# Patient Record
Sex: MALE | Race: WHITE | NOT HISPANIC OR LATINO | Employment: OTHER | URBAN - METROPOLITAN AREA
[De-identification: names, ages, dates, MRNs, and addresses within clinical notes are randomized per-mention and may not be internally consistent; named-entity substitution may affect disease eponyms.]

---

## 2017-01-04 ENCOUNTER — ALLIED HEALTH/NURSE VISIT (OUTPATIENT)
Dept: DERMATOLOGY | Facility: CLINIC | Age: 62
End: 2017-01-04

## 2017-01-04 DIAGNOSIS — L40.9 PSORIASIS: ICD-10-CM

## 2017-01-04 NOTE — PROGRESS NOTES
[facility] Dermatology Phototherapy Record  1. Tony Mcnair is a 61 year old male is here today for phototherapy (UVB) treatment for Psoriasis.        Changes or new medications since last treatment:   NO    New medical conditions or problems since last treatment:   NO    Any problems with last phototherapy treatment?    NO    2. The patient tolerated phototherapy without complication    Patient will return for next UVB treatment, per protocol.     Patient to see provider every 4-12 weeks for follow-up during treatment.      All questions and concerns discussed with patient in clinic today.      Evy Avina

## 2017-01-11 ENCOUNTER — ALLIED HEALTH/NURSE VISIT (OUTPATIENT)
Dept: DERMATOLOGY | Facility: CLINIC | Age: 62
End: 2017-01-11

## 2017-01-11 DIAGNOSIS — L40.9 PSORIASIS: ICD-10-CM

## 2017-01-11 NOTE — PROGRESS NOTES
[facility] Dermatology Phototherapy Record  1. Tony Mcnair is a 61 year old male is here today for phototherapy (UVB) treatment for psoriasis.        Changes or new medications since last treatment:   NO    New medical conditions or problems since last treatment:   NO    Any problems with last phototherapy treatment?    NO    2. The patient tolerated phototherapy without complication    Patient will return for next UVB treatment, per protocol.     Patient to see provider every 4-12 weeks for follow-up during treatment.      All questions and concerns discussed with patient in clinic today.      Mary Aponte

## 2017-01-18 ENCOUNTER — ALLIED HEALTH/NURSE VISIT (OUTPATIENT)
Dept: DERMATOLOGY | Facility: CLINIC | Age: 62
End: 2017-01-18

## 2017-01-18 DIAGNOSIS — L40.9 PSORIASIS: ICD-10-CM

## 2017-01-18 NOTE — PROGRESS NOTES
[facility] Dermatology Phototherapy Record  1. Tony Mcnair is a 61 year old male is here today for phototherapy (UVB) treatment for Psoriasis.        Changes or new medications since last treatment:   NO    New medical conditions or problems since last treatment:   NO    Any problems with last phototherapy treatment?    NO    2. The patient tolerated phototherapy without complication    Patient will return for next UVB treatment, per protocol.     Patient to see provider every 4-12 weeks for follow-up during treatment.      All questions and concerns discussed with patient in clinic today.      Evy Avina

## 2017-01-25 ENCOUNTER — ALLIED HEALTH/NURSE VISIT (OUTPATIENT)
Dept: DERMATOLOGY | Facility: CLINIC | Age: 62
End: 2017-01-25

## 2017-01-25 DIAGNOSIS — L40.9 PSORIASIS: ICD-10-CM

## 2017-01-25 NOTE — PROGRESS NOTES
[facility] Dermatology Phototherapy Record  1. Tony Mcnair is a 61 year old male is here today for phototherapy (UVB) treatment for psoriasis.        Changes or new medications since last treatment:   NO    New medical conditions or problems since last treatment:   NO    Any problems with last phototherapy treatment?    NO    2. The patient tolerated phototherapy without complication    Patient will return for next UVB treatment, per protocol.     Patient to see provider every 4-12 weeks for follow-up during treatment.      All questions and concerns discussed with patient in clinic today.      Mary Aponte

## 2017-02-01 ENCOUNTER — ALLIED HEALTH/NURSE VISIT (OUTPATIENT)
Dept: DERMATOLOGY | Facility: CLINIC | Age: 62
End: 2017-02-01

## 2017-02-01 DIAGNOSIS — L40.9 PSORIASIS: ICD-10-CM

## 2017-02-01 NOTE — PROGRESS NOTES
[facility] Dermatology Phototherapy Record  1. Tony Mcnair is a 61 year old male is here today for phototherapy (UVB) treatment for Psoriasis .        Changes or new medications since last treatment:   NO    New medical conditions or problems since last treatment:   NO    Any problems with last phototherapy treatment?    NO    2. The patient tolerated phototherapy without complication    Patient will return  for next UVB treatment, per protocol.     Patient to see provider every 4-12 weeks for follow-up during treatment.      All questions and concerns discussed with patient in clinic today.      Evy Avina

## 2017-02-08 ENCOUNTER — ALLIED HEALTH/NURSE VISIT (OUTPATIENT)
Dept: DERMATOLOGY | Facility: CLINIC | Age: 62
End: 2017-02-08

## 2017-02-08 DIAGNOSIS — L40.9 PSORIASIS: ICD-10-CM

## 2017-02-08 NOTE — PROGRESS NOTES
[facility] Dermatology Phototherapy Record  1. Tony Mcnair is a 61 year old male is here today for phototherapy (UVB) treatment for Psoriasis.        Changes or new medications since last treatment:   NO    New medical conditions or problems since last treatment:   NO    Any problems with last phototherapy treatment?    NO    2. The patient tolerated phototherapy without complication    Patient will return for next UVB treatment, per protocol.     Patient to see provider every 4-12 weeks for follow-up during treatment.      All questions and concerns discussed with patient in clinic today.      Evy Avina

## 2017-02-15 ENCOUNTER — ALLIED HEALTH/NURSE VISIT (OUTPATIENT)
Dept: DERMATOLOGY | Facility: CLINIC | Age: 62
End: 2017-02-15

## 2017-02-15 DIAGNOSIS — L40.9 PSORIASIS: ICD-10-CM

## 2017-02-15 NOTE — PROGRESS NOTES
[facility] Dermatology Phototherapy Record  1. Tony Mcnair is a 61 year old male is here today for phototherapy (UVB) treatment for Psoriasis.        Changes or new medications since last treatment:   NO    New medical conditions or problems since last treatment:   NO    Any problems with last phototherapy treatment?    NO    2. The patient tolerated phototherapy without complication    Patient will return for next UVB treatment, per protocol.     Patient to see provider every 4-12 weeks for follow-up during treatment.      All questions and concerns discussed with patient in clinic today.      Evy Avina

## 2017-02-22 ENCOUNTER — ALLIED HEALTH/NURSE VISIT (OUTPATIENT)
Dept: DERMATOLOGY | Facility: CLINIC | Age: 62
End: 2017-02-22

## 2017-02-22 DIAGNOSIS — L40.9 PSORIASIS: ICD-10-CM

## 2017-02-22 NOTE — MR AVS SNAPSHOT
After Visit Summary   2/22/2017    Tony Mcnair    MRN: [id number]           Patient Information     Date Of Birth          1955        Visit Information        Provider Department      2/22/2017 7:25 AM UC DERM LIGHT TREATMENT [facility] Dermatology        Today's Diagnoses     Psoriasis           Follow-ups after your visit        Your next 10 appointments already scheduled     Mar 01, 2017  7:30 AM CST   (Arrive by 7:15 AM)   Light Treatment Visit with UC DERM LIGHT TREATMENT   [facility] Dermatology ([facility])    [address] [place] [address]   [phone]            Mar 08, 2017  7:30 AM CST   (Arrive by 7:15 AM)   Light Treatment Visit with UC DERM LIGHT TREATMENT   [facility] Dermatology ([facility])    [address] [place] [address]   [phone]            Mar 15, 2017  7:30 AM CDT   (Arrive by 7:15 AM)   Light Treatment Visit with UC DERM LIGHT TREATMENT   [facility] Dermatology ([facility])    [address] [place] [address]   [phone]            Mar 15, 2017  8:00 AM CDT   (Arrive by 7:45 AM)   Return Visit with Sandhya Mckinney MD   [facility] Dermatology ([facility])    [address] [place] [address]   [phone]            Mar 22, 2017  7:30 AM CDT   (Arrive by 7:15 AM)   Light Treatment Visit with UC DERM LIGHT TREATMENT   [facility] Dermatology ([facility])    [address] [place] [address]   [phone]            Mar 29, 2017  7:30 AM CDT   (Arrive by 7:15 AM)   Light Treatment Visit with UC DERM LIGHT TREATMENT   [facility] Dermatology ([facility])    [address] [place] [address]   [phone]              Who to contact     Please call your clinic at  [phone] to:    Ask questions about your health    Make or cancel appointments    Discuss your medicines    Learn about your test results    Speak to your doctor   If you have compliments or concerns about an experience at your clinic, or if you wish to file a complaint, please contact [facility] Patient Relations at [phone] or email us at [email or web address]         Additional Information About Your Visit        [employer] Information     [employer] gives you secure access to your electronic health record. If you see a primary care provider, you can also send messages to your care team and make appointments. If you have questions, please call your primary care clinic.  If you do not have a primary care provider, please call [phone] and they will assist you.      [employer] is an electronic gateway that provides easy, online access to your medical records. With [employer], you can request a clinic appointment, read your test results, renew a prescription or communicate with your care team.     To access your existing account, please contact your [facility] or call [phone] for assistance.        Care EveryWhere ID     This is your Care EveryWhere ID. This could be used by other organizations to access your [place] medical records  [id number]         Blood Pressure from Last 3 Encounters:   01/23/15 (!) 145/100   10/08/14 133/81   03/03/14 131/89    Weight from Last 3 Encounters:   01/23/15 78 kg (172 lb)   10/08/14 75.3 kg (166 lb)   06/25/14 78.2 kg (172 lb 6.4 oz)              We Performed the Following     CHARGE - PHOTOTHERAPY NBUVB (UV LIGHT)     PROCEDURE - PHOTOTHERAPY NBUVB        Primary Care Provider Office Phone # Fax #    William Morton [phone]       [address] [address]        Thank you!     Thank you for choosing [facility] DERMATOLOGY  for your care. Our  goal is always to provide you with excellent care. Hearing back from our patients is one way we can continue to improve our services. Please take a few minutes to complete the written survey that you may receive in the mail after your visit with us. Thank you!             Your Updated Medication List - Protect others around you: Learn how to safely use, store and throw away your medicines at www.disposemymeds.org.          This list is accurate as of: 2/22/17  8:27 AM.  Always use your most recent med list.                   Brand Name Dispense Instructions for use    BIOTIN PO      Take  by mouth daily.       calcipotriene-betameth diprop 0.005-0.064 % Oint    TACLONEX    60 g    Externally apply  topically 2 times daily as needed.       COD LIVER OIL PO      Take  by mouth daily.       CONCERTA 18 MG CR tablet   Generic drug:  methylphenidate ER      Take 18 mg by mouth every morning.       DAILY MULTIVITAMIN PO      Take  by mouth daily.       fluocinonide 0.05 % solution    LIDEX    60 mL    Apply  topically 2 times daily for itchy scalp or scalp psoriasis       pimecrolimus 1 % cream    ELIDEL    60 g    Apply topically 2 times daily as needed For psoriasis of face or genitals       REFRESH P.M. Oint     1 Tube    Apply 1 inch to eye nightly as needed       tacrolimus 0.1 % ointment    PROTOPIC    60 g    Apply topically 2 times daily Apply to areas around eyes as needed       Vitamin C 100 MG Tabs      Take  by mouth daily.       VITAMIN E COMPLEX PO      Take by mouth daily

## 2017-02-22 NOTE — PROGRESS NOTES
[facility] Dermatology Phototherapy Record  1. Tony Mcnair is a 61 year old male is here today for phototherapy (UVB) treatment for Psoriasis.        Changes or new medications since last treatment:   NO    New medical conditions or problems since last treatment:   NO    Any problems with last phototherapy treatment?    NO    2. The patient tolerated phototherapy without complication    Patient will return for next UVB treatment, per protocol.     Patient to see provider every 4-12 weeks for follow-up during treatment.      All questions and concerns discussed with patient in clinic today.      Evy Avina

## 2017-03-01 ENCOUNTER — ALLIED HEALTH/NURSE VISIT (OUTPATIENT)
Dept: DERMATOLOGY | Facility: CLINIC | Age: 62
End: 2017-03-01

## 2017-03-01 DIAGNOSIS — L40.9 PSORIASIS: ICD-10-CM

## 2017-03-01 NOTE — MR AVS SNAPSHOT
After Visit Summary   3/1/2017    Tony Mcnair    MRN: [id number]           Patient Information     Date Of Birth          1955        Visit Information        Provider Department      3/1/2017 7:30 AM UC DERM LIGHT TREATMENT [facility] Dermatology        Today's Diagnoses     Psoriasis           Follow-ups after your visit        Your next 10 appointments already scheduled     Mar 08, 2017  7:30 AM CST   (Arrive by 7:15 AM)   Light Treatment Visit with UC DERM LIGHT TREATMENT   [facility] Dermatology ([facility])    [address] [place] [address]   [phone]            Mar 15, 2017  7:30 AM CDT   (Arrive by 7:15 AM)   Light Treatment Visit with UC DERM LIGHT TREATMENT   [facility] Dermatology ([facility])    [address] [place] [address]   [phone]            Mar 15, 2017  8:00 AM CDT   (Arrive by 7:45 AM)   Return Visit with Sandhya Mckinney MD   [facility] Dermatology ([facility])    [address] [place] [address]   [phone]            Mar 22, 2017  7:30 AM CDT   (Arrive by 7:15 AM)   Light Treatment Visit with UC DERM LIGHT TREATMENT   [facility] Dermatology ([facility])    [address] [place] [address]   [phone]            Mar 29, 2017  7:30 AM CDT   (Arrive by 7:15 AM)   Light Treatment Visit with UC DERM LIGHT TREATMENT   [facility])    [address] [place] [address]   [phone]              Who to contact     Please call your clinic at [phone] to:    Ask questions about your health    Make or cancel appointments    Discuss your medicines    Learn about your test results    Speak to your doctor   If you have compliments or concerns about an experience at your clinic, or if you wish to  file a complaint, please contact [facility] Patient Relations at [phone] or email us at [email or web address]         Additional Information About Your Visit        [employer] Information     [email or web address] gives you secure access to your electronic health record. If you see a primary care provider, you can also send messages to your care team and make appointments. If you have questions, please call your primary care clinic.  If you do not have a primary care provider, please call [phone] and they will assist you.      [email or web address] is an electronic gateway that provides easy, online access to your medical records. With [email or web address], you can request a clinic appointment, read your test results, renew a prescription or communicate with your care team.     To access your existing account, please contact your [facility] or call [phone] for assistance.        Care EveryWhere ID     This is your Care EveryWhere ID. This could be used by other organizations to access your [place] medical records  [id number]         Blood Pressure from Last 3 Encounters:   01/23/15 (!) 145/100   10/08/14 133/81   03/03/14 131/89    Weight from Last 3 Encounters:   01/23/15 78 kg (172 lb)   10/08/14 75.3 kg (166 lb)   06/25/14 78.2 kg (172 lb 6.4 oz)              We Performed the Following     CHARGE - PHOTOTHERAPY NBUVB (UV LIGHT)     PROCEDURE - PHOTOTHERAPY NBUVB          Today's Medication Changes          These changes are accurate as of: 3/1/17  9:24 AM.  If you have any questions, ask your nurse or doctor.               Stop taking these medicines if you haven't already. Please contact your care team if you have questions.     COD LIVER OIL PO                    Primary Care Provider Office Phone # Fax #    William Morton [phone]       [address] [address]        Thank you!     Thank you for  choosing [facility] DERMATOLOGY  for your care. Our goal is always to provide you with excellent care. Hearing back from our patients is one way we can continue to improve our services. Please take a few minutes to complete the written survey that you may receive in the mail after your visit with us. Thank you!             Your Updated Medication List - Protect others around you: Learn how to safely use, store and throw away your medicines at www.disposemymeds.org.          This list is accurate as of: 3/1/17  9:24 AM.  Always use your most recent med list.                   Brand Name Dispense Instructions for use    BIOTIN PO      Take  by mouth daily.       calcipotriene-betameth diprop 0.005-0.064 % Oint    TACLONEX    60 g    Externally apply  topically 2 times daily as needed.       CONCERTA 18 MG CR tablet   Generic drug:  methylphenidate ER      Take 18 mg by mouth every morning.       DAILY MULTIVITAMIN PO      Take  by mouth daily.       fluocinonide 0.05 % solution    LIDEX    60 mL    Apply  topically 2 times daily for itchy scalp or scalp psoriasis       pimecrolimus 1 % cream    ELIDEL    60 g    Apply topically 2 times daily as needed For psoriasis of face or genitals       REFRESH P.M. Oint     1 Tube    Apply 1 inch to eye nightly as needed       tacrolimus 0.1 % ointment    PROTOPIC    60 g    Apply topically 2 times daily Apply to areas around eyes as needed       Vitamin C 100 MG Tabs      Take  by mouth daily.       VITAMIN E COMPLEX PO      Take by mouth daily

## 2017-03-01 NOTE — PROGRESS NOTES
[facility] Dermatology Phototherapy Record  1. Tony Mcnair is a 61 year old male is here today for phototherapy (UVB) treatment forPsoriasis.        Changes or new medications since last treatment:   NO    New medical conditions or problems since last treatment:   NO    Any problems with last phototherapy treatment?    NO    2. The patient tolerated phototherapy without complication    Patient will return for next UVB treatment, per protocol.     Patient to see provider every 4-12 weeks for follow-up during treatment.      All questions and concerns discussed with patient in clinic today.      Evy Avina

## 2017-03-08 ENCOUNTER — ALLIED HEALTH/NURSE VISIT (OUTPATIENT)
Dept: DERMATOLOGY | Facility: CLINIC | Age: 62
End: 2017-03-08

## 2017-03-08 DIAGNOSIS — L40.9 PSORIASIS: ICD-10-CM

## 2017-03-08 NOTE — MR AVS SNAPSHOT
After Visit Summary   3/8/2017    Tony Mcnair    MRN: [id number]           Patient Information     Date Of Birth          1955        Visit Information        Provider Department      3/8/2017 7:30 AM UC DERM LIGHT TREATMENT [facility] Dermatology        Today's Diagnoses     Psoriasis           Follow-ups after your visit        Your next 10 appointments already scheduled     Mar 15, 2017  7:30 AM CDT   (Arrive by 7:15 AM)   Light Treatment Visit with UC DERM LIGHT TREATMENT   [facility] Dermatology ([facility])    [address] [place] [id number] [phone]            Mar 22, 2017  7:30 AM CDT   (Arrive by 7:15 AM)   Light Treatment Visit with UC DERM LIGHT TREATMENT   [facility] Dermatology ([facility])    [address] [place] [id number] [phone]            Mar 29, 2017  7:30 AM CDT   (Arrive by 7:15 AM)   Light Treatment Visit with UC DERM LIGHT TREATMENT   [facility])    [address] [place] [id number] [phone]              Who to contact     Please call your clinic at [phone] to:    Ask questions about your health    Make or cancel appointments    Discuss your medicines    Learn about your test results    Speak to your doctor   If you have compliments or concerns about an experience at your clinic, or if you wish to file a complaint, please contact [facility] Patient Relations at [phone] or email us at [email or web address]         Additional Information About Your Visit        MyChart Information     [employer] gives you secure access to your electronic health record. If you see a primary care provider, you can also send messages to your care team and make appointments. If you have questions, please call your primary care clinic.  If you do not have a primary  care provider, please call [phone] and they will assist you.      [employer] is an electronic gateway that provides easy, online access to your medical records. With [employer], you can request a clinic appointment, read your test results, renew a prescription or communicate with your care team.     To access your existing account, please contact your [facility] or call [phone] for assistance.        Care EveryWhere ID     This is your Care EveryWhere ID. This could be used by other organizations to access your [place] medical records  [id number]         Blood Pressure from Last 3 Encounters:   01/23/15 (!) 145/100   10/08/14 133/81   03/03/14 131/89    Weight from Last 3 Encounters:   01/23/15 78 kg (172 lb)   10/08/14 75.3 kg (166 lb)   06/25/14 78.2 kg (172 lb 6.4 oz)              We Performed the Following     CHARGE - PHOTOTHERAPY NBUVB (UV LIGHT)     PROCEDURE - PHOTOTHERAPY NBUVB        Primary Care Provider Office Phone # Fax #    William Morton [phone]       [place] [address]        Thank you!     Thank you for choosing [facility] DERMATOLOGY  for your care. Our goal is always to provide you with excellent care. Hearing back from our patients is one way we can continue to improve our services. Please take a few minutes to complete the written survey that you may receive in the mail after your visit with us. Thank you!             Your Updated Medication List - Protect others around you: Learn how to safely use, store and throw away your medicines at www.disposemymeds.org.          This list is accurate as of: 3/8/17  9:50 AM.  Always use your most recent med list.                   Brand Name Dispense Instructions for use    BIOTIN PO      Take  by mouth daily.       calcipotriene-betameth diprop 0.005-0.064 % Oint    TACLONEX    60 g    Externally apply  topically 2 times daily as needed.        CONCERTA 18 MG CR tablet   Generic drug:  methylphenidate ER      Take 18 mg by mouth every morning.       DAILY MULTIVITAMIN PO      Take  by mouth daily.       fluocinonide 0.05 % solution    LIDEX    60 mL    Apply  topically 2 times daily for itchy scalp or scalp psoriasis       pimecrolimus 1 % cream    ELIDEL    60 g    Apply topically 2 times daily as needed For psoriasis of face or genitals       REFRESH P.M. Oint     1 Tube    Apply 1 inch to eye nightly as needed       tacrolimus 0.1 % ointment    PROTOPIC    60 g    Apply topically 2 times daily Apply to areas around eyes as needed       Vitamin C 100 MG Tabs      Take  by mouth daily.       VITAMIN E COMPLEX PO      Take by mouth daily

## 2017-03-08 NOTE — PROGRESS NOTES
[facility] Dermatology Phototherapy Record  1. Tony Mcnair is a 61 year old male is here today for phototherapy (UVB) treatment forPsoriasis .        Changes or new medications since last treatment:   NO    New medical conditions or problems since last treatment:   NO    Any problems with last phototherapy treatment?    NO    2. The patient tolerated phototherapy without complication    Patient will return for next UVB treatment, per protocol.     Patient to see provider every 4-12 weeks for follow-up during treatment.      All questions and concerns discussed with patient in clinic today.      Lorrie Graham

## 2017-03-15 ENCOUNTER — ALLIED HEALTH/NURSE VISIT (OUTPATIENT)
Dept: DERMATOLOGY | Facility: CLINIC | Age: 62
End: 2017-03-15

## 2017-03-15 DIAGNOSIS — L40.9 PSORIASIS: ICD-10-CM

## 2017-03-15 NOTE — MR AVS SNAPSHOT
After Visit Summary   3/15/2017    Tony Mcnair    MRN: [id number]           Patient Information     Date Of Birth          1955        Visit Information        Provider Department      3/15/2017 7:30 AM UC DERM LIGHT TREATMENT [facility] Dermatology        Today's Diagnoses     Psoriasis           Follow-ups after your visit        Your next 10 appointments already scheduled     Mar 21, 2017  8:00 AM CDT   (Arrive by 7:45 AM)   Return Visit with Petar Corbett MD   [facility] Dermatology ([facility])    [address] [place] [id number] [phone]            Mar 21, 2017  9:00 AM CDT   (Arrive by 8:45 AM)   Light Treatment Visit with UC DERM LIGHT TREATMENT   [facility] Dermatology ([facility])    [address] [place] [id number] [phone]            Mar 29, 2017  7:30 AM CDT   (Arrive by 7:15 AM)   Light Treatment Visit with UC DERM LIGHT TREATMENT   [facility] Dermatology ([facility])    [address] [place] [id number] [phone]              Who to contact     Please call your clinic at [phone] to:    Ask questions about your health    Make or cancel appointments    Discuss your medicines    Learn about your test results    Speak to your doctor   If you have compliments or concerns about an experience at your clinic, or if you wish to file a complaint, please contact [facility] Patient Relations at [phone] or email us at [email or web address]         Additional Information About Your Visit        MyChart Information     [employer] gives you secure access to your electronic health record. If you see a primary care provider, you can also send messages to your care team and make appointments. If you have questions, please call your primary care clinic.  If you do not have a primary care  provider, please call [phone] and they will assist you.      [employer] is an electronic gateway that provides easy, online access to your medical records. With [employer], you can request a clinic appointment, read your test results, renew a prescription or communicate with your care team.     To access your existing account, please contact your [facility] or call [phone] for assistance.        Care EveryWhere ID     This is your Care EveryWhere ID. This could be used by other organizations to access your [place] medical records  [id number]         Blood Pressure from Last 3 Encounters:   01/23/15 (!) 145/100   10/08/14 133/81   03/03/14 131/89    Weight from Last 3 Encounters:   01/23/15 78 kg (172 lb)   10/08/14 75.3 kg (166 lb)   06/25/14 78.2 kg (172 lb 6.4 oz)              We Performed the Following     CHARGE - PHOTOTHERAPY NBUVB (UV LIGHT)     PROCEDURE - PHOTOTHERAPY NBUVB        Primary Care Provider Office Phone # Fax #    William Morton [phone]       [place] [address]        Thank you!     Thank you for choosing [facility] DERMATOLOGY  for your care. Our goal is always to provide you with excellent care. Hearing back from our patients is one way we can continue to improve our services. Please take a few minutes to complete the written survey that you may receive in the mail after your visit with us. Thank you!             Your Updated Medication List - Protect others around you: Learn how to safely use, store and throw away your medicines at www.disposemymeds.org.          This list is accurate as of: 3/15/17 10:25 AM.  Always use your most recent med list.                   Brand Name Dispense Instructions for use    BIOTIN PO      Take  by mouth daily.       calcipotriene-betameth diprop 0.005-0.064 % Oint    TACLONEX    60 g    Externally apply  topically 2 times daily as needed.        CONCERTA 18 MG CR tablet   Generic drug:  methylphenidate ER      Take 18 mg by mouth every morning.       DAILY MULTIVITAMIN PO      Take  by mouth daily.       fluocinonide 0.05 % solution    LIDEX    60 mL    Apply  topically 2 times daily for itchy scalp or scalp psoriasis       pimecrolimus 1 % cream    ELIDEL    60 g    Apply topically 2 times daily as needed For psoriasis of face or genitals       REFRESH P.M. Oint     1 Tube    Apply 1 inch to eye nightly as needed       tacrolimus 0.1 % ointment    PROTOPIC    60 g    Apply topically 2 times daily Apply to areas around eyes as needed       Vitamin C 100 MG Tabs      Take  by mouth daily.       VITAMIN E COMPLEX PO      Take by mouth daily

## 2017-03-15 NOTE — PROGRESS NOTES
[facility] Dermatology Phototherapy Record  1. Tony Mcnair is a 61 year old male is here today for phototherapy (UVB) treatment for Psoriasis.        Changes or new medications since last treatment:   NO    New medical conditions or problems since last treatment:   NO    Any problems with last phototherapy treatment?    NO    2. The patient tolerated phototherapy without complication    Patient will return for next UVB treatment, per protocol.     Patient to see provider every 4-12 weeks for follow-up during treatment.      All questions and concerns discussed with patient in clinic today.      Evy Avina

## 2017-03-21 ENCOUNTER — ALLIED HEALTH/NURSE VISIT (OUTPATIENT)
Dept: DERMATOLOGY | Facility: CLINIC | Age: 62
End: 2017-03-21

## 2017-03-21 ENCOUNTER — OFFICE VISIT (OUTPATIENT)
Dept: DERMATOLOGY | Facility: CLINIC | Age: 62
End: 2017-03-21

## 2017-03-21 DIAGNOSIS — L40.9 PSORIASIS: ICD-10-CM

## 2017-03-21 DIAGNOSIS — L40.9 PSORIASIS: Primary | ICD-10-CM

## 2017-03-21 RX ORDER — CHLORAL HYDRATE 500 MG
2 CAPSULE ORAL DAILY
COMMUNITY
End: 2018-10-23

## 2017-03-21 ASSESSMENT — PAIN SCALES - GENERAL: PAINLEVEL: NO PAIN (0)

## 2017-03-21 NOTE — LETTER
3/21/2017       RE: Tony Mcnair  [address] [place] [id number]     Dear Colleague,    Thank you for referring your patient, Tony Mcnair, to the [facility] DERMATOLOGY at [facility]. Please see a copy of my visit note below.    CHIEF COMPLAINT:  Follow up on psoriasis.      SUBJECTIVE:  Tony is a very pleasant 61-year-old male with a longstanding history of limited plaque type psoriasis affecting approximately 5% of his total body surface area.  He was last seen by me in the summer of 2015 but has also seen my colleague, Dr. Erendira Mckinney, in the last several years.  His current plan is narrowband UVB phototherapy once weekly as well as topical steroids and Elidel cream for sensitive areas.  Lately, he has been consistent about his weekly phototherapy, but he has noticed some flaring on his lower extremities over the past several months.  He is generally happy with his control but is worried about some spreading.  He also notes some intermittent arthritis over the last several months which he feels is most pronounced in his hips.  He thinks this is worse at the end of the day and he does not have significant morning stiffness.      REVIEW OF SYSTEMS:  Joint pain as noted above.  No recent fevers or chills.      PHYSICAL EXAMINATION:   GENERAL:  This is a well-appearing, well-nourished male with a normal mood and affect who is oriented x3.   SKIN:  A cutaneous exam of the head, neck and bilateral upper and lower extremities was performed.  On the extensor elbows and knees, there are thin, well-marginated, scaly, erythematous plaques.  Similar well-marginated appearing, thin, partially treated psoriatic plaques are seen on the pretibial areas and lower lateral calves.      ASSESSMENT AND PLAN:  Limited plaque type psoriasis, with approximately 5% total body surface area.  Tony is relatively happy with his current control but would like to improve the skin on his lower  extremities.  We discussed newer options for psoriasis, such as TNF alpha blockade or Otezla, though he has concerns about side effects from these.  I discussed with him that if he has true inflammatory psoriatic arthritis, that either a TNF blockade or methotrexate may be reasonable options, though I am not convinced that his arthritis complaints are actually psoriatic arthritis.  Today's plan is as follows:   1.  He will temporarily increase his phototherapy to 2 times weekly.  He may return to once weekly once his lower extremities are under better control.   2.  He will continue Lidex solution for his scalp as well as Elidel cream for areas of sensitive involvement.   3.  I provided him with a referral to Rheumatology to further evaluate for psoriatic arthritis.   4.  He will follow up in our clinic in 6-12 months.  At this point, we may consider additional therapy if he is still flaring.   5.  I encouraged him to continue close followup with his primary care provider and to have close monitoring of his blood pressure and cholesterol.       Petar Corbett MD  Dermatology Attending

## 2017-03-21 NOTE — PROGRESS NOTES
CHIEF COMPLAINT:  Follow up on psoriasis.      SUBJECTIVE:  Tony is a very pleasant 61-year-old male with a longstanding history of limited plaque type psoriasis affecting approximately 5% of his total body surface area.  He was last seen by me in the summer of 2015 but has also seen my colleague, Dr. Erendira Mckinney, in the last several years.  His current plan is narrowband UVB phototherapy once weekly as well as topical steroids and Elidel cream for sensitive areas.  Lately, he has been consistent about his weekly phototherapy, but he has noticed some flaring on his lower extremities over the past several months.  He is generally happy with his control but is worried about some spreading.  He also notes some intermittent arthritis over the last several months which he feels is most pronounced in his hips.  He thinks this is worse at the end of the day and he does not have significant morning stiffness.      REVIEW OF SYSTEMS:  Joint pain as noted above.  No recent fevers or chills.      PHYSICAL EXAMINATION:   GENERAL:  This is a well-appearing, well-nourished male with a normal mood and affect who is oriented x3.   SKIN:  A cutaneous exam of the head, neck and bilateral upper and lower extremities was performed.  On the extensor elbows and knees, there are thin, well-marginated, scaly, erythematous plaques.  Similar well-marginated appearing, thin, partially treated psoriatic plaques are seen on the pretibial areas and lower lateral calves.      ASSESSMENT AND PLAN:  Limited plaque type psoriasis, with approximately 5% total body surface area.  Tony is relatively happy with his current control but would like to improve the skin on his lower extremities.  We discussed newer options for psoriasis, such as TNF alpha blockade or Otezla, though he has concerns about side effects from these.  I discussed with him that if he has true inflammatory psoriatic arthritis, that either a TNF blockade or methotrexate may be  reasonable options, though I am not convinced that his arthritis complaints are actually psoriatic arthritis.  Today's plan is as follows:   1.  He will temporarily increase his phototherapy to 2 times weekly.  He may return to once weekly once his lower extremities are under better control.   2.  He will continue Lidex solution for his scalp as well as Elidel cream for areas of sensitive involvement.   3.  I provided him with a referral to Rheumatology to further evaluate for psoriatic arthritis.   4.  He will follow up in our clinic in 6-12 months.  At this point, we may consider additional therapy if he is still flaring.   5.  I encouraged him to continue close followup with his primary care provider and to have close monitoring of his blood pressure and cholesterol.       Petar Corbett MD  Dermatology Attending

## 2017-03-21 NOTE — MR AVS SNAPSHOT
After Visit Summary   3/21/2017    Tony Mcnair    MRN: [id number]           Patient Information     Date Of Birth          1955        Visit Information        Provider Department      3/21/2017 8:00 AM Petar Corbett MD [facility] Dermatology        Today's Diagnoses     Psoriasis    -  1       Follow-ups after your visit        Additional Services     RHEUMATOLOGY REFERRAL       Your provider has referred you to: [facility]: Rheumatology Clinic [facility] [phone]   [email or web address]    Please eval for psoriatic arthritis activity    Please be aware that coverage of these services is subject to the terms and limitations of your health insurance plan.  Call member services at your health plan with any benefit or coverage questions.      Please bring the following with you to your appointment:    (1) Any X-Rays, CTs or MRIs which have been performed.  Contact the facility where they were done to arrange for  prior to your scheduled appointment.    (2) List of current medications   (3) This referral request   (4) Any documents/labs given to you for this referral                  Your next 10 appointments already scheduled     Mar 21, 2017  9:00 AM CDT   (Arrive by 8:45 AM)   Light Treatment Visit with UC DERM LIGHT TREATMENT   [facility] Dermatology ([facility])    [address] [place] [id number] [phone]            Mar 29, 2017  7:30 AM CDT   (Arrive by 7:15 AM)   Light Treatment Visit with UC DERM LIGHT TREATMENT   [facility] Dermatology ([facility])    [address] [place] [id number] [phone]              Who to contact     Please call your clinic at [phone] to:    Ask questions about your health    Make or cancel appointments    Discuss your medicines    Learn about your test results    Speak to your doctor   If you have  compliments or concerns about an experience at your clinic, or if you wish to file a complaint, please contact [facility] Patient Relations at [phone] or email us at [email or web address]         Additional Information About Your Visit        MyChart Information     [employer] gives you secure access to your electronic health record. If you see a primary care provider, you can also send messages to your care team and make appointments. If you have questions, please call your primary care clinic.  If you do not have a primary care provider, please call [phone] and they will assist you.      [employer] is an electronic gateway that provides easy, online access to your medical records. With [employer], you can request a clinic appointment, read your test results, renew a prescription or communicate with your care team.     To access your existing account, please contact your [facility] or call [phone] for assistance.        Care EveryWhere ID     This is your Care EveryWhere ID. This could be used by other organizations to access your [place] medical records  [id number]         Blood Pressure from Last 3 Encounters:   01/23/15 (!) 145/100   10/08/14 133/81   03/03/14 131/89    Weight from Last 3 Encounters:   01/23/15 78 kg (172 lb)   10/08/14 75.3 kg (166 lb)   06/25/14 78.2 kg (172 lb 6.4 oz)              We Performed the Following     RHEUMATOLOGY REFERRAL          Today's Medication Changes          These changes are accurate as of: 3/21/17  8:28 AM.  If you have any questions, ask your nurse or doctor.               Stop taking these medicines if you haven't already. Please contact your care team if you have questions.     VITAMIN E COMPLEX PO   Stopped by:  Petar Corbett MD                    Primary Care Provider Office Phone # Fax #    William Morton [phone]       [address]  [facility] [address]        Thank you!     Thank you for choosing [facility] DERMATOLOGY  for your care. Our goal is always to provide you with excellent care. Hearing back from our patients is one way we can continue to improve our services. Please take a few minutes to complete the written survey that you may receive in the mail after your visit with us. Thank you!             Your Updated Medication List - Protect others around you: Learn how to safely use, store and throw away your medicines at www.disposemymeds.org.          This list is accurate as of: 3/21/17  8:28 AM.  Always use your most recent med list.                   Brand Name Dispense Instructions for use    BIOTIN PO      Take  by mouth daily.       calcipotriene-betameth diprop 0.005-0.064 % Oint    TACLONEX    60 g    Externally apply  topically 2 times daily as needed.       CONCERTA 18 MG CR tablet   Generic drug:  methylphenidate ER      Take 18 mg by mouth every morning.       DAILY MULTIVITAMIN PO      Take  by mouth daily.       fish oil-omega-3 fatty acids 1000 MG capsule      Take 2 g by mouth daily       fluocinonide 0.05 % solution    LIDEX    60 mL    Apply  topically 2 times daily for itchy scalp or scalp psoriasis       pimecrolimus 1 % cream    ELIDEL    60 g    Apply topically 2 times daily as needed For psoriasis of face or genitals       REFRESH P.M. Oint     1 Tube    Apply 1 inch to eye nightly as needed       tacrolimus 0.1 % ointment    PROTOPIC    60 g    Apply topically 2 times daily Apply to areas around eyes as needed       Vitamin C 100 MG Tabs      Take  by mouth daily.       VITAMIN D (CHOLECALCIFEROL) PO      Take by mouth daily

## 2017-03-21 NOTE — PROGRESS NOTES
[facility] Dermatology Phototherapy Record  1. Tony Mcnair is a 61 year old male is here today for phototherapy (UVB) treatment for Psoriasis.        Changes or new medications since last treatment:   NO    New medical conditions or problems since last treatment:   NO    Any problems with last phototherapy treatment?    NO    2. The patient tolerated phototherapy without complication    Patient will return for next UVB treatment, per protocol.     Patient to see provider every 4-12 weeks for follow-up during treatment.      All questions and concerns discussed with patient in clinic today.      Georgina Wills

## 2017-03-21 NOTE — MR AVS SNAPSHOT
After Visit Summary   3/21/2017    Tony Mcnair    MRN: [id number]           Patient Information     Date Of Birth          1955        Visit Information        Provider Department      3/21/2017 9:00 AM UC DERM LIGHT TREATMENT [facility] Dermatology        Today's Diagnoses     Psoriasis           Follow-ups after your visit        Your next 10 appointments already scheduled     Mar 29, 2017  7:30 AM CDT   (Arrive by 7:15 AM)   Light Treatment Visit with UC DERM LIGHT TREATMENT   [facility] Dermatology ([facility] [place])    [address] [address] [phone]              Who to contact     Please call your clinic at [phone] to:    Ask questions about your health    Make or cancel appointments    Discuss your medicines    Learn about your test results    Speak to your doctor   If you have compliments or concerns about an experience at your clinic, or if you wish to file a complaint, please contact [facility] Patient Relations at [phone] or email us at [email or web address]         Additional Information About Your Visit        MyChart Information     [employer] gives you secure access to your electronic health record. If you see a primary care provider, you can also send messages to your care team and make appointments. If you have questions, please call your primary care clinic.  If you do not have a primary care provider, please call [phone] and they will assist you.      [employer] is an electronic gateway that provides easy, online access to your medical records. With [employer] you can request a clinic appointment, read your test results, renew a prescription or communicate with your care team.     To access your existing account, please contact your [facility] or call [phone] for assistance.        Care EveryWhere ID     This is your Care EveryWhere ID. This  could be used by other organizations to access your [place] medical records  [id number]         Blood Pressure from Last 3 Encounters:   01/23/15 (!) 145/100   10/08/14 133/81   03/03/14 131/89    Weight from Last 3 Encounters:   01/23/15 78 kg (172 lb)   10/08/14 75.3 kg (166 lb)   06/25/14 78.2 kg (172 lb 6.4 oz)              We Performed the Following     PROCEDURE - PHOTOTHERAPY NBUVB          Today's Medication Changes          These changes are accurate as of: 3/21/17  9:20 AM.  If you have any questions, ask your nurse or doctor.               Stop taking these medicines if you haven't already. Please contact your care team if you have questions.     VITAMIN E COMPLEX PO   Stopped by:  Petar Corbett MD                    Primary Care Provider Office Phone # Fax #    William Morton [phone]       [place] [address]        Thank you!     Thank you for choosing [facility] DERMATOLOGY  for your care. Our goal is always to provide you with excellent care. Hearing back from our patients is one way we can continue to improve our services. Please take a few minutes to complete the written survey that you may receive in the mail after your visit with us. Thank you!             Your Updated Medication List - Protect others around you: Learn how to safely use, store and throw away your medicines at www.disposemymeds.org.          This list is accurate as of: 3/21/17  9:20 AM.  Always use your most recent med list.                   Brand Name Dispense Instructions for use    BIOTIN PO      Take  by mouth daily.       calcipotriene-betameth diprop 0.005-0.064 % Oint    TACLONEX    60 g    Externally apply  topically 2 times daily as needed.       CONCERTA 18 MG CR tablet   Generic drug:  methylphenidate ER      Take 18 mg by mouth every morning.       DAILY MULTIVITAMIN PO      Take  by mouth daily.       fish oil-omega-3 fatty acids 1000 MG  capsule      Take 2 g by mouth daily       fluocinonide 0.05 % solution    LIDEX    60 mL    Apply  topically 2 times daily for itchy scalp or scalp psoriasis       pimecrolimus 1 % cream    ELIDEL    60 g    Apply topically 2 times daily as needed For psoriasis of face or genitals       REFRESH P.M. Oint     1 Tube    Apply 1 inch to eye nightly as needed       tacrolimus 0.1 % ointment    PROTOPIC    60 g    Apply topically 2 times daily Apply to areas around eyes as needed       Vitamin C 100 MG Tabs      Take  by mouth daily.       VITAMIN D (CHOLECALCIFEROL) PO      Take by mouth daily

## 2017-03-26 ENCOUNTER — MYC MEDICAL ADVICE (OUTPATIENT)
Dept: RHEUMATOLOGY | Facility: CLINIC | Age: 62
End: 2017-03-26

## 2017-03-28 NOTE — TELEPHONE ENCOUNTER
Staff message sent to Dr Malik at 10:30 am:    You   Terrance Malik MD 39 minutes ago (10:43 AM)                 Can I put him at 8 am on 4/21/17? It appears a rtn was scheduled in the new slot and left the second 1/2 hour open. (Routing comment)        Mychart message sent to pt asking that he call office to discuss.

## 2017-03-29 ENCOUNTER — ALLIED HEALTH/NURSE VISIT (OUTPATIENT)
Dept: DERMATOLOGY | Facility: CLINIC | Age: 62
End: 2017-03-29

## 2017-03-29 DIAGNOSIS — L40.9 PSORIASIS: ICD-10-CM

## 2017-03-29 NOTE — PROGRESS NOTES
[facility] Dermatology Phototherapy Record  1. Tony Mcnair is a 61 year old male is here today for phototherapy (UVB) treatment for Psoriasis.        Changes or new medications since last treatment:   NO    New medical conditions or problems since last treatment:   NO    Any problems with last phototherapy treatment?    NO    2. The patient tolerated phototherapy without complication    Patient will return for next UVB treatment, per protocol.     Patient to see provider every 4-12 weeks for follow-up during treatment.      All questions and concerns discussed with patient in clinic today.      Evy Avina

## 2017-03-29 NOTE — MR AVS SNAPSHOT
After Visit Summary   3/29/2017    Tony Mcnair    MRN: [id number]           Patient Information     Date Of Birth          1955        Visit Information        Provider Department      3/29/2017 7:30 AM UC DERM LIGHT TREATMENT [facility] Dermatology        Today's Diagnoses     Psoriasis           Follow-ups after your visit        Who to contact     Please call your clinic at [phone] to:    Ask questions about your health    Make or cancel appointments    Discuss your medicines    Learn about your test results    Speak to your doctor   If you have compliments or concerns about an experience at your clinic, or if you wish to file a complaint, please contact [facility] Patient Relations at [phone] or email us at [email or web address]         Additional Information About Your Visit        MyChart Information     Datalott gives you secure access to your electronic health record. If you see a primary care provider, you can also send messages to your care team and make appointments. If you have questions, please call your primary care clinic.  If you do not have a primary care provider, please call [phone] and they will assist you.      Personera is an electronic gateway that provides easy, online access to your medical records. With Personera, you can request a clinic appointment, read your test results, renew a prescription or communicate with your care team.     To access your existing account, please contact your [facility] or call [phone] for assistance.        Care EveryWhere ID     This is your Care EveryWhere ID. This could be used by other organizations to access your [place] medical records  [id number]         Blood Pressure from Last 3 Encounters:   01/23/15 (!) 145/100   10/08/14 133/81   03/03/14 131/89    Weight from Last 3 Encounters:   01/23/15 78 kg (172 lb)   10/08/14 75.3 kg (166 lb)   06/25/14 78.2  kg (172 lb 6.4 oz)              We Performed the Following     CHARGE - PHOTOTHERAPY NBUVB (UV LIGHT)     PROCEDURE - PHOTOTHERAPY NBUVB        Primary Care Provider Office Phone # Fax #    William Morton [phone]       [address] [address]        Thank you!     Thank you for choosing [facility] DERMATOLOGY  for your care. Our goal is always to provide you with excellent care. Hearing back from our patients is one way we can continue to improve our services. Please take a few minutes to complete the written survey that you may receive in the mail after your visit with us. Thank you!             Your Updated Medication List - Protect others around you: Learn how to safely use, store and throw away your medicines at www.disposemymeds.org.          This list is accurate as of: 3/29/17  8:54 AM.  Always use your most recent med list.                   Brand Name Dispense Instructions for use    BIOTIN PO      Take  by mouth daily.       calcipotriene-betameth diprop 0.005-0.064 % Oint    TACLONEX    60 g    Externally apply  topically 2 times daily as needed.       CONCERTA 18 MG CR tablet   Generic drug:  methylphenidate ER      Take 18 mg by mouth every morning.       DAILY MULTIVITAMIN PO      Take  by mouth daily.       fish oil-omega-3 fatty acids 1000 MG capsule      Take 2 g by mouth daily       fluocinonide 0.05 % solution    LIDEX    60 mL    Apply  topically 2 times daily for itchy scalp or scalp psoriasis       pimecrolimus 1 % cream    ELIDEL    60 g    Apply topically 2 times daily as needed For psoriasis of face or genitals       REFRESH P.M. Oint     1 Tube    Apply 1 inch to eye nightly as needed       tacrolimus 0.1 % ointment    PROTOPIC    60 g    Apply topically 2 times daily Apply to areas around eyes as needed       Vitamin C 100 MG Tabs      Take  by mouth daily.       VITAMIN D (CHOLECALCIFEROL) PO      Take by mouth daily

## 2017-04-05 ENCOUNTER — ALLIED HEALTH/NURSE VISIT (OUTPATIENT)
Dept: DERMATOLOGY | Facility: CLINIC | Age: 62
End: 2017-04-05

## 2017-04-05 DIAGNOSIS — L40.9 PSORIASIS: ICD-10-CM

## 2017-04-05 NOTE — MR AVS SNAPSHOT
After Visit Summary   4/5/2017    Tony Mcnair    MRN: [id number]           Patient Information     Date Of Birth          1955        Visit Information        Provider Department      4/5/2017 7:55 AM UC DERM LIGHT TREATMENT [facility] Dermatology        Today's Diagnoses     Psoriasis           Follow-ups after your visit        Who to contact     Please call your clinic at [phone] to:    Ask questions about your health    Make or cancel appointments    Discuss your medicines    Learn about your test results    Speak to your doctor   If you have compliments or concerns about an experience at your clinic, or if you wish to file a complaint, please contact [facility] Patient Relations at [phone] or email us at [email or web address]         Additional Information About Your Visit        MyChart Information     [employer] gives you secure access to your electronic health record. If you see a primary care provider, you can also send messages to your care team and make appointments. If you have questions, please call your primary care clinic.  If you do not have a primary care provider, please call [phone] and they will assist you.      Overlay Studio is an electronic gateway that provides easy, online access to your medical records. With Over[employer] Studio, you can request a clinic appointment, read your test results, renew a prescription or communicate with your care team.     To access your existing account, please contact your [facility] or call [phone] for assistance.        Care EveryWhere ID     This is your Care EveryWhere ID. This could be used by other organizations to access your [place] medical records  [id number]         Blood Pressure from Last 3 Encounters:   01/23/15 (!) 145/100   10/08/14 133/81   03/03/14 131/89    Weight from Last 3 Encounters:   01/23/15 78 kg (172 lb)   10/08/14 75.3 kg (166 lb)   06/25/14 78.2  kg (172 lb 6.4 oz)              We Performed the Following     CHARGE - PHOTOTHERAPY NBUVB (UV LIGHT)     PROCEDURE - PHOTOTHERAPY NBUVB        Primary Care Provider Office Phone # Fax #    William Morton [phone]       [address] [address]        Thank you!     Thank you for choosing [facility] DERMATOLOGY  for your care. Our goal is always to provide you with excellent care. Hearing back from our patients is one way we can continue to improve our services. Please take a few minutes to complete the written survey that you may receive in the mail after your visit with us. Thank you!             Your Updated Medication List - Protect others around you: Learn how to safely use, store and throw away your medicines at www.disposemymeds.org.          This list is accurate as of: 4/5/17  8:49 AM.  Always use your most recent med list.                   Brand Name Dispense Instructions for use    BIOTIN PO      Take  by mouth daily.       calcipotriene-betameth diprop 0.005-0.064 % Oint    TACLONEX    60 g    Externally apply  topically 2 times daily as needed.       CONCERTA 18 MG CR tablet   Generic drug:  methylphenidate ER      Take 18 mg by mouth every morning.       DAILY MULTIVITAMIN PO      Take  by mouth daily.       fish oil-omega-3 fatty acids 1000 MG capsule      Take 2 g by mouth daily       fluocinonide 0.05 % solution    LIDEX    60 mL    Apply  topically 2 times daily for itchy scalp or scalp psoriasis       pimecrolimus 1 % cream    ELIDEL    60 g    Apply topically 2 times daily as needed For psoriasis of face or genitals       REFRESH P.M. Oint     1 Tube    Apply 1 inch to eye nightly as needed       tacrolimus 0.1 % ointment    PROTOPIC    60 g    Apply topically 2 times daily Apply to areas around eyes as needed       Vitamin C 100 MG Tabs      Take  by mouth daily.       VITAMIN D (CHOLECALCIFEROL) PO      Take by mouth daily

## 2017-04-05 NOTE — PROGRESS NOTES
[facility] Dermatology Phototherapy Record  1. Tony Mcnair is a 61 year old male is here today for phototherapy (UVB) treatment for Psoriasis .        Changes or new medications since last treatment:   NO    New medical conditions or problems since last treatment:   NO    Any problems with last phototherapy treatment?    NO    2. The patient tolerated phototherapy without complication    Patient will return for next UVB treatment, per protocol.     Patient to see provider every 4-12 weeks for follow-up during treatment.      All questions and concerns discussed with patient in clinic today.      Evy Avina

## 2017-04-12 ENCOUNTER — ALLIED HEALTH/NURSE VISIT (OUTPATIENT)
Dept: DERMATOLOGY | Facility: CLINIC | Age: 62
End: 2017-04-12

## 2017-04-12 DIAGNOSIS — L40.9 PSORIASIS: ICD-10-CM

## 2017-04-12 NOTE — MR AVS SNAPSHOT
After Visit Summary   4/12/2017    Tony Mcnair    MRN: [id number]           Patient Information     Date Of Birth          1955        Visit Information        Provider Department      4/12/2017 7:20 AM UC DERM LIGHT TREATMENT [facility] Dermatology        Today's Diagnoses     Psoriasis           Follow-ups after your visit        Your next 10 appointments already scheduled     Apr 19, 2017  7:25 AM CDT   (Arrive by 7:10 AM)   Light Treatment Visit with UC DERM LIGHT TREATMENT   [facility] ([facility])    [address] [place] [address]   [phone]            Apr 26, 2017  7:25 AM CDT   (Arrive by 7:10 AM)   Light Treatment Visit with UC DERM LIGHT TREATMENT   [facility] ([facility])    [address] [place] [address]   [phone]            May 03, 2017  7:20 AM CDT   (Arrive by 7:05 AM)   Light Treatment Visit with UC DERM LIGHT TREATMENT   [facility])    [address] [place] [address]   [phone]            May 10, 2017  7:20 AM CDT   (Arrive by 7:05 AM)   Light Treatment Visit with UC DERM LIGHT TREATMENT   [facility] ([facility])    [address] [place] [address]   [phone]            May 17, 2017  7:20 AM CDT   (Arrive by 7:05 AM)   Light Treatment Visit with UC DERM LIGHT TREATMENT   [facility])    [address] [place] [address]   [phone]            May 24, 2017  7:20 AM CDT   (Arrive by 7:05 AM)   Light Treatment Visit with UC DERM LIGHT TREATMENT   [facility] ([facility])    [address] [place] [address]   [phone]            May 31, 2017  7:20 AM CDT   (Arrive by 7:05 AM)    Light Treatment Visit with  DERM LIGHT TREATMENT   [facility] Dermatology ([facility])    [address]  3rd Floor  [facility] [address] [phone]              Who to contact     Please call your clinic at [phone] to:    Ask questions about your health    Make or cancel appointments    Discuss your medicines    Learn about your test results    Speak to your doctor   If you have compliments or concerns about an experience at your clinic, or if you wish to file a complaint, please contact [facility] Patient Relations at [phone] or email us at [email or web address]         Additional Information About Your Visit        Image SocketharHealthbox Information     [employer] gives you secure access to your electronic health record. If you see a primary care provider, you can also send messages to your care team and make appointments. If you have questions, please call your primary care clinic.  If you do not have a primary care provider, please call [phone] and they will assist you.      [employer] is an electronic gateway that provides easy, online access to your medical records. With [employer], you can request a clinic appointment, read your test results, renew a prescription or communicate with your care team.     To access your existing account, please contact your [facility] or call [phone] for assistance.        Care EveryWhere ID     This is your Care EveryWhere ID. This could be used by other organizations to access your [place] medical records  [id number]         Blood Pressure from Last 3 Encounters:   01/23/15 (!) 145/100   10/08/14 133/81   03/03/14 131/89    Weight from Last 3 Encounters:   01/23/15 78 kg (172 lb)   10/08/14 75.3 kg (166 lb)   06/25/14 78.2 kg (172 lb 6.4 oz)              We Performed the Following     CHARGE - PHOTOTHERAPY NBUVB (UV LIGHT)     PROCEDURE - PHOTOTHERAPY NBUVB         Primary Care Provider Office Phone # Fax #    William Morton [phone]       [facility] [address] [address]        Thank you!     Thank you for choosing [facility] DERMATOLOGY  for your care. Our goal is always to provide you with excellent care. Hearing back from our patients is one way we can continue to improve our services. Please take a few minutes to complete the written survey that you may receive in the mail after your visit with us. Thank you!             Your Updated Medication List - Protect others around you: Learn how to safely use, store and throw away your medicines at www.disposemymeds.org.          This list is accurate as of: 4/12/17 11:46 AM.  Always use your most recent med list.                   Brand Name Dispense Instructions for use    BIOTIN PO      Take  by mouth daily.       calcipotriene-betameth diprop 0.005-0.064 % Oint    TACLONEX    60 g    Externally apply  topically 2 times daily as needed.       CONCERTA 18 MG CR tablet   Generic drug:  methylphenidate ER      Take 18 mg by mouth every morning.       DAILY MULTIVITAMIN PO      Take  by mouth daily.       fish oil-omega-3 fatty acids 1000 MG capsule      Take 2 g by mouth daily       fluocinonide 0.05 % solution    LIDEX    60 mL    Apply  topically 2 times daily for itchy scalp or scalp psoriasis       pimecrolimus 1 % cream    ELIDEL    60 g    Apply topically 2 times daily as needed For psoriasis of face or genitals       REFRESH P.M. Oint     1 Tube    Apply 1 inch to eye nightly as needed       tacrolimus 0.1 % ointment    PROTOPIC    60 g    Apply topically 2 times daily Apply to areas around eyes as needed       Vitamin C 100 MG Tabs      Take  by mouth daily.       VITAMIN D (CHOLECALCIFEROL) PO      Take by mouth daily

## 2017-04-13 NOTE — TELEPHONE ENCOUNTER
Left message on answering machine for patient to call back.    HSARLENE HernandezN RN  Rheumatology RN Coordinator  [facility]

## 2017-04-19 ENCOUNTER — ALLIED HEALTH/NURSE VISIT (OUTPATIENT)
Dept: DERMATOLOGY | Facility: CLINIC | Age: 62
End: 2017-04-19

## 2017-04-19 DIAGNOSIS — L40.9 PSORIASIS: ICD-10-CM

## 2017-04-19 NOTE — MR AVS SNAPSHOT
After Visit Summary   4/19/2017    Tony Mcnair    MRN: [id number]           Patient Information     Date Of Birth          1955        Visit Information        Provider Department      4/19/2017 7:25 AM UC DERM LIGHT TREATMENT [facility] Dermatology        Today's Diagnoses     Psoriasis           Follow-ups after your visit        Your next 10 appointments already scheduled     Apr 26, 2017  7:25 AM CDT   (Arrive by 7:10 AM)   Light Treatment Visit with UC DERM LIGHT TREATMENT   [facility] ([facility])    [address] [place] [address]   [phone]            May 03, 2017  7:20 AM CDT   (Arrive by 7:05 AM)   Light Treatment Visit with UC DERM LIGHT TREATMENT   [facility] ([facility])    [address] [place] [address]   [phone]            May 10, 2017  7:20 AM CDT   (Arrive by 7:05 AM)   Light Treatment Visit with UC DERM LIGHT TREATMENT   [facility] Dermatology [facility])    [address] [place] [address]   [phone]            May 17, 2017  7:20 AM CDT   (Arrive by 7:05 AM)   Light Treatment Visit with UC DERM LIGHT TREATMENT   [facility] ([facility])    [address] [place] [address]   [phone]            May 24, 2017  7:20 AM CDT   (Arrive by 7:05 AM)   Light Treatment Visit with UC DERM LIGHT TREATMENT   [facility])    [address] [place] [address]   [phone]            May 31, 2017  7:20 AM CDT   (Arrive by 7:05 AM)   Light Treatment Visit with UC DERM LIGHT TREATMENT   [facility] ([facility])    [address] [place] [address]   [phone]              Who to contact     Please call your clinic at  [phone] to:    Ask questions about your health    Make or cancel appointments    Discuss your medicines    Learn about your test results    Speak to your doctor   If you have compliments or concerns about an experience at your clinic, or if you wish to file a complaint, please contact [facility] Patient Relations at [phone] or email us at [email or web address]         Additional Information About Your Visit        Virgancehart Information     [employer] gives you secure access to your electronic health record. If you see a primary care provider, you can also send messages to your care team and make appointments. If you have questions, please call your primary care clinic.  If you do not have a primary care provider, please call [phone] and they will assist you.      Cap[employer] is an electronic gateway that provides easy, online access to your medical records. With [employer], you can request a clinic appointment, read your test results, renew a prescription or communicate with your care team.     To access your existing account, please contact your [facility] or call [phone] for assistance.        Care EveryWhere ID     This is your Care EveryWhere ID. This could be used by other organizations to access your [place] medical records  [id number]         Blood Pressure from Last 3 Encounters:   01/23/15 (!) 145/100   10/08/14 133/81   03/03/14 131/89    Weight from Last 3 Encounters:   01/23/15 78 kg (172 lb)   10/08/14 75.3 kg (166 lb)   06/25/14 78.2 kg (172 lb 6.4 oz)              We Performed the Following     ACTINOTHERAPY (UV LIGHT)     PROCEDURE - PHOTOTHERAPY NBUVB        Primary Care Provider Office Phone # Fax #    William Morton [phone]       [address] [address]        Thank you!     Thank you for choosing [facility] DERMATOLOGY  for your care. Our goal is always  to provide you with excellent care. Hearing back from our patients is one way we can continue to improve our services. Please take a few minutes to complete the written survey that you may receive in the mail after your visit with us. Thank you!             Your Updated Medication List - Protect others around you: Learn how to safely use, store and throw away your medicines at www.disposemymeds.org.          This list is accurate as of: 4/19/17  8:36 AM.  Always use your most recent med list.                   Brand Name Dispense Instructions for use    BIOTIN PO      Take  by mouth daily.       calcipotriene-betameth diprop 0.005-0.064 % Oint    TACLONEX    60 g    Externally apply  topically 2 times daily as needed.       CONCERTA 18 MG CR tablet   Generic drug:  methylphenidate ER      Take 18 mg by mouth every morning.       DAILY MULTIVITAMIN PO      Take  by mouth daily.       fish oil-omega-3 fatty acids 1000 MG capsule      Take 2 g by mouth daily       fluocinonide 0.05 % solution    LIDEX    60 mL    Apply  topically 2 times daily for itchy scalp or scalp psoriasis       pimecrolimus 1 % cream    ELIDEL    60 g    Apply topically 2 times daily as needed For psoriasis of face or genitals       REFRESH P.M. Oint     1 Tube    Apply 1 inch to eye nightly as needed       tacrolimus 0.1 % ointment    PROTOPIC    60 g    Apply topically 2 times daily Apply to areas around eyes as needed       Vitamin C 100 MG Tabs      Take  by mouth daily.       VITAMIN D (CHOLECALCIFEROL) PO      Take by mouth daily

## 2017-04-19 NOTE — PROGRESS NOTES
[facility] Dermatology Phototherapy Record  1. Tony Mcnair is a 61 year old male is here today for phototherapy (UVB) treatment for Psoriasis.        Changes or new medications since last treatment:   NO    New medical conditions or problems since last treatment:   NO    Any problems with last phototherapy treatment?    NO    2. The patient tolerated phototherapy without complication    Patient will return on for next UVB treatment, per protocol.     Patient to see provider every 4-12 weeks for follow-up during treatment.      All questions and concerns discussed with patient in clinic today.      Evy Avina

## 2017-04-21 ENCOUNTER — TELEPHONE (OUTPATIENT)
Dept: RHEUMATOLOGY | Facility: CLINIC | Age: 62
End: 2017-04-21

## 2017-04-21 NOTE — TELEPHONE ENCOUNTER
New Patient Referral Documentation    Date Received: 4/6/17    Reason for referral: psoriasis    Referring provider: Petar Corbett    Type of form received: NA    Medical Records     Office Location: records in [facility]    Chart Reviewed: yes     Appointment scheduled for: 5/5/2017    Provider: Juaquin Saxena    Patient contacted: Attempted to contact patient on 4/27/17 offering appointment. No answer and unable to leave a voice mail. Patient is active on mychart, but does not read his messages. 4/28/17 spoke to patient. An appointment was offered to patient on 5/5/2017. Patient agrees to date and time and states that he has no questions at this time.

## 2017-04-26 ENCOUNTER — ALLIED HEALTH/NURSE VISIT (OUTPATIENT)
Dept: DERMATOLOGY | Facility: CLINIC | Age: 62
End: 2017-04-26

## 2017-04-26 DIAGNOSIS — L40.9 PSORIASIS: ICD-10-CM

## 2017-04-26 NOTE — MR AVS SNAPSHOT
After Visit Summary   4/26/2017    Tony Mcnair    MRN: [id number]           Patient Information     Date Of Birth          1955        Visit Information        Provider Department      4/26/2017 7:25 AM UC DERM LIGHT TREATMENT [facility] Dermatology        Today's Diagnoses     Psoriasis           Follow-ups after your visit        Your next 10 appointments already scheduled     May 03, 2017  7:20 AM CDT   (Arrive by 7:05 AM)   Light Treatment Visit with UC DERM LIGHT TREATMENT   [facility] ([facility])    [address] [place] [address]   [phone]            May 10, 2017  7:20 AM CDT   (Arrive by 7:05 AM)   Light Treatment Visit with UC DERM LIGHT TREATMENT   [facility] ([facility])    [address] [place] [address]   [phone]            May 17, 2017  7:20 AM CDT   (Arrive by 7:05 AM)   Light Treatment Visit with UC DERM LIGHT TREATMENT   [facility] ([facility])    [address] [place] [address] [phone]            May 24, 2017  7:20 AM CDT   (Arrive by 7:05 AM)   Light Treatment Visit with UC DERM LIGHT TREATMENT   [facility] ([facility])    [address] [place] [address] [phone]            May 31, 2017  7:20 AM CDT   (Arrive by 7:05 AM)   Light Treatment Visit with UC DERM LIGHT TREATMENT   [facility])    [address] [place] [address] [phone]              Who to contact     Please call your clinic at [phone] to:    Ask questions about your health    Make or cancel appointments    Discuss your medicines    Learn about your test results    Speak to your doctor   If you have compliments or concerns about an experience at your clinic, or if you  wish to file a complaint, please contact [facility] Patient Relations at [phone] or email us at [email or web address]         Additional Information About Your Visit        MuchasaharHelicomm Information     Second Porch gives you secure access to your electronic health record. If you see a primary care provider, you can also send messages to your care team and make appointments. If you have questions, please call your primary care clinic.  If you do not have a primary care provider, please call [phone] and they will assist you.      Second Porch is an electronic gateway that provides easy, online access to your medical records. With [employer], you can request a clinic appointment, read your test results, renew a prescription or communicate with your care team.     To access your existing account, please contact your [facility] or call [phone] for assistance.        Care EveryWhere ID     This is your Care EveryWhere ID. This could be used by other organizations to access your [place] medical records  [id number]         Blood Pressure from Last 3 Encounters:   01/23/15 (!) 145/100   10/08/14 133/81   03/03/14 131/89    Weight from Last 3 Encounters:   01/23/15 78 kg (172 lb)   10/08/14 75.3 kg (166 lb)   06/25/14 78.2 kg (172 lb 6.4 oz)              We Performed the Following     CHARGE - PHOTOTHERAPY NBUVB (UV LIGHT)     PROCEDURE - PHOTOTHERAPY NBUVB        Primary Care Provider Office Phone # Fax #    William Morton [phone]       [address] [address]        Thank you!     Thank you for choosing [facility] DERMATOLOGY  for your care. Our goal is always to provide you with excellent care. Hearing back from our patients is one way we can continue to improve our services. Please take a few minutes to complete the written survey that you may receive in the mail after your visit with us.  Thank you!             Your Updated Medication List - Protect others around you: Learn how to safely use, store and throw away your medicines at www.disposemymeds.org.          This list is accurate as of: 4/26/17  8:00 AM.  Always use your most recent med list.                   Brand Name Dispense Instructions for use    BIOTIN PO      Take  by mouth daily.       calcipotriene-betameth diprop 0.005-0.064 % Oint    TACLONEX    60 g    Externally apply  topically 2 times daily as needed.       CONCERTA 18 MG CR tablet   Generic drug:  methylphenidate ER      Take 18 mg by mouth every morning.       DAILY MULTIVITAMIN PO      Take  by mouth daily.       fish oil-omega-3 fatty acids 1000 MG capsule      Take 2 g by mouth daily       fluocinonide 0.05 % solution    LIDEX    60 mL    Apply  topically 2 times daily for itchy scalp or scalp psoriasis       pimecrolimus 1 % cream    ELIDEL    60 g    Apply topically 2 times daily as needed For psoriasis of face or genitals       REFRESH P.M. Oint     1 Tube    Apply 1 inch to eye nightly as needed       tacrolimus 0.1 % ointment    PROTOPIC    60 g    Apply topically 2 times daily Apply to areas around eyes as needed       Vitamin C 100 MG Tabs      Take  by mouth daily.       VITAMIN D (CHOLECALCIFEROL) PO      Take by mouth daily

## 2017-04-26 NOTE — PROGRESS NOTES
[facility] Dermatology Phototherapy Record  1. Tony Mcnair is a 61 year old male is here today for phototherapy (UVB) treatment for Psoriasis.        Changes or new medications since last treatment:   NO    New medical conditions or problems since last treatment:   NO    Any problems with last phototherapy treatment?    NO    2. The patient tolerated phototherapy without complication    Patient will return for next UVB treatment, per protocol.     Patient to see provider every 4-12 weeks for follow-up during treatment.      All questions and concerns discussed with patient in clinic today.      Evy Avina

## 2017-05-01 NOTE — TELEPHONE ENCOUNTER
Pt is scheduled to see Juaquin on 5/5/17 at 3 pm.    SHARLENE HernandezN RN  Rheumatology RN Coordinator  [facility]

## 2017-05-03 ENCOUNTER — ALLIED HEALTH/NURSE VISIT (OUTPATIENT)
Dept: DERMATOLOGY | Facility: CLINIC | Age: 62
End: 2017-05-03

## 2017-05-03 DIAGNOSIS — L40.9 PSORIASIS: ICD-10-CM

## 2017-05-03 NOTE — MR AVS SNAPSHOT
After Visit Summary   5/3/2017    Tony Mcnair    MRN: [id number]           Patient Information     Date Of Birth          1955        Visit Information        Provider Department      5/3/2017 7:20 AM UC DERM LIGHT TREATMENT [facility] Dermatology        Today's Diagnoses     Psoriasis           Follow-ups after your visit        Your next 10 appointments already scheduled     May 05, 2017  3:00 PM CDT   (Arrive by 2:45 PM)   Return Visit with CHAZ Gupta CNP   [facility] Rheumatology ([facility])    [address] [place] [address]   [phone]            May 10, 2017  7:20 AM CDT   (Arrive by 7:05 AM)   Light Treatment Visit with UC DERM LIGHT TREATMENT   [facility] Dermatology ([facility])    [address] [place] [address] [phone]            May 17, 2017  7:20 AM CDT   (Arrive by 7:05 AM)   Light Treatment Visit with UC DERM LIGHT TREATMENT   [facility] Dermatology ([facility])    [address] [place] [address] [phone]            May 24, 2017  7:20 AM CDT   (Arrive by 7:05 AM)   Light Treatment Visit with UC DERM LIGHT TREATMENT   [facility] Dermatology ([facility])    [address] [place] [address] [phone]            May 31, 2017  7:20 AM CDT   (Arrive by 7:05 AM)   Light Treatment Visit with UC DERM LIGHT TREATMENT   [facility] Dermatology ([facility])    [address] [place] [address] [phone]              Who to contact     Please call your clinic at [phone] to:    Ask questions about your health    Make or cancel appointments    Discuss your medicines    Learn about your test results    Speak to your doctor   If you have compliments or concerns about an experience at your clinic, or if you wish  to file a complaint, please contact [facility] Patient Relations at [phone] or email us at [email or web address]         Additional Information About Your Visit        RxVantagehart Information     [employer] gives you secure access to your electronic health record. If you see a primary care provider, you can also send messages to your care team and make appointments. If you have questions, please call your primary care clinic.  If you do not have a primary care provider, please call [phone] and they will assist you.      At[employer]ocor is an electronic gateway that provides easy, online access to your medical records. With [employer]or, you can request a clinic appointment, read your test results, renew a prescription or communicate with your care team.     To access your existing account, please contact your [facility] or call [phone] for assistance.        Care EveryWhere ID     This is your Care EveryWhere ID. This could be used by other organizations to access your [place] medical records  [id number]         Blood Pressure from Last 3 Encounters:   01/23/15 (!) 145/100   10/08/14 133/81   03/03/14 131/89    Weight from Last 3 Encounters:   01/23/15 78 kg (172 lb)   10/08/14 75.3 kg (166 lb)   06/25/14 78.2 kg (172 lb 6.4 oz)              We Performed the Following     CHARGE - PHOTOCHEMOTHERAPY WITH UV-B     PROCEDURE - PHOTOTHERAPY NBUVB        Primary Care Provider Office Phone # Fax #    William Morton [phone]       [address] [address]        Thank you!     Thank you for choosing [facility] DERMATOLOGY  for your care. Our goal is always to provide you with excellent care. Hearing back from our patients is one way we can continue to improve our services. Please take a few minutes to complete the written survey that you may receive in the mail after your visit with us. Thank  you!             Your Updated Medication List - Protect others around you: Learn how to safely use, store and throw away your medicines at www.disposemymeds.org.          This list is accurate as of: 5/3/17  9:22 AM.  Always use your most recent med list.                   Brand Name Dispense Instructions for use    BIOTIN PO      Take  by mouth daily.       calcipotriene-betameth diprop 0.005-0.064 % Oint    TACLONEX    60 g    Externally apply  topically 2 times daily as needed.       CONCERTA 18 MG CR tablet   Generic drug:  methylphenidate ER      Take 18 mg by mouth every morning.       DAILY MULTIVITAMIN PO      Take  by mouth daily.       fish oil-omega-3 fatty acids 1000 MG capsule      Take 2 g by mouth daily       fluocinonide 0.05 % solution    LIDEX    60 mL    Apply  topically 2 times daily for itchy scalp or scalp psoriasis       pimecrolimus 1 % cream    ELIDEL    60 g    Apply topically 2 times daily as needed For psoriasis of face or genitals       REFRESH P.M. Oint     1 Tube    Apply 1 inch to eye nightly as needed       tacrolimus 0.1 % ointment    PROTOPIC    60 g    Apply topically 2 times daily Apply to areas around eyes as needed       Vitamin C 100 MG Tabs      Take  by mouth daily.       VITAMIN D (CHOLECALCIFEROL) PO      Take by mouth daily

## 2017-05-03 NOTE — NURSING NOTE
[facility] Dermatology Phototherapy Record  1. Tony Mcnair is a 61 year old male is here today for phototherapy (UVB) treatment for Psoriasis.        Changes or new medications since last treatment:   NO    New medical conditions or problems since last treatment:   NO    Any problems with last phototherapy treatment?    NO    2. The patient tolerated phototherapy without complication    Patient will return for next UVB treatment, per protocol.     Patient to see provider every 4-12 weeks for follow-up during treatment.      All questions and concerns discussed with patient in clinic today.      Mary Aponte

## 2017-05-05 ENCOUNTER — OFFICE VISIT (OUTPATIENT)
Dept: RHEUMATOLOGY | Facility: CLINIC | Age: 62
End: 2017-05-05
Attending: NURSE PRACTITIONER
Payer: COMMERCIAL

## 2017-05-05 VITALS
HEIGHT: 70 IN | SYSTOLIC BLOOD PRESSURE: 121 MMHG | BODY MASS INDEX: 24.12 KG/M2 | WEIGHT: 168.5 LBS | HEART RATE: 90 BPM | OXYGEN SATURATION: 96 % | TEMPERATURE: 97.9 F | DIASTOLIC BLOOD PRESSURE: 87 MMHG

## 2017-05-05 DIAGNOSIS — M79.671 PAIN IN BOTH FEET: ICD-10-CM

## 2017-05-05 DIAGNOSIS — M79.672 PAIN IN BOTH FEET: ICD-10-CM

## 2017-05-05 DIAGNOSIS — L40.9 PSORIASIS: ICD-10-CM

## 2017-05-05 DIAGNOSIS — M76.30 ITB SYNDROME: ICD-10-CM

## 2017-05-05 DIAGNOSIS — M76.30 ITB SYNDROME: Primary | ICD-10-CM

## 2017-05-05 LAB
ALBUMIN SERPL-MCNC: 3.9 G/DL (ref 3.4–5)
ALT SERPL W P-5'-P-CCNC: 27 U/L (ref 0–70)
AST SERPL W P-5'-P-CCNC: 18 U/L (ref 0–45)
BASOPHILS # BLD AUTO: 0.1 10E9/L (ref 0–0.2)
BASOPHILS NFR BLD AUTO: 0.9 %
CREAT SERPL-MCNC: 1.05 MG/DL (ref 0.66–1.25)
CRP SERPL-MCNC: <2.9 MG/L (ref 0–8)
DIFFERENTIAL METHOD BLD: NORMAL
EOSINOPHIL # BLD AUTO: 0.3 10E9/L (ref 0–0.7)
EOSINOPHIL NFR BLD AUTO: 4.1 %
ERYTHROCYTE [DISTWIDTH] IN BLOOD BY AUTOMATED COUNT: 11.8 % (ref 10–15)
ERYTHROCYTE [SEDIMENTATION RATE] IN BLOOD BY WESTERGREN METHOD: 6 MM/H (ref 0–20)
GFR SERPL CREATININE-BSD FRML MDRD: 72 ML/MIN/1.7M2
HCT VFR BLD AUTO: 41.5 % (ref 40–53)
HGB BLD-MCNC: 14.8 G/DL (ref 13.3–17.7)
IMM GRANULOCYTES # BLD: 0 10E9/L (ref 0–0.4)
IMM GRANULOCYTES NFR BLD: 0.6 %
LYMPHOCYTES # BLD AUTO: 1.9 10E9/L (ref 0.8–5.3)
LYMPHOCYTES NFR BLD AUTO: 26.6 %
MCH RBC QN AUTO: 32.2 PG (ref 26.5–33)
MCHC RBC AUTO-ENTMCNC: 35.7 G/DL (ref 31.5–36.5)
MCV RBC AUTO: 90 FL (ref 78–100)
MONOCYTES # BLD AUTO: 0.7 10E9/L (ref 0–1.3)
MONOCYTES NFR BLD AUTO: 10.4 %
NEUTROPHILS # BLD AUTO: 4 10E9/L (ref 1.6–8.3)
NEUTROPHILS NFR BLD AUTO: 57.4 %
NRBC # BLD AUTO: 0 10*3/UL
NRBC BLD AUTO-RTO: 0 /100
PLATELET # BLD AUTO: 155 10E9/L (ref 150–450)
RBC # BLD AUTO: 4.6 10E12/L (ref 4.4–5.9)
WBC # BLD AUTO: 7 10E9/L (ref 4–11)

## 2017-05-05 PROCEDURE — 84450 TRANSFERASE (AST) (SGOT): CPT | Performed by: NURSE PRACTITIONER

## 2017-05-05 PROCEDURE — 86140 C-REACTIVE PROTEIN: CPT | Performed by: NURSE PRACTITIONER

## 2017-05-05 PROCEDURE — 99212 OFFICE O/P EST SF 10 MIN: CPT | Mod: ZF

## 2017-05-05 PROCEDURE — 85025 COMPLETE CBC W/AUTO DIFF WBC: CPT | Performed by: NURSE PRACTITIONER

## 2017-05-05 PROCEDURE — 36415 COLL VENOUS BLD VENIPUNCTURE: CPT | Performed by: NURSE PRACTITIONER

## 2017-05-05 PROCEDURE — 82040 ASSAY OF SERUM ALBUMIN: CPT | Performed by: NURSE PRACTITIONER

## 2017-05-05 PROCEDURE — 82565 ASSAY OF CREATININE: CPT | Performed by: NURSE PRACTITIONER

## 2017-05-05 PROCEDURE — 85652 RBC SED RATE AUTOMATED: CPT | Performed by: NURSE PRACTITIONER

## 2017-05-05 PROCEDURE — 84460 ALANINE AMINO (ALT) (SGPT): CPT | Performed by: NURSE PRACTITIONER

## 2017-05-05 ASSESSMENT — PAIN SCALES - GENERAL: PAINLEVEL: MILD PAIN (2)

## 2017-05-05 NOTE — MR AVS SNAPSHOT
After Visit Summary   5/5/2017    Tony Mcnair    MRN: [id number]           Patient Information     Date Of Birth          1955        Visit Information        Provider Department      5/5/2017 3:00 PM Juaquin Saxena APRN CNP [facility] Rheumatology        Today's Diagnoses     ITB syndrome    -  1    Pain in both feet        Psoriasis           Follow-ups after your visit        Additional Services     PHYSICAL THERAPY REFERRAL       *This therapy referral will be filtered to a centralized scheduling office at [facility] and the patient will receive a call to schedule an appointment at a [place] location most convenient for them. *     [facility] provides Physical Therapy evaluation and treatment and many specialty services across the [place] system.  If requesting a specialty program, please choose from the list below.    If you have not heard from the scheduling office within 2 business days, please call [phone] for all locations, with the exception of [place], please call [phone].  Treatment: Evaluation & Treatment  Special Instructions/Modalities:   Special Programs: None    Please be aware that coverage of these services is subject to the terms and limitations of your health insurance plan.  Call member services at your health plan with any benefit or coverage questions.      **Note to Provider:  If you are referring outside of [place] for the therapy appointment, please list the name of the location in the  special instructions  above, print the referral and give to the patient to schedule the appointment.            SPORTS MEDICINE REFERRAL       Your provider has referred you to:  FM: [facility] [phone]   [email or web address]     OR HERE     Please be aware that coverage of these services is subject to the terms and limitations of your health insurance plan.   Call member services at your health plan with any benefit or coverage questions.      Please bring the following to your appointment:    >>   Any x-rays, CTs or MRIs which have been performed.  Contact the facility where they were done to arrange for  prior to your scheduled appointment.    >>   List of current medications   >>   This referral request   >>   Any documents/labs given to you for this referral                  Follow-up notes from your care team     Return in about 1 year (around 5/5/2018) for Labs + X-rays Today.      Your next 10 appointments already scheduled     May 05, 2017  4:10 PM CDT   XR FOOT BILATERAL G/E 3 VIEWS with [id number]   [facility] XRay ([facility])    [address] [place] [address]  [address] [facility] [id number] [phone]           Please bring a list of your current medicines to your exam. (Include vitamins, minerals and over-thecounter medicines.) Leave your valuables at home.  Tell your doctor if there is a chance you may be pregnant.  You do not need to do anything special for this exam.            May 05, 2017  4:45 PM CDT   Lab with UC LAB   [facility] ([facility])    [address] [place] [address]  [address] [facility] [address]   [phone]            May 10, 2017  7:20 AM CDT   (Arrive by 7:05 AM)   Light Treatment Visit with UC DERM LIGHT TREATMENT   [facility] Dermatology ([facility])    [address] [place] [address]  [address] [facility] [address]   [phone]            May 17, 2017  7:20 AM CDT   (Arrive by 7:05 AM)   Light Treatment Visit with UC DERM LIGHT TREATMENT   [facility] Dermatology ([facility])    [address] [place] [address]  [address] [facility] [address]   [phone]            May 24, 2017  7:20 AM CDT   (Arrive by 7:05 AM)   Light Treatment Visit with UC DERM LIGHT TREATMENT   [facility] Dermatology [facility])     [address] [address]   [phone]            May 31, 2017  7:20 AM CDT   (Arrive by 7:05 AM)   Light Treatment Visit with UC DERM LIGHT TREATMENT   [facility] Dermatology ([facility])    [address] [place] [address] [phone]            Apr 30, 2018  7:00 AM CDT   (Arrive by 6:45 AM)   Return Visit with CHAZ Gupta CNP   [facility] Rheumatology ([facility])    [address] [place] [address] [phone]              Future tests that were ordered for you today     Open Future Orders        Priority Expected Expires Ordered    CBC with platelets differential Routine  5/5/2018 5/5/2017    AST Routine  5/5/2018 5/5/2017    ALT Routine  5/5/2018 5/5/2017    Albumin level Routine  5/5/2018 5/5/2017    Creatinine Routine  5/5/2018 5/5/2017    CRP inflammation Routine  5/5/2018 5/5/2017            Who to contact     If you have questions or need follow up information about today's clinic visit or your schedule please contact [facility] RHEUMATOLOGY directly at [phone].  Normal or non-critical lab and imaging results will be communicated to you by Bookioohart, letter or phone within 4 business days after the clinic has received the results. If you do not hear from us within 7 days, please contact the clinic through Bookioohart or phone. If you have a critical or abnormal lab result, we will notify you by phone as soon as possible.  Submit refill requests through Twyxt or call your pharmacy and they will forward the refill request to us. Please allow 3 business days for your refill to be completed.          Additional Information About Your Visit        BookiooharG10 Entertainment Information     Twyxt gives you secure access to your electronic health record. If you see a primary care provider, you can also send messages to your care team and make appointments. If you have questions,  "please call your primary care clinic.  If you do not have a primary care provider, please call [phone] and they will assist you.        Care EveryWhere ID     This is your Care EveryWhere ID. This could be used by other organizations to access your [place] medical records  [id number]        Your Vitals Were     Pulse Temperature Height Pulse Oximetry BMI (Body Mass Index)       90 97.9  F (36.6  C) (Oral) 1.778 m (5' 10\") 96% 24.18 kg/m2        Blood Pressure from Last 3 Encounters:   05/05/17 121/87   01/23/15 (!) 145/100   10/08/14 133/81    Weight from Last 3 Encounters:   05/05/17 76.4 kg (168 lb 8 oz)   01/23/15 78 kg (172 lb)   10/08/14 75.3 kg (166 lb)              We Performed the Following     Erythrocyte sedimentation rate auto     PHYSICAL THERAPY REFERRAL     SPORTS MEDICINE REFERRAL     XR Foot Bilateral G/E 3 Views        Primary Care Provider Office Phone # Fax #    William Morton [phone]       [place] [address]        Thank you!     Thank you for choosing [facility] RHEUMATOLOGY  for your care. Our goal is always to provide you with excellent care. Hearing back from our patients is one way we can continue to improve our services. Please take a few minutes to complete the written survey that you may receive in the mail after your visit with us. Thank you!             Your Updated Medication List - Protect others around you: Learn how to safely use, store and throw away your medicines at www.disposemymeds.org.          This list is accurate as of: 5/5/17  4:07 PM.  Always use your most recent med list.                   Brand Name Dispense Instructions for use    BIOTIN PO      Take  by mouth daily.       calcipotriene-betameth diprop 0.005-0.064 % Oint    TACLONEX    60 g    Externally apply  topically 2 times daily as needed.       CONCERTA 18 MG CR tablet   Generic drug:  methylphenidate ER      Take 18 mg by mouth every " morning.       DAILY MULTIVITAMIN PO      Take  by mouth daily.       fish oil-omega-3 fatty acids 1000 MG capsule      Take 2 g by mouth daily       fluocinonide 0.05 % solution    LIDEX    60 mL    Apply  topically 2 times daily for itchy scalp or scalp psoriasis       pimecrolimus 1 % cream    ELIDEL    60 g    Apply topically 2 times daily as needed For psoriasis of face or genitals       REFRESH P.M. Oint     1 Tube    Apply 1 inch to eye nightly as needed       tacrolimus 0.1 % ointment    PROTOPIC    60 g    Apply topically 2 times daily Apply to areas around eyes as needed       Vitamin C 100 MG Tabs      Take  by mouth daily.       VITAMIN D (CHOLECALCIFEROL) PO      Take by mouth daily

## 2017-05-05 NOTE — LETTER
5/5/2017      RE: Tony Mcnair  [address] [address]       Rheumatology Visit     Tony Mcnair MRN# [id number]   YOB: 1955 Age: 61 year old     Date of Visit: May 5, 2017  Last visit: January 23, 2015 with Dr. Malik   Primary care provider: William Morton          Assessment/Plan:     1. Left ITB syndrome. No clear psoriatic arthritis today. This is not clear if more of an injury or early sign of PsA. We discussed both    2. Feet pain, no swelling some EAS. No active swelling, or s/sx psoriatic arthritis.   3. Psoriasis    Plan:  1. Discussed PT and or referral to sports medicine for trochanteric injections, stretches and HEP. May try ice and NSAIDs (short-term) to help alleviate pain. Will do x-rays of left hip. X-rays of feet and labs to including inflammatory markers as I want to make sure there is no underlying inflammation or erosions (on x-rays).   2. RTC 1 yr sooner prn  3. I educated him on more s/sx psoriatic arthritis and when he would need to come back to consider immunosuppression therapy.   The patient understood the rational for the diagnosis and treatment plan. Patient shared in the decision making. All questions were answered to best of my ability and the patient's satisfaction  Juaquin WARE, CNP, MSN  [facility]  Department of Rheumatology & Autoimmune Disorders          History of Present Illness:   Tony Mcnair is a 61 year old male who presents as a follow-up for psoriatic arthritis.     Copy forward 1-2015: Review: Prior  (asymmetric oligoarthralgias including L hand 3rd and 4th DIP, L malleolus, R hip, morning stiffness, psoriasis, and fatigue). He has had psoriasis since ~ age 42. He developed gradual polyarthralgia over decades. Treatment for psoriatic arthritis began with NSAID monotherapy in 10-14.    : He had experienced pain in different joints for varying amounts of time (L foot/malleolus - ~20 years, R hip -  ~10-15 years, L achilles - 1-2 months). Pain had gotten gradually worse over time. Hip pain can wake up Mr. Mcnair once a night, he takes ibuprofen and sits up and for 1-2 hours and then is able to fall back asleep. Foot pain (low level, but constant) can increase rapidly and cause him to feel lame and unable to walk for a few minutes. DIP pain is pressure sensitive and only reports that it bothers him when he hits the joint with some force. Stiffness and pain is worse in the morning, improves quickly after waking up and then pain level/function is maintained for the rest of the day. Mr. Mcnair has noted that exercising regularly he experiences less pain, but that sometimes it's hard to find the motivation when he's in pain (even though he believes it helps - called it a catch-22).   1-2015:   Pt states that since Oct, he took the piroxicam which brought his pain from a 5-7/10 down to a 1-2/10 and now is not having any stiffness.  He states that he is doing much better and stopped the piroxicam a number of weeks ago.  He states that he did note some acid reflux but that resolved after a week of stopping the NSAID.  He did not try taking an prilosec or PPI.  He is very happy with how his joints are doing and is happy with his functional status.  He states that he did have trauma to the dorsum of his left foot a number of years ago and he still will have occasional left dorsum foot pain. The pt states that his psoriasis is doing very well too.  He continues to follow with dermatology and is using topical therapy and getting light therapy once/week.  He states that his psorasis is well controlled for him especially during the winter months in which it can be worse.     May 5, 2017      Hands are good,   Left hip will wake him up at night, this wake hims up. This has been since Fall into winter months <6 mths. This has had for about 15 yrs intermittently but now noticing this more. Like getting his leg over his saddle  "would be sore. The end joints of fingers would bother swelling --not now this was the past had for about 4 yrs. Occasional flare in 4-5 x months. No pain in right hip. Outside of his hip. Stretching and eating better helps. Avoiding gluten and alcohol and less sugar more recently this is some help. ysterday notice when working in yard \"really hurt\". Mornings --when walking is slow the feet are \"hurt\" then warm-up then they are fine. Left outer foot bothers where he broke years ago. Both feet sore in the mornings for about 3-60 min No swelling. Not in the achilles or the plantar fascia. This the past year or so. Mornings no pain in hands or wrist. No inflammatory back . Only the left hip. No dactylitis or tendontiis elbows or shoulders. NO knee issue. No loss of motion or ROM. No OTC. May take ibuprofen prn last night took 1 hr last night --washing his car squating down stood this shooting pain. More frequent    Psoriasis \"ok\" derm OV 6 weeks ago and continues with light therapy, may go to 2 times a week. Ebows good. Right leg psoriasis mild but under control         Past Medical/Surgical History:   Injuries-left foot broke   No Blood transfusions  Medical-Shingles 1998 not anymore left eye \"damage vision\" distance, psoriasi   Past Medical History:   Diagnosis Date     Arthritis      Zoster 1988    LEFT EYE       Surgical-left knee spur, hernia left/right repair   Past Surgical History:   Procedure Laterality Date     IRIS NEVUS Right      LESION RIGHT UPPER LID Right 1999    EXCISION             Family/Social History:   No autoimmune disorders, psoriasis, UC, crohn's, SLE, RA, PsA, gout.   Mom-breast CA, alzheimer, psoriasis \"fingers\" act up arthritis-passed  Dad-passed. CABG, CVI, carotid artery stenosis   Brother- passed heart disease, kidney and liver   1 other siblings healthy  Brother with MS 4 mth ago with at [place]. Slow kind   MGF-passed heart  MGM-lived long time   PGF-heart -passed   Dads side --aneursym or " CVI     Social-  Ocassionlly now none alcohol. Never Smoking. NO Children. NO IVDU or drug use. Works advertising              Allergies/Medications:     Allergies   Allergen Reactions     Nkda [No Known Drug Allergies]        Outpatient Prescriptions Marked as Taking for the 5/5/17 encounter (Office Visit) with Juaquin Saxena APRN CNP   Medication Sig     fish oil-omega-3 fatty acids 1000 MG capsule Take 2 g by mouth daily     VITAMIN D, CHOLECALCIFEROL, PO Take by mouth daily     pimecrolimus (ELIDEL) 1 % cream Apply topically 2 times daily as needed For psoriasis of face or genitals     fluocinonide (LIDEX) 0.05 % solution Apply  topically 2 times daily for itchy scalp or scalp psoriasis     calcipotriene-betameth diprop (TACLONEX) 0.005-0.064 % OINT Externally apply  topically 2 times daily as needed.     Artificial Tear Ointment (REFRESH P.M.) OINT Apply 1 inch to eye nightly as needed     tacrolimus (PROTOPIC) 0.1 % ointment Apply topically 2 times daily Apply to areas around eyes as needed     BIOTIN PO Take  by mouth daily.     methylphenidate (CONCERTA) 18 MG CR tablet Take 18 mg by mouth every morning.     Multiple Vitamin (DAILY MULTIVITAMIN PO) Take  by mouth daily.     Ascorbic Acid (VITAMIN C) 100 MG TABS Take  by mouth daily.            Review of Systems:   Negative raynaud s phenomena, hairloss, sun sensitivities, keratoconjunctivitis sicca, pleurisy, significant rashes like malar, oral/nasal or ulcerations, inflammatory eye disease, inflammatory bowel disease, dactylitis, tenosynovitis, or enthespathy, blood clots, gout, psoriasis, UC, crohn's. No temporal headache, no jaw claudication, no scalp tenderness, vision changes, carotidynia, cough  +eye drys   +good energy   CONSTITUTIONAL: No fevers, night sweats or unintentional weight change. No acute distress, swollen glands  EYES: No vision change, diplopia, pain in eyes or red eyes   EARS, NOSE, MOUTH, THROAT: No tinnitus or hearing change, no  epistaxis or nasal discharge, no oral lesions, throat clear. Normal saliva pool.  No drymouth. No thyroid enlargement  CARDIOVASCULAR: No chest pain, palpitations, or pain with walking, no orthopnea or PND   RESPIRATORY: No dyspnea, cough, shortness of breath or wheezing. No pleurisy.   GI: No nausea, vomiting, diarrhea or constipation, no abdominal pain, or blood in stools.   : No change in urine, no dysuria or hematuria   MUSCKL: No swollen, tender, red or painful joints. No nodules. No enthesitis, plantar fascitis or heel pain.   INTEGUMENTARY: No concerning lesions or moles   NEURO: No loss of strength or sensation, no numbness or tingling, no tremor, no dizziness, no headache. No falls   ENDO: No polyuria or polydipsia, no temperature intolerance   HEME/LYMPH:No concerning bumps, bleeding problems, or swollen lymph nodes. No recent infections, hospitalizations or new illnesses.   ALLERGY: No environmental allergies   PSYCH:No depression or anxiety, no sleep problems.  Otherwise 14 point ROS obtained, reviewed and found negative.          Physical Exam:     Constitutional: WD-WN-WG cooperative  Eyes: nl EOM, PERRLA, vision, conjunctiva, sclera  ENT: nl external ears, nose, hearing, lips, teeth, gums, throat. Nl saliva pool  No mucous membrane lesions, normal saliva pool  Neck: no mass or thyroid enlargement. non-tender.  Resp: lungs clear to auscultation, nl to palpation, nl breath sounds  CV: RRR, no murmurs, rubs or gallops, no edema  GI: no ABD mass or tenderness, no HSM. No RUQ pain.   : not tested  Lymph: no cervical, supraclavicular, inguinal or epitrochlear nodes  MSK: tender left ITB and trochanertic bursa. No SI tenderness and -SLR. FROM hips. All other TMJ, neck, shoulder, elbow, wrist, MCP/PIP/DIP, spine, hip, knee, ankle, and foot MTP/IP joints were examined and  found normal. No active synovitis or deformity. Full ROM.  NL prayer sign. No periuncle erythema. Normal  strength. No dactylitis,   tenosynovitis, enthespathy. Negative MCP and MTP squeeze. No impingment signs of shoulders. Negative Lhermitte's sign.   Skin: No nail pitting, alopecia, rash, nodules or lesions.   Neuro: nl cranial nerves, strength, sensation  Psych: nl judgement, orientation, memory, affect.         Labs/Imaging:   Reviewed medications, labs, imaging, and EMR.   Additional review:  Labs with Pt: NO     Outside Records/Xrays: YES  Reviewed Rheumatology Lab Flowsheet & Lab Flowsheet    Results for orders placed or performed in visit on 05/23/16   Punctal Closure, Plugs    Narrative    Sign in/Time Out: Correct patient, Correct medication, Correct procedure,   Correct site   . Time out performed at: 9:55 AM   . Preprocedure Medication: Proparacaine 2% solution   .     Punctal Plug Insertion #1  Brand: Collagen Oasis 0.4mm   . Location: Right lower   . Ref #: [id number]. Lot #: [id number]   .     Punctal Plug Insertion #2  Brand: Collagen Oasis 0.4mm   . Location: Left lower   . Ref #: [id number]   . Lot #: [id number]   .     Punctal Plug Insertion #4  Plan: Monitor   . Pre-Procedure Pain: 0   . Post-Procedure Pain: 0   . Sign Out: Patient counseled on signs and symptoms for which to call   and/or return to clinic, Patient tolerated procedure well with no   complications   .      Recent Labs   Lab Test  10/08/14   1003   WBC  5.1   HGB  14.5   HCT  41.2   MCV  93   PLT  131*   AST  16   ALT  22              Education:   1. Discussed routine annual physicals, cancer screening, cardiac risk monitoring, bone health and primary care with primary care provider.   2. Educated on diagnosis, prognosis, labs, imaging, treatment options (including risks, benefits, risk of no treatment), medications (use, dose, side-effects, risks of medications including infection/cancer/bone marrow suppression, lab monitoring), infections what to do, plan of cares, goals of treatment.       Juaquin Saxena APRN, CNP, MSN  [facility]  Department of  Rheumatology & Autoimmune Disorders  [facility]: [phone] (opt.2 then opt. 1 scheduling, opt. 3 nurse)  [facility]: [phone]

## 2017-05-05 NOTE — PROGRESS NOTES
Rheumatology Visit     Tony Mcnair MRN# [id number]   YOB: 1955 Age: 61 year old     Date of Visit: May 5, 2017  Last visit: January 23, 2015 with Dr. Malik   Primary care provider: William Morton          Assessment/Plan:     1. Left ITB syndrome. No clear psoriatic arthritis today. This is not clear if more of an injury or early sign of PsA. We discussed both    2. Feet pain, no swelling some EAS. No active swelling, or s/sx psoriatic arthritis.   3. Psoriasis    Plan:  1. Discussed PT and or referral to sports medicine for trochanteric injections, stretches and HEP. May try ice and NSAIDs (short-term) to help alleviate pain. Will do x-rays of left hip. X-rays of feet and labs to including inflammatory markers as I want to make sure there is no underlying inflammation or erosions (on x-rays).   2. RTC 1 yr sooner prn  3. I educated him on more s/sx psoriatic arthritis and when he would need to come back to consider immunosuppression therapy.   The patient understood the rational for the diagnosis and treatment plan. Patient shared in the decision making. All questions were answered to best of my ability and the patient's satisfaction  Juaquin WARE, CNP, MSN  [facility]  Department of Rheumatology & Autoimmune Disorders          History of Present Illness:   Tony Mcnair is a 61 year old male who presents as a follow-up for psoriatic arthritis.     Copy forward 1-2015: Review: Prior  (asymmetric oligoarthralgias including L hand 3rd and 4th DIP, L malleolus, R hip, morning stiffness, psoriasis, and fatigue). He has had psoriasis since ~ age 42. He developed gradual polyarthralgia over decades. Treatment for psoriatic arthritis began with NSAID monotherapy in 10-14.    : He had experienced pain in different joints for varying amounts of time (L foot/malleolus - ~20 years, R hip - ~10-15 years, L achilles - 1-2 months). Pain had gotten gradually worse over time.  Hip pain can wake up Mr. Mcnair once a night, he takes ibuprofen and sits up and for 1-2 hours and then is able to fall back asleep. Foot pain (low level, but constant) can increase rapidly and cause him to feel lame and unable to walk for a few minutes. DIP pain is pressure sensitive and only reports that it bothers him when he hits the joint with some force. Stiffness and pain is worse in the morning, improves quickly after waking up and then pain level/function is maintained for the rest of the day. Mr. Mcnair has noted that exercising regularly he experiences less pain, but that sometimes it's hard to find the motivation when he's in pain (even though he believes it helps - called it a catch-22).   1-2015:   Pt states that since Oct, he took the piroxicam which brought his pain from a 5-7/10 down to a 1-2/10 and now is not having any stiffness.  He states that he is doing much better and stopped the piroxicam a number of weeks ago.  He states that he did note some acid reflux but that resolved after a week of stopping the NSAID.  He did not try taking an prilosec or PPI.  He is very happy with how his joints are doing and is happy with his functional status.  He states that he did have trauma to the dorsum of his left foot a number of years ago and he still will have occasional left dorsum foot pain. The pt states that his psoriasis is doing very well too.  He continues to follow with dermatology and is using topical therapy and getting light therapy once/week.  He states that his psorasis is well controlled for him especially during the winter months in which it can be worse.     May 5, 2017      Hands are good,   Left hip will wake him up at night, this wake hims up. This has been since Fall into winter months <6 mths. This has had for about 15 yrs intermittently but now noticing this more. Like getting his leg over his saddle would be sore. The end joints of fingers would bother swelling --not now this was  "the past had for about 4 yrs. Occasional flare in 4-5 x months. No pain in right hip. Outside of his hip. Stretching and eating better helps. Avoiding gluten and alcohol and less sugar more recently this is some help. ysterday notice when working in yard \"really hurt\". Mornings --when walking is slow the feet are \"hurt\" then warm-up then they are fine. Left outer foot bothers where he broke years ago. Both feet sore in the mornings for about 3-60 min No swelling. Not in the achilles or the plantar fascia. This the past year or so. Mornings no pain in hands or wrist. No inflammatory back . Only the left hip. No dactylitis or tendontiis elbows or shoulders. NO knee issue. No loss of motion or ROM. No OTC. May take ibuprofen prn last night took 1 hr last night --washing his car squating down stood this shooting pain. More frequent    Psoriasis \"ok\" derm OV 6 weeks ago and continues with light therapy, may go to 2 times a week. Ebows good. Right leg psoriasis mild but under control         Past Medical/Surgical History:   Injuries-left foot broke   No Blood transfusions  Medical-Shingles 1998 not anymore left eye \"damage vision\" distance, psoriasi   Past Medical History:   Diagnosis Date     Arthritis      Zoster 1988    LEFT EYE       Surgical-left knee spur, hernia left/right repair   Past Surgical History:   Procedure Laterality Date     IRIS NEVUS Right      LESION RIGHT UPPER LID Right 1999    EXCISION             Family/Social History:   No autoimmune disorders, psoriasis, UC, crohn's, SLE, RA, PsA, gout.   Mom-breast CA, alzheimer, psoriasis \"fingers\" act up arthritis-passed  Dad-passed. CABG, CVI, carotid artery stenosis   Brother- passed heart disease, kidney and liver   1 other siblings healthy  Brother with MS 4 mth ago with at [place]. Slow kind   MGF-passed heart  MGM-lived long time   PGF-heart -passed   Dads side --aneursym or CVI     Social-  Ocassionlly now none alcohol. Never Smoking. NO Children. 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              Allergies/Medications:     Allergies   Allergen Reactions     Nkda [No Known Drug Allergies]        Outpatient Prescriptions Marked as Taking for the 5/5/17 encounter (Office Visit) with Juaquin Saxena APRN CNP   Medication Sig     fish oil-omega-3 fatty acids 1000 MG capsule Take 2 g by mouth daily     VITAMIN D, CHOLECALCIFEROL, PO Take by mouth daily     pimecrolimus (ELIDEL) 1 % cream Apply topically 2 times daily as needed For psoriasis of face or genitals     fluocinonide (LIDEX) 0.05 % solution Apply  topically 2 times daily for itchy scalp or scalp psoriasis     calcipotriene-betameth diprop (TACLONEX) 0.005-0.064 % OINT Externally apply  topically 2 times daily as needed.     Artificial Tear Ointment (REFRESH P.M.) OINT Apply 1 inch to eye nightly as needed     tacrolimus (PROTOPIC) 0.1 % ointment Apply topically 2 times daily Apply to areas around eyes as needed     BIOTIN PO Take  by mouth daily.     methylphenidate (CONCERTA) 18 MG CR tablet Take 18 mg by mouth every morning.     Multiple Vitamin (DAILY MULTIVITAMIN PO) Take  by mouth daily.     Ascorbic Acid (VITAMIN C) 100 MG TABS Take  by mouth daily.            Review of Systems:   Negative raynaud s phenomena, hairloss, sun sensitivities, keratoconjunctivitis sicca, pleurisy, significant rashes like malar, oral/nasal or ulcerations, inflammatory eye disease, inflammatory bowel disease, dactylitis, tenosynovitis, or enthespathy, blood clots, gout, psoriasis, UC, crohn's. No temporal headache, no jaw claudication, no scalp tenderness, vision changes, carotidynia, cough  +eye drys   +good energy   CONSTITUTIONAL: No fevers, night sweats or unintentional weight change. No acute distress, swollen glands  EYES: No vision change, diplopia, pain in eyes or red eyes   EARS, NOSE, MOUTH, THROAT: No tinnitus or hearing change, no epistaxis or nasal discharge, no oral lesions, throat clear. Normal saliva pool.   No drymouth. No thyroid enlargement  CARDIOVASCULAR: No chest pain, palpitations, or pain with walking, no orthopnea or PND   RESPIRATORY: No dyspnea, cough, shortness of breath or wheezing. No pleurisy.   GI: No nausea, vomiting, diarrhea or constipation, no abdominal pain, or blood in stools.   : No change in urine, no dysuria or hematuria   MUSCKL: No swollen, tender, red or painful joints. No nodules. No enthesitis, plantar fascitis or heel pain.   INTEGUMENTARY: No concerning lesions or moles   NEURO: No loss of strength or sensation, no numbness or tingling, no tremor, no dizziness, no headache. No falls   ENDO: No polyuria or polydipsia, no temperature intolerance   HEME/LYMPH:No concerning bumps, bleeding problems, or swollen lymph nodes. No recent infections, hospitalizations or new illnesses.   ALLERGY: No environmental allergies   PSYCH:No depression or anxiety, no sleep problems.  Otherwise 14 point ROS obtained, reviewed and found negative.          Physical Exam:     Constitutional: WD-WN-WG cooperative  Eyes: nl EOM, PERRLA, vision, conjunctiva, sclera  ENT: nl external ears, nose, hearing, lips, teeth, gums, throat. Nl saliva pool  No mucous membrane lesions, normal saliva pool  Neck: no mass or thyroid enlargement. non-tender.  Resp: lungs clear to auscultation, nl to palpation, nl breath sounds  CV: RRR, no murmurs, rubs or gallops, no edema  GI: no ABD mass or tenderness, no HSM. No RUQ pain.   : not tested  Lymph: no cervical, supraclavicular, inguinal or epitrochlear nodes  MSK: tender left ITB and trochanertic bursa. No SI tenderness and -SLR. FROM hips. All other TMJ, neck, shoulder, elbow, wrist, MCP/PIP/DIP, spine, hip, knee, ankle, and foot MTP/IP joints were examined and  found normal. No active synovitis or deformity. Full ROM.  NL prayer sign. No periuncle erythema. Normal  strength. No dactylitis,  tenosynovitis, enthespathy. Negative MCP and MTP squeeze. No impingment signs of  shoulders. Negative Lhermitte's sign.   Skin: No nail pitting, alopecia, rash, nodules or lesions.   Neuro: nl cranial nerves, strength, sensation  Psych: nl judgement, orientation, memory, affect.         Labs/Imaging:   Reviewed medications, labs, imaging, and EMR.   Additional review:  Labs with Pt: NO     Outside Records/Xrays: YES  Reviewed Rheumatology Lab Flowsheet & Lab Flowsheet    Results for orders placed or performed in visit on 05/23/16   Punctal Closure, Plugs    Narrative    Sign in/Time Out: Correct patient, Correct medication, Correct procedure,   Correct site   . Time out performed at: 9:55 AM   . Preprocedure Medication: Proparacaine 2% solution   .     Punctal Plug Insertion #1  Brand: Collagen Oasis 0.4mm   . Location: Right lower   . Ref #: [id number]. Lot #: [id number]   .     Punctal Plug Insertion #2  Brand: Collagen Oasis 0.4mm   . Location: Left lower   . Ref #: [id number]   . Lot #: [id number]   .     Punctal Plug Insertion #4  Plan: Monitor   . Pre-Procedure Pain: 0   . Post-Procedure Pain: 0   . Sign Out: Patient counseled on signs and symptoms for which to call   and/or return to clinic, Patient tolerated procedure well with no   complications   .      Recent Labs   Lab Test  10/08/14   1003   WBC  5.1   HGB  14.5   HCT  41.2   MCV  93   PLT  131*   AST  16   ALT  22              Education:   1. Discussed routine annual physicals, cancer screening, cardiac risk monitoring, bone health and primary care with primary care provider.   2. Educated on diagnosis, prognosis, labs, imaging, treatment options (including risks, benefits, risk of no treatment), medications (use, dose, side-effects, risks of medications including infection/cancer/bone marrow suppression, lab monitoring), infections what to do, plan of cares, goals of treatment.       Juaquin Saxena APRN, CNP, MSN  [facility]  Department of Rheumatology & Autoimmune Disorders  [facility]: [phone] (opt.2 then opt.  1 scheduling, opt. 3 nurse)  [facility]: [phone]

## 2017-05-05 NOTE — NURSING NOTE
"No chief complaint on file.      Initial /87  Pulse 90  Temp 97.9  F (36.6  C) (Oral)  Ht 1.778 m (5' 10\")  Wt 76.4 kg (168 lb 8 oz)  SpO2 96%  BMI 24.18 kg/m2 Estimated body mass index is 24.18 kg/(m^2) as calculated from the following:    Height as of this encounter: 1.778 m (5' 10\").    Weight as of this encounter: 76.4 kg (168 lb 8 oz).  Medication Reconciliation: complete   Vibha Calvo., CMA    "

## 2017-05-08 NOTE — PROGRESS NOTES
The blood counts, liver, kidney and CRP inflammation labs are normal.     Juaquin WARE, CNP, MSN  5/8/2017  11:19 AM

## 2017-05-10 ENCOUNTER — ALLIED HEALTH/NURSE VISIT (OUTPATIENT)
Dept: DERMATOLOGY | Facility: CLINIC | Age: 62
End: 2017-05-10

## 2017-05-10 DIAGNOSIS — L40.9 PSORIASIS: ICD-10-CM

## 2017-05-10 NOTE — MR AVS SNAPSHOT
After Visit Summary   5/10/2017    Tony Mcnair    MRN: [id number]           Patient Information     Date Of Birth          1955        Visit Information        Provider Department      5/10/2017 7:20 AM UC DERM LIGHT TREATMENT [facility] Dermatology        Today's Diagnoses     Psoriasis           Follow-ups after your visit        Your next 10 appointments already scheduled     May 17, 2017  7:20 AM CDT   (Arrive by 7:05 AM)   Light Treatment Visit with UC DERM LIGHT TREATMENT   [facility] Dermatology ([facility])    [address] [place] [id number] [phone]            May 24, 2017  7:20 AM CDT   (Arrive by 7:05 AM)   Light Treatment Visit with UC DERM LIGHT TREATMENT   [facility] Dermatology ([facility])    [address] [place] [id number] [phone]            May 31, 2017  7:20 AM CDT   (Arrive by 7:05 AM)   Light Treatment Visit with UC DERM LIGHT TREATMENT   [facility] Dermatology ([facility])    [address] [place] [id number] [phone]            Apr 30, 2018  7:00 AM CDT   (Arrive by 6:45 AM)   Return Visit with CHAZ Gupta CNP   [facility] Rheumatology ([facility])    [address] [place] [id number] [phone]              Who to contact     Please call your clinic at [phone] to:    Ask questions about your health    Make or cancel appointments    Discuss your medicines    Learn about your test results    Speak to your doctor   If you have compliments or concerns about an experience at your clinic, or if you wish to file a complaint, please contact [facility] Patient Relations at [phone] or email us at [email or web address]         Additional Information About Your Visit        MyChart Information     Geovannahart gives  you secure access to your electronic health record. If you see a primary care provider, you can also send messages to your care team and make appointments. If you have questions, please call your primary care clinic.  If you do not have a primary care provider, please call [phone] and they will assist you.      Edventory is an electronic gateway that provides easy, online access to your medical records. With Edventory, you can request a clinic appointment, read your test results, renew a prescription or communicate with your care team.     To access your existing account, please contact your [facility] or call [phone] for assistance.        Care EveryWhere ID     This is your Care EveryWhere ID. This could be used by other organizations to access your [place] medical records  [id number]         Blood Pressure from Last 3 Encounters:   05/05/17 121/87   01/23/15 (!) 145/100   10/08/14 133/81    Weight from Last 3 Encounters:   05/05/17 76.4 kg (168 lb 8 oz)   01/23/15 78 kg (172 lb)   10/08/14 75.3 kg (166 lb)              We Performed the Following     CHARGE - PHOTOTHERAPY NBUVB (UV LIGHT)     PROCEDURE - PHOTOTHERAPY NBUVB        Primary Care Provider Office Phone # Fax #    William Morton [phone]       [address] [address]        Thank you!     Thank you for choosing [facility]  for your care. Our goal is always to provide you with excellent care. Hearing back from our patients is one way we can continue to improve our services. Please take a few minutes to complete the written survey that you may receive in the mail after your visit with us. Thank you!             Your Updated Medication List - Protect others around you: Learn how to safely use, store and throw away your medicines at www.disposemymeds.org.          This list is accurate as of: 5/10/17  9:12 AM.  Always use your most recent med  list.                   Brand Name Dispense Instructions for use    BIOTIN PO      Take  by mouth daily.       calcipotriene-betameth diprop 0.005-0.064 % Oint    TACLONEX    60 g    Externally apply  topically 2 times daily as needed.       CONCERTA 18 MG CR tablet   Generic drug:  methylphenidate ER      Take 18 mg by mouth every morning.       DAILY MULTIVITAMIN PO      Take  by mouth daily.       fish oil-omega-3 fatty acids 1000 MG capsule      Take 2 g by mouth daily       fluocinonide 0.05 % solution    LIDEX    60 mL    Apply  topically 2 times daily for itchy scalp or scalp psoriasis       pimecrolimus 1 % cream    ELIDEL    60 g    Apply topically 2 times daily as needed For psoriasis of face or genitals       REFRESH P.M. Oint     1 Tube    Apply 1 inch to eye nightly as needed       tacrolimus 0.1 % ointment    PROTOPIC    60 g    Apply topically 2 times daily Apply to areas around eyes as needed       Vitamin C 100 MG Tabs      Take  by mouth daily.       VITAMIN D (CHOLECALCIFEROL) PO      Take by mouth daily

## 2017-05-10 NOTE — PROGRESS NOTES
[facility] Dermatology Phototherapy Record  1. Tony Mcnair is a 61 year old male is here today for phototherapy (UVB) treatment forPsoriasis .        Changes or new medications since last treatment:   NO    New medical conditions or problems since last treatment:   NO    Any problems with last phototherapy treatment?    NO    2. The patient tolerated phototherapy without complication    Patient will return for next UVB treatment, per protocol.     Patient to see provider every 4-12 weeks for follow-up during treatment.      All questions and concerns discussed with patient in clinic today.      Lorrie Graham

## 2017-05-12 ENCOUNTER — THERAPY VISIT (OUTPATIENT)
Dept: PHYSICAL THERAPY | Facility: CLINIC | Age: 62
End: 2017-05-12
Payer: COMMERCIAL

## 2017-05-12 DIAGNOSIS — M76.30 ILIOTIBIAL BAND SYNDROME, UNSPECIFIED LATERALITY: ICD-10-CM

## 2017-05-12 DIAGNOSIS — M79.671 PAIN IN BOTH FEET: Primary | ICD-10-CM

## 2017-05-12 DIAGNOSIS — M79.672 PAIN IN BOTH FEET: Primary | ICD-10-CM

## 2017-05-12 PROCEDURE — 97161 PT EVAL LOW COMPLEX 20 MIN: CPT | Mod: GP | Performed by: PHYSICAL THERAPIST

## 2017-05-12 PROCEDURE — 97530 THERAPEUTIC ACTIVITIES: CPT | Mod: GP | Performed by: PHYSICAL THERAPIST

## 2017-05-12 PROCEDURE — 97110 THERAPEUTIC EXERCISES: CPT | Mod: GP | Performed by: PHYSICAL THERAPIST

## 2017-05-12 ASSESSMENT — ACTIVITIES OF DAILY LIVING (ADL)
RISE FROM A CHAIR: ACTIVITY IS FAIRLY DIFFICULT
AS_A_RESULT_OF_YOUR_KNEE_INJURY,_HOW_WOULD_YOU_RATE_YOUR_CURRENT_LEVEL_OF_DAILY_ACTIVITY?: ABNORMAL
SIT WITH YOUR KNEE BENT: ACTIVITY IS FAIRLY DIFFICULT
GIVING WAY, BUCKLING OR SHIFTING OF KNEE: I DO NOT HAVE THE SYMPTOM
GO UP STAIRS: ACTIVITY IS MINIMALLY DIFFICULT
GO DOWN STAIRS: ACTIVITY IS MINIMALLY DIFFICULT
GO DOWN STAIRS: ACTIVITY IS MINIMALLY DIFFICULT
SQUAT: ACTIVITY IS FAIRLY DIFFICULT
GO UP STAIRS: ACTIVITY IS MINIMALLY DIFFICULT
SWELLING: I DO NOT HAVE THE SYMPTOM
KNEEL ON THE FRONT OF YOUR KNEE: ACTIVITY IS NOT DIFFICULT
SWELLING: I DO NOT HAVE THE SYMPTOM
SQUAT: ACTIVITY IS FAIRLY DIFFICULT
RISE FROM A CHAIR: ACTIVITY IS FAIRLY DIFFICULT
PAIN: THE SYMPTOM AFFECTS MY ACTIVITY SLIGHTLY
STIFFNESS: THE SYMPTOM AFFECTS MY ACTIVITY SLIGHTLY
STAND: ACTIVITY IS NOT DIFFICULT
SIT WITH YOUR KNEE BENT: ACTIVITY IS FAIRLY DIFFICULT
WEAKNESS: I HAVE THE SYMPTOM BUT IT DOES NOT AFFECT MY ACTIVITY
KNEE_ACTIVITY_OF_DAILY_LIVING_SUM: 49
HOW_WOULD_YOU_RATE_THE_OVERALL_FUNCTION_OF_YOUR_KNEE_DURING_YOUR_USUAL_DAILY_ACTIVITIES?: ABNORMAL
GIVING WAY, BUCKLING OR SHIFTING OF KNEE: I DO NOT HAVE THE SYMPTOM
PAIN: THE SYMPTOM AFFECTS MY ACTIVITY SLIGHTLY
RAW_SCORE: 49
STAND: ACTIVITY IS NOT DIFFICULT
KNEE_ACTIVITY_OF_DAILY_LIVING_SCORE: 70
HOW_WOULD_YOU_RATE_THE_OVERALL_FUNCTION_OF_YOUR_KNEE_DURING_YOUR_USUAL_DAILY_ACTIVITIES?: ABNORMAL
WEAKNESS: I HAVE THE SYMPTOM BUT IT DOES NOT AFFECT MY ACTIVITY
KNEEL ON THE FRONT OF YOUR KNEE: ACTIVITY IS NOT DIFFICULT
LIMPING: THE SYMPTOM AFFECTS MY ACTIVITY SLIGHTLY
HOW_WOULD_YOU_RATE_THE_CURRENT_FUNCTION_OF_YOUR_KNEE_DURING_YOUR_USUAL_DAILY_ACTIVITIES_ON_A_SCALE_FROM_0_TO_100_WITH_100_BEING_YOUR_LEVEL_OF_KNEE_FUNCTION_PRIOR_TO_YOUR_INJURY_AND_0_BEING_THE_INABILITY_TO_PERFORM_ANY_OF_YOUR_USUAL_DAILY_ACTIVITIES?: 50
STIFFNESS: THE SYMPTOM AFFECTS MY ACTIVITY SLIGHTLY
LIMPING: THE SYMPTOM AFFECTS MY ACTIVITY SLIGHTLY
RAW_SCORE: 49
WALK: ACTIVITY IS FAIRLY DIFFICULT
WALK: ACTIVITY IS FAIRLY DIFFICULT
KNEE_ACTIVITY_OF_DAILY_LIVING_SCORE: 70
HOW_WOULD_YOU_RATE_THE_CURRENT_FUNCTION_OF_YOUR_KNEE_DURING_YOUR_USUAL_DAILY_ACTIVITIES_ON_A_SCALE_FROM_0_TO_100_WITH_100_BEING_YOUR_LEVEL_OF_KNEE_FUNCTION_PRIOR_TO_YOUR_INJURY_AND_0_BEING_THE_INABILITY_TO_PERFORM_ANY_OF_YOUR_USUAL_DAILY_ACTIVITIES?: 50
KNEE_ACTIVITY_OF_DAILY_LIVING_SUM: 49
AS_A_RESULT_OF_YOUR_KNEE_INJURY,_HOW_WOULD_YOU_RATE_YOUR_CURRENT_LEVEL_OF_DAILY_ACTIVITY?: ABNORMAL

## 2017-05-12 NOTE — PROGRESS NOTES
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 He recalls first injuring his hip doing yoga about 10 years ago in a NAYLA position. He also reports a history of L lateral meniscectomy (early 2000s). Pt presents with exam finding of L>R dynamic SL valgus, LE weakness, + anterior hip testing for mobility restrictions/pain, limited L hip IR, and weakness of hip rotators, and pain with deep hip flexion; these finding are consistent with L subspine impingement vs anterior labral pathology. These impairments limit the patient's ability to squat, exercise, or sleep without significant discomfort. This patient would benefit from skilled PT service to address their functional limitation in ADLs.      SUBJECTIVE    Injury/Condition Details:  Onset Timing/Date 5/5/17 order   Presenting History of Chief Complaint/Symptoms Pt reports a 10 year history of L hip pain. He reports doing yoga at this time a recalls immediate pain is hip after this. Since then, He recalls his pain became better, but with certain activities, he would, \"notice pain.\"    He reports a mental history of anxiety/depression.     He mainly notices his pain at night. He is a side sleeper, but this doesn't seem to bother his hip. He notes a dull ache in his hip with sleeping that isn't really position dependent.    He as more pain as well with Spring yard work; squatting, lifting task.    He as autoimmune psoriasis    Mechanism Yoga stretch 10 year prior, per report      Symptom Behavior Details    Primary Symptoms Constant symptoms; worsen with activity, pain (Location: lateral hip, Quality: Aching/Throbbing), stiffness, weakness   Worst " Pain 7/10 (with exercise)   Best Pain 3/10 (with rest)   Time of day dependent? Worse in evening after activity   Recent symptom change? 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), bike racing,   General health status (self-report) good   Orthopaedic history L RTCR (2000), L meniscectomy    Notable medical history none     Previous Functional Status: 10 year history of pain  Patient's Goal(s): return to activity with less pain  Current HEP/exercise regimen: 2x/week, starting to bike daily  Medications: none  Hand/Leg Dominance: R  Red Flags: Patient denies the following: Clicking, Popping, Catching (knee); Pain with Cough / Sneeze / Laughing ; Night Pain ; Fever/Chills ; Weakness ; Numbness/Tingling ; Saddle Anesthesia ; Change in Bowel or Bladder ; Chest Pain ; Weight Loss/Gain ; Rashes or Lesions of the Skin ; Non-Healing Wounds ; Edema of the Feet ; Vision Change   Other relevant comments:       OBJECTIVE    Standing Posture: normal posture    Lumbar Screen: diminished lumbar flexion    SL Balance:   L: 30 touch-downs in 20 sec (+ R hip drop)  R: 30 touch-downs in 20sec (+L hip drop)     Gait: non antalgic     Functional Screen:   - Overhead Squat: anterior tilt   -SL Squat: L>R dynamic valgus  -Lateral Step Down:    Knee ROM: FROM    Strength:   L R   HIP     Flex 4 4   Ext 4- 4-   ABD/ER (clamshell MMT) 4 4   ABD 3+ 3   KNEE     Flex     Ext       Hip PROM:     L R   Flexion     Extension     IR 5 10   ER 40 40   Jaspreet's     Hamstring 90-90     Pk Test + -       Special tests:   L R   Scour - -   FADER - -   NAYLA: + on L  Palpation: Tender at A/L iliac crest/TFL     Patellar tracking:normal      Patient is a 61 year old male with left side knee/hip complaints.    Patient  has the following significant findings with corresponding treatment plan.                Diagnosis 1:  L subspine impingement syndrome  Pain -  manual therapy, splint/taping/bracing/orthotics, self management, education, directional preference exercise and home program  Decreased ROM/flexibility - manual therapy, therapeutic exercise, therapeutic activity and home program  Decreased strength - therapeutic exercise, therapeutic activities and home program    Therapy Evaluation Codes:   1) History comprised of:   Personal factors that impact the plan of care:      Age, Anxiety and Work status.    Comorbidity factors that impact the plan of care are:      Osteoarthritis and Weakness.     Medications impacting care: None.  2) Examination of Body Systems comprised of:   Body structures and functions that impact the plan of care:      Hip and Lumbar spine.   Activity limitations that impact the plan of care are:      Bending, Dressing, Jumping, Lifting, Reading/Computer work, Sitting, Squatting/kneeling, Stairs, Walking, Working and Sleeping.  3) Clinical presentation characteristics are:   Stable/Uncomplicated.  4) Decision-Making    Low complexity using standardized patient assessment instrument and/or measureable assessment of functional outcome.  Cumulative Therapy Evaluation is: Low complexity.    Previous and current functional limitations:  (See Goal Flow Sheet for this information)    Short term and Long term goals: (See Goal Flow Sheet for this information)     Communication ability:  Patient appears to be able to clearly communicate and understand verbal and written communication and follow directions correctly.  Treatment Explanation - The following has been discussed with the patient:   RX ordered/plan of care  Anticipated outcomes  Possible risks and side effects  This patient would benefit from PT intervention to resume normal activities.   Rehab potential is good.    Frequency:  1 X week, once  daily  Duration:  for 8 visits  Discharge Plan:  Achieve all LTG.  Independent in home treatment program.  Reach maximal therapeutic benefit.    Please refer to the daily flowsheet for treatment today, total treatment time and time spent performing 1:1 timed codes.

## 2017-05-17 ENCOUNTER — ALLIED HEALTH/NURSE VISIT (OUTPATIENT)
Dept: DERMATOLOGY | Facility: CLINIC | Age: 62
End: 2017-05-17

## 2017-05-17 DIAGNOSIS — L40.9 PSORIASIS: ICD-10-CM

## 2017-05-17 NOTE — NURSING NOTE
[facility] Dermatology Phototherapy Record  1. Tony Mcnair is a 61 year old male is here today for phototherapy (UVB) treatment for psoriasis.        Changes or new medications since last treatment:   NO    New medical conditions or problems since last treatment:   NO    Any problems with last phototherapy treatment?    NO    2. The patient tolerated phototherapy without complication    Patient will return for next UVB treatment, per protocol.     Patient to see provider every 4-12 weeks for follow-up during treatment.      All questions and concerns discussed with patient in clinic today.      Mary Aponte

## 2017-05-17 NOTE — MR AVS SNAPSHOT
After Visit Summary   5/17/2017    Toyn Mcnair    MRN: [id number]           Patient Information     Date Of Birth          1955        Visit Information        Provider Department      5/17/2017 7:20 AM UC DERM LIGHT TREATMENT [facility] Dermatology        Today's Diagnoses     Psoriasis           Follow-ups after your visit        Your next 10 appointments already scheduled     May 22, 2017  7:00 AM CDT   JEN Extremity with Theo Escamilla PT   [facility] [place] [facility] ([facility] [place])    [address] [place] [address]  [address]  [place] [address]   [phone]            May 24, 2017  7:20 AM CDT   (Arrive by 7:05 AM)   Light Treatment Visit with UC DERM LIGHT TREATMENT   [facility] Dermatology ([facility])    [address] [place] [id number] [phone]            May 31, 2017  7:20 AM CDT   (Arrive by 7:05 AM)   Light Treatment Visit with UC DERM LIGHT TREATMENT   [facility] Dermatology ([facility])    [address] [place] [address] [phone] [phone]            Apr 30, 2018  7:00 AM CDT   (Arrive by 6:45 AM)   Return Visit with CHAZ Gupta [facility] Rheumatology ([facility])    [address] [place] [address] [phone]              Who to contact     Please call your clinic at [phone] to:    Ask questions about your health    Make or cancel appointments    Discuss your medicines    Learn about your test results    Speak to your doctor   If you have compliments or concerns about an experience at your clinic, or if you wish to file a complaint, please contact [facility] Patient Relations at [phone] or email us at [email or web address]         Additional Information About Your Visit        MyChart Information     OnetoOnetexthart gives you secure access  to your electronic health record. If you see a primary care provider, you can also send messages to your care team and make appointments. If you have questions, please call your primary care clinic.  If you do not have a primary care provider, please call [phone] and they will assist you.      LiquidCompass is an electronic gateway that provides easy, online access to your medical records. With LiquidCompass, you can request a clinic appointment, read your test results, renew a prescription or communicate with your care team.     To access your existing account, please contact your [facility] or call [phone] for assistance.        Care EveryWhere ID     This is your Care EveryWhere ID. This could be used by other organizations to access your [place] medical records  [id number]         Blood Pressure from Last 3 Encounters:   05/05/17 121/87   01/23/15 (!) 145/100   10/08/14 133/81    Weight from Last 3 Encounters:   05/05/17 76.4 kg (168 lb 8 oz)   01/23/15 78 kg (172 lb)   10/08/14 75.3 kg (166 lb)              We Performed the Following     CHARGE - PHOTOTHERAPY NBUVB (UV LIGHT)     PROCEDURE - PHOTOTHERAPY NBUVB        Primary Care Provider Office Phone # Fax #    William Morton [phone]       [address] [address]        Thank you!     Thank you for choosing [facility]  for your care. Our goal is always to provide you with excellent care. Hearing back from our patients is one way we can continue to improve our services. Please take a few minutes to complete the written survey that you may receive in the mail after your visit with us. Thank you!             Your Updated Medication List - Protect others around you: Learn how to safely use, store and throw away your medicines at www.disposemymeds.org.          This list is accurate as of: 5/17/17  8:03 AM.  Always use your most recent med list.                    Brand Name Dispense Instructions for use    BIOTIN PO      Take  by mouth daily.       calcipotriene-betameth diprop 0.005-0.064 % Oint    TACLONEX    60 g    Externally apply  topically 2 times daily as needed.       CONCERTA 18 MG CR tablet   Generic drug:  methylphenidate ER      Take 18 mg by mouth every morning.       DAILY MULTIVITAMIN PO      Take  by mouth daily.       fish oil-omega-3 fatty acids 1000 MG capsule      Take 2 g by mouth daily       fluocinonide 0.05 % solution    LIDEX    60 mL    Apply  topically 2 times daily for itchy scalp or scalp psoriasis       pimecrolimus 1 % cream    ELIDEL    60 g    Apply topically 2 times daily as needed For psoriasis of face or genitals       REFRESH P.M. Oint     1 Tube    Apply 1 inch to eye nightly as needed       tacrolimus 0.1 % ointment    PROTOPIC    60 g    Apply topically 2 times daily Apply to areas around eyes as needed       Vitamin C 100 MG Tabs      Take  by mouth daily.       VITAMIN D (CHOLECALCIFEROL) PO      Take by mouth daily

## 2017-05-22 ENCOUNTER — THERAPY VISIT (OUTPATIENT)
Dept: PHYSICAL THERAPY | Facility: CLINIC | Age: 62
End: 2017-05-22
Payer: COMMERCIAL

## 2017-05-22 DIAGNOSIS — M76.30 ILIOTIBIAL BAND SYNDROME, UNSPECIFIED LATERALITY: ICD-10-CM

## 2017-05-22 DIAGNOSIS — M79.671 PAIN IN BOTH FEET: ICD-10-CM

## 2017-05-22 DIAGNOSIS — M79.672 PAIN IN BOTH FEET: ICD-10-CM

## 2017-05-22 PROCEDURE — 97110 THERAPEUTIC EXERCISES: CPT | Mod: GP | Performed by: PHYSICAL THERAPIST

## 2017-05-22 PROCEDURE — 97140 MANUAL THERAPY 1/> REGIONS: CPT | Mod: GP | Performed by: PHYSICAL THERAPIST

## 2017-05-24 ENCOUNTER — ALLIED HEALTH/NURSE VISIT (OUTPATIENT)
Dept: DERMATOLOGY | Facility: CLINIC | Age: 62
End: 2017-05-24

## 2017-05-24 DIAGNOSIS — L40.9 PSORIASIS: ICD-10-CM

## 2017-05-24 NOTE — MR AVS SNAPSHOT
After Visit Summary   5/24/2017    Tony Mcnair    MRN: [id number]           Patient Information     Date Of Birth          1955        Visit Information        Provider Department      5/24/2017 7:20 AM UC DERM LIGHT TREATMENT [facility] Dermatology        Today's Diagnoses     Psoriasis           Follow-ups after your visit        Your next 10 appointments already scheduled     May 31, 2017  7:20 AM CDT   (Arrive by 7:05 AM)   Light Treatment Visit with UC DERM LIGHT TREATMENT   [facility] Dermatology ([facility])    [address] [place] [address] [phone]            Jun 02, 2017  7:00 AM CDT   JEN Extremity with Theo Escamilla PT   [facility] [place] [place] [facility] ([facility] [place])    [address] [place] [address]  [address]  [place] [address]   [phone]            Apr 30, 2018  7:00 AM CDT   (Arrive by 6:45 AM)   Return Visit with CHAZ Gupta [facility] Rheumatology ([facility])    [address] [place] [address] [phone]              Who to contact     Please call your clinic at [phone] to:    Ask questions about your health    Make or cancel appointments    Discuss your medicines    Learn about your test results    Speak to your doctor   If you have compliments or concerns about an experience at your clinic, or if you wish to file a complaint, please contact [facility] Patient Relations at [phone] or email us at [email or web address]         Additional Information About Your Visit        MyChart Information     Bueroservice24hart gives you secure access to your electronic health record. If you see a primary care provider, you can also send messages to your care team and make appointments. If you have questions, please call your primary care clinic.  If you do not have a primary care provider, please  call [phone] and they will assist you.      IntellinX is an electronic gateway that provides easy, online access to your medical records. With IntellinX, you can request a clinic appointment, read your test results, renew a prescription or communicate with your care team.     To access your existing account, please contact your [facility] or call [phone] for assistance.        Care EveryWhere ID     This is your Care EveryWhere ID. This could be used by other organizations to access your [place] medical records  [id number]         Blood Pressure from Last 3 Encounters:   05/05/17 121/87   01/23/15 (!) 145/100   10/08/14 133/81    Weight from Last 3 Encounters:   05/05/17 76.4 kg (168 lb 8 oz)   01/23/15 78 kg (172 lb)   10/08/14 75.3 kg (166 lb)              We Performed the Following     CHARGE - PHOTOTHERAPY NBUVB (UV LIGHT)     PROCEDURE - PHOTOTHERAPY NBUVB        Primary Care Provider Office Phone # Fax #    William Morton [phone]       [place] [address]        Thank you!     Thank you for choosing [facility] DERMATOLOGY  for your care. Our goal is always to provide you with excellent care. Hearing back from our patients is one way we can continue to improve our services. Please take a few minutes to complete the written survey that you may receive in the mail after your visit with us. Thank you!             Your Updated Medication List - Protect others around you: Learn how to safely use, store and throw away your medicines at www.disposemymeds.org.          This list is accurate as of: 5/24/17  7:51 AM.  Always use your most recent med list.                   Brand Name Dispense Instructions for use    BIOTIN PO      Take  by mouth daily.       calcipotriene-betameth diprop 0.005-0.064 % Oint    TACLONEX    60 g    Externally apply  topically 2 times daily as needed.       CONCERTA 18 MG CR tablet    Generic drug:  methylphenidate ER      Take 18 mg by mouth every morning.       DAILY MULTIVITAMIN PO      Take  by mouth daily.       fish oil-omega-3 fatty acids 1000 MG capsule      Take 2 g by mouth daily       fluocinonide 0.05 % solution    LIDEX    60 mL    Apply  topically 2 times daily for itchy scalp or scalp psoriasis       pimecrolimus 1 % cream    ELIDEL    60 g    Apply topically 2 times daily as needed For psoriasis of face or genitals       REFRESH P.M. Oint     1 Tube    Apply 1 inch to eye nightly as needed       tacrolimus 0.1 % ointment    PROTOPIC    60 g    Apply topically 2 times daily Apply to areas around eyes as needed       Vitamin C 100 MG Tabs      Take  by mouth daily.       VITAMIN D (CHOLECALCIFEROL) PO      Take by mouth daily

## 2017-05-24 NOTE — NURSING NOTE
[facility] Dermatology Phototherapy Record  1. Tony Mcnair is a 61 year old male is here today for phototherapy (UVB) treatment for Psoriasis.        Changes or new medications since last treatment:   NO    New medical conditions or problems since last treatment:   NO    Any problems with last phototherapy treatment?    NO    2. The patient tolerated phototherapy without complication    Patient will return for next UVB treatment, per protocol.     Patient to see provider every 4-12 weeks for follow-up during treatment.      All questions and concerns discussed with patient in clinic today.      Katlyn Marinelli

## 2017-05-31 ENCOUNTER — ALLIED HEALTH/NURSE VISIT (OUTPATIENT)
Dept: DERMATOLOGY | Facility: CLINIC | Age: 62
End: 2017-05-31

## 2017-05-31 DIAGNOSIS — L40.9 PSORIASIS: ICD-10-CM

## 2017-05-31 NOTE — MR AVS SNAPSHOT
After Visit Summary   5/31/2017    Tony Mcnair    MRN: [id number]           Patient Information     Date Of Birth          1955        Visit Information        Provider Department      5/31/2017 7:20 AM UC DERM LIGHT TREATMENT [facility] D[facility]atology        Today's Diagnoses     Psoriasis           Follow-ups after your visit        Your next 10 appointments already scheduled     Jun 02, 2017  7:00 AM CDT   JEN Extremity with Theo Escamilla PT   [place] [facility] (JEN [place])    [address] [place] [address]  [address]  [place] [address]   [phone]            Apr 30, 2018  7:00 AM CDT   (Arrive by 6:45 AM)   Return Visit with CHAZ Gupta [facility] ([facility])    [address] [place] [address] [phone]              Who to contact     Please call your clinic at [phone] to:    Ask questions about your health    Make or cancel appointments    Discuss your medicines    Learn about your test results    Speak to your doctor   If you have compliments or concerns about an experience at your clinic, or if you wish to file a complaint, please contact [facility] Patient Relations at [phone] or email us at [email or web address]         Additional Information About Your Visit        MyChart Information     [employer] gives you secure access to your electronic health record. If you see a primary care provider, you can also send messages to your care team and make appointments. If you have questions, please call your primary care clinic.  If you do not have a primary care provider, please call [phone] and they will assist you.      [employer] is an electronic gateway that provides easy, online access to your medical records. With [employer], you can request a clinic appointment, read your test results, renew a prescription or  communicate with your care team.     To access your existing account, please contact your [facility] or call [phone] for assistance.        Care EveryWhere ID     This is your Care EveryWhere ID. This could be used by other organizations to access your [facility] medical records  [id number]         Blood Pressure from Last 3 Encounters:   05/05/17 121/87   01/23/15 (!) 145/100   10/08/14 133/81    Weight from Last 3 Encounters:   05/05/17 76.4 kg (168 lb 8 oz)   01/23/15 78 kg (172 lb)   10/08/14 75.3 kg (166 lb)              We Performed the Following     CHARGE - PHOTOTHERAPY NBUVB (UV LIGHT)     PROCEDURE - PHOTOTHERAPY NBUVB        Primary Care Provider Office Phone # Fax #    William Morton [phone]       [place] [address]        Thank you!     Thank you for choosing [facility]  for your care. Our goal is always to provide you with excellent care. Hearing back from our patients is one way we can continue to improve our services. Please take a few minutes to complete the written survey that you may receive in the mail after your visit with us. Thank you!             Your Updated Medication List - Protect others around you: Learn how to safely use, store and throw away your medicines at www.disposemymeds.org.          This list is accurate as of: 5/31/17  8:06 AM.  Always use your most recent med list.                   Brand Name Dispense Instructions for use    BIOTIN PO      Take  by mouth daily.       calcipotriene-betameth diprop 0.005-0.064 % Oint    TACLONEX    60 g    Externally apply  topically 2 times daily as needed.       CONCERTA 18 MG CR tablet   Generic drug:  methylphenidate ER      Take 18 mg by mouth every morning.       DAILY MULTIVITAMIN PO      Take  by mouth daily.       fish oil-omega-3 fatty acids 1000 MG capsule      Take 2 g by mouth daily       fluocinonide 0.05 %  solution    LIDEX    60 mL    Apply  topically 2 times daily for itchy scalp or scalp psoriasis       pimecrolimus 1 % cream    ELIDEL    60 g    Apply topically 2 times daily as needed For psoriasis of face or genitals       REFRESH P.M. Oint     1 Tube    Apply 1 inch to eye nightly as needed       tacrolimus 0.1 % ointment    PROTOPIC    60 g    Apply topically 2 times daily Apply to areas around eyes as needed       Vitamin C 100 MG Tabs      Take  by mouth daily.       VITAMIN D (CHOLECALCIFEROL) PO      Take by mouth daily

## 2017-05-31 NOTE — NURSING NOTE
[facility] Dermatology Phototherapy Record  1. Tony Mcnair is a 61 year old male is here today for phototherapy (UVB) treatment for psoriasis.        Changes or new medications since last treatment:   NO    New medical conditions or problems since last treatment:   NO    Any problems with last phototherapy treatment?    NO    2. The patient tolerated phototherapy without complication    Patient will return for next UVB treatment, per protocol.     Patient to see provider every 4-12 weeks for follow-up during treatment.      All questions and concerns discussed with patient in clinic today.      Mary Aponte

## 2017-06-05 ENCOUNTER — THERAPY VISIT (OUTPATIENT)
Dept: PHYSICAL THERAPY | Facility: CLINIC | Age: 62
End: 2017-06-05
Payer: COMMERCIAL

## 2017-06-05 DIAGNOSIS — M76.30 ILIOTIBIAL BAND SYNDROME, UNSPECIFIED LATERALITY: ICD-10-CM

## 2017-06-05 DIAGNOSIS — M79.672 PAIN IN BOTH FEET: ICD-10-CM

## 2017-06-05 DIAGNOSIS — M79.671 PAIN IN BOTH FEET: ICD-10-CM

## 2017-06-05 PROCEDURE — 97140 MANUAL THERAPY 1/> REGIONS: CPT | Mod: GP | Performed by: PHYSICAL THERAPIST

## 2017-06-05 PROCEDURE — 97110 THERAPEUTIC EXERCISES: CPT | Mod: GP | Performed by: PHYSICAL THERAPIST

## 2017-06-07 ENCOUNTER — ALLIED HEALTH/NURSE VISIT (OUTPATIENT)
Dept: DERMATOLOGY | Facility: CLINIC | Age: 62
End: 2017-06-07

## 2017-06-07 DIAGNOSIS — L40.9 PSORIASIS: ICD-10-CM

## 2017-06-07 NOTE — MR AVS SNAPSHOT
After Visit Summary   6/7/2017    Tony Mcnair    MRN: [id number]           Patient Information     Date Of Birth          1955        Visit Information        Provider Department      6/7/2017 7:35 AM UC DERM LIGHT TREATMENT [facility] Dermatology        Today's Diagnoses     Psoriasis           Follow-ups after your visit        Your next 10 appointments already scheduled     Jun 14, 2017  7:30 AM CDT   (Arrive by 7:15 AM)   Light Treatment Visit with UC DERM LIGHT TREATMENT   [facility] ([facility])    [address] [place] [address] [phone]            Jun 16, 2017  9:00 AM CDT   JEN Extremity with Theo Escamilla PT   [place] [facility] (J[facility] [place])    [address] [place] [address]  [address]  [place] [address]   [phone]            Jun 21, 2017  7:30 AM CDT   (Arrive by 7:15 AM)   Light Treatment Visit with UC DERM LIGHT TREATMENT   [facility] ([facility])    [address] [place] [address] [phone]            Jun 28, 2017  7:30 AM CDT   (Arrive by 7:15 AM)   Light Treatment Visit with UC DERM LIGHT TREATMENT   [facility] D[facility] ([facility])    [address] [place] [address] [phone]            Sep 12, 2017  8:00 AM CDT   (Arrive by 7:45 AM)   Return Visit with Petar Corbett MD   [facility] ([facility])    [address] [place] [address] [phone]            Apr 30, 2018  7:00 AM CDT   (Arrive by 6:45 AM)   Return Visit with CHAZ Gupta CNP   [facility] Rheumat[facility] ([facility])    [address] [place] [id number] [phone]              Who to contact     Please call your clinic at [phone] to:    Ask questions  about your health    Make or cancel appointments    Discuss your medicines    Learn about your test results    Speak to your doctor   If you have compliments or concerns about an experience at your clinic, or if you wish to file a complaint, please contact [facility] Patient Relations at [phone] or email us at [email or web address]         Additional Information About Your Visit        MyChart Information     [employer] gives you secure access to your electronic health record. If you see a primary care provider, you can also send messages to your care team and make appointments. If you have questions, please call your primary care clinic.  If you do not have a primary care provider, please call [phone] and they will assist you.      [employer] is an electronic gateway that provides easy, online access to your medical records. With [employer], you can request a clinic appointment, read your test results, renew a prescription or communicate with your care team.     To access your existing account, please contact your [facility] or call [phone] for assistance.        Care EveryWhere ID     This is your Care EveryWhere ID. This could be used by other organizations to access your [facility] medical records  [id number]         Blood Pressure from Last 3 Encounters:   05/05/17 121/87   01/23/15 (!) 145/100   10/08/14 133/81    Weight from Last 3 Encounters:   05/05/17 76.4 kg (168 lb 8 oz)   01/23/15 78 kg (172 lb)   10/08/14 75.3 kg (166 lb)              We Performed the Following     CHARGE - PHOTOTHERAPY NBUVB (UV LIGHT)     PROCEDURE - PHOTOTHERAPY NBUVB        Primary Care Provider Office Phone # Fax #    William Morton [phone]       [address] [address]        Thank you!     Thank you for choosing [facility]  for your care. Our goal is always to provide you with  excellent care. Hearing back from our patients is one way we can continue to improve our services. Please take a few minutes to complete the written survey that you may receive in the mail after your visit with us. Thank you!             Your Updated Medication List - Protect others around you: Learn how to safely use, store and throw away your medicines at www.disposemymeds.org.          This list is accurate as of: 6/7/17  8:26 AM.  Always use your most recent med list.                   Brand Name Dispense Instructions for use    BIOTIN PO      Take  by mouth daily.       calcipotriene-betameth diprop 0.005-0.064 % Oint    TACLONEX    60 g    Externally apply  topically 2 times daily as needed.       CONCERTA 18 MG CR tablet   Generic drug:  methylphenidate ER      Take 18 mg by mouth every morning.       DAILY MULTIVITAMIN PO      Take  by mouth daily.       fish oil-omega-3 fatty acids 1000 MG capsule      Take 2 g by mouth daily       fluocinonide 0.05 % solution    LIDEX    60 mL    Apply  topically 2 times daily for itchy scalp or scalp psoriasis       pimecrolimus 1 % cream    ELIDEL    60 g    Apply topically 2 times daily as needed For psoriasis of face or genitals       REFRESH P.M. Oint     1 Tube    Apply 1 inch to eye nightly as needed       tacrolimus 0.1 % ointment    PROTOPIC    60 g    Apply topically 2 times daily Apply to areas around eyes as needed       Vitamin C 100 MG Tabs      Take  by mouth daily.       VITAMIN D (CHOLECALCIFEROL) PO      Take by mouth daily

## 2017-06-14 ENCOUNTER — ALLIED HEALTH/NURSE VISIT (OUTPATIENT)
Dept: DERMATOLOGY | Facility: CLINIC | Age: 62
End: 2017-06-14

## 2017-06-14 DIAGNOSIS — L40.9 PSORIASIS: ICD-10-CM

## 2017-06-14 NOTE — MR AVS SNAPSHOT
After Visit Summary   6/14/2017    Tony Mcnair    MRN: [id number]           Patient Information     Date Of Birth          1955        Visit Information        Provider Department      6/14/2017 7:30 AM UC DERM LIGHT TREATMENT [facility] Dermatology        Today's Diagnoses     Psoriasis           Follow-ups after your visit        Your next 10 appointments already scheduled     Jun 16, 2017  9:00 AM CDT   JEN Extremity with Theo Escamilla PT   [place] [facility] [place] [facility] (JEN [place])    [address] [place] [address]  [address]  [place] [address]   [phone]            Jun 21, 2017  7:30 AM CDT   (Arrive by 7:15 AM)   Light Treatment Visit with UC DERM LIGHT TREATMENT   [facility] Dermatology ([facility])    [address] [place] [id number] [phone]            Jun 28, 2017  7:30 AM CDT   (Arrive by 7:15 AM)   Light Treatment Visit with UC DERM LIGHT TREATMENT   [facility] Dermat[facility] ([facility])    [address] [place] [id number] [phone]            Sep 12, 2017  8:00 AM CDT   (Arrive by 7:45 AM)   Return Visit with Petar Corbett MD   [facility] Dermat[facility] ([facility])    [address] [place] [id number] [phone]            Apr 30, 2018  7:00 AM CDT   (Arrive by 6:45 AM)   Return Visit with CHAZ Gupta CNP   [facility] Rheumatology ([facility])    [address] [place] [id number] [phone]              Who to contact     Please call your clinic at [phone] to:    Ask questions about your health    Make or cancel appointments    Discuss your medicines    Learn about your test results    Speak to your doctor   If you have compliments or concerns about an experience at your clinic, or if you wish to file a complaint, please  contact [facility] Patient Relations at [phone] or email us at [email or web address]         Additional Information About Your Visit        MyChart Information     [employer] gives you secure access to your electronic health record. If you see a primary care provider, you can also send messages to your care team and make appointments. If you have questions, please call your primary care clinic.  If you do not have a primary care provider, please call [phone] and they will assist you.      [employer] is an electronic gateway that provides easy, online access to your medical records. With [employer], you can request a clinic appointment, read your test results, renew a prescription or communicate with your care team.     To access your existing account, please contact your [facility] or call [phone] for assistance.        Care EveryWhere ID     This is your Care EveryWhere ID. This could be used by other organizations to access your [facility] medical records  [id number]         Blood Pressure from Last 3 Encounters:   05/05/17 121/87   01/23/15 (!) 145/100   10/08/14 133/81    Weight from Last 3 Encounters:   05/05/17 76.4 kg (168 lb 8 oz)   01/23/15 78 kg (172 lb)   10/08/14 75.3 kg (166 lb)              We Performed the Following     CHARGE - PHOTOTHERAPY NBUVB (UV LIGHT)     PROCEDURE - PHOTOTHERAPY NBUVB        Primary Care Provider Office Phone # Fax #    William Morton [phone]       [address] [address]        Thank you!     Thank you for choosing [facility] DERMATOLOGY  for your care. Our goal is always to provide you with excellent care. Hearing back from our patients is one way we can continue to improve our services. Please take a few minutes to complete the written survey that you may receive in the mail after your visit with us. Thank you!             Your Updated  Medication List - Protect others around you: Learn how to safely use, store and throw away your medicines at www.disposemymeds.org.          This list is accurate as of: 6/14/17  8:13 AM.  Always use your most recent med list.                   Brand Name Dispense Instructions for use    BIOTIN PO      Take  by mouth daily.       calcipotriene-betameth diprop 0.005-0.064 % Oint    TACLONEX    60 g    Externally apply  topically 2 times daily as needed.       CONCERTA 18 MG CR tablet   Generic drug:  methylphenidate ER      Take 18 mg by mouth every morning.       DAILY MULTIVITAMIN PO      Take  by mouth daily.       fish oil-omega-3 fatty acids 1000 MG capsule      Take 2 g by mouth daily       fluocinonide 0.05 % solution    LIDEX    60 mL    Apply  topically 2 times daily for itchy scalp or scalp psoriasis       pimecrolimus 1 % cream    ELIDEL    60 g    Apply topically 2 times daily as needed For psoriasis of face or genitals       REFRESH P.M. Oint     1 Tube    Apply 1 inch to eye nightly as needed       tacrolimus 0.1 % ointment    PROTOPIC    60 g    Apply topically 2 times daily Apply to areas around eyes as needed       Vitamin C 100 MG Tabs      Take  by mouth daily.       VITAMIN D (CHOLECALCIFEROL) PO      Take by mouth daily

## 2017-06-14 NOTE — NURSING NOTE
[facility] Dermatology Phototherapy Record  1. Tony Mcnair is a 61 year old male is here today for phototherapy (UVB) treatment for psoriasis.        Changes or new medications since last treatment:   NO    New medical conditions or problems since last treatment:   NO    Any problems with last phototherapy treatment?    NO    2. The patient tolerated phototherapy without complication    Patient will return for next UVB treatment, per protocol.     Patient to see provider every 4-12 weeks for follow-up during treatment.      All questions and concerns discussed with patient in clinic today.      Mary Aponte

## 2017-06-16 ENCOUNTER — THERAPY VISIT (OUTPATIENT)
Dept: PHYSICAL THERAPY | Facility: CLINIC | Age: 62
End: 2017-06-16
Payer: COMMERCIAL

## 2017-06-16 DIAGNOSIS — M76.30 ILIOTIBIAL BAND SYNDROME, UNSPECIFIED LATERALITY: ICD-10-CM

## 2017-06-16 DIAGNOSIS — M79.672 PAIN IN BOTH FEET: ICD-10-CM

## 2017-06-16 DIAGNOSIS — M79.671 PAIN IN BOTH FEET: ICD-10-CM

## 2017-06-16 PROCEDURE — 97110 THERAPEUTIC EXERCISES: CPT | Mod: GP | Performed by: PHYSICAL THERAPIST

## 2017-06-16 PROCEDURE — 97530 THERAPEUTIC ACTIVITIES: CPT | Mod: GP | Performed by: PHYSICAL THERAPIST

## 2017-06-16 ASSESSMENT — ACTIVITIES OF DAILY LIVING (ADL)
HOW_WOULD_YOU_RATE_THE_CURRENT_FUNCTION_OF_YOUR_KNEE_DURING_YOUR_USUAL_DAILY_ACTIVITIES_ON_A_SCALE_FROM_0_TO_100_WITH_100_BEING_YOUR_LEVEL_OF_KNEE_FUNCTION_PRIOR_TO_YOUR_INJURY_AND_0_BEING_THE_INABILITY_TO_PERFORM_ANY_OF_YOUR_USUAL_DAILY_ACTIVITIES?: 50
KNEE_ACTIVITY_OF_DAILY_LIVING_SCORE: 90
WALK: ACTIVITY IS MINIMALLY DIFFICULT
WEAKNESS: I DO NOT HAVE THE SYMPTOM
PAIN: THE SYMPTOM AFFECTS MY ACTIVITY SLIGHTLY
STAND: ACTIVITY IS NOT DIFFICULT
GO DOWN STAIRS: ACTIVITY IS NOT DIFFICULT
GO UP STAIRS: ACTIVITY IS NOT DIFFICULT
KNEEL ON THE FRONT OF YOUR KNEE: ACTIVITY IS NOT DIFFICULT
GIVING WAY, BUCKLING OR SHIFTING OF KNEE: I DO NOT HAVE THE SYMPTOM
KNEE_ACTIVITY_OF_DAILY_LIVING_SUM: 63
RAW_SCORE: 63
SWELLING: I DO NOT HAVE THE SYMPTOM
HOW_WOULD_YOU_RATE_THE_OVERALL_FUNCTION_OF_YOUR_KNEE_DURING_YOUR_USUAL_DAILY_ACTIVITIES?: NEARLY NORMAL
SQUAT: ACTIVITY IS NOT DIFFICULT
SIT WITH YOUR KNEE BENT: ACTIVITY IS MINIMALLY DIFFICULT
RISE FROM A CHAIR: ACTIVITY IS NOT DIFFICULT
STIFFNESS: THE SYMPTOM AFFECTS MY ACTIVITY SLIGHTLY
AS_A_RESULT_OF_YOUR_KNEE_INJURY,_HOW_WOULD_YOU_RATE_YOUR_CURRENT_LEVEL_OF_DAILY_ACTIVITY?: NEARLY NORMAL
LIMPING: I HAVE THE SYMPTOM BUT IT DOES NOT AFFECT MY ACTIVITY

## 2017-06-16 NOTE — PROGRESS NOTES
PROGRESS  REPORT    Progress reporting period is from 5/12/17 to 6/16/17.       Dx. M76.30 ITB syndrome    Therapist Impression:    Tony is doing much better since starting therapy. His KOS is at 63% and he rate is overall improvement as 50% since starting therapy. He continues to have modifiable factors such as LE strength and Tspine mobility that could potentially be related to his symptoms; these are becoming better with therapy and his symptoms are resolving. He is independent in his HEP and performing them with good form. Based on these findings, Tony and PT in agreement for 3-4 more visits due to his progress. We will reassess his needs at that time point. We will also extend duration of time between visits at this point.     SUBJECTIVE    Tony reports having nights where he can sleep throughout the night without pain. He also notes his pain is less severe with exercise and ADLs. He notes less irritability in the hip throughout the day, and when it does fair-up, it calms down faster.     Global Improvement Score: 50% improvement since starting therapy    KOS Score (actual hip pathology): 63% (49% at initial eval)      OBJECTIVE  Strength assessment:    Strength testing (0-5 MMT scale) L R   hip flexion     Hip Abduction 4- 4-   Hip ABD/ER 4- 4-   Hip Extension 4- 4-   Knee Extension     Knee Flexion     Upper Abdominal crunch  5 5   Lower Abdominal BILAT SLR lower (angle measured from 90 degrees hip flexion) 4- 4-     Tspine mobility: limited 25% at end range     Palpation: tender at anterior and lateral aspects of the L hip joint, tender at lateral epicondyle of L femur       ASSESSMENT/PLAN  Updated problem list and treatment plan: Diagnosis 1:  L ITB syndrome  Pain -  manual therapy, splint/taping/bracing/orthotics, self management, education, directional preference exercise and home program  Decreased ROM/flexibility - manual therapy, therapeutic exercise, therapeutic activity and home program  Decreased  strength - therapeutic exercise, therapeutic activities and home program  Decreased function - therapeutic activities and home program  STG/LTGs have been met or progress has been made towards goals:  Yes (See Goal flow sheet completed today.)  Assessment of Progress: The patient's condition is improving.  The patient's condition has potential to improve.  Patient is meeting short term goals and is progressing towards long term goals.  Self Management Plans:  Patient has been instructed in a home treatment program.  Patient is independent in a home treatment program.  Patient  has been instructed in self management of symptoms.  I have re-evaluated this patient and find that the nature, scope, duration and intensity of the therapy is appropriate for the medical condition of the patient.  Tony continues to require the following intervention to meet STG and LTG's:  PT    Recommendations:  This patient would benefit from continued therapy.     Frequency:  1 X week, once daily  Duration:  for 4-6 visits    Please refer to the daily flowsheet for treatment today, total treatment time and time spent performing 1:1 timed codes.

## 2017-06-16 NOTE — MR AVS SNAPSHOT
After Visit Summary   6/16/2017    Tony Mcnair    MRN: [id number]           Patient Information     Date Of Birth          1955        Visit Information        Provider Department      6/16/2017 9:00 AM Theo Escamilla PT [facility] [place] [facility]        Today's Diagnoses     Pain in both feet        Iliotibial band syndrome, unspecified laterality           Follow-ups after your visit        Your next 10 appointments already scheduled     Jun 21, 2017  7:30 AM CDT   (Arrive by 7:15 AM)   Light Treatment Visit with UC DERM LIGHT TREATMENT   [facility] Dermatology ([facility])    [address] [place] [address]   [phone]            Jun 26, 2017  7:00 AM CDT   JEN Extremity with Theo Escamilla PT   [place] [facility] ([facility] [place])    [address] [place] [address]  [address]  [place] [address] [phone]            Jun 28, 2017  7:30 AM CDT   (Arrive by 7:15 AM)   Light Treatment Visit with UC DERM LIGHT TREATMENT   [facility] Dermatology ([facility])    [address] [place] [id number] [phone]            Sep 12, 2017  8:00 AM CDT   (Arrive by 7:45 AM)   Return Visit with Petar Corbett MD   [facility] Dermatology ([facility])    [address] [place] [id number] [phone]            Apr 30, 2018  7:00 AM CDT   (Arrive by 6:45 AM)   Return Visit with CHAZ Gupta [facility] Rheumatology ([facility])    [address] [place] [id number] [phone]              Who to contact     If you have questions or need follow up information about today's clinic visit or your schedule please contact [facility] directly at [phone].  Normal or  non-critical lab and imaging results will be communicated to you by MyChart, letter or phone within 4 business days after the clinic has received the results. If you do not hear from us within 7 days, please contact the clinic through Webflakest or phone. If you have a critical or abnormal lab result, we will notify you by phone as soon as possible.  Submit refill requests through AtriCure or call your pharmacy and they will forward the refill request to us. Please allow 3 business days for your refill to be completed.          Additional Information About Your Visit        GooseChaseharGlimpse Information     AtriCure gives you secure access to your electronic health record. If you see a primary care provider, you can also send messages to your care team and make appointments. If you have questions, please call your primary care clinic.  If you do not have a primary care provider, please call [phone] and they will assist you.        Care EveryWhere ID     This is your Care EveryWhere ID. This could be used by other organizations to access your [place] medical records  [id number]         Blood Pressure from Last 3 Encounters:   05/05/17 121/87   01/23/15 (!) 145/100   10/08/14 133/81    Weight from Last 3 Encounters:   05/05/17 76.4 kg (168 lb 8 oz)   01/23/15 78 kg (172 lb)   10/08/14 75.3 kg (166 lb)              We Performed the Following     JNE PROGRESS NOTES REPORT     THERAPEUTIC ACTIVITIES     THERAPEUTIC EXERCISES        Primary Care Provider Office Phone # Fax #    William Morton [phone]       [address] [address]        Thank you!     Thank you for choosing [facility]  [facility]. Our goal is always to provide you with excellent care. Hearing back from our patients is one way we can continue to improve our services. Please take a few minutes to complete the written survey that you may  receive in the mail after your visit with us. Thank you!             Your Updated Medication List - Protect others around you: Learn how to safely use, store and throw away your medicines at www.disposemymeds.org.          This list is accurate as of: 6/16/17 10:23 AM.  Always use your most recent med list.                   Brand Name Dispense Instructions for use    BIOTIN PO      Take  by mouth daily.       calcipotriene-betameth diprop 0.005-0.064 % Oint    TACLONEX    60 g    Externally apply  topically 2 times daily as needed.       CONCERTA 18 MG CR tablet   Generic drug:  methylphenidate ER      Take 18 mg by mouth every morning.       DAILY MULTIVITAMIN PO      Take  by mouth daily.       fish oil-omega-3 fatty acids 1000 MG capsule      Take 2 g by mouth daily       fluocinonide 0.05 % solution    LIDEX    60 mL    Apply  topically 2 times daily for itchy scalp or scalp psoriasis       pimecrolimus 1 % cream    ELIDEL    60 g    Apply topically 2 times daily as needed For psoriasis of face or genitals       REFRESH P.M. Oint     1 Tube    Apply 1 inch to eye nightly as needed       tacrolimus 0.1 % ointment    PROTOPIC    60 g    Apply topically 2 times daily Apply to areas around eyes as needed       Vitamin C 100 MG Tabs      Take  by mouth daily.       VITAMIN D (CHOLECALCIFEROL) PO      Take by mouth daily

## 2017-06-21 ENCOUNTER — ALLIED HEALTH/NURSE VISIT (OUTPATIENT)
Dept: DERMATOLOGY | Facility: CLINIC | Age: 62
End: 2017-06-21

## 2017-06-21 DIAGNOSIS — L40.9 PSORIASIS: ICD-10-CM

## 2017-06-21 NOTE — NURSING NOTE
[facility] Dermatology Phototherapy Record  1. Tony Mcnair is a 61 year old male is here today for phototherapy (UVB) treatment for psoriasis.        Changes or new medications since last treatment:   NO    New medical conditions or problems since last treatment:   NO    Any problems with last phototherapy treatment?    NO    2. The patient tolerated phototherapy without complication    Patient will return for next UVB treatment, per protocol.     Patient to see provider every 4-12 weeks for follow-up during treatment.      All questions and concerns discussed with patient in clinic today.      Mary Aponte

## 2017-06-21 NOTE — MR AVS SNAPSHOT
After Visit Summary   6/21/2017    Tony Mcnair    MRN: [id number]           Patient Information     Date Of Birth          1955        Visit Information        Provider Department      6/21/2017 7:30 AM UC DERM LIGHT TREATMENT [facility] Dermatology        Today's Diagnoses     Psoriasis           Follow-ups after your visit        Your next 10 appointments already scheduled     Jun 26, 2017  7:00 AM CDT   JEN Extremity with Theo Escamilla PT   [place] [facility] [place] ([facility] [place])    [address] [place] [address]  [address]  [place] [address]   [phone]            Jun 28, 2017  7:30 AM CDT   (Arrive by 7:15 AM)   Light Treatment Visit with UC DERM LIGHT TREATMENT   [facility] Dermatology ([facility])    [address] [place] [id number] [phone]            Sep 12, 2017  8:00 AM CDT   (Arrive by 7:45 AM)   Return Visit with Petar Corbett MD   [facility] Dermatology ([facility])    [address] [place] [id number] [phone]            Apr 30, 2018  7:00 AM CDT   (Arrive by 6:45 AM)   Return Visit with CHAZ Gupta [facility] Rheumat[facility] ([facility])    [address] [place] [id number] [phone]              Who to contact     Please call your clinic at [phone] to:    Ask questions about your health    Make or cancel appointments    Discuss your medicines    Learn about your test results    Speak to your doctor   If you have compliments or concerns about an experience at your clinic, or if you wish to file a complaint, please contact [facility] Patient Relations at [phone] or email us at [email or web address]         Additional Information About Your Visit        MyChart Information     MyChart gives you secure access to your  electronic health record. If you see a primary care provider, you can also send messages to your care team and make appointments. If you have questions, please call your primary care clinic.  If you do not have a primary care provider, please call [phone] and they will assist you.      Rapidlea is an electronic gateway that provides easy, online access to your medical records. With Rapidlea, you can request a clinic appointment, read your test results, renew a prescription or communicate with your care team.     To access your existing account, please contact your [facility] or call [phone] for assistance.        Care EveryWhere ID     This is your Care EveryWhere ID. This could be used by other organizations to access your [place] medical records  [id number]         Blood Pressure from Last 3 Encounters:   05/05/17 121/87   01/23/15 (!) 145/100   10/08/14 133/81    Weight from Last 3 Encounters:   05/05/17 76.4 kg (168 lb 8 oz)   01/23/15 78 kg (172 lb)   10/08/14 75.3 kg (166 lb)              We Performed the Following     CHARGE - PHOTOTHERAPY NBUVB (UV LIGHT)     PROCEDURE - PHOTOTHERAPY NBUVB        Primary Care Provider Office Phone # Fax #    William Morton [phone]       [address] [address]        Equal Access to Services     TORI EDWARDS : Hadii aad ku hadasho Soanna, waaxda luqadaha, qaybta kaalmada saira, renzo monroe. So [facility] [phone].    ATENCIÓN: Si habla español, tiene a garrett disposición servicios gratuitos de asistencia lingüística. Llame al [phone].    We comply with applicable federal civil rights laws and [place] laws. We do not discriminate on the basis of race, color, national origin, age, disability sex, sexual orientation or gender identity.            Thank you!     Thank you for choosing [facility]  for your care. Our goal is  always to provide you with excellent care. Hearing back from our patients is one way we can continue to improve our services. Please take a few minutes to complete the written survey that you may receive in the mail after your visit with us. Thank you!             Your Updated Medication List - Protect others around you: Learn how to safely use, store and throw away your medicines at www.disposemymeds.org.          This list is accurate as of: 6/21/17  7:59 AM.  Always use your most recent med list.                   Brand Name Dispense Instructions for use Diagnosis    BIOTIN PO      Take  by mouth daily.        calcipotriene-betameth diprop 0.005-0.064 % Oint    TACLONEX    60 g    Externally apply  topically 2 times daily as needed.    Psoriasis       CONCERTA 18 MG CR tablet   Generic drug:  methylphenidate ER      Take 18 mg by mouth every morning.        DAILY MULTIVITAMIN PO      Take  by mouth daily.        fish oil-omega-3 fatty acids 1000 MG capsule      Take 2 g by mouth daily        fluocinonide 0.05 % solution    LIDEX    60 mL    Apply  topically 2 times daily for itchy scalp or scalp psoriasis    Psoriasis       pimecrolimus 1 % cream    ELIDEL    60 g    Apply topically 2 times daily as needed For psoriasis of face or genitals    Psoriasis       REFRESH P.M. Oint     1 Tube    Apply 1 inch to eye nightly as needed    Tear film insufficiency, unspecified       tacrolimus 0.1 % ointment    PROTOPIC    60 g    Apply topically 2 times daily Apply to areas around eyes as needed    Psoriasis       Vitamin C 100 MG Tabs      Take  by mouth daily.        VITAMIN D (CHOLECALCIFEROL) PO      Take by mouth daily

## 2017-06-26 ENCOUNTER — THERAPY VISIT (OUTPATIENT)
Dept: PHYSICAL THERAPY | Facility: CLINIC | Age: 62
End: 2017-06-26
Payer: COMMERCIAL

## 2017-06-26 DIAGNOSIS — M76.30 ILIOTIBIAL BAND SYNDROME, UNSPECIFIED LATERALITY: ICD-10-CM

## 2017-06-26 DIAGNOSIS — M79.671 PAIN IN BOTH FEET: ICD-10-CM

## 2017-06-26 DIAGNOSIS — M79.672 PAIN IN BOTH FEET: ICD-10-CM

## 2017-06-26 PROCEDURE — 97140 MANUAL THERAPY 1/> REGIONS: CPT | Mod: GP | Performed by: PHYSICAL THERAPIST

## 2017-06-26 PROCEDURE — 97110 THERAPEUTIC EXERCISES: CPT | Mod: GP | Performed by: PHYSICAL THERAPIST

## 2017-07-03 ENCOUNTER — TELEPHONE (OUTPATIENT)
Dept: OPHTHALMOLOGY | Facility: CLINIC | Age: 62
End: 2017-07-03

## 2017-07-05 ENCOUNTER — ALLIED HEALTH/NURSE VISIT (OUTPATIENT)
Dept: DERMATOLOGY | Facility: CLINIC | Age: 62
End: 2017-07-05

## 2017-07-05 DIAGNOSIS — L40.9 PSORIASIS: ICD-10-CM

## 2017-07-05 RX ORDER — CITALOPRAM HYDROBROMIDE 10 MG/1
10 TABLET ORAL DAILY
COMMUNITY
End: 2019-03-28

## 2017-07-05 NOTE — PROGRESS NOTES
[facility] Dermatology Phototherapy Record  1. Tony Mcnair is a 61 year old male is here today for phototherapy (UVB) treatment forPsoriasis .        Changes or new medications since last treatment:   Yes - citalopram. Decreased dose by 50%    New medical conditions or problems since last treatment:   NO    Any problems with last phototherapy treatment?    NO    2. The patient tolerated phototherapy without complication    Patient will return for next UVB treatment, per protocol.     Patient to see provider every 4-12 weeks for follow-up during treatment.      All questions and concerns discussed with patient in clinic today.      Lorrie Graham

## 2017-07-05 NOTE — MR AVS SNAPSHOT
After Visit Summary   7/5/2017    Tony Mcnair    MRN: [id number]           Patient Information     Date Of Birth          1955        Visit Information        Provider Department      7/5/2017 7:20 AM UC DERM LIGHT TREATMENT [facility] Dermatology        Today's Diagnoses     Psoriasis           Follow-ups after your visit        Your next 10 appointments already scheduled     Jul 10, 2017  7:00 AM CDT   JEN Extremity with Theo Escamilla PT   [place] [facility] [place] [place] ([facility] [place])    [address] [place] [address]  [address]  [place] [address]   [phone]            Sep 12, 2017  8:00 AM CDT   (Arrive by 7:45 AM)   Return Visit with Petar Corbett MD   [facility] Dermatology ([facility])    [address] [place] [id number] [phone]            Apr 30, 2018  7:00 AM CDT   (Arrive by 6:45 AM)   Return Visit with CHAZ Gupta [facility] Rheumatology ([facility])    [address] [place] [phone] [phone]              Who to contact     Please call your clinic at [phone] to:    Ask questions about your health    Make or cancel appointments    Discuss your medicines    Learn about your test results    Speak to your doctor   If you have compliments or concerns about an experience at your clinic, or if you wish to file a complaint, please contact [facility] Patient Relations at [phone] or email us at [email or web address]         Additional Information About Your Visit        MyChart Information     FTF Technologieshart gives you secure access to your electronic health record. If you see a primary care provider, you can also send messages to your care team and make appointments. If you have questions, please call your primary care clinic.  If you do not have a primary care provider, please call  [phone] and they will assist you.      Yellowsmith is an electronic gateway that provides easy, online access to your medical records. With Yellowsmith, you can request a clinic appointment, read your test results, renew a prescription or communicate with your care team.     To access your existing account, please contact your [facility] or call [phone] for assistance.        Care EveryWhere ID     This is your Care EveryWhere ID. This could be used by other organizations to access your [place] medical records  [id number]         Blood Pressure from Last 3 Encounters:   05/05/17 121/87   01/23/15 (!) 145/100   10/08/14 133/81    Weight from Last 3 Encounters:   05/05/17 76.4 kg (168 lb 8 oz)   01/23/15 78 kg (172 lb)   10/08/14 75.3 kg (166 lb)              We Performed the Following     CHARGE - PHOTOTHERAPY NBUVB (UV LIGHT)     PROCEDURE - PHOTOTHERAPY NBUVB        Primary Care Provider Office Phone # Fax #    William Morton [phone]       [address] [address]        Equal Access to Services     TORI EDWARDS : Hadii zina Rodrigez, waphoenixda ryley, qaybta kaalmada saira, renzo haider . So [facility] [phone].    ATENCIÓN: Si habla español, tiene a garrett disposición servicios gratuitos de asistencia lingüística. Llame al [phone].    We comply with applicable federal civil rights laws and [place] laws. We do not discriminate on the basis of race, color, national origin, age, disability sex, sexual orientation or gender identity.            Thank you!     Thank you for choosing [facility]  for your care. Our goal is always to provide you with excellent care. Hearing back from our patients is one way we can continue to improve our services. Please take a few minutes to complete the written survey that you may receive in the mail after your visit with us. Thank  you!             Your Updated Medication List - Protect others around you: Learn how to safely use, store and throw away your medicines at www.disposemymeds.org.          This list is accurate as of: 7/5/17  8:19 AM.  Always use your most recent med list.                   Brand Name Dispense Instructions for use Diagnosis    BIOTIN PO      Take  by mouth daily.        calcipotriene-betameth diprop 0.005-0.064 % Oint    TACLONEX    60 g    Externally apply  topically 2 times daily as needed.    Psoriasis       CONCERTA 18 MG CR tablet   Generic drug:  methylphenidate ER      Take 18 mg by mouth every morning.        DAILY MULTIVITAMIN PO      Take  by mouth daily.        fish oil-omega-3 fatty acids 1000 MG capsule      Take 2 g by mouth daily        fluocinonide 0.05 % solution    LIDEX    60 mL    Apply  topically 2 times daily for itchy scalp or scalp psoriasis    Psoriasis       pimecrolimus 1 % cream    ELIDEL    60 g    Apply topically 2 times daily as needed For psoriasis of face or genitals    Psoriasis       REFRESH P.M. Oint     1 Tube    Apply 1 inch to eye nightly as needed    Tear film insufficiency, unspecified       tacrolimus 0.1 % ointment    PROTOPIC    60 g    Apply topically 2 times daily Apply to areas around eyes as needed    Psoriasis       Vitamin C 100 MG Tabs      Take  by mouth daily.        VITAMIN D (CHOLECALCIFEROL) PO      Take by mouth daily

## 2017-07-10 ENCOUNTER — THERAPY VISIT (OUTPATIENT)
Dept: PHYSICAL THERAPY | Facility: CLINIC | Age: 62
End: 2017-07-10
Payer: COMMERCIAL

## 2017-07-10 DIAGNOSIS — M79.672 PAIN IN BOTH FEET: ICD-10-CM

## 2017-07-10 DIAGNOSIS — M79.671 PAIN IN BOTH FEET: ICD-10-CM

## 2017-07-10 DIAGNOSIS — M76.30 ILIOTIBIAL BAND SYNDROME, UNSPECIFIED LATERALITY: ICD-10-CM

## 2017-07-10 PROCEDURE — 97530 THERAPEUTIC ACTIVITIES: CPT | Mod: GP | Performed by: PHYSICAL THERAPIST

## 2017-07-10 PROCEDURE — 97110 THERAPEUTIC EXERCISES: CPT | Mod: GP | Performed by: PHYSICAL THERAPIST

## 2017-07-10 PROCEDURE — 97140 MANUAL THERAPY 1/> REGIONS: CPT | Mod: GP | Performed by: PHYSICAL THERAPIST

## 2017-07-12 ENCOUNTER — ALLIED HEALTH/NURSE VISIT (OUTPATIENT)
Dept: DERMATOLOGY | Facility: CLINIC | Age: 62
End: 2017-07-12

## 2017-07-12 DIAGNOSIS — L40.9 PSORIASIS: ICD-10-CM

## 2017-07-12 NOTE — NURSING NOTE
[facility] Dermatology Phototherapy Record  1. Tony Mcnair is a 61 year old male is here today for phototherapy (UVB) treatment for Psoriasis.        Changes or new medications since last treatment:   NO    New medical conditions or problems since last treatment:   NO    Any problems with last phototherapy treatment?    NO    2. The patient tolerated phototherapy without complication    Patient will return for next UVB treatment, per protocol.     Patient to see provider every 4-12 weeks for follow-up during treatment.      All questions and concerns discussed with patient in clinic today.      Mary Aponte

## 2017-07-12 NOTE — MR AVS SNAPSHOT
After Visit Summary   7/12/2017    Tony Mcnair    MRN: [id number]           Patient Information     Date Of Birth          1955        Visit Information        Provider Department      7/12/2017 7:25 AM UC DERM LIGHT TREATMENT [facility]        Today's Diagnoses     Psoriasis           Follow-ups after your visit        Your next 10 appointments already scheduled     Jul 19, 2017  7:25 AM CDT   (Arrive by 7:10 AM)   Light Treatment Visit with UC DERM LIGHT TREATMENT   [facility] ([facility])    [address] [place] [id number] [phone]            Jul 24, 2017  7:40 AM CDT   JEN Extremity with Theo Escamilla PT   [facility] [place] (JEN [place])    [address] [place] [address]  [address]  [place] [address]   [phone]            Jul 26, 2017  7:25 AM CDT   (Arrive by 7:10 AM)   Light Treatment Visit with UC DERM LIGHT TREATMENT   [facility] ([facility])    [address] [place] [id number] [phone]            Sep 12, 2017  8:00 AM CDT   (Arrive by 7:45 AM)   Return Visit with Petar Corbett MD   [facility] ([facility])    [address] [place] [id number] [phone]            Apr 30, 2018  7:00 AM CDT   (Arrive by 6:45 AM)   Return Visit with CHAZ Gupta CNP   [facility] ([facility])    [address] [place] [id number] [phone]              Who to contact     Please call your clinic at [phone] to:    Ask questions about your health    Make or cancel appointments    Discuss your medicines    Learn about your test results    Speak to your doctor   If you have compliments or concerns about an experience at your clinic, or if you wish to file a complaint, please  contact [facility] Patient Relations at [phone] or email us at [email or web address]         Additional Information About Your Visit        SpotBanksharmelissa Information     [employer] gives you secure access to your electronic health record. If you see a primary care provider, you can also send messages to your care team and make appointments. If you have questions, please call your primary care clinic.  If you do not have a primary care provider, please call [phone] and they will assist you.      [employer] is an electronic gateway that provides easy, online access to your medical records. With [employer], you can request a clinic appointment, read your test results, renew a prescription or communicate with your care team.     To access your existing account, please contact your [facility] or call [phone] for assistance.        Care EveryWhere ID     This is your Care EveryWhere ID. This could be used by other organizations to access your [facility] medical records  [id number]         Blood Pressure from Last 3 Encounters:   05/05/17 121/87   01/23/15 (!) 145/100   10/08/14 133/81    Weight from Last 3 Encounters:   05/05/17 76.4 kg (168 lb 8 oz)   01/23/15 78 kg (172 lb)   10/08/14 75.3 kg (166 lb)              We Performed the Following     CHARGE - PHOTOTHERAPY NBUVB (UV LIGHT)     PROCEDURE - PHOTOTHERAPY NBUVB        Primary Care Provider Office Phone # Fax #    William Morton [phone]       [address] [address]        Equal Access to Services     TORI EDWARDS : Hadii zina mcdonald Soanna, waaxda luqadaha, qaybta kaalmada renzo chávez. So [facility] [phone].    ATENCIÓN: Si habla español, tiene a garrett disposición servicios gratuitos de asistencia lingüística. Llame al [phone].    We comply with applicable federal civil rights laws  and [place] laws. We do not discriminate on the basis of race, color, national origin, age, disability sex, sexual orientation or gender identity.            Thank you!     Thank you for choosing [facility] DERMATOLOG[facility]  for your care. Our goal is always to provide you with excellent care. Hearing back from our patients is one way we can continue to improve our services. Please take a few minutes to complete the written survey that you may receive in the mail after your visit with us. Thank you!             Your Updated Medication List - Protect others around you: Learn how to safely use, store and throw away your medicines at www.disposemymeds.org.          This list is accurate as of: 7/12/17  7:47 AM.  Always use your most recent med list.                   Brand Name Dispense Instructions for use Diagnosis    BIOTIN PO      Take  by mouth daily.        calcipotriene-betameth diprop 0.005-0.064 % Oint    TACLONEX    60 g    Externally apply  topically 2 times daily as needed.    Psoriasis       citalopram 10 MG tablet    celeXA     Take 10 mg by mouth daily        CONCERTA 18 MG CR tablet   Generic drug:  methylphenidate ER      Take 18 mg by mouth every morning.        DAILY MULTIVITAMIN PO      Take  by mouth daily.        fish oil-omega-3 fatty acids 1000 MG capsule      Take 2 g by mouth daily        fluocinonide 0.05 % solution    LIDEX    60 mL    Apply  topically 2 times daily for itchy scalp or scalp psoriasis    Psoriasis       pimecrolimus 1 % cream    ELIDEL    60 g    Apply topically 2 times daily as needed For psoriasis of face or genitals    Psoriasis       REFRESH P.M. Oint     1 Tube    Apply 1 inch to eye nightly as needed    Tear film insufficiency, unspecified       tacrolimus 0.1 % ointment    PROTOPIC    60 g    Apply topically 2 times daily Apply to areas around eyes as needed    Psoriasis       Vitamin C 100 MG Tabs      Take  by mouth daily.        VITAMIN D (CHOLECALCIFEROL) PO      Take  by mouth daily

## 2017-07-19 ENCOUNTER — ALLIED HEALTH/NURSE VISIT (OUTPATIENT)
Dept: DERMATOLOGY | Facility: CLINIC | Age: 62
End: 2017-07-19

## 2017-07-19 DIAGNOSIS — L40.9 PSORIASIS: ICD-10-CM

## 2017-07-19 NOTE — MR AVS SNAPSHOT
After Visit Summary   7/19/2017    Tony Mcnair    MRN: [id number]           Patient Information     Date Of Birth          1955        Visit Information        Provider Department      7/19/2017 7:25 AM UC DERM LIGHT TREATMENT [facility]        Today's Diagnoses     Psoriasis           Follow-ups after your visit        Your next 10 appointments already scheduled     Jul 24, 2017  7:40 AM CDT   JEN Extremity with Theo Escamilla PT   [facility] ([facility] [place])    [address] [place] [address]  [address]  [place] [address]   [phone]            Jul 26, 2017  7:25 AM CDT   (Arrive by 7:10 AM)   Light Treatment Visit with UC DERM LIGHT TREATMENT   [facility] ([facility])    [address] [place] [phone] [phone]            Sep 12, 2017  8:00 AM CDT   (Arrive by 7:45 AM)   Return Visit with Petar Corbett MD   [facility] ([facility])    [address] [place] [id number] [phone]            Apr 30, 2018  7:00 AM CDT   (Arrive by 6:45 AM)   Return Visit with CHAZ Gupta [facility] ([facility])    [address] [place] [phone] [phone]              Who to contact     Please call your clinic at [phone] to:    Ask questions about your health    Make or cancel appointments    Discuss your medicines    Learn about your test results    Speak to your doctor   If you have compliments or concerns about an experience at your clinic, or if you wish to file a complaint, please contact [facility] Patient Relations at [phone] or email us at [email or web address]         Additional Information About Your Visit        MyChart Information     MyChart gives you secure access to your  electronic health record. If you see a primary care provider, you can also send messages to your care team and make appointments. If you have questions, please call your primary care clinic.  If you do not have a primary care provider, please call [phone] and they will assist you.      SumAll is an electronic gateway that provides easy, online access to your medical records. With SumAll, you can request a clinic appointment, read your test results, renew a prescription or communicate with your care team.     To access your existing account, please contact your [facility] Clinic or call [phone] for assistance.        Care EveryWhere ID     This is your Care EveryWhere ID. This could be used by other organizations to access your [facility] medical records  [id number]         Blood Pressure from Last 3 Encounters:   05/05/17 121/87   01/23/15 (!) 145/100   10/08/14 133/81    Weight from Last 3 Encounters:   05/05/17 76.4 kg (168 lb 8 oz)   01/23/15 78 kg (172 lb)   10/08/14 75.3 kg (166 lb)              We Performed the Following     CHARGE - PHOTOTHERAPY NBUVB (UV LIGHT)     PROCEDURE - PHOTOTHERAPY NBUVB        Primary Care Provider Office Phone # Fax #    William Morton [phone]       [address] [address]        Equal Access to Services     TORI EDWARDS : Hadii aad ku hadasho Soanna, waaxda luqadaha, qaybta kaalmada saira, renzo monroe. So [facility] [phone].    ATENCIÓN: Si habla español, tiene a garrett disposición servicios gratuitos de asistencia lingüística. Llame al [phone].    We comply with applicable federal civil rights laws and [place] laws. We do not discriminate on the basis of race, color, national origin, age, disability sex, sexual orientation or gender identity.            Thank you!     Thank you for choosing [facility]  for your care. Our goal is  always to provide you with excellent care. Hearing back from our patients is one way we can continue to improve our services. Please take a few minutes to complete the written survey that you may receive in the mail after your visit with us. Thank you!             Your Updated Medication List - Protect others around you: Learn how to safely use, store and throw away your medicines at www.disposemymeds.org.          This list is accurate as of: 7/19/17  8:06 AM.  Always use your most recent med list.                   Brand Name Dispense Instructions for use Diagnosis    BIOTIN PO      Take  by mouth daily.        calcipotriene-betameth diprop 0.005-0.064 % Oint    TACLONEX    60 g    Externally apply  topically 2 times daily as needed.    Psoriasis       citalopram 10 MG tablet    celeXA     Take 10 mg by mouth daily        CONCERTA 18 MG CR tablet   Generic drug:  methylphenidate ER      Take 18 mg by mouth every morning.        DAILY MULTIVITAMIN PO      Take  by mouth daily.        fish oil-omega-3 fatty acids 1000 MG capsule      Take 2 g by mouth daily        fluocinonide 0.05 % solution    LIDEX    60 mL    Apply  topically 2 times daily for itchy scalp or scalp psoriasis    Psoriasis       pimecrolimus 1 % cream    ELIDEL    60 g    Apply topically 2 times daily as needed For psoriasis of face or genitals    Psoriasis       REFRESH P.M. Oint     1 Tube    Apply 1 inch to eye nightly as needed    Tear film insufficiency, unspecified       tacrolimus 0.1 % ointment    PROTOPIC    60 g    Apply topically 2 times daily Apply to areas around eyes as needed    Psoriasis       Vitamin C 100 MG Tabs      Take  by mouth daily.        VITAMIN D (CHOLECALCIFEROL) PO      Take by mouth daily

## 2017-07-26 ENCOUNTER — ALLIED HEALTH/NURSE VISIT (OUTPATIENT)
Dept: DERMATOLOGY | Facility: CLINIC | Age: 62
End: 2017-07-26

## 2017-07-26 DIAGNOSIS — L40.9 PSORIASIS: Primary | ICD-10-CM

## 2017-07-26 NOTE — NURSING NOTE
[facility] Phototherapy Record  1. Tony Mcnair is a 62 year old male is here today for phototherapy (UVB) treatment for psoriasis.        Changes or new medications since last treatment:   NO    New medical conditions or problems since last treatment:   NO    Any problems with last phototherapy treatment?    NO    2. The patient tolerated phototherapy without complication    Patient will return for next UVB treatment, per protocol.     Patient to see provider every 4-12 weeks for follow-up during treatment.      All questions and concerns discussed with patient in clinic today.      Mary Aponte

## 2017-07-26 NOTE — MR AVS SNAPSHOT
After Visit Summary   7/26/2017    Tony Mcnair    MRN: [id number]           Patient Information     Date Of Birth          1955        Visit Information        Provider Department      7/26/2017 7:25 AM UC DERM LIGHT TREATMENT [facility] D[facility]        Today's Diagnoses     Psoriasis    -  1       Follow-ups after your visit        Your next 10 appointments already scheduled     Jul 28, 2017  7:00 AM CDT   JEN Extremity with Theo Escamilla PT   [facility] (J[facility] [place])    [address] [place] [address]  [address]  [place] [address]   [phone]            Sep 12, 2017  8:00 AM CDT   (Arrive by 7:45 AM)   Return Visit with Petar Corbett MD   [facility] ([facility])    [address] [place] [phone] [phone]            Apr 30, 2018  7:00 AM CDT   (Arrive by 6:45 AM)   Return Visit with CHAZ Gupta [facility] ([facility])    [address] [place] [phone] [phone]              Who to contact     Please call your clinic at [phone] to:    Ask questions about your health    Make or cancel appointments    Discuss your medicines    Learn about your test results    Speak to your doctor   If you have compliments or concerns about an experience at your clinic, or if you wish to file a complaint, please contact [facility] Patient Relations at [phone] or email us at [email or web address]         Additional Information About Your Visit        MyChart Information     [employer] gives you secure access to your electronic health record. If you see a primary care provider, you can also send messages to your care team and make appointments. If you have questions, please call your primary care clinic.  If you do not have a primary care provider, please call  [phone] and they will assist you.      [employer] is an electronic gateway that provides easy, online access to your medical records. With [employer], you can request a clinic appointment, read your test results, renew a prescription or communicate with your care team.     To access your existing account, please contact your [facility] Clinic or call [phone] for assistance.        Care EveryWhere ID     This is your Care EveryWhere ID. This could be used by other organizations to access your [facility] medical records  [id number]         Blood Pressure from Last 3 Encounters:   05/05/17 121/87   01/23/15 (!) 145/100   10/08/14 133/81    Weight from Last 3 Encounters:   05/05/17 76.4 kg (168 lb 8 oz)   01/23/15 78 kg (172 lb)   10/08/14 75.3 kg (166 lb)              We Performed the Following     CHARGE - PHOTOTHERAPY NBUVB (UV LIGHT)        Primary Care Provider Office Phone # Fax #    William Morton [phone]       [place] [address]        Equal Access to Services     TORI EDWARDS : Hadii zina mcdonald Soanna, waaxda luearline, qaybta kaalmada saira, renzo haider . So [facility] [phone].    ATENCIÓN: Si habla español, tiene a garrett disposición servicios gratuitos de asistencia lingüística. Ze al [phone].    We comply with applicable federal civil rights laws and [place] laws. We do not discriminate on the basis of race, color, national origin, age, disability sex, sexual orientation or gender identity.            Thank you!     Thank you for choosing [facility] DERMATOLOGY  for your care. Our goal is always to provide you with excellent care. Hearing back from our patients is one way we can continue to improve our services. Please take a few minutes to complete the written survey that you may receive in the mail after your visit with us. Thank you!             Your Updated  Medication List - Protect others around you: Learn how to safely use, store and throw away your medicines at www.disposemymeds.org.          This list is accurate as of: 7/26/17  7:46 AM.  Always use your most recent med list.                   Brand Name Dispense Instructions for use Diagnosis    BIOTIN PO      Take  by mouth daily.        calcipotriene-betameth diprop 0.005-0.064 % Oint    TACLONEX    60 g    Externally apply  topically 2 times daily as needed.    Psoriasis       citalopram 10 MG tablet    celeXA     Take 10 mg by mouth daily        CONCERTA 18 MG CR tablet   Generic drug:  methylphenidate ER      Take 18 mg by mouth every morning.        DAILY MULTIVITAMIN PO      Take  by mouth daily.        fish oil-omega-3 fatty acids 1000 MG capsule      Take 2 g by mouth daily        fluocinonide 0.05 % solution    LIDEX    60 mL    Apply  topically 2 times daily for itchy scalp or scalp psoriasis    Psoriasis       pimecrolimus 1 % cream    ELIDEL    60 g    Apply topically 2 times daily as needed For psoriasis of face or genitals    Psoriasis       REFRESH P.M. Oint     1 Tube    Apply 1 inch to eye nightly as needed    Tear film insufficiency, unspecified       tacrolimus 0.1 % ointment    PROTOPIC    60 g    Apply topically 2 times daily Apply to areas around eyes as needed    Psoriasis       Vitamin C 100 MG Tabs      Take  by mouth daily.        VITAMIN D (CHOLECALCIFEROL) PO      Take by mouth daily

## 2017-07-28 DIAGNOSIS — L40.9 PSORIASIS: ICD-10-CM

## 2017-07-28 NOTE — TELEPHONE ENCOUNTER
Last Visit:03/21/17  Next Visit: 09/12/17  ASSESSMENT AND PLAN:  Limited plaque type psoriasis, with approximately 5% total body surface area.  Tony is relatively happy with his current control but would like to improve the skin on his lower extremities.  We discussed newer options for psoriasis, such as TNF alpha blockade or Otezla, though he has concerns about side effects from these.  I discussed with him that if he has true inflammatory psoriatic arthritis, that either a TNF blockade or methotrexate may be reasonable options, though I am not convinced that his arthritis complaints are actually psoriatic arthritis.  Today's plan is as follows:   1.  He will temporarily increase his phototherapy to 2 times weekly.  He may return to once weekly once his lower extremities are under better control.   2.  He will continue Lidex solution for his scalp as well as Elidel cream for areas of sensitive involvement.   3.  I provided him with a referral to Rheumatology to further evaluate for psoriatic arthritis.   4.  He will follow up in our clinic in 6-12 months.  At this point, we may consider additional therapy if he is still flaring.   5.  I encouraged him to continue close followup with his primary care provider and to have close monitoring of his blood pressure and cholesterol.

## 2017-07-31 RX ORDER — CALCIPOTRIENE, BETAMETHASONE DIPROPIONATE 50; .643 UG/G; MG/G
OINTMENT TOPICAL
Qty: 60 G | Refills: 2 | Status: SHIPPED | OUTPATIENT
Start: 2017-07-31 | End: 2018-11-29

## 2017-07-31 NOTE — TELEPHONE ENCOUNTER
Received refill request for taclonex as EDEL. Dr. Mckinney's notes reviewed. Refill appropriate, and accepted.    Nadya Billings MD  Medicine/Dermatology, PGY-5  [facility]

## 2017-08-02 ENCOUNTER — ALLIED HEALTH/NURSE VISIT (OUTPATIENT)
Dept: DERMATOLOGY | Facility: CLINIC | Age: 62
End: 2017-08-02

## 2017-08-02 DIAGNOSIS — L40.9 PSORIASIS: ICD-10-CM

## 2017-08-02 NOTE — MR AVS SNAPSHOT
After Visit Summary   8/2/2017    Tony Mcnair    MRN: [id number]           Patient Information     Date Of Birth          1955        Visit Information        Provider Department      8/2/2017 7:25 AM UC DERM LIGHT TREATMENT [facility] Dermatology        Today's Diagnoses     Psoriasis           Follow-ups after your visit        Your next 10 appointments already scheduled     Aug 08, 2017  7:40 AM CDT   JEN Extremity with Bill Kennedy PT   [facility] [place] [facility] ([facility] [place])    [address] [place] [address]  [address]  [place] [address]   [phone]            Aug 09, 2017  7:20 AM CDT   (Arrive by 7:05 AM)   Light Treatment Visit with UC DERM LIGHT TREATMENT   [facility] Dermatology ([facility])    [address] [place] [id number] [phone]            Aug 16, 2017  7:20 AM CDT   (Arrive by 7:05 AM)   Light Treatment Visit with UC DERM LIGHT TREATMENT   [facility] Dermatology ([facility])    [address] [place] [id number] [phone]            Sep 12, 2017  8:00 AM CDT   (Arrive by 7:45 AM)   Return Visit with Petar Corbett MD   [facility] Dermatology ([facility])    [address] [place] [id number] [phone]            Apr 30, 2018  7:00 AM CDT   (Arrive by 6:45 AM)   [facility] ([facility])    [address] [place] [id number] [phone]              Who to contact     Please call your clinic at [phone] to:    Ask questions about your health    Make or cancel appointments    Discuss your medicines    Learn about your test results    Speak to your doctor   If you have compliments or concerns about an experience at your clinic, or if you wish to file a complaint, please  contact [facility] Patient Relations at [phone] or email us at [email or web address]         Additional Information About Your Visit        Goomzeeharmelissa Information     [employer] gives you secure access to your electronic health record. If you see a primary care provider, you can also send messages to your care team and make appointments. If you have questions, please call your primary care clinic.  If you do not have a primary care provider, please call [phone] and they will assist you.      [employer] is an electronic gateway that provides easy, online access to your medical records. With [employer], you can request a clinic appointment, read your test results, renew a prescription or communicate with your care team.     To access your existing account, please contact your [facility] or call [phone] for assistance.        Care EveryWhere ID     This is your Care EveryWhere ID. This could be used by other organizations to access your [place] medical records  [id number]         Blood Pressure from Last 3 Encounters:   05/05/17 121/87   01/23/15 (!) 145/100   10/08/14 133/81    Weight from Last 3 Encounters:   05/05/17 76.4 kg (168 lb 8 oz)   01/23/15 78 kg (172 lb)   10/08/14 75.3 kg (166 lb)              We Performed the Following     CHARGE - PHOTOTHERAPY NBUVB (UV LIGHT)     PROCEDURE - PHOTOTHERAPY NBUVB        Primary Care Provider Office Phone # Fax #    William Morton [phone]       [address] [address]        Equal Access to Services     TORI EDWARDS : Hadii zina mcdonald Soanna, waaxda luqadaha, qaybta kaalmada renzo chávez. So [facility] [phone].    ATENCIÓN: Si habla español, tiene a garrett disposición servicios gratuitos de asistencia lingüística. Llame al [phone].    We comply with applicable federal civil rights laws  and [place] laws. We do not discriminate on the basis of race, color, national origin, age, disability sex, sexual orientation or gender identity.            Thank you!     Thank you for choosing [facility] DERMATOLOGY  for your care. Our goal is always to provide you with excellent care. Hearing back from our patients is one way we can continue to improve our services. Please take a few minutes to complete the written survey that you may receive in the mail after your visit with us. Thank you!             Your Updated Medication List - Protect others around you: Learn how to safely use, store and throw away your medicines at www.disposemymeds.org.          This list is accurate as of: 8/2/17  8:06 AM.  Always use your most recent med list.                   Brand Name Dispense Instructions for use Diagnosis    BIOTIN PO      Take  by mouth daily.        calcipotriene-betameth diprop 0.005-0.064 % Oint    TACLONEX    60 g    Externally apply  topically 2 times daily as needed.    Psoriasis       citalopram 10 MG tablet    celeXA     Take 10 mg by mouth daily        CONCERTA 18 MG CR tablet   Generic drug:  methylphenidate ER      Take 18 mg by mouth every morning.        DAILY MULTIVITAMIN PO      Take  by mouth daily.        fish oil-omega-3 fatty acids 1000 MG capsule      Take 2 g by mouth daily        fluocinonide 0.05 % solution    LIDEX    60 mL    Apply  topically 2 times daily for itchy scalp or scalp psoriasis    Psoriasis       pimecrolimus 1 % cream    ELIDEL    60 g    Apply topically 2 times daily as needed For psoriasis of face or genitals    Psoriasis       REFRESH P.M. Oint     1 Tube    Apply 1 inch to eye nightly as needed    Tear film insufficiency, unspecified       tacrolimus 0.1 % ointment    PROTOPIC    60 g    Apply topically 2 times daily Apply to areas around eyes as needed    Psoriasis       Vitamin C 100 MG Tabs      Take  by mouth daily.        VITAMIN D (CHOLECALCIFEROL) PO      Take by  mouth daily

## 2017-08-02 NOTE — NURSING NOTE
[facility] Dermatology Phototherapy Record  1. Tony Mcnair is a 62 year old male is here today for phototherapy (UVB) treatment for psoriasis.        Changes or new medications since last treatment:   NO    New medical conditions or problems since last treatment:   NO    Any problems with last phototherapy treatment?    NO    2. The patient tolerated phototherapy without complication    Patient will return for next UVB treatment, per protocol.     Patient to see provider every 4-12 weeks for follow-up during treatment.      All questions and concerns discussed with patient in clinic today.      Mary Aponte

## 2017-08-08 ENCOUNTER — THERAPY VISIT (OUTPATIENT)
Dept: PHYSICAL THERAPY | Facility: CLINIC | Age: 62
End: 2017-08-08
Payer: COMMERCIAL

## 2017-08-08 DIAGNOSIS — M79.672 PAIN IN BOTH FEET: ICD-10-CM

## 2017-08-08 DIAGNOSIS — M79.671 PAIN IN BOTH FEET: ICD-10-CM

## 2017-08-08 DIAGNOSIS — M76.30 ILIOTIBIAL BAND SYNDROME, UNSPECIFIED LATERALITY: ICD-10-CM

## 2017-08-08 PROCEDURE — 97110 THERAPEUTIC EXERCISES: CPT | Mod: GP | Performed by: PHYSICAL THERAPIST

## 2017-08-09 ENCOUNTER — ALLIED HEALTH/NURSE VISIT (OUTPATIENT)
Dept: DERMATOLOGY | Facility: CLINIC | Age: 62
End: 2017-08-09

## 2017-08-09 DIAGNOSIS — L40.9 PSORIASIS: ICD-10-CM

## 2017-08-09 NOTE — MR AVS SNAPSHOT
After Visit Summary   8/9/2017    Tony Mcnair    MRN: [id number]           Patient Information     Date Of Birth          1955        Visit Information        Provider Department      8/9/2017 7:20 AM UC DERM LIGHT TREATMENT [facility] Dermatology        Today's Diagnoses     Psoriasis           Follow-ups after your visit        Your next 10 appointments already scheduled     Aug 16, 2017  7:20 AM CDT   (Arrive by 7:05 AM)   Light Treatment Visit with UC DERM LIGHT TREATMENT   [facility] Dermatology ([facility])    [address] [place] [id number] [phone]            Sep 12, 2017  8:00 AM CDT   (Arrive by 7:45 AM)   Return Visit with Petar Corbett MD   [facility] Dermatology ([facility])    [address] [place] [id number] [phone]            Apr 30, 2018  7:00 AM CDT   (Arrive by 6:45 AM)   Return Visit with CHAZ Gupta CNP   [facility] Rheumatology ([facility])    [address] [place] [address] [phone]              Who to contact     Please call your clinic at [phone] to:    Ask questions about your health    Make or cancel appointments    Discuss your medicines    Learn about your test results    Speak to your doctor   If you have compliments or concerns about an experience at your clinic, or if you wish to file a complaint, please contact [facility] Patient Relations at [phone] or email us at [email or web address]         Additional Information About Your Visit        MyChart Information     [employer] gives you secure access to your electronic health record. If you see a primary care provider, you can also send messages to your care team and make appointments. If you have questions, please call your primary care clinic.  If you do not have a primary care provider,  please call [phone] and they will assist you.      [employer] is an electronic gateway that provides easy, online access to your medical records. With [employer], you can request a clinic appointment, read your test results, renew a prescription or communicate with your care team.     To access your existing account, please contact your [facility] or call [phone] for assistance.        Care EveryWhere ID     This is your Care EveryWhere ID. This could be used by other organizations to access your [place] medical records  [id number]         Blood Pressure from Last 3 Encounters:   05/05/17 121/87   01/23/15 (!) 145/100   10/08/14 133/81    Weight from Last 3 Encounters:   05/05/17 76.4 kg (168 lb 8 oz)   01/23/15 78 kg (172 lb)   10/08/14 75.3 kg (166 lb)              We Performed the Following     CHARGE - PHOTOTHERAPY NBUVB (UV LIGHT)     PROCEDURE - PHOTOTHERAPY NBUVB        Primary Care Provider Office Phone # Fax #    William Morton [phone]       [address] [address]        Equal Access to Services     TORI EDWARDS : Hadii zina rios hadasho Soanna, waaxda luqadaha, qaybta kaalmada adeegyada, renzo monroe. So [facility] [phone].    ATENCIÓN: Si habla español, tiene a garrett disposición servicios gratuitos de asistencia lingüística. Llame al [phone].    We comply with applicable federal civil rights laws and [place] laws. We do not discriminate on the basis of race, color, national origin, age, disability sex, sexual orientation or gender identity.            Thank you!     Thank you for choosing [facility] DERMATOLOGY  for your care. Our goal is always to provide you with excellent care. Hearing back from our patients is one way we can continue to improve our services. Please take a few minutes to complete the written survey that you may receive in the mail after your visit with  us. Thank you!             Your Updated Medication List - Protect others around you: Learn how to safely use, store and throw away your medicines at www.disposemymeds.org.          This list is accurate as of: 8/9/17  8:31 AM.  Always use your most recent med list.                   Brand Name Dispense Instructions for use Diagnosis    BIOTIN PO      Take  by mouth daily.        calcipotriene-betameth diprop 0.005-0.064 % Oint    TACLONEX    60 g    Externally apply  topically 2 times daily as needed.    Psoriasis       citalopram 10 MG tablet    celeXA     Take 10 mg by mouth daily        CONCERTA 18 MG CR tablet   Generic drug:  methylphenidate ER      Take 18 mg by mouth every morning.        DAILY MULTIVITAMIN PO      Take  by mouth daily.        fish oil-omega-3 fatty acids 1000 MG capsule      Take 2 g by mouth daily        fluocinonide 0.05 % solution    LIDEX    60 mL    Apply  topically 2 times daily for itchy scalp or scalp psoriasis    Psoriasis       pimecrolimus 1 % cream    ELIDEL    60 g    Apply topically 2 times daily as needed For psoriasis of face or genitals    Psoriasis       REFRESH P.M. Oint     1 Tube    Apply 1 inch to eye nightly as needed    Tear film insufficiency, unspecified       tacrolimus 0.1 % ointment    PROTOPIC    60 g    Apply topically 2 times daily Apply to areas around eyes as needed    Psoriasis       Vitamin C 100 MG Tabs      Take  by mouth daily.        VITAMIN D (CHOLECALCIFEROL) PO      Take by mouth daily

## 2017-08-16 ENCOUNTER — ALLIED HEALTH/NURSE VISIT (OUTPATIENT)
Dept: DERMATOLOGY | Facility: CLINIC | Age: 62
End: 2017-08-16

## 2017-08-16 DIAGNOSIS — L40.9 PSORIASIS: ICD-10-CM

## 2017-08-16 NOTE — PROGRESS NOTES
[facility] Dermatology Phototherapy Record  1. Tony Mcnair is a 62 year old male is here today for phototherapy (UVB) treatment forPsoriasis .        Changes or new medications since last treatment:   NO    New medical conditions or problems since last treatment:   NO    Any problems with last phototherapy treatment?    NO    2. The patient tolerated phototherapy without complication    Patient will return for next UVB treatment, per protocol.     Patient to see provider every 4-12 weeks for follow-up during treatment.      All questions and concerns discussed with patient in clinic today.      Lorrie Graham

## 2017-08-16 NOTE — MR AVS SNAPSHOT
After Visit Summary   8/16/2017    Tony Mcnair    MRN: [id number]           Patient Information     Date Of Birth          1955        Visit Information        Provider Department      8/16/2017 7:20 AM UC DERM LIGHT TREATMENT [facility] Dermatology        Today's Diagnoses     Psoriasis           Follow-ups after your visit        Your next 10 appointments already scheduled     Aug 23, 2017  7:20 AM CDT   (Arrive by 7:05 AM)   Light Treatment Visit with UC DERM LIGHT TREATMENT   [facility] Dermatology ([facility])    [address] [place] [id number] [phone]            Aug 30, 2017  7:20 AM CDT   (Arrive by 7:05 AM)   Light Treatment Visit with UC DERM LIGHT TREATMENT   [facility] Dermatology ([facility])    [address] [place] [id number] [phone]            Sep 06, 2017  7:20 AM CDT   (Arrive by 7:05 AM)   Light Treatment Visit with UC DERM LIGHT TREATMENT   [facility] Dermatology ([facility])    [address] [place] [id number] [phone]            Sep 12, 2017  8:00 AM CDT   (Arrive by 7:45 AM)   Return Visit with Petar Corbett MD   [facility] Dermatology ([facility])    [address] [place] [id number] [phone]            Apr 30, 2018  7:00 AM CDT   (Arrive by 6:45 AM)   Return Visit with CHAZ Gupta CNP   [facility] Rheumatology ([facility])    [address] [place] [id number] [phone]              Who to contact     Please call your clinic at [phone] to:    Ask questions about your health    Make or cancel appointments    Discuss your medicines    Learn about your test results    Speak to your doctor   If you have compliments or concerns about an experience at your clinic, or if you wish to file a  complaint, please contact [facility] Phys[facility] Patient Relations at [phone] or email us at [email or web address]         Additional Information About Your Visit        [employer] Information     [employer] gives you secure access to your electronic health record. If you see a primary care provider, you can also send messages to your care team and make appointments. If you have questions, please call your primary care clinic.  If you do not have a primary care provider, please call [phone] and they will assist you.      [employer] is an electronic gateway that provides easy, online access to your medical records. With [employer], you can request a clinic appointment, read your test results, renew a prescription or communicate with your care team.     To access your existing account, please contact your [facility] Clinic or call [phone] for assistance.        Care EveryWhere ID     This is your Care EveryWhere ID. This could be used by other organizations to access your [place] medical records  [id number]         Blood Pressure from Last 3 Encounters:   05/05/17 121/87   01/23/15 (!) 145/100   10/08/14 133/81    Weight from Last 3 Encounters:   05/05/17 76.4 kg (168 lb 8 oz)   01/23/15 78 kg (172 lb)   10/08/14 75.3 kg (166 lb)              We Performed the Following     CHARGE - PHOTOTHERAPY NBUVB (UV LIGHT)     PROCEDURE - PHOTOTHERAPY NBUVB        Primary Care Provider Office Phone # Fax #    William Morton [phone]       [address] [address]        Equal Access to Services     DARYN [facility] Hospital[facility]IN : Hadii aad ku hadasho Soanna, waaxda luqadaha, qaybta kaalmada saira, renzo haider . So [facility] [phone].    ATENCIÓN: Si habla español, tiene a garrett disposición servicios gratuitos de asistencia lingüística. Llame al [phone].    We comply with applicable federal  civil rights laws and [place] laws. We do not discriminate on the basis of race, color, national origin, age, disability sex, sexual orientation or gender identity.            Thank you!     Thank you for choosing [facility] DERMATOLOGY  for your care. Our goal is always to provide you with excellent care. Hearing back from our patients is one way we can continue to improve our services. Please take a few minutes to complete the written survey that you may receive in the mail after your visit with us. Thank you!             Your Updated Medication List - Protect others around you: Learn how to safely use, store and throw away your medicines at www.disposemymeds.org.          This list is accurate as of: 8/16/17  7:53 AM.  Always use your most recent med list.                   Brand Name Dispense Instructions for use Diagnosis    BIOTIN PO      Take  by mouth daily.        calcipotriene-betameth diprop 0.005-0.064 % Oint    TACLONEX    60 g    Externally apply  topically 2 times daily as needed.    Psoriasis       citalopram 10 MG tablet    celeXA     Take 10 mg by mouth daily        CONCERTA 18 MG CR tablet   Generic drug:  methylphenidate ER      Take 18 mg by mouth every morning.        DAILY MULTIVITAMIN PO      Take  by mouth daily.        fish oil-omega-3 fatty acids 1000 MG capsule      Take 2 g by mouth daily        fluocinonide 0.05 % solution    LIDEX    60 mL    Apply  topically 2 times daily for itchy scalp or scalp psoriasis    Psoriasis       pimecrolimus 1 % cream    ELIDEL    60 g    Apply topically 2 times daily as needed For psoriasis of face or genitals    Psoriasis       REFRESH P.M. Oint     1 Tube    Apply 1 inch to eye nightly as needed    Tear film insufficiency, unspecified       tacrolimus 0.1 % ointment    PROTOPIC    60 g    Apply topically 2 times daily Apply to areas around eyes as needed    Psoriasis       Vitamin C 100 MG Tabs      Take  by mouth daily.        VITAMIN D  (CHOLECALCIFEROL) PO      Take by mouth daily

## 2017-08-23 ENCOUNTER — ALLIED HEALTH/NURSE VISIT (OUTPATIENT)
Dept: DERMATOLOGY | Facility: CLINIC | Age: 62
End: 2017-08-23

## 2017-08-23 DIAGNOSIS — L40.9 PSORIASIS: ICD-10-CM

## 2017-08-23 NOTE — MR AVS SNAPSHOT
After Visit Summary   8/23/2017    Tony Mcnair    MRN: [id number]           Patient Information     Date Of Birth          1955        Visit Information        Provider Department      8/23/2017 7:20 AM UC DERM LIGHT TREATMENT [facility] Dermatology        Today's Diagnoses     Psoriasis           Follow-ups after your visit        Your next 10 appointments already scheduled     Aug 28, 2017  2:00 PM CDT   RETURN CORNEA with Reji Allison MD   Eye Clinic ([facility])    [address]  [address] [facility]  [address] [address]  [facility] [address]   [phone]            Aug 30, 2017  7:20 AM CDT   (Arrive by 7:05 AM)   Light Treatment Visit with UC DERM LIGHT TREATMENT   [facility] Dermatology ([facility])    [address] [place] [address] [id number] [phone]            Sep 06, 2017  7:20 AM CDT   (Arrive by 7:05 AM)   Light Treatment Visit with UC DERM LIGHT TREATMENT   [facility] Dermatology ([facility])    [address] [place] [address] [id number] [phone]            Sep 12, 2017  8:00 AM CDT   (Arrive by 7:45 AM)   Return Visit with Petar Corbett MD   [facility] Dermatology ([facility])    [address] [place] [id number] [phone]            Apr 30, 2018  7:00 AM CDT   (Arrive by 6:45 AM)   Return Visit with CHAZ Gupta [facility] Rheumatology ([facility])    [address] [place] [address] [phone]              Who to contact     Please call your clinic at [phone] to:    Ask questions about your health    Make or cancel appointments    Discuss your medicines    Learn about your test results    Speak to your doctor   If you have compliments or concerns about an experience at your clinic, or if you wish to file a complaint, please contact  [facility] Patient Relations at [phone] or email us at [email or web address]         Additional Information About Your Visit        MeilleurMobilehart Information     MeilleurMobilehart gives you secure access to your electronic health record. If you see a primary care provider, you can also send messages to your care team and make appointments. If you have questions, please call your primary care clinic.  If you do not have a primary care provider, please call [phone] and they will assist you.      NetScaler is an electronic gateway that provides easy, online access to your medical records. With NetScaler, you can request a clinic appointment, read your test results, renew a prescription or communicate with your care team.     To access your existing account, please contact your [facility] or call [phone] for assistance.        Care EveryWhere ID     This is your Care EveryWhere ID. This could be used by other organizations to access your [place] medical records  [id number]         Blood Pressure from Last 3 Encounters:   05/05/17 121/87   01/23/15 (!) 145/100   10/08/14 133/81    Weight from Last 3 Encounters:   05/05/17 76.4 kg (168 lb 8 oz)   01/23/15 78 kg (172 lb)   10/08/14 75.3 kg (166 lb)              We Performed the Following     CHARGE - PHOTOTHERAPY NBUVB (UV LIGHT)     PROCEDURE - PHOTOTHERAPY NBUVB        Primary Care Provider Office Phone # Fax #    William Morton [phone]       [address] [address]        Equal Access to Services     TORI EDWARDS : Hadii zina mcdonald Soanna, waaxda luqadaha, qaybta kaalmada renzo chávez. So [facility] [place] [phone].    ATENCIÓN: Si habla español, tiene a garrett disposición servicios gratuitos de asistencia lingüística. Llame al [phone].    We comply with applicable federal civil rights laws and  [place] laws. We do not discriminate on the basis of race, color, national origin, age, disability sex, sexual orientation or gender identity.            Thank you!     Thank you for choosing [facility] DERMATOLOGY  for your care. Our goal is always to provide you with excellent care. Hearing back from our patients is one way we can continue to improve our services. Please take a few minutes to complete the written survey that you may receive in the mail after your visit with us. Thank you!             Your Updated Medication List - Protect others around you: Learn how to safely use, store and throw away your medicines at www.disposemymeds.org.          This list is accurate as of: 8/23/17  8:31 AM.  Always use your most recent med list.                   Brand Name Dispense Instructions for use Diagnosis    BIOTIN PO      Take  by mouth daily.        calcipotriene-betameth diprop 0.005-0.064 % Oint    TACLONEX    60 g    Externally apply  topically 2 times daily as needed.    Psoriasis       citalopram 10 MG tablet    celeXA     Take 10 mg by mouth daily        CONCERTA 18 MG CR tablet   Generic drug:  methylphenidate ER      Take 18 mg by mouth every morning.        DAILY MULTIVITAMIN PO      Take  by mouth daily.        fish oil-omega-3 fatty acids 1000 MG capsule      Take 2 g by mouth daily        fluocinonide 0.05 % solution    LIDEX    60 mL    Apply  topically 2 times daily for itchy scalp or scalp psoriasis    Psoriasis       pimecrolimus 1 % cream    ELIDEL    60 g    Apply topically 2 times daily as needed For psoriasis of face or genitals    Psoriasis       REFRESH P.M. Oint     1 Tube    Apply 1 inch to eye nightly as needed    Tear film insufficiency, unspecified       tacrolimus 0.1 % ointment    PROTOPIC    60 g    Apply topically 2 times daily Apply to areas around eyes as needed    Psoriasis       Vitamin C 100 MG Tabs      Take  by mouth daily.        VITAMIN D (CHOLECALCIFEROL) PO      Take by  mouth daily

## 2017-08-23 NOTE — NURSING NOTE
[facility] Dermatology Phototherapy Record  1. Tony Mcnair is a 62 year old male is here today for phototherapy (UVB) treatment for Psoriasis.        Changes or new medications since last treatment:   NO    New medical conditions or problems since last treatment:   NO    Any problems with last phototherapy treatment?    NO    2. The patient tolerated phototherapy without complication    Patient will return for next UVB treatment, per protocol.     Patient to see provider every 4-12 weeks for follow-up during treatment.      All questions and concerns discussed with patient in clinic today.      Mary Aponte

## 2017-08-28 ENCOUNTER — OFFICE VISIT (OUTPATIENT)
Dept: OPHTHALMOLOGY | Facility: CLINIC | Age: 62
End: 2017-08-28
Attending: OPHTHALMOLOGY
Payer: COMMERCIAL

## 2017-08-28 DIAGNOSIS — H04.123 DRY EYES, BILATERAL: Primary | ICD-10-CM

## 2017-08-28 DIAGNOSIS — H43.813 POSTERIOR VITREOUS DETACHMENT OF BOTH EYES: ICD-10-CM

## 2017-08-28 DIAGNOSIS — H10.13 ALLERGIC CONJUNCTIVITIS OF BOTH EYES: ICD-10-CM

## 2017-08-28 DIAGNOSIS — H02.889 MEIBOMIAN GLAND DYSFUNCTION (MGD): ICD-10-CM

## 2017-08-28 DIAGNOSIS — D31.41 NEVUS OF IRIS OF RIGHT EYE: ICD-10-CM

## 2017-08-28 PROCEDURE — 99213 OFFICE O/P EST LOW 20 MIN: CPT | Mod: ZF

## 2017-08-28 RX ORDER — OLOPATADINE HYDROCHLORIDE 2 MG/ML
1 SOLUTION/ DROPS OPHTHALMIC DAILY
Qty: 1 BOTTLE | Refills: 1 | Status: SHIPPED | OUTPATIENT
Start: 2017-08-28 | End: 2017-12-14

## 2017-08-28 ASSESSMENT — VISUAL ACUITY
METHOD: SNELLEN - LINEAR
OD_CC: 20/30
CORRECTION_TYPE: GLASSES
OD_CC+: +/-
OS_CC: 20/20

## 2017-08-28 ASSESSMENT — SLIT LAMP EXAM - LIDS
COMMENTS: MGD, MILD BLEPHARITIS
COMMENTS: MGD, MILD BLEPHARITIS

## 2017-08-28 ASSESSMENT — CONF VISUAL FIELD
METHOD: COUNTING FINGERS
OD_INFERIOR_TEMPORAL_RESTRICTION: 3
OD_INFERIOR_NASAL_RESTRICTION: 3
OS_INFERIOR_TEMPORAL_RESTRICTION: 3

## 2017-08-28 ASSESSMENT — TONOMETRY
IOP_METHOD: ICARE
OS_IOP_MMHG: 09
OD_IOP_MMHG: 14

## 2017-08-28 ASSESSMENT — CUP TO DISC RATIO
OD_RATIO: 0.25
OS_RATIO: 0.25

## 2017-08-28 ASSESSMENT — EXTERNAL EXAM - RIGHT EYE: OD_EXAM: NORMAL

## 2017-08-28 ASSESSMENT — EXTERNAL EXAM - LEFT EYE: OS_EXAM: NORMAL

## 2017-08-28 NOTE — NURSING NOTE
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 Needs DFE today.     HPI    Affected eye(s):  Both   Symptoms:     Blurred vision   Decreased vision   Floaters   Dryness   Itching   Burning         Do you have eye pain now?:  No      Comments:  Pt states vision seems worse because of all the floaters. Vision varies from day to day.  Estrella Sifuentes COA 2:26 PM August 28, 2017

## 2017-08-28 NOTE — PROGRESS NOTES
Mr. Mcnair is a 60 year old CM with h/o Dry eye syndrome.     Interval:  Patient was lost to f/up for over a year.  The patient reports that he continues to have dry eyes and symptoms of irritation, worse since last visit. He reports mild worsening of vision in OD. Left eye is more dry and harder to stay lubricated. Refuses eye pain and photophobia. Reports itching OU, denies runny nose.    Hx of psoriasis.    Reports worsening of floaters, no flashes.    Ocular meds:  Artificial tears ointment nightly both eyes   Preservative free artificial tears: 1-2x/day   Pataday: ran out  Fish oil  Warm compress: not doing    A/P:  1. 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    Continue artificial tears. 3-4 times daily.   Continue artificial tear ointment nightly both eyes.    BLL collagen plug placed 18 months (previously tried silicone and rubbed them out almost immediately)   Surface looks healthy. Can approach additional methods of treatment if symptoms/signs worsen - plugs, Restasis, BCL, Doxy, etc.      2. PVD OU   Discussed Retinal detachment  Return precautions   Retina attached OU, no holes/tears   Monitor     3. Allergic conjunctivitis.   Symptomatic   Refilled Pataday      4. Iris Nevus OD   stable in size compared to prior photos (dilated today)   Monitor      F/u: 12 months        MIRIAM Rodriguez  Cornea fellow    ~~~~~~~~~~~~~~~~~~~~~~~~~~~~~~~~~~~~~~~~~~~~~~~~~~~~~~~~~~~~~~~~    Complete documentation of historical and exam elements from today's encounter can be found in the full encounter summary report (not reduplicated in this progress note). I personally obtained the chief complaint(s) and history of present illness.  I confirmed and edited as necessary the review of systems, past medical/surgical history, family history, social history, and examination findings as documented by others; and I  examined the patient myself. I personally reviewed the relevant tests, images, and reports as documented above. I formulated and edited as necessary the assessment and plan and discussed the findings and management plan with the patient and family.    Reji Allison MD

## 2017-08-28 NOTE — MR AVS SNAPSHOT
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      Your next 10 appointments already scheduled     Aug 30, 2017  7:20 AM CDT   (Arrive by 7:05 AM)   Light Treatment Visit with UC DERM LIGHT TREATMENT   [facility] Dermatology ([facility])    [address] [place] [id number] [phone]            Sep 06, 2017  7:20 AM CDT   (Arrive by 7:05 AM)   Light Treatment Visit with UC DERM LIGHT TREATMENT   [facility] Dermatology ([facility])    [address] [place] [id number] [phone]            Sep 12, 2017  8:00 AM CDT   (Arrive by 7:45 AM)   Return Visit with Petar Corbett MD   [facility] Dermatology ([facility])    [address] [place] [address] [id number] [phone]            Apr 30, 2018  7:00 AM CDT   (Arrive by 6:45 AM)   Return Visit with CHAZ Gupta [facility] Rheumatology ([facility])    [address] [place] [id number] [phone]              Who to contact     Please call your clinic at [phone] to:    Ask questions about your health    Make or cancel appointments    Discuss your medicines    Learn about your test results    Speak to your doctor    If you have compliments or concerns about an experience at your clinic, or if you wish to file a complaint, please contact [facility] Patient Relations at [phone] or email us at [email or web address]         Additional Information About Your Visit        MyChart Information     Helioshart gives you secure access to your electronic health record. If you see a primary care provider, you can also send messages to your care team and make appointments. If you have questions, please call your primary care clinic.  If you do not have a primary care provider, please call [phone] and they will assist you.      Adworx is an electronic gateway that provides easy, online access to your medical records. With Adworx, you can request a clinic appointment, read your test results, renew a prescription or communicate with your care team.     To access your existing account, please contact your [facility] or call [phone] for assistance.        Care EveryWhere ID     This is your Care EveryWhere ID. This could be used by other organizations to access your [place] medical records  [id number]         Blood Pressure from Last 3 Encounters:   05/05/17 121/87   01/23/15 (!) 145/100   10/08/14 133/81    Weight from Last 3 Encounters:   05/05/17 76.4 kg (168 lb 8 oz)   01/23/15 78 kg (172 lb)   10/08/14 75.3 kg (166 lb)              Today, you had the following     No orders found for display         Today's Medication Changes          These changes are accurate as of: 8/28/17  4:24 PM.  If you have any questions, ask your nurse or doctor.               Start taking these medicines.        Dose/Directions    olopatadine HCl 0.2 % Soln   Commonly known as:  PATADAY   Used for:  Allergic conjunctivitis of both eyes   Started by:  Reji Allison MD        Dose:  1 drop   Place 1 drop into both eyes daily   Quantity:  1 Bottle   Refills:  1             Where to get your medicines      These medications were sent to [facility] [address] [facility] - [place], [place] - [address] [place] [address] [address]  [address] [facility] [address], [facility] [address]     Phone:  [phone]     olopatadine HCl 0.2 % Soln                Primary Care Provider Office Phone # Fax #    William Morton [phone]       [address] [address]        Equal Access to Services     TORI EDWARDS : Hadii aad ku hadasho Soomaali, waaxda luqadaha, qaybta kaalmada adeegyada, waxay idiin hayaan adeeg maryarafco la'zana . So [facility] [phone].    ATENCIÓN: Si habla español, tiene a garrett disposición servicios gratuitos de asistencia lingüística. [facility] [phone].    We comply with applicable federal civil rights laws and [place] laws. We do not discriminate on the basis of race, color, national origin, age, disability sex, sexual orientation or gender identity.            Thank you!     Thank you for choosing EYE CLINIC  for your care. Our goal is always to provide you with excellent care. Hearing back from our patients is one way we can continue to improve our services. Please take a few minutes to complete the written survey that you may receive in the mail after your visit with us. Thank you!             Your Updated Medication List - Protect others around you: Learn how to safely use, store and throw away your medicines at www.disposemymeds.org.          This list is accurate as of: 8/28/17  4:24 PM.  Always use your most recent med list.                   Brand Name Dispense Instructions for use Diagnosis    BIOTIN PO      Take  by mouth daily.        calcipotriene-betameth diprop 0.005-0.064 % Oint    TACLONEX    60 g    Externally apply  topically 2 times daily as needed.    Psoriasis       citalopram 10 MG tablet    celeXA     Take 10 mg by mouth daily        CONCERTA 18 MG CR tablet   Generic drug:  methylphenidate ER      Take 18 mg by mouth every  morning.        DAILY MULTIVITAMIN PO      Take  by mouth daily.        fish oil-omega-3 fatty acids 1000 MG capsule      Take 2 g by mouth daily        fluocinonide 0.05 % solution    LIDEX    60 mL    Apply  topically 2 times daily for itchy scalp or scalp psoriasis    Psoriasis       olopatadine HCl 0.2 % Soln    PATADAY    1 Bottle    Place 1 drop into both eyes daily    Allergic conjunctivitis of both eyes       pimecrolimus 1 % cream    ELIDEL    60 g    Apply topically 2 times daily as needed For psoriasis of face or genitals    Psoriasis       REFRESH P.M. Oint     1 Tube    Apply 1 inch to eye nightly as needed    Tear film insufficiency, unspecified       tacrolimus 0.1 % ointment    PROTOPIC    60 g    Apply topically 2 times daily Apply to areas around eyes as needed    Psoriasis       Vitamin C 100 MG Tabs      Take  by mouth daily.        VITAMIN D (CHOLECALCIFEROL) PO      Take by mouth daily

## 2017-08-30 ENCOUNTER — ALLIED HEALTH/NURSE VISIT (OUTPATIENT)
Dept: DERMATOLOGY | Facility: CLINIC | Age: 62
End: 2017-08-30

## 2017-08-30 DIAGNOSIS — L40.9 PSORIASIS: ICD-10-CM

## 2017-08-30 NOTE — NURSING NOTE
[facility] Dermatology Phototherapy Record  1. Tony Mcnair is a 62 year old male is here today for phototherapy (UVB) treatment for Psoriasis.        Changes or new medications since last treatment:   NO    New medical conditions or problems since last treatment:   NO    Any problems with last phototherapy treatment?    NO    2. The patient tolerated phototherapy without complication    Patient will return for next UVB treatment, per protocol.     Patient to see provider every 4-12 weeks for follow-up during treatment.      All questions and concerns discussed with patient in clinic today.      Mary Aponte

## 2017-08-30 NOTE — MR AVS SNAPSHOT
After Visit Summary   8/30/2017    Tony Mcnair    MRN: [id number]           Patient Information     Date Of Birth          1955        Visit Information        Provider Department      8/30/2017 7:20 AM UC DERM LIGHT TREATMENT [facility] Dermatology        Today's Diagnoses     Psoriasis           Follow-ups after your visit        Your next 10 appointments already scheduled     Sep 06, 2017  7:20 AM CDT   (Arrive by 7:05 AM)   Light Treatment Visit with UC DERM LIGHT TREATMENT   [facility] Dermatology ([facility])    [address] [place] [address] [phone]            Sep 12, 2017  8:00 AM CDT   (Arrive by 7:45 AM)   Return Visit with Petar Corbett MD   [facility] Dermatology ([facility])    [address] [place] [address] [phone]            Apr 30, 2018  7:00 AM CDT   (Arrive by 6:45 AM)   Return Visit with CHAZ Gupta CNP   [facility] Rheumatology ([facility])    [address] [place] [phone] [phone]              Who to contact     Please call your clinic at [phone] to:    Ask questions about your health    Make or cancel appointments    Discuss your medicines    Learn about your test results    Speak to your doctor   If you have compliments or concerns about an experience at your clinic, or if you wish to file a complaint, please contact [facility] Patient Relations at [phone] or email us at [email or web address]         Additional Information About Your Visit        MyChart Information     TrackBillt gives you secure access to your electronic health record. If you see a primary care provider, you can also send messages to your care team and make appointments. If you have questions, please call your primary care clinic.  If you do not have a primary care provider,  please call [phone] and they will assist you.      Sc[employer] is an electronic gateway that provides easy, online access to your medical records. With [employer], you can request a clinic appointment, read your test results, renew a prescription or communicate with your care team.     To access your existing account, please contact your [facility] or call [phone] for assistance.        Care EveryWhere ID     This is your Care EveryWhere ID. This could be used by other organizations to access your [place] medical records  [id number]         Blood Pressure from Last 3 Encounters:   05/05/17 121/87   01/23/15 (!) 145/100   10/08/14 133/81    Weight from Last 3 Encounters:   05/05/17 76.4 kg (168 lb 8 oz)   01/23/15 78 kg (172 lb)   10/08/14 75.3 kg (166 lb)              We Performed the Following     CHARGE - PHOTOTHERAPY NBUVB (UV LIGHT)     PROCEDURE - PHOTOTHERAPY NBUVB        Primary Care Provider Office Phone # Fax #    William Morton [phone]       [address] [address]        Equal Access to Services     TORI EDWARDS : Hadii zina rios hadasho Soanna, waaxda luqadaha, qaybta kaalmada adeegyada, renzo monroe. So [facility] [phone].    ATENCIÓN: Si habla español, tiene a garrett disposición servicios gratuitos de asistencia lingüística. Llame al [phone].    We comply with applicable federal civil rights laws and [place] laws. We do not discriminate on the basis of race, color, national origin, age, disability sex, sexual orientation or gender identity.            Thank you!     Thank you for choosing [facility] DERMATOLOGY  for your care. Our goal is always to provide you with excellent care. Hearing back from our patients is one way we can continue to improve our services. Please take a few minutes to complete the written survey that you may receive in the mail after your visit with  us. Thank you!             Your Updated Medication List - Protect others around you: Learn how to safely use, store and throw away your medicines at www.disposemymeds.org.          This list is accurate as of: 8/30/17  8:33 AM.  Always use your most recent med list.                   Brand Name Dispense Instructions for use Diagnosis    BIOTIN PO      Take  by mouth daily.        calcipotriene-betameth diprop 0.005-0.064 % Oint    TACLONEX    60 g    Externally apply  topically 2 times daily as needed.    Psoriasis       citalopram 10 MG tablet    celeXA     Take 10 mg by mouth daily        CONCERTA 18 MG CR tablet   Generic drug:  methylphenidate ER      Take 18 mg by mouth every morning.        DAILY MULTIVITAMIN PO      Take  by mouth daily.        fish oil-omega-3 fatty acids 1000 MG capsule      Take 2 g by mouth daily        fluocinonide 0.05 % solution    LIDEX    60 mL    Apply  topically 2 times daily for itchy scalp or scalp psoriasis    Psoriasis       olopatadine HCl 0.2 % Soln    PATADAY    1 Bottle    Place 1 drop into both eyes daily    Allergic conjunctivitis of both eyes       pimecrolimus 1 % cream    ELIDEL    60 g    Apply topically 2 times daily as needed For psoriasis of face or genitals    Psoriasis       REFRESH P.M. Oint     1 Tube    Apply 1 inch to eye nightly as needed    Tear film insufficiency, unspecified       tacrolimus 0.1 % ointment    PROTOPIC    60 g    Apply topically 2 times daily Apply to areas around eyes as needed    Psoriasis       Vitamin C 100 MG Tabs      Take  by mouth daily.        VITAMIN D (CHOLECALCIFEROL) PO      Take by mouth daily

## 2017-09-06 ENCOUNTER — ALLIED HEALTH/NURSE VISIT (OUTPATIENT)
Dept: DERMATOLOGY | Facility: CLINIC | Age: 62
End: 2017-09-06

## 2017-09-06 DIAGNOSIS — L40.9 PSORIASIS: ICD-10-CM

## 2017-09-06 NOTE — PROGRESS NOTES
[facility] Dermatology Phototherapy Record  1. Tony Mcnair is a 62 year old male is here today for phototherapy (UVB) treatment for Psoriasis.        Changes or new medications since last treatment:   NO    New medical conditions or problems since last treatment:   NO    Any problems with last phototherapy treatment?    NO    2. The patient tolerated phototherapy without complication    Patient will return for next UVB treatment, per protocol.     Patient to see provider every 4-12 weeks for follow-up during treatment.      All questions and concerns discussed with patient in clinic today.      Lorrie Graham

## 2017-09-06 NOTE — NURSING NOTE
Tony Mcnair comes into clinic today at the request of Dr. Merida Ordering Provider for Phototherapy.      This service provided today was under the supervising provider of the kristie Davies PA-C, who was available if needed.    Reason for visit: Phototherapy    Lorrie Graham

## 2017-09-06 NOTE — MR AVS SNAPSHOT
After Visit Summary   9/6/2017    Tony Mcnair    MRN: [id number]           Patient Information     Date Of Birth          1955        Visit Information        Provider Department      9/6/2017 7:20 AM [facility] DERM LIGHT TREATMENT [facility]        Today's Diagnoses     Psoriasis           Follow-ups after your visit        Your next 10 appointments already scheduled     Sep 12, 2017  8:00 AM CDT   (Arrive by 7:45 AM)   Return Visit with Petar Corbett MD   [facility])    [address] [place] [address]   [phone]            Sep 12, 2017  8:30 AM CDT   (Arrive by 8:15 AM)   Light Treatment Visit with UC DERM LIGHT TREATMENT   [facility] ([facility])    [address] [place] [address]   [phone]            Sep 20, 2017  7:50 AM CDT   (Arrive by 7:35 AM)   Light Treatment Visit with UC DERM LIGHT TREATMENT   [facility] ([facility])    [address] [place] [address]   [phone]            Sep 27, 2017  7:20 AM CDT   (Arrive by 7:05 AM)   Light Treatment Visit with UC DERM LIGHT TREATMENT   [facility] ([facility])    [address] [place] [address]   [phone]            Oct 04, 2017  7:20 AM CDT   (Arrive by 7:05 AM)   Light Treatment Visit with UC DERM LIGHT TREATMENT   [facility])    [address] [place] [address]   [phone]            Oct 11, 2017  7:20 AM CDT   (Arrive by 7:05 AM)   Light Treatment Visit with UC DERM LIGHT TREATMENT   [facility] ([facility])    [address] [place] [address]   [phone]            Oct 18, 2017  7:20 AM CDT   (Arrive by 7:05 AM)   Light  Treatment Visit with UC DERM LIGHT TREATMENT   [facility] D[facility] ([facility])    [address] [id number] [phone]            Oct 25, 2017  7:20 AM CDT   (Arrive by 7:05 AM)   Light Treatment Visit with UC DERM LIGHT TREATMENT   [facility] D[facility] ([facility])    [address] [id number] [phone]            Apr 30, 2018  7:00 AM CDT   (Arrive by 6:45 AM)   Return Visit with CHAZ Gupta [facility] Rheumatology ([facility])    [address] [place] [address] [phone]              Who to contact     Please call your clinic at [phone] to:    Ask questions about your health    Make or cancel appointments    Discuss your medicines    Learn about your test results    Speak to your doctor   If you have compliments or concerns about an experience at your clinic, or if you wish to file a complaint, please contact [facility] Patient Relations at [phone] or email us at [email or web address]         Additional Information About Your Visit        [employer] PlatformsharLocalEats Information     [employer] gives you secure access to your electronic health record. If you see a primary care provider, you can also send messages to your care team and make appointments. If you have questions, please call your primary care clinic.  If you do not have a primary care provider, please call [phone] and they will assist you.      [employer] is an electronic gateway that provides easy, online access to your medical records. With [employer], you can request a clinic appointment, read your test results, renew a prescription or communicate with your care team.     To access your existing account, please contact your [facility] or call [phone] for assistance.        Care EveryWhere ID      This is your Care EveryWhere ID. This could be used by other organizations to access your [place] medical records  [id number]         Blood Pressure from Last 3 Encounters:   05/05/17 121/87   01/23/15 (!) 145/100   10/08/14 133/81    Weight from Last 3 Encounters:   05/05/17 76.4 kg (168 lb 8 oz)   01/23/15 78 kg (172 lb)   10/08/14 75.3 kg (166 lb)              We Performed the Following     CHARGE - PHOTOTHERAPY NBUVB (UV LIGHT)     PROCEDURE - PHOTOTHERAPY NBUVB        Primary Care Provider Office Phone # Fax #    William Morton [phone]       [place] [address]        Equal Access to Services     TORI EDWARDS : Stan Rodrigez, waaxda luqadaha, qaybta kaalmada calebyakoby, renzo monroe. So [facility] [phone].    ATENCIÓN: Si habla español, tiene a garrett disposición servicios gratuitos de asistencia lingüística. Mickiame al [phone].    We comply with applicable federal civil rights laws and [place] laws. We do not discriminate on the basis of race, color, national origin, age, disability sex, sexual orientation or gender identity.            Thank you!     Thank you for choosing [facility] DERMATOLOG[facility]  for your care. Our goal is always to provide you with excellent care. Hearing back from our patients is one way we can continue to improve our services. Please take a few minutes to complete the written survey that you may receive in the mail after your visit with us. Thank you!             Your Updated Medication List - Protect others around you: Learn how to safely use, store and throw away your medicines at www.disposemymeds.org.          This list is accurate as of: 9/6/17  8:23 AM.  Always use your most recent med list.                   Brand Name Dispense Instructions for use Diagnosis    BIOTIN PO      Take  by mouth daily.        calcipotriene-betameth diprop 0.005-0.064 % Oint    TACLONEX     60 g    Externally apply  topically 2 times daily as needed.    Psoriasis       citalopram 10 MG tablet    celeXA     Take 10 mg by mouth daily        CONCERTA 18 MG CR tablet   Generic drug:  methylphenidate ER      Take 18 mg by mouth every morning.        DAILY MULTIVITAMIN PO      Take  by mouth daily.        fish oil-omega-3 fatty acids 1000 MG capsule      Take 2 g by mouth daily        fluocinonide 0.05 % solution    LIDEX    60 mL    Apply  topically 2 times daily for itchy scalp or scalp psoriasis    Psoriasis       olopatadine HCl 0.2 % Soln    PATADAY    1 Bottle    Place 1 drop into both eyes daily    Allergic conjunctivitis of both eyes       pimecrolimus 1 % cream    ELIDEL    60 g    Apply topically 2 times daily as needed For psoriasis of face or genitals    Psoriasis       REFRESH P.M. Oint     1 Tube    Apply 1 inch to eye nightly as needed    Tear film insufficiency, unspecified       tacrolimus 0.1 % ointment    PROTOPIC    60 g    Apply topically 2 times daily Apply to areas around eyes as needed    Psoriasis       Vitamin C 100 MG Tabs      Take  by mouth daily.        VITAMIN D (CHOLECALCIFEROL) PO      Take by mouth daily

## 2017-09-12 ENCOUNTER — ALLIED HEALTH/NURSE VISIT (OUTPATIENT)
Dept: DERMATOLOGY | Facility: CLINIC | Age: 62
End: 2017-09-12

## 2017-09-12 ENCOUNTER — OFFICE VISIT (OUTPATIENT)
Dept: DERMATOLOGY | Facility: CLINIC | Age: 62
End: 2017-09-12

## 2017-09-12 DIAGNOSIS — L40.9 PSORIASIS: ICD-10-CM

## 2017-09-12 DIAGNOSIS — L40.9 PSORIASIS: Primary | ICD-10-CM

## 2017-09-12 ASSESSMENT — PAIN SCALES - GENERAL: PAINLEVEL: NO PAIN (0)

## 2017-09-12 NOTE — NURSING NOTE
[facility] Phototherapy Record  1. Tony Mcnair is a 62 year old male is here today for phototherapy (UVB) treatment for psoriasis.        Changes or new medications since last treatment:   NO    New medical conditions or problems since last treatment:   NO    Any problems with last phototherapy treatment?    NO    2. The patient tolerated phototherapy without complication    Patient will return for next UVB treatment, per protocol.     Patient to see provider every 4-12 weeks for follow-up during treatment.      All questions and concerns discussed with patient in clinic today.      Mary Aponte      Tony Mcnair comes into clinic today at the request of Dr. Corbett Ordering Provider for NBUVB.      This service provided today was under the supervising provider of the [facility], who was available if needed.      Mary Aponte

## 2017-09-12 NOTE — MR AVS SNAPSHOT
After Visit Summary   9/12/2017    Tony Mcnair    MRN: [id number]           Patient Information     Date Of Birth          1955        Visit Information        Provider Department      9/12/2017 8:30 AM UC DERM LIGHT TREATMENT [facility]        Today's Diagnoses     Psoriasis           Follow-ups after your visit        Your next 10 appointments already scheduled     Sep 14, 2017  8:10 AM CDT   (Arrive by 7:55 AM)   Light Treatment Visit with UC DERM LIGHT TREATMENT   [facility] ([facility])    [address] [place] [address]   [phone]            Sep 20, 2017  7:50 AM CDT   (Arrive by 7:35 AM)   Light Treatment Visit with UC DERM LIGHT TREATMENT   [facility] ([facility])    [address] [place] [address]   [phone]            Sep 27, 2017  7:20 AM CDT   (Arrive by 7:05 AM)   Light Treatment Visit with UC DERM LIGHT TREATMENT   [facility])    [address] [place] [address]   [phone]            Oct 04, 2017  7:20 AM CDT   (Arrive by 7:05 AM)   Light Treatment Visit with UC DERM LIGHT TREATMENT   [facility] ([facility])    [address] [place] [address]   [phone]            Oct 11, 2017  7:20 AM CDT   (Arrive by 7:05 AM)   Light Treatment Visit with UC DERM LIGHT TREATMENT   [facility])    [address] [place] [address]   [phone]            Oct 18, 2017  7:20 AM CDT   (Arrive by 7:05 AM)   Light Treatment Visit with UC DERM LIGHT TREATMENT   [facility] ([facility])    [address] [place] [address]   [phone]            Oct 25, 2017  7:20 AM CDT   (Arrive by 7:05 AM)    Light Treatment Visit with BLAYNE DERM LIGHT TREATMENT   [facility] D[facility] ([facility])    [address] [facility]  [address] [facility] [address] [phone]            Apr 30, 2018  7:00 AM CDT   (Arrive by 6:45 AM)   Return Visit with CHAZ Gupta CNP   [facility] Rheumatology ([facility])    [address] [facility] [place] [facility] [address] [facility] [address] [phone]              Who to contact     Please call your clinic at [phone] to:    Ask questions about your health    Make or cancel appointments    Discuss your medicines    Learn about your test results    Speak to your doctor   If you have compliments or concerns about an experience at your clinic, or if you wish to file a complaint, please contact [facility] Patient Relations at [phone] or email us at [email or web address]         Additional Information About Your Visit        [employer] Information     [employer]l gives you secure access to your electronic health record. If you see a primary care provider, you can also send messages to your care team and make appointments. If you have questions, please call your primary care clinic.  If you do not have a primary care provider, please call [phone] and they will assist you.      Szl is an electronic gateway that provides easy, online access to your medical records. With [employer]l, you can request a clinic appointment, read your test results, renew a prescription or communicate with your care team.     To access your existing account, please contact your [facility] or call [phone] for assistance.        Care EveryWhere ID     This is your Care EveryWhere ID. This could be used by other organizations to access your [place] medical records  [id number]         Blood Pressure from Last 3 Encounters:   05/05/17 121/87   01/23/15 (!) 145/100   10/08/14 133/81     Weight from Last 3 Encounters:   05/05/17 76.4 kg (168 lb 8 oz)   01/23/15 78 kg (172 lb)   10/08/14 75.3 kg (166 lb)              We Performed the Following     CHARGE - PHOTOTHERAPY NBUVB (UV LIGHT)     PROCEDURE - PHOTOTHERAPY NBUVB        Primary Care Provider Office Phone # Fax #    William Morton [phone]       [address] [address]        Equal Access to Services     TORI EDWARDS : Hadii aad ku hadasho Soomaali, waaxda luqadaha, qaybta kaalmada adeegyada, waxay idiin hayaan adetiffanie monroe. So [facility] [phone].    ATENCIÓN: Si habla español, tiene a garrett disposición servicios gratuitos de asistencia lingüística. Ze al [phone].    We comply with applicable federal civil rights laws and [place] laws. We do not discriminate on the basis of race, color, national origin, age, disability sex, sexual orientation or gender identity.            Thank you!     Thank you for choosing [facility] DERMATOLOGY  for your care. Our goal is always to provide you with excellent care. Hearing back from our patients is one way we can continue to improve our services. Please take a few minutes to complete the written survey that you may receive in the mail after your visit with us. Thank you!             Your Updated Medication List - Protect others around you: Learn how to safely use, store and throw away your medicines at www.disposemymeds.org.          This list is accurate as of: 9/12/17  9:24 AM.  Always use your most recent med list.                   Brand Name Dispense Instructions for use Diagnosis    BIOTIN PO      Take  by mouth daily.        calcipotriene-betameth diprop 0.005-0.064 % Oint    TACLONEX    60 g    Externally apply  topically 2 times daily as needed.    Psoriasis       citalopram 10 MG tablet    celeXA     Take 10 mg by mouth daily        CONCERTA 18 MG CR tablet   Generic drug:  methylphenidate ER      Take 18 mg by mouth  every morning.        DAILY MULTIVITAMIN PO      Take  by mouth daily.        fish oil-omega-3 fatty acids 1000 MG capsule      Take 2 g by mouth daily        fluocinonide 0.05 % solution    LIDEX    60 mL    Apply  topically 2 times daily for itchy scalp or scalp psoriasis    Psoriasis       olopatadine HCl 0.2 % Soln    PATADAY    1 Bottle    Place 1 drop into both eyes daily    Allergic conjunctivitis of both eyes       pimecrolimus 1 % cream    ELIDEL    60 g    Apply topically 2 times daily as needed For psoriasis of face or genitals    Psoriasis       REFRESH P.M. Oint     1 Tube    Apply 1 inch to eye nightly as needed    Tear film insufficiency, unspecified       tacrolimus 0.1 % ointment    PROTOPIC    60 g    Apply topically 2 times daily Apply to areas around eyes as needed    Psoriasis       Vitamin C 100 MG Tabs      Take  by mouth daily.        VITAMIN D (CHOLECALCIFEROL) PO      Take by mouth daily

## 2017-09-12 NOTE — MR AVS SNAPSHOT
After Visit Summary   9/12/2017    Tony Mcnair    MRN: [id number]           Patient Information     Date Of Birth          1955        Visit Information        Provider Department      9/12/2017 8:00 AM Petar Corbett MD [facility] Dermatology        Today's Diagnoses     Psoriasis    -  1       Follow-ups after your visit        Your next 10 appointments already scheduled     Sep 20, 2017  7:50 AM CDT   (Arrive by 7:35 AM)   Light Treatment Visit with UC DERM LIGHT TREATMENT   [facility] ([facility])    [address] [place] [address]   [phone]            Sep 27, 2017  7:20 AM CDT   (Arrive by 7:05 AM)   Light Treatment Visit with UC DERM LIGHT TREATMENT   [facility])    [address] [place] [address]   [phone]            Oct 04, 2017  7:20 AM CDT   (Arrive by 7:05 AM)   Light Treatment Visit with UC DERM LIGHT TREATMENT   [facility])    [address] [place] [address]   [phone]            Oct 11, 2017  7:20 AM CDT   (Arrive by 7:05 AM)   Light Treatment Visit with UC DERM LIGHT TREATMENT   [facility])    [address] [place] [address]   [phone]            Oct 18, 2017  7:20 AM CDT   (Arrive by 7:05 AM)   Light Treatment Visit with UC DERM LIGHT TREATMENT   [facility])    [address] [place] [address]   [phone]            Oct 25, 2017  7:20 AM CDT   (Arrive by 7:05 AM)   Light Treatment Visit with UC DERM LIGHT TREATMENT   [facility])    [address] [place] [address]   [phone]            Apr 30, 2018  7:00 AM CDT   (Arrive by 6:45 AM)    Return Visit with CHAZ Gupta [facility] Rheumatology ([facility])    [address] [facility]  [address] [facility] [address] [phone]              Who to contact     Please call your clinic at [phone] to:    Ask questions about your health    Make or cancel appointments    Discuss your medicines    Learn about your test results    Speak to your doctor   If you have compliments or concerns about an experience at your clinic, or if you wish to file a complaint, please contact [facility] Patient Relations at [phone] or email us at [email or web address]         Additional Information About Your Visit        RetailNextharHiveLive Information     [employer] gives you secure access to your electronic health record. If you see a primary care provider, you can also send messages to your care team and make appointments. If you have questions, please call your primary care clinic.  If you do not have a primary care provider, please call [phone] and they will assist you.      [employer] is an electronic gateway that provides easy, online access to your medical records. With [employer], you can request a clinic appointment, read your test results, renew a prescription or communicate with your care team.     To access your existing account, please contact your [facility] or call [phone] for assistance.        Care EveryWhere ID     This is your Care EveryWhere ID. This could be used by other organizations to access your [place] medical records  [id number]         Blood Pressure from Last 3 Encounters:   05/05/17 121/87   01/23/15 (!) 145/100   10/08/14 133/81    Weight from Last 3 Encounters:   05/05/17 76.4 kg (168 lb 8 oz)   01/23/15 78 kg (172 lb)   10/08/14 75.3 kg (166 lb)              Today, you had the following     No orders found for display       Primary Care Provider Office Phone # Fax #    [facility]  MD Praveen [phone]       [address] [address]        Equal Access to Services     TORI EDWARDS : Stan Rodrigez, deepak hedrick, kathleenswathi acostajermankoby liragerrykoby, waxnancy kindrain hayaaindu liratiffanie marygrayfco monroe. So [facility] [phone].    ATENCIÓN: Si habla español, tiene a garrett disposición servicios gratuitos de asistencia lingüística. Llame al [phone].    We comply with applicable federal civil rights laws and [place] laws. We do not discriminate on the basis of race, color, national origin, age, disability sex, sexual orientation or gender identity.            Thank you!     Thank you for choosing [facility] DERMATOLOGY  for your care. Our goal is always to provide you with excellent care. Hearing back from our patients is one way we can continue to improve our services. Please take a few minutes to complete the written survey that you may receive in the mail after your visit with us. Thank you!             Your Updated Medication List - Protect others around you: Learn how to safely use, store and throw away your medicines at www.disposemymeds.org.          This list is accurate as of: 9/12/17 11:59 PM.  Always use your most recent med list.                   Brand Name Dispense Instructions for use Diagnosis    BIOTIN PO      Take  by mouth daily.        calcipotriene-betameth diprop 0.005-0.064 % Oint    TACLONEX    60 g    Externally apply  topically 2 times daily as needed.    Psoriasis       citalopram 10 MG tablet    celeXA     Take 10 mg by mouth daily        CONCERTA 18 MG CR tablet   Generic drug:  methylphenidate ER      Take 18 mg by mouth every morning.        DAILY MULTIVITAMIN PO      Take  by mouth daily.        fish oil-omega-3 fatty acids 1000 MG capsule      Take 2 g by mouth daily        fluocinonide 0.05 % solution    LIDEX    60 mL    Apply  topically 2 times daily for itchy scalp or scalp psoriasis    Psoriasis        olopatadine HCl 0.2 % Soln    PATADAY    1 Bottle    Place 1 drop into both eyes daily    Allergic conjunctivitis of both eyes       pimecrolimus 1 % cream    ELIDEL    60 g    Apply topically 2 times daily as needed For psoriasis of face or genitals    Psoriasis       REFRESH P.M. Oint     1 Tube    Apply 1 inch to eye nightly as needed    Tear film insufficiency, unspecified       tacrolimus 0.1 % ointment    PROTOPIC    60 g    Apply topically 2 times daily Apply to areas around eyes as needed    Psoriasis       Vitamin C 100 MG Tabs      Take  by mouth daily.        VITAMIN D (CHOLECALCIFEROL) PO      Take by mouth daily

## 2017-09-12 NOTE — NURSING NOTE
Dermatology Rooming Note    Tony Mcnair's goals for this visit include:   Chief Complaint   Patient presents with     Derm Problem     Psoriasis. Tony notes improvement with his skin. Phototherapy seems to help.     Lorrie Graham, CMA

## 2017-09-12 NOTE — LETTER
9/12/2017       RE: Tony Mcnair  [address] [facility] [address]     Dear Colleague,    Thank you for referring your patient, Tony Mcnair, to the [facility] DERMATOLOGY at [facility]. Please see a copy of my visit note below.    [facility] Dermatology Note      Dermatology Problem List:  1. Limited plaque type psoriasis  Current tx:   -UVB phototherapy 1/week  -Elidel 1% cream for face and groin BID PRN  -Lidex 0.05% solution for scalp BID   -Protopic 0.1% around eyes BID PRN  -taclonex ointment to affected areas BID PRN    Encounter Date: Sep 12, 2017    CC:  Chief Complaint   Patient presents with     Derm Problem     Psoriasis. Tony notes improvement with his skin. Phototherapy seems to help.         History of Present Illness:  Mr. Tony Mcnair is a 62 year old male who presents as a follow-up for psoriasis. The patient was last seen 3/21/17 when increasing phototherapy to 2/week was discussed. However the patient reports he started taking citalopram and was told this increases photosensitivity and to decrease his phototherapy time in half. Thus he has been going for once a week for half the time. He states he started flaring on his LE but this has since come towards his baseline. Mr. Mcnair has continued activity on his elbows and feels his scalp and groin are well controlled.     Patient reports he was seen by rheumatology who did not feel his hip pain was psoriatic arthritis but more likely ITB syndrome. Patient has been doing PT and reports improvement.     Past Medical History:   Patient Active Problem List   Diagnosis     Psoriasis     Pain in both feet     Iliotibial band syndrome, unspecified laterality     Past Medical History:   Diagnosis Date     Arthritis      Zoster 1988    LEFT EYE     Past Surgical History:   Procedure Laterality Date     IRIS NEVUS Right      LESION RIGHT UPPER LID Right 1999    EXCISION       Social  History:  The patient works in [employer]. The patient denies use of tanning beds.    Family History:  There is a history of psoriasis in his mother.     Medications:  Current Outpatient Prescriptions   Medication Sig Dispense Refill     olopatadine HCl (PATADAY) 0.2 % SOLN Place 1 drop into both eyes daily 1 Bottle 1     calcipotriene-betameth diprop (TACLONEX) 0.005-0.064 % OINT Externally apply  topically 2 times daily as needed. 60 g 2     citalopram (CELEXA) 10 MG tablet Take 10 mg by mouth daily       fish oil-omega-3 fatty acids 1000 MG capsule Take 2 g by mouth daily       VITAMIN D, CHOLECALCIFEROL, PO Take by mouth daily       pimecrolimus (ELIDEL) 1 % cream Apply topically 2 times daily as needed For psoriasis of face or genitals 60 g 2     fluocinonide (LIDEX) 0.05 % solution Apply  topically 2 times daily for itchy scalp or scalp psoriasis 60 mL 3     Artificial Tear Ointment (REFRESH P.M.) OINT Apply 1 inch to eye nightly as needed 1 Tube 6     tacrolimus (PROTOPIC) 0.1 % ointment Apply topically 2 times daily Apply to areas around eyes as needed 60 g 2     BIOTIN PO Take  by mouth daily.       methylphenidate (CONCERTA) 18 MG CR tablet Take 18 mg by mouth every morning.       Multiple Vitamin (DAILY MULTIVITAMIN PO) Take  by mouth daily.       Ascorbic Acid (VITAMIN C) 100 MG TABS Take  by mouth daily.       Allergies   Allergen Reactions     Nkda [No Known Drug Allergies]          Review of Systems:  -Constitutional: The patient denies fatigue, fevers, chills, unintended weight loss, and night sweats.  -HEENT: Patient denies nonhealing oral sores.  -Skin: As above in HPI. No additional skin concerns.    Physical exam:  Vitals: There were no vitals taken for this visit.  GEN: This is a well developed, well-nourished male in no acute distress, in a pleasant mood.    SKIN: Focused examination of the LE and UE was performed.  -pink plaques with scale and excoriations on lateral ankles bilaterally.    -Pink plaques with scale on extensor surfaces of knees and elbows bilaterally.  -Small pink plaque on forehead  -No other lesions of concern on areas examined.     Impression/Plan:  1. Limited type plaque psoriasis. Patient has flare of LE in context of his once a week phototherapy being cut down because he started citalopram, which may increase photosensitivity. Phototherapy is being increased by 50 mJ/session and he's now at 1115 mJ. Patient reports besides his LE his psoriasis at his elbows, scalp and groin is fairly well controlled. Discussed that he can apply Taclonex to his LE and wrap in Seran wrap before bed to decrease activity there.     Continue UVB phototherapy 1/week    Continue Elidel 1% cream for face and groin BID PRN    Continue Lidex 0.05% solution for scalp BID     Continue Protopic 0.1% around eyes BID PRN    Continue taclonex ointment to affected areas BID PRN      Follow-up in 6 months, earlier for new or changing lesions.     Staff Involved:  Scribed by Joanne Serrano, MS4 for Dr. Corbett.      The medical student acted as a scribe with respect to this patient.  I have performed all the key elements of the history and physical.    Petar Corbett MD  Dermatology Attending

## 2017-09-12 NOTE — PROGRESS NOTES
[facility] Dermatology Note      Dermatology Problem List:  1. Limited plaque type psoriasis  Current tx:   -UVB phototherapy 1/week  -Elidel 1% cream for face and groin BID PRN  -Lidex 0.05% solution for scalp BID   -Protopic 0.1% around eyes BID PRN  -taclonex ointment to affected areas BID PRN    Encounter Date: Sep 12, 2017    CC:  Chief Complaint   Patient presents with     Derm Problem     Psoriasis. Tony notes improvement with his skin. Phototherapy seems to help.         History of Present Illness:  Mr. Tony Mcnair is a 62 year old male who presents as a follow-up for psoriasis. The patient was last seen 3/21/17 when increasing phototherapy to 2/week was discussed. However the patient reports he started taking citalopram and was told this increases photosensitivity and to decrease his phototherapy time in half. Thus he has been going for once a week for half the time. He states he started flaring on his LE but this has since come towards his baseline. Mr. Mcnair has continued activity on his elbows and feels his scalp and groin are well controlled.     Patient reports he was seen by rheumatology who did not feel his hip pain was psoriatic arthritis but more likely ITB syndrome. Patient has been doing PT and reports improvement.     Past Medical History:   Patient Active Problem List   Diagnosis     Psoriasis     Pain in both feet     Iliotibial band syndrome, unspecified laterality     Past Medical History:   Diagnosis Date     Arthritis      Zoster 1988    LEFT EYE     Past Surgical History:   Procedure Laterality Date     IRIS NEVUS Right      LESION RIGHT UPPER LID Right 1999    EXCISION       Social History:  The patient works in [employer]. The patient denies use of tanning beds.    Family History:  There is a history of psoriasis in his mother.     Medications:  Current Outpatient Prescriptions   Medication Sig Dispense Refill     olopatadine HCl (PATADAY) 0.2 % SOLN Place 1 drop  into both eyes daily 1 Bottle 1     calcipotriene-betameth diprop (TACLONEX) 0.005-0.064 % OINT Externally apply  topically 2 times daily as needed. 60 g 2     citalopram (CELEXA) 10 MG tablet Take 10 mg by mouth daily       fish oil-omega-3 fatty acids 1000 MG capsule Take 2 g by mouth daily       VITAMIN D, CHOLECALCIFEROL, PO Take by mouth daily       pimecrolimus (ELIDEL) 1 % cream Apply topically 2 times daily as needed For psoriasis of face or genitals 60 g 2     fluocinonide (LIDEX) 0.05 % solution Apply  topically 2 times daily for itchy scalp or scalp psoriasis 60 mL 3     Artificial Tear Ointment (REFRESH P.M.) OINT Apply 1 inch to eye nightly as needed 1 Tube 6     tacrolimus (PROTOPIC) 0.1 % ointment Apply topically 2 times daily Apply to areas around eyes as needed 60 g 2     BIOTIN PO Take  by mouth daily.       methylphenidate (CONCERTA) 18 MG CR tablet Take 18 mg by mouth every morning.       Multiple Vitamin (DAILY MULTIVITAMIN PO) Take  by mouth daily.       Ascorbic Acid (VITAMIN C) 100 MG TABS Take  by mouth daily.       Allergies   Allergen Reactions     Nkda [No Known Drug Allergies]          Review of Systems:  -Constitutional: The patient denies fatigue, fevers, chills, unintended weight loss, and night sweats.  -HEENT: Patient denies nonhealing oral sores.  -Skin: As above in HPI. No additional skin concerns.    Physical exam:  Vitals: There were no vitals taken for this visit.  GEN: This is a well developed, well-nourished male in no acute distress, in a pleasant mood.    SKIN: Focused examination of the LE and UE was performed.  -pink plaques with scale and excoriations on lateral ankles bilaterally.   -Pink plaques with scale on extensor surfaces of knees and elbows bilaterally.  -Small pink plaque on forehead  -No other lesions of concern on areas examined.     Impression/Plan:  1. Limited type plaque psoriasis. Patient has flare of LE in context of his once a week phototherapy being cut  down because he started citalopram, which may increase photosensitivity. Phototherapy is being increased by 50 mJ/session and he's now at 1115 mJ. Patient reports besides his LE his psoriasis at his elbows, scalp and groin is fairly well controlled. Discussed that he can apply Taclonex to his LE and wrap in Seran wrap before bed to decrease activity there.     Continue UVB phototherapy 1/week    Continue Elidel 1% cream for face and groin BID PRN    Continue Lidex 0.05% solution for scalp BID     Continue Protopic 0.1% around eyes BID PRN    Continue taclonex ointment to affected areas BID PRN      Follow-up in 6 months, earlier for new or changing lesions.     Staff Involved:  Scribed by Joanne Serrano, MS4 for Dr. Corbett.      The medical student acted as a scribe with respect to this patient.  I have performed all the key elements of the history and physical.    Petar Corbett MD  Dermatology Attending

## 2017-09-14 ENCOUNTER — ALLIED HEALTH/NURSE VISIT (OUTPATIENT)
Dept: DERMATOLOGY | Facility: CLINIC | Age: 62
End: 2017-09-14

## 2017-09-14 DIAGNOSIS — L40.9 PSORIASIS: ICD-10-CM

## 2017-09-14 NOTE — MR AVS SNAPSHOT
After Visit Summary   9/14/2017    Tony Mcnair    MRN: [id number]           Patient Information     Date Of Birth          1955        Visit Information        Provider Department      9/14/2017 8:10 AM [facility] DERM LIGHT TREATMENT [facility] Dermatology        Today's Diagnoses     Psoriasis           Follow-ups after your visit        Your next 10 appointments already scheduled     Sep 20, 2017  7:50 AM CDT   (Arrive by 7:35 AM)   Light Treatment Visit with UC DERM LIGHT TREATMENT   [facility] ([facility])    [address] [place] [address]   [phone]            Sep 27, 2017  7:20 AM CDT   (Arrive by 7:05 AM)   Light Treatment Visit with UC DERM LIGHT TREATMENT   [facility] ([facility])    [address] [place] [address]   [phone]            Oct 04, 2017  7:20 AM CDT   (Arrive by 7:05 AM)   Light Treatment Visit with UC DERM LIGHT TREATMENT   [facility])    [address] [place] [address]   [phone]            Oct 11, 2017  7:20 AM CDT   (Arrive by 7:05 AM)   Light Treatment Visit with UC DERM LIGHT TREATMENT   [facility] ([facility])    [address] [place] [address]   [phone]            Oct 18, 2017  7:20 AM CDT   (Arrive by 7:05 AM)   Light Treatment Visit with UC DERM LIGHT TREATMENT   [facility])    [address] [place] [address]   [phone]            Oct 25, 2017  7:20 AM CDT   (Arrive by 7:05 AM)   Light Treatment Visit with UC DERM LIGHT TREATMENT   [facility] ([facility])    [address] [place] [address]   [phone]            Apr 30, 2018  7:00 AM CDT   (Arrive by 6:45 AM)    Return Visit with CHAZ Gupta [facility] Rheumatology ([facility])    [address] [facility] [address] [facility] [address] [phone]              Who to contact     Please call your clinic at [phone] to:    Ask questions about your health    Make or cancel appointments    Discuss your medicines    Learn about your test results    Speak to your doctor   If you have compliments or concerns about an experience at your clinic, or if you wish to file a complaint, please contact [facility] Patient Relations at [phone] or email us at [email or web address]         Additional Information About Your Visit        [employer] Information     [employer] gives you secure access to your electronic health record. If you see a primary care provider, you can also send messages to your care team and make appointments. If you have questions, please call your primary care clinic.  If you do not have a primary care provider, please call [phone] and they will assist you.      [employer] is an electronic gateway that provides easy, online access to your medical records. With [employer], you can request a clinic appointment, read your test results, renew a prescription or communicate with your care team.     To access your existing account, please contact your [facility] or call [phone] for assistance.        Care EveryWhere ID     This is your Care EveryWhere ID. This could be used by other organizations to access your [facility] medical records  [id number]         Blood Pressure from Last 3 Encounters:   05/05/17 121/87   01/23/15 (!) 145/100   10/08/14 133/81    Weight from Last 3 Encounters:   05/05/17 76.4 kg (168 lb 8 oz)   01/23/15 78 kg (172 lb)   10/08/14 75.3 kg (166 lb)              We Performed the Following     CHARGE - PHOTOTHERAPY NBUVB (UV LIGHT)     PROCEDURE - PHOTOTHERAPY NBUVB        Primary  Care Provider Office Phone # Fax #    William Morton [phone]       [address] [address]        Equal Access to Services     TORI EDWARDS : Hadii aad ku hadalhaji Rodrigez, waphoenixda luqgerman, qaybta kaalmada saira, renzo gómez sorayatiffanie marygrayfco monroe. So [facility] [phone].    ATENCIÓN: Si habla español, tiene a garrett disposición servicios gratuitos de asistencia lingüística. Ze al [phone].    We comply with applicable federal civil rights laws and [place] laws. We do not discriminate on the basis of race, color, national origin, age, disability sex, sexual orientation or gender identity.            Thank you!     Thank you for choosing [facility] DERMATOLOGY  for your care. Our goal is always to provide you with excellent care. Hearing back from our patients is one way we can continue to improve our services. Please take a few minutes to complete the written survey that you may receive in the mail after your visit with us. Thank you!             Your Updated Medication List - Protect others around you: Learn how to safely use, store and throw away your medicines at www.disposemymeds.org.          This list is accurate as of: 9/14/17  8:49 AM.  Always use your most recent med list.                   Brand Name Dispense Instructions for use Diagnosis    BIOTIN PO      Take  by mouth daily.        calcipotriene-betameth diprop 0.005-0.064 % Oint    TACLONEX    60 g    Externally apply  topically 2 times daily as needed.    Psoriasis       citalopram 10 MG tablet    celeXA     Take 10 mg by mouth daily        CONCERTA 18 MG CR tablet   Generic drug:  methylphenidate ER      Take 18 mg by mouth every morning.        DAILY MULTIVITAMIN PO      Take  by mouth daily.        fish oil-omega-3 fatty acids 1000 MG capsule      Take 2 g by mouth daily        fluocinonide 0.05 % solution    LIDEX    60 mL    Apply  topically 2 times daily for itchy  scalp or scalp psoriasis    Psoriasis       olopatadine HCl 0.2 % Soln    PATADAY    1 Bottle    Place 1 drop into both eyes daily    Allergic conjunctivitis of both eyes       pimecrolimus 1 % cream    ELIDEL    60 g    Apply topically 2 times daily as needed For psoriasis of face or genitals    Psoriasis       REFRESH P.M. Oint     1 Tube    Apply 1 inch to eye nightly as needed    Tear film insufficiency, unspecified       tacrolimus 0.1 % ointment    PROTOPIC    60 g    Apply topically 2 times daily Apply to areas around eyes as needed    Psoriasis       Vitamin C 100 MG Tabs      Take  by mouth daily.        VITAMIN D (CHOLECALCIFEROL) PO      Take by mouth daily

## 2017-09-14 NOTE — NURSING NOTE
[facility] Dermatology Phototherapy Record  1. Tony Mcnair is a 62 year old male is here today for phototherapy (UVB) treatment for Psoriasis.        Changes or new medications since last treatment:   NO    New medical conditions or problems since last treatment:   NO    Any problems with last phototherapy treatment?    NO    2. The patient tolerated phototherapy without complication    Patient will return for next UVB treatment, per protocol.     Patient to see provider every 4-12 weeks for follow-up during treatment.      All questions and concerns discussed with patient in clinic today.      Mary Aponte      Tony Mcnair comes into clinic today at the request of Hank Ordering Provider for NBUVB.      This service provided today was under the supervising provider of the kristie Betancourt, who was available if needed.      Mary Aponte

## 2017-09-20 ENCOUNTER — ALLIED HEALTH/NURSE VISIT (OUTPATIENT)
Dept: DERMATOLOGY | Facility: CLINIC | Age: 62
End: 2017-09-20

## 2017-09-20 DIAGNOSIS — L40.9 PSORIASIS: ICD-10-CM

## 2017-09-20 NOTE — MR AVS SNAPSHOT
After Visit Summary   9/20/2017    Tony Mcnair    MRN: [id number]           Patient Information     Date Of Birth          1955        Visit Information        Provider Department      9/20/2017 7:50 AM [facility] DERM LIGHT TREATMENT [facility] Dermatology        Today's Diagnoses     Psoriasis           Follow-ups after your visit        Your next 10 appointments already scheduled     Sep 27, 2017  7:20 AM CDT   (Arrive by 7:05 AM)   Light Treatment Visit with UC DERM LIGHT TREATMENT   [facility] Dermatology ([facility])    [address] [place] [address]   [phone]            Oct 04, 2017  7:20 AM CDT   (Arrive by 7:05 AM)   Light Treatment Visit with UC DERM LIGHT TREATMENT   [facility] Dermatology ([facility])    [address] [place] [address]   [phone]            Oct 11, 2017  7:20 AM CDT   (Arrive by 7:05 AM)   Light Treatment Visit with UC DERM LIGHT TREATMENT   [facility] Dermatology ([facility])    [address] [place] [address]   [phone]            Oct 18, 2017  7:20 AM CDT   (Arrive by 7:05 AM)   Light Treatment Visit with UC DERM LIGHT TREATMENT   [facility] Dermat[facility] ([facility])    [address] [place] [address]   [phone]            Oct 25, 2017  7:20 AM CDT   (Arrive by 7:05 AM)   Light Treatment Visit with UC DERM LIGHT TREATMENT   [facility] Dermat[facility] ([facility])    [address] [place] [address]   [phone]            Apr 30, 2018  7:00 AM CDT   (Arrive by 6:45 AM)   Return Visit with CHAZ Gupta CNP   [facility] Rheumatology ([facility])    [address] [place] [address]   [phone]              Who to contact     Please call your clinic at  [phone] to:    Ask questions about your health    Make or cancel appointments    Discuss your medicines    Learn about your test results    Speak to your doctor   If you have compliments or concerns about an experience at your clinic, or if you wish to file a complaint, please contact [facility] Patient Relations at [phone] or email us at [email or web address]         Additional Information About Your Visit        [facility] Information     Balls[employer] gives you secure access to your electronic health record. If you see a primary care provider, you can also send messages to your care team and make appointments. If you have questions, please call your primary care clinic.  If you do not have a primary care provider, please call [phone] and they will assist you.      [employer] is an electronic gateway that provides easy, online access to your medical records. With [employer], you can request a clinic appointment, read your test results, renew a prescription or communicate with your care team.     To access your existing account, please contact your [facility] or call [phone] for assistance.        Care EveryWhere ID     This is your Care EveryWhere ID. This could be used by other organizations to access your [facility] medical records  [id number]         Blood Pressure from Last 3 Encounters:   05/05/17 121/87   01/23/15 (!) 145/100   10/08/14 133/81    Weight from Last 3 Encounters:   05/05/17 76.4 kg (168 lb 8 oz)   01/23/15 78 kg (172 lb)   10/08/14 75.3 kg (166 lb)              We Performed the Following     CHARGE - PHOTOTHERAPY NBUVB (UV LIGHT)     PROCEDURE - PHOTOTHERAPY NBUVB        Primary Care Provider Office Phone # Fax #    William Morton [phone]       [address] [address]        Equal Access to Services     TORI EDWARDS : deepak Braxton  abhilashgerman renzo correa. So [facility] [phone].    ATENCIÓN: Si rose mary varghese, tiene a garrett disposición servicios gratuitos de asistencia lingüística. Ze al [phone].    We comply with applicable federal civil rights laws and [place] laws. We do not discriminate on the basis of race, color, national origin, age, disability sex, sexual orientation or gender identity.            Thank you!     Thank you for choosing [facility] DERMATOLOGY  for your care. Our goal is always to provide you with excellent care. Hearing back from our patients is one way we can continue to improve our services. Please take a few minutes to complete the written survey that you may receive in the mail after your visit with us. Thank you!             Your Updated Medication List - Protect others around you: Learn how to safely use, store and throw away your medicines at www.disposemymeds.org.          This list is accurate as of: 9/20/17  7:54 AM.  Always use your most recent med list.                   Brand Name Dispense Instructions for use Diagnosis    BIOTIN PO      Take  by mouth daily.        calcipotriene-betameth diprop 0.005-0.064 % Oint    TACLONEX    60 g    Externally apply  topically 2 times daily as needed.    Psoriasis       citalopram 10 MG tablet    celeXA     Take 10 mg by mouth daily        CONCERTA 18 MG CR tablet   Generic drug:  methylphenidate ER      Take 18 mg by mouth every morning.        DAILY MULTIVITAMIN PO      Take  by mouth daily.        fish oil-omega-3 fatty acids 1000 MG capsule      Take 2 g by mouth daily        fluocinonide 0.05 % solution    LIDEX    60 mL    Apply  topically 2 times daily for itchy scalp or scalp psoriasis    Psoriasis       olopatadine HCl 0.2 % Soln    PATADAY    1 Bottle    Place 1 drop into both eyes daily    Allergic conjunctivitis of both eyes       pimecrolimus 1 % cream    ELIDEL    60 g    Apply topically 2 times  daily as needed For psoriasis of face or genitals    Psoriasis       REFRESH P.M. Oint     1 Tube    Apply 1 inch to eye nightly as needed    Tear film insufficiency, unspecified       tacrolimus 0.1 % ointment    PROTOPIC    60 g    Apply topically 2 times daily Apply to areas around eyes as needed    Psoriasis       Vitamin C 100 MG Tabs      Take  by mouth daily.        VITAMIN D (CHOLECALCIFEROL) PO      Take by mouth daily

## 2017-09-20 NOTE — NURSING NOTE
[facility] Dermatology Phototherapy Record  1. Tony Mcnair is a 62 year old male is here today for phototherapy (UVB) treatment for Psoriasis .        Changes or new medications since last treatment:   NO    New medical conditions or problems since last treatment:   NO    Any problems with last phototherapy treatment?    NO    2. The patient tolerated phototherapy without complication    Patient will return for next UVB treatment, per protocol.     Patient to see provider every 4-12 weeks for follow-up during treatment.      All questions and concerns discussed with patient in clinic today.      Mary Aponte      Tony Mcnair comes into clinic today at the request of Hank Ordering Provider for NBUVB.      This service provided today was under the supervising provider of the [facility], who was available if needed.      Mary Aponte

## 2017-09-27 ENCOUNTER — ALLIED HEALTH/NURSE VISIT (OUTPATIENT)
Dept: DERMATOLOGY | Facility: CLINIC | Age: 62
End: 2017-09-27

## 2017-09-27 DIAGNOSIS — L40.9 PSORIASIS: ICD-10-CM

## 2017-09-27 NOTE — MR AVS SNAPSHOT
After Visit Summary   9/27/2017    Tony Mcnair    MRN: [id number]           Patient Information     Date Of Birth          1955        Visit Information        Provider Department      9/27/2017 7:20 AM UC DERM LIGHT TREATMENT [facility] Dermatology        Today's Diagnoses     Psoriasis           Follow-ups after your visit        Your next 10 appointments already scheduled     Oct 04, 2017  7:20 AM CDT   (Arrive by 7:05 AM)   Light Treatment Visit with UC DERM LIGHT TREATMENT   [facility] D[facility]atology ([facility])    [address] [place] [id number] [phone]            Oct 11, 2017  7:20 AM CDT   (Arrive by 7:05 AM)   Light Treatment Visit with UC DERM LIGHT TREATMENT   [facility] ([facility])    [address] [place] [id number] [phone]            Oct 18, 2017  7:20 AM CDT   (Arrive by 7:05 AM)   Light Treatment Visit with UC DERM LIGHT TREATMENT   [facility] ([facility])    [address] [place] [id number] [phone]            Oct 25, 2017  7:20 AM CDT   (Arrive by 7:05 AM)   Light Treatment Visit with UC DERM LIGHT TREATMENT   [facility] ([facility])    [address] [place] [id number] [phone]            Apr 30, 2018  7:00 AM CDT   (Arrive by 6:45 AM)   Return Visit with CHAZ Gupta CNP   [facility] ([facility])    [address] [place] [id number] [phone]              Who to contact     Please call your clinic at [phone] to:    Ask questions about your health    Make or cancel appointments    Discuss your medicines    Learn about your test results    Speak to your doctor   If you have compliments or concerns about an experience at your clinic, or if you wish  to file a complaint, please contact [facility] Patient Relations at [phone] or email us at [email or web address]         Additional Information About Your Visit        Manyetahart Information     [employer] gives you secure access to your electronic health record. If you see a primary care provider, you can also send messages to your care team and make appointments. If you have questions, please call your primary care clinic.  If you do not have a primary care provider, please call [phone] and they will assist you.      [employer] is an electronic gateway that provides easy, online access to your medical records. With [employer], you can request a clinic appointment, read your test results, renew a prescription or communicate with your care team.     To access your existing account, please contact your [facility] or call [phone] for assistance.        Care EveryWhere ID     This is your Care EveryWhere ID. This could be used by other organizations to access your [place] medical records  [id number]         Blood Pressure from Last 3 Encounters:   05/05/17 121/87   01/23/15 (!) 145/100   10/08/14 133/81    Weight from Last 3 Encounters:   05/05/17 76.4 kg (168 lb 8 oz)   01/23/15 78 kg (172 lb)   10/08/14 75.3 kg (166 lb)              We Performed the Following     CHARGE - PHOTOTHERAPY NBUVB (UV LIGHT)     PROCEDURE - PHOTOTHERAPY NBUVB        Primary Care Provider Office Phone # Fax #    William Morton [phone]       [address] [address]        Equal Access to Services     [facility]: Hadii zina mcdonald Soanna, waaxda luqadaha, qaybta kaalmada saira, renzo haider . So [facility] [phone].    ATENCIÓN: Si habla español, tiene a garrett disposición servicios gratuitos de asistencia lingüística. Llame al [phone].    We comply with applicable  federal civil rights laws and [place] laws. We do not discriminate on the basis of race, color, national origin, age, disability sex, sexual orientation or gender identity.            Thank you!     Thank you for choosing [facility] DERMATOLOGY  for your care. Our goal is always to provide you with excellent care. Hearing back from our patients is one way we can continue to improve our services. Please take a few minutes to complete the written survey that you may receive in the mail after your visit with us. Thank you!             Your Updated Medication List - Protect others around you: Learn how to safely use, store and throw away your medicines at www.disposemymeds.org.          This list is accurate as of: 9/27/17  7:41 AM.  Always use your most recent med list.                   Brand Name Dispense Instructions for use Diagnosis    BIOTIN PO      Take  by mouth daily.        calcipotriene-betameth diprop 0.005-0.064 % Oint    TACLONEX    60 g    Externally apply  topically 2 times daily as needed.    Psoriasis       citalopram 10 MG tablet    celeXA     Take 10 mg by mouth daily        CONCERTA 18 MG CR tablet   Generic drug:  methylphenidate ER      Take 18 mg by mouth every morning.        DAILY MULTIVITAMIN PO      Take  by mouth daily.        fish oil-omega-3 fatty acids 1000 MG capsule      Take 2 g by mouth daily        fluocinonide 0.05 % solution    LIDEX    60 mL    Apply  topically 2 times daily for itchy scalp or scalp psoriasis    Psoriasis       olopatadine HCl 0.2 % Soln    PATADAY    1 Bottle    Place 1 drop into both eyes daily    Allergic conjunctivitis of both eyes       pimecrolimus 1 % cream    ELIDEL    60 g    Apply topically 2 times daily as needed For psoriasis of face or genitals    Psoriasis       REFRESH P.M. Oint     1 Tube    Apply 1 inch to eye nightly as needed    Tear film insufficiency, unspecified       tacrolimus 0.1 % ointment    PROTOPIC    60 g    Apply topically 2 times  daily Apply to areas around eyes as needed    Psoriasis       Vitamin C 100 MG Tabs      Take  by mouth daily.        VITAMIN D (CHOLECALCIFEROL) PO      Take by mouth daily

## 2017-09-27 NOTE — PROGRESS NOTES
[facility] Dermatology Phototherapy Record  1. Tony Mcnair is a 62 year old male is here today for phototherapy (UVB) treatment for Psoriasis.        Changes or new medications since last treatment:   NO    New medical conditions or problems since last treatment:   NO    Any problems with last phototherapy treatment?    NO    2. The patient tolerated phototherapy without complication    Patient will return for next UVB treatment, per protocol.     Patient to see provider every 4-12 weeks for follow-up during treatment.      All questions and concerns discussed with patient in clinic today.      Georgina Wills RN

## 2017-10-04 ENCOUNTER — ALLIED HEALTH/NURSE VISIT (OUTPATIENT)
Dept: DERMATOLOGY | Facility: CLINIC | Age: 62
End: 2017-10-04

## 2017-10-04 DIAGNOSIS — L40.9 PSORIASIS: ICD-10-CM

## 2017-10-04 NOTE — PROGRESS NOTES
[facility] Dermatology Phototherapy Record  1. Tony Mcnair is a 62 year old male is here today for phototherapy (UVB) treatment for Psoriasis.        Changes or new medications since last treatment:   NO    New medical conditions or problems since last treatment:   NO    Any problems with last phototherapy treatment?    NO    2. The patient tolerated phototherapy without complication    Patient will return for next UVB treatment, per protocol.     Patient to see provider every 4-12 weeks for follow-up during treatment.      All questions and concerns discussed with patient in clinic today.      Lorrie Graham

## 2017-10-04 NOTE — MR AVS SNAPSHOT
After Visit Summary   10/4/2017    Tony Mcnair    MRN: [id number]           Patient Information     Date Of Birth          1955        Visit Information        Provider Department      10/4/2017 7:20 AM UC DERM LIGHT TREATMENT [facility] Dermatology        Today's Diagnoses     Psoriasis           Follow-ups after your visit        Your next 10 appointments already scheduled     Oct 11, 2017  7:20 AM CDT   (Arrive by 7:05 AM)   Light Treatment Visit with UC DERM LIGHT TREATMENT   [facility] Dermatology ([facility])    [address] [place] [phone]            Oct 18, 2017  7:20 AM CDT   (Arrive by 7:05 AM)   Light Treatment Visit with UC DERM LIGHT TREATMENT   [facility] D[facility] ([facility])    [address] [place] [phone]            Oct 25, 2017  7:20 AM CDT   (Arrive by 7:05 AM)   Light Treatment Visit with UC DERM LIGHT TREATMENT   [facility] ([facility])    [address] [place] [phone]            Apr 30, 2018  7:00 AM CDT   (Arrive by 6:45 AM)   Return Visit with CHAZ Gupta CNP   [facility] Rheumatology ([facility])    [address] [place] [phone]              Who to contact     Please call your clinic at [phone] to:    Ask questions about your health    Make or cancel appointments    Discuss your medicines    Learn about your test results    Speak to your doctor   If you have compliments or concerns about an experience at your clinic, or if you wish to file a complaint, please contact [facility] Patient Relations at [phone] or email us at [email or web address]         Additional Information About Your Visit        MyChart Information     MyChart gives  you secure access to your electronic health record. If you see a primary care provider, you can also send messages to your care team and make appointments. If you have questions, please call your primary care clinic.  If you do not have a primary care provider, please call [phone] and they will assist you.      [employer] is an electronic gateway that provides easy, online access to your medical records. With [employer], you can request a clinic appointment, read your test results, renew a prescription or communicate with your care team.     To access your existing account, please contact your [facility] or call [phone] for assistance.        Care EveryWhere ID     This is your Care EveryWhere ID. This could be used by other organizations to access your [place] medical records  [id number]         Blood Pressure from Last 3 Encounters:   05/05/17 121/87   01/23/15 (!) 145/100   10/08/14 133/81    Weight from Last 3 Encounters:   05/05/17 76.4 kg (168 lb 8 oz)   01/23/15 78 kg (172 lb)   10/08/14 75.3 kg (166 lb)              We Performed the Following     CHARGE - PHOTOTHERAPY NBUVB (UV LIGHT)     PROCEDURE - PHOTOTHERAPY NBUVB        Primary Care Provider Office Phone # Fax #    William Morton [phone]       [address] [address]        Equal Access to Services     [facility] : Hadii zina shoemakero Soanna, waaxda luqgerman, qaybta kaalmarenzo lainez. So [facility] [phone].    ATENCIÓN: Si habla español, tiene a garrett disposición servicios gratuitos de asistencia lingüística. Ze al [phone].    We comply with applicable federal civil rights laws and [place] laws. We do not discriminate on the basis of race, color, national origin, age, disability, sex, sexual orientation, or gender identity.            Thank you!     Thank you for choosing [facility]   for your care. Our goal is always to provide you with excellent care. Hearing back from our patients is one way we can continue to improve our services. Please take a few minutes to complete the written survey that you may receive in the mail after your visit with us. Thank you!             Your Updated Medication List - Protect others around you: Learn how to safely use, store and throw away your medicines at www.disposemymeds.org.          This list is accurate as of: 10/4/17 11:35 AM.  Always use your most recent med list.                   Brand Name Dispense Instructions for use Diagnosis    BIOTIN PO      Take  by mouth daily.        calcipotriene-betameth diprop 0.005-0.064 % Oint    TACLONEX    60 g    Externally apply  topically 2 times daily as needed.    Psoriasis       citalopram 10 MG tablet    celeXA     Take 10 mg by mouth daily        CONCERTA 18 MG CR tablet   Generic drug:  methylphenidate ER      Take 18 mg by mouth every morning.        DAILY MULTIVITAMIN PO      Take  by mouth daily.        fish oil-omega-3 fatty acids 1000 MG capsule      Take 2 g by mouth daily        fluocinonide 0.05 % solution    LIDEX    60 mL    Apply  topically 2 times daily for itchy scalp or scalp psoriasis    Psoriasis       olopatadine HCl 0.2 % Soln    PATADAY    1 Bottle    Place 1 drop into both eyes daily    Allergic conjunctivitis of both eyes       pimecrolimus 1 % cream    ELIDEL    60 g    Apply topically 2 times daily as needed For psoriasis of face or genitals    Psoriasis       REFRESH P.M. Oint     1 Tube    Apply 1 inch to eye nightly as needed    Tear film insufficiency, unspecified       tacrolimus 0.1 % ointment    PROTOPIC    60 g    Apply topically 2 times daily Apply to areas around eyes as needed    Psoriasis       Vitamin C 100 MG Tabs      Take  by mouth daily.        VITAMIN D (CHOLECALCIFEROL) PO      Take by mouth daily

## 2017-10-04 NOTE — NURSING NOTE
Tony Mcnair comes into clinic today at the request of Dr. Mckinney Ordering Provider for Phototherapy.    This service provided today was under the supervising provider of the day Dr. Waters, who was available if needed.    Reason for visit: Phototherapy    Lorrie Graham

## 2017-10-11 ENCOUNTER — ALLIED HEALTH/NURSE VISIT (OUTPATIENT)
Dept: DERMATOLOGY | Facility: CLINIC | Age: 62
End: 2017-10-11

## 2017-10-11 DIAGNOSIS — L40.9 PSORIASIS: ICD-10-CM

## 2017-10-11 NOTE — PROGRESS NOTES
[facility] Dermatology Phototherapy Record  1. Tony Mcnair is a 62 year old male is here today for phototherapy (UVB) treatment forPsoriasis .        Changes or new medications since last treatment:   NO    New medical conditions or problems since last treatment:   NO    Any problems with last phototherapy treatment?    NO    2. The patient tolerated phototherapy without complication    Patient will return for next UVB treatment, per protocol.     Patient to see provider every 4-12 weeks for follow-up during treatment.      All questions and concerns discussed with patient in clinic today.      Lorrie Graham

## 2017-10-11 NOTE — MR AVS SNAPSHOT
After Visit Summary   10/11/2017    Tony Mcnair    MRN: [id number]           Patient Information     Date Of Birth          1955        Visit Information        Provider Department      10/11/2017 7:20 AM UC DERM LIGHT TREATMENT [facility] Dermatology        Today's Diagnoses     Psoriasis           Follow-ups after your visit        Your next 10 appointments already scheduled     Oct 18, 2017  7:20 AM CDT   (Arrive by 7:05 AM)   Light Treatment Visit with UC DERM LIGHT TREATMENT   [facility] Dermatology ([facility])    [address] [place] [phone]            Oct 25, 2017  7:20 AM CDT   (Arrive by 7:05 AM)   Light Treatment Visit with UC DERM LIGHT TREATMENT   [facility] Dermatology ([facility])    [address] [place] [phone]            Apr 30, 2018  7:00 AM CDT   (Arrive by 6:45 AM)   Return Visit with CHAZ Gupta CNP   [facility] Rheumatology ([facility])    [address] [place] [phone]              Who to contact     Please call your clinic at [phone] to:    Ask questions about your health    Make or cancel appointments    Discuss your medicines    Learn about your test results    Speak to your doctor   If you have compliments or concerns about an experience at your clinic, or if you wish to file a complaint, please contact [facility] Patient Relations at [phone] or email us at [email or web address]         Additional Information About Your Visit        MyChart Information     [email or web address] gives you secure access to your electronic health record. If you see a primary care provider, you can also send messages to your care team and make appointments. If you have questions, please call your primary care clinic.  If you do not have a primary  care provider, please call [phone] and they will assist you.      [employer] is an electronic gateway that provides easy, online access to your medical records. With [employer], you can request a clinic appointment, read your test results, renew a prescription or communicate with your care team.     To access your existing account, please contact your [facility] or call [phone] for assistance.        Care EveryWhere ID     This is your Care EveryWhere ID. This could be used by other organizations to access your [place] medical records  [id number]         Blood Pressure from Last 3 Encounters:   05/05/17 121/87   01/23/15 (!) 145/100   10/08/14 133/81    Weight from Last 3 Encounters:   05/05/17 76.4 kg (168 lb 8 oz)   01/23/15 78 kg (172 lb)   10/08/14 75.3 kg (166 lb)              We Performed the Following     CHARGE - PHOTOTHERAPY NBUVB (UV LIGHT)     PROCEDURE - PHOTOTHERAPY NBUVB        Primary Care Provider Office Phone # Fax #    William Morton [phone]       [place] [address]        Equal Access to Services     [facility]WARDS : Hadii zina rios hadasho Soblaineali, waaxda luqadaha, qaybta kaalmada adegerryda, renzo monroe. So [facility] [phone].    ATENCIÓN: Si habla español, tiene a garrett disposición servicios gratuitos de asistencia lingüística. Ze al [phone].    We comply with applicable federal civil rights laws and [place] laws. We do not discriminate on the basis of race, color, national origin, age, disability, sex, sexual orientation, or gender identity.            Thank you!     Thank you for choosing [facility] DERMATOLOGY  for your care. Our goal is always to provide you with excellent care. Hearing back from our patients is one way we can continue to improve our services. Please take a few minutes to complete the written survey that you may receive in the mail  after your visit with us. Thank you!             Your Updated Medication List - Protect others around you: Learn how to safely use, store and throw away your medicines at www.disposemymeds.org.          This list is accurate as of: 10/11/17 11:39 AM.  Always use your most recent med list.                   Brand Name Dispense Instructions for use Diagnosis    BIOTIN PO      Take  by mouth daily.        calcipotriene-betameth diprop 0.005-0.064 % Oint    TACLONEX    60 g    Externally apply  topically 2 times daily as needed.    Psoriasis       citalopram 10 MG tablet    celeXA     Take 10 mg by mouth daily        CONCERTA 18 MG CR tablet   Generic drug:  methylphenidate ER      Take 18 mg by mouth every morning.        DAILY MULTIVITAMIN PO      Take  by mouth daily.        fish oil-omega-3 fatty acids 1000 MG capsule      Take 2 g by mouth daily        fluocinonide 0.05 % solution    LIDEX    60 mL    Apply  topically 2 times daily for itchy scalp or scalp psoriasis    Psoriasis       olopatadine HCl 0.2 % Soln    PATADAY    1 Bottle    Place 1 drop into both eyes daily    Allergic conjunctivitis of both eyes       pimecrolimus 1 % cream    ELIDEL    60 g    Apply topically 2 times daily as needed For psoriasis of face or genitals    Psoriasis       REFRESH P.M. Oint     1 Tube    Apply 1 inch to eye nightly as needed    Tear film insufficiency, unspecified       tacrolimus 0.1 % ointment    PROTOPIC    60 g    Apply topically 2 times daily Apply to areas around eyes as needed    Psoriasis       Vitamin C 100 MG Tabs      Take  by mouth daily.        VITAMIN D (CHOLECALCIFEROL) PO      Take by mouth daily

## 2017-10-11 NOTE — NURSING NOTE
Tony Mcnair comes into clinic today at the request of Dr. Mckinney Ordering Provider for NBUVB.    This service provided today was under the supervising provider of the day Dr. Waters, who was available if needed.    Reason for visit: NBUVTODD Graham

## 2017-10-18 ENCOUNTER — ALLIED HEALTH/NURSE VISIT (OUTPATIENT)
Dept: DERMATOLOGY | Facility: CLINIC | Age: 62
End: 2017-10-18

## 2017-10-18 DIAGNOSIS — L40.9 PSORIASIS: ICD-10-CM

## 2017-10-18 NOTE — PROGRESS NOTES
[facility] Dermatology Phototherapy Record  1. Tony Mcnair is a 62 year old male is here today for phototherapy (UVB) treatment for Psoriasis.        Changes or new medications since last treatment:   NO    New medical conditions or problems since last treatment:   NO    Any problems with last phototherapy treatment?    NO    2. The patient tolerated phototherapy without complication    Patient will return for next UVB treatment, per protocol.     Patient to see provider every 4-12 weeks for follow-up during treatment.      All questions and concerns discussed with patient in clinic today.      Lorrie Graham

## 2017-10-18 NOTE — MR AVS SNAPSHOT
After Visit Summary   10/18/2017    Tony Mcnair    MRN: [id number]           Patient Information     Date Of Birth          1955        Visit Information        Provider Department      10/18/2017 7:20 AM UC DERM LIGHT TREATMENT [facility]        Today's Diagnoses     Psoriasis           Follow-ups after your visit        Your next 10 appointments already scheduled     Oct 25, 2017  7:20 AM CDT   (Arrive by 7:05 AM)   Light Treatment Visit with UC DERM LIGHT TREATMENT   [facility] ([facility])    [address] [place] [address]   [phone]            Nov 01, 2017  7:15 AM CDT   (Arrive by 7:00 AM)   Light Treatment Visit with UC DERM LIGHT TREATMENT   [facility] ([facility])    [address] [place] [address]   [phone]            Nov 08, 2017  7:15 AM CST   (Arrive by 7:00 AM)   Light Treatment Visit with UC DERM LIGHT TREATMENT   [facility])    [address] [place] [address]   [phone]            Nov 15, 2017  7:20 AM CST   (Arrive by 7:05 AM)   Light Treatment Visit with UC DERM LIGHT TREATMENT   [facility] ([facility])    [address] [place] [address]   [phone]            Nov 22, 2017  7:15 AM CST   (Arrive by 7:00 AM)   Light Treatment Visit with UC DERM LIGHT TREATMENT   [facility] ([facility])    [address] [place] [address]   [phone]            Nov 29, 2017  7:15 AM CST   (Arrive by 7:00 AM)   Light Treatment Visit with UC DERM LIGHT TREATMENT   [facility] ([facility])    [address] [place] [address]   [phone]            Apr 30, 2018  7:00 AM CDT   (Arrive by 6:45  AM)   Return Visit with CHAZ Gupta [facility] ([facility])    [address]  [address]rd [facility] [address] [phone]              Who to contact     Please call your clinic at [phone] to:    Ask questions about your health    Make or cancel appointments    Discuss your medicines    Learn about your test results    Speak to your doctor   If you have compliments or concerns about an experience at your clinic, or if you wish to file a complaint, please contact [facility] Patient Relations at [phone] or email us at [email or web address]         Additional Information About Your Visit        [employer] Information     [email or web address] gives you secure access to your electronic health record. If you see a primary care provider, you can also send messages to your care team and make appointments. If you have questions, please call your primary care clinic.  If you do not have a primary care provider, please call [phone] and they will assist you.      [employer] is an electronic gateway that provides easy, online access to your medical records. With [employer], you can request a clinic appointment, read your test results, renew a prescription or communicate with your care team.     To access your existing account, please contact your [facility] Clinic or call [phone] for assistance.        Care EveryWhere ID     This is your Care EveryWhere ID. This could be used by other organizations to access your [place] medical records  [id number]         Blood Pressure from Last 3 Encounters:   05/05/17 121/87   01/23/15 (!) 145/100   10/08/14 133/81    Weight from Last 3 Encounters:   05/05/17 76.4 kg (168 lb 8 oz)   01/23/15 78 kg (172 lb)   10/08/14 75.3 kg (166 lb)              We Performed the Following     CHARGE - PHOTOTHERAPY NBUVB (UV LIGHT)     PROCEDURE - PHOTOTHERAPY NBUVB         Primary Care Provider Office Phone # Fax #    William Morton [phone]       [facility] [address] [address]        Equal Access to Services     TORI EDWARDS : Stan rios saharao Soanna, waaxda luqadaha, qaybta kaalmada saira, renzo gómez sorayatiffanie vargas meliza monroe. So [facility] [phone].    ATENCIÓN: Si habla español, tiene a garrett disposición servicios gratuitos de asistencia lingüística. Ze al [phone].    We comply with applicable federal civil rights laws and [place] laws. We do not discriminate on the basis of race, color, national origin, age, disability, sex, sexual orientation, or gender identity.            Thank you!     Thank you for choosing [facility] DER[facility]  for your care. Our goal is always to provide you with excellent care. Hearing back from our patients is one way we can continue to improve our services. Please take a few minutes to complete the written survey that you may receive in the mail after your visit with us. Thank you!             Your Updated Medication List - Protect others around you: Learn how to safely use, store and throw away your medicines at www.disposemymeds.org.          This list is accurate as of: 10/18/17  8:05 AM.  Always use your most recent med list.                   Brand Name Dispense Instructions for use Diagnosis    BIOTIN PO      Take  by mouth daily.        calcipotriene-betameth diprop 0.005-0.064 % Oint    TACLONEX    60 g    Externally apply  topically 2 times daily as needed.    Psoriasis       citalopram 10 MG tablet    celeXA     Take 10 mg by mouth daily        CONCERTA 18 MG CR tablet   Generic drug:  methylphenidate ER      Take 18 mg by mouth every morning.        DAILY MULTIVITAMIN PO      Take  by mouth daily.        fish oil-omega-3 fatty acids 1000 MG capsule      Take 2 g by mouth daily        fluocinonide 0.05 % solution    LIDEX    60 mL    Apply  topically 2 times daily  for itchy scalp or scalp psoriasis    Psoriasis       olopatadine HCl 0.2 % Soln    PATADAY    1 Bottle    Place 1 drop into both eyes daily    Allergic conjunctivitis of both eyes       pimecrolimus 1 % cream    ELIDEL    60 g    Apply topically 2 times daily as needed For psoriasis of face or genitals    Psoriasis       REFRESH P.M. Oint     1 Tube    Apply 1 inch to eye nightly as needed    Tear film insufficiency, unspecified       tacrolimus 0.1 % ointment    PROTOPIC    60 g    Apply topically 2 times daily Apply to areas around eyes as needed    Psoriasis       Vitamin C 100 MG Tabs      Take  by mouth daily.        VITAMIN D (CHOLECALCIFEROL) PO      Take by mouth daily

## 2017-10-25 ENCOUNTER — ALLIED HEALTH/NURSE VISIT (OUTPATIENT)
Dept: DERMATOLOGY | Facility: CLINIC | Age: 62
End: 2017-10-25

## 2017-10-25 DIAGNOSIS — L40.9 PSORIASIS: ICD-10-CM

## 2017-10-25 NOTE — NURSING NOTE
Tony Mcnair comes into clinic today at the request of Dr. Mckinney Ordering Provider for NBUVB.    This service provided today was under the supervising provider of the day Dr. Blount, who was available if needed.    Reason for visit: NBUVTODD Graham

## 2017-10-25 NOTE — PROGRESS NOTES
[facility] Phototherapy Record  1. Tony Mcnair is a 62 year old male is here today for phototherapy (UVB) treatment forPsoriasis .        Changes or new medications since last treatment:   NO    New medical conditions or problems since last treatment:   NO    Any problems with last phototherapy treatment?    NO    2. The patient tolerated phototherapy without complication    Patient will return for next UVB treatment, per protocol.     Patient to see provider every 4-12 weeks for follow-up during treatment.      All questions and concerns discussed with patient in clinic today.      Lorrie Graham

## 2017-10-25 NOTE — MR AVS SNAPSHOT
After Visit Summary   10/25/2017    Tony Mcnair    MRN: [id number]           Patient Information     Date Of Birth          1955        Visit Information        Provider Department      10/25/2017 7:20 AM UC DERM LIGHT TREATMENT [facility]        Today's Diagnoses     Psoriasis           Follow-ups after your visit        Your next 10 appointments already scheduled     Nov 01, 2017  7:15 AM CDT   (Arrive by 7:00 AM)   Light Treatment Visit with UC DERM LIGHT TREATMENT   [facility] ([facility])    [address] [place] [address]   [phone]            Nov 08, 2017  7:15 AM CST   (Arrive by 7:00 AM)   Light Treatment Visit with UC DERM LIGHT TREATMENT   [facility] ([facility])    [address] [place] [address]   [phone]            Nov 15, 2017  7:20 AM CST   (Arrive by 7:05 AM)   Light Treatment Visit with UC DERM LIGHT TREATMENT   [facility] ([facility])    [address] [place] [address]   [phone]            Nov 22, 2017  7:15 AM CST   (Arrive by 7:00 AM)   Light Treatment Visit with UC DERM LIGHT TREATMENT   [facility] ([facility])    [address] [place] [address]   [phone]            Nov 29, 2017  7:15 AM CST   (Arrive by 7:00 AM)   Light Treatment Visit with UC DERM LIGHT TREATMENT   [facility] ([facility])    [address] [place] [address]   [phone]            Apr 30, 2018  7:00 AM CDT   (Arrive by 6:45 AM)   Return Visit with CHAZ Gupta CNP   [facility] ([facility])    [address] [place] [address] [phone]              Who to contact     Please call your clinic at  [phone] to:    Ask questions about your health    Make or cancel appointments    Discuss your medicines    Learn about your test results    Speak to your doctor   If you have compliments or concerns about an experience at your clinic, or if you wish to file a complaint, please contact [facility] Patient Relations at [phone] or email us at [email or web address]         Additional Information About Your Visit        [employer] Information     [employer] gives you secure access to your electronic health record. If you see a primary care provider, you can also send messages to your care team and make appointments. If you have questions, please call your primary care clinic.  If you do not have a primary care provider, please call [phone] and they will assist you.      [employer] is an electronic gateway that provides easy, online access to your medical records. With [employer], you can request a clinic appointment, read your test results, renew a prescription or communicate with your care team.     To access your existing account, please contact your [facility] or call [phone] for assistance.        Care EveryWhere ID     This is your Care EveryWhere ID. This could be used by other organizations to access your [place] medical records  [id number]         Blood Pressure from Last 3 Encounters:   05/05/17 121/87   01/23/15 (!) 145/100   10/08/14 133/81    Weight from Last 3 Encounters:   05/05/17 76.4 kg (168 lb 8 oz)   01/23/15 78 kg (172 lb)   10/08/14 75.3 kg (166 lb)              We Performed the Following     CHARGE - PHOTOTHERAPY NBUVB (UV LIGHT)     PROCEDURE - PHOTOTHERAPY NBUVB        Primary Care Provider Office Phone # Fax #    William Morton [phone]       [address] [address]        Equal Access to Services     TORI EDWARDS : deepak Braxton  ryley renzo correa. So [facility] [phone].    ATENCIÓN: Si rose mary varghese, tiene a garrett disposición servicios gratuitos de asistencia lingüística. Ze al [phone].    We comply with applicable federal civil rights laws and [place] laws. We do not discriminate on the basis of race, color, national origin, age, disability, sex, sexual orientation, or gender identity.            Thank you!     Thank you for choosing [facility] DERMAT[facility]  for your care. Our goal is always to provide you with excellent care. Hearing back from our patients is one way we can continue to improve our services. Please take a few minutes to complete the written survey that you may receive in the mail after your visit with us. Thank you!             Your Updated Medication List - Protect others around you: Learn how to safely use, store and throw away your medicines at www.disposemymeds.org.          This list is accurate as of: 10/25/17 10:31 AM.  Always use your most recent med list.                   Brand Name Dispense Instructions for use Diagnosis    BIOTIN PO      Take  by mouth daily.        calcipotriene-betameth diprop 0.005-0.064 % Oint    TACLONEX    60 g    Externally apply  topically 2 times daily as needed.    Psoriasis       citalopram 10 MG tablet    celeXA     Take 10 mg by mouth daily        CONCERTA 18 MG CR tablet   Generic drug:  methylphenidate ER      Take 18 mg by mouth every morning.        DAILY MULTIVITAMIN PO      Take  by mouth daily.        fish oil-omega-3 fatty acids 1000 MG capsule      Take 2 g by mouth daily        fluocinonide 0.05 % solution    LIDEX    60 mL    Apply  topically 2 times daily for itchy scalp or scalp psoriasis    Psoriasis       olopatadine HCl 0.2 % Soln    PATADAY    1 Bottle    Place 1 drop into both eyes daily    Allergic conjunctivitis of both eyes       pimecrolimus 1 % cream    ELIDEL    60 g    Apply topically 2 times  daily as needed For psoriasis of face or genitals    Psoriasis       REFRESH P.M. Oint     1 Tube    Apply 1 inch to eye nightly as needed    Tear film insufficiency, unspecified       tacrolimus 0.1 % ointment    PROTOPIC    60 g    Apply topically 2 times daily Apply to areas around eyes as needed    Psoriasis       Vitamin C 100 MG Tabs      Take  by mouth daily.        VITAMIN D (CHOLECALCIFEROL) PO      Take by mouth daily

## 2017-11-01 ENCOUNTER — ALLIED HEALTH/NURSE VISIT (OUTPATIENT)
Dept: DERMATOLOGY | Facility: CLINIC | Age: 62
End: 2017-11-01

## 2017-11-01 DIAGNOSIS — L40.9 PSORIASIS: ICD-10-CM

## 2017-11-01 NOTE — MR AVS SNAPSHOT
After Visit Summary   11/1/2017    Tnoy Mcnair    MRN: [id number]           Patient Information     Date Of Birth          1955        Visit Information        Provider Department      11/1/2017 7:15 AM UC DERM LIGHT TREATMENT [facility]        Today's Diagnoses     Psoriasis           Follow-ups after your visit        Your next 10 appointments already scheduled     Nov 08, 2017  7:15 AM CST   (Arrive by 7:00 AM)   Light Treatment Visit with UC DERM LIGHT TREATMENT   [facility] ([facility])    [address] [place] [address] [phone]            Nov 15, 2017  7:20 AM CST   (Arrive by 7:05 AM)   Light Treatment Visit with UC DERM LIGHT TREATMENT   [facility] ([facility])    [address] [place] [id number] [phone]            Nov 22, 2017  7:15 AM CST   (Arrive by 7:00 AM)   Light Treatment Visit with UC DERM LIGHT TREATMENT   [facility] ([facility])    [address] [place] [address] [phone]            Nov 29, 2017  7:15 AM CST   (Arrive by 7:00 AM)   Light Treatment Visit with UC DERM LIGHT TREATMENT   [facility] ([facility])    [address] [place] [phone]            Apr 30, 2018  7:00 AM CDT   (Arrive by 6:45 AM)   Return Visit with CHAZ Gupta CNP   [facility] ([facility])    [address] [place] [id number] [phone]              Who to contact     Please call your clinic at [phone] to:    Ask questions about your health    Make or cancel appointments    Discuss your medicines    Learn about your test results    Speak to your doctor   If you have compliments or concerns about an experience at your clinic, or if you wish  to file a complaint, please contact [facility] Patient Relations at [phone] or email us at [email or web address]         Additional Information About Your Visit        [employer] Information     [employer] gives you secure access to your electronic health record. If you see a primary care provider, you can also send messages to your care team and make appointments. If you have questions, please call your primary care clinic.  If you do not have a primary care provider, please call [phone] and they will assist you.      [employer] is an electronic gateway that provides easy, online access to your medical records. With [employer], you can request a clinic appointment, read your test results, renew a prescription or communicate with your care team.     To access your existing account, please contact your [facility] or call [phone] for assistance.        Care EveryWhere ID     This is your Care EveryWhere ID. This could be used by other organizations to access your [place] medical records  [id number]         Blood Pressure from Last 3 Encounters:   05/05/17 121/87   01/23/15 (!) 145/100   10/08/14 133/81    Weight from Last 3 Encounters:   05/05/17 76.4 kg (168 lb 8 oz)   01/23/15 78 kg (172 lb)   10/08/14 75.3 kg (166 lb)              We Performed the Following     CHARGE - PHOTOTHERAPY NBUVB (UV LIGHT)     PROCEDURE - PHOTOTHERAPY NBUVB        Primary Care Provider Office Phone # Fax #    William Morton [phone]       [address] [address]        Equal Access to Services     [facility]: Hadii zina mcdonald Soanna, waaxda luqadaha, qaybta kaalmada saira, renzo haider . So [facility] [phone].    ATENCIÓN: Si habla español, tiene a garrett disposición servicios gratuitos de asistencia lingüística. Llame al [phone].    We comply with applicable  federal civil rights laws and [place] laws. We do not discriminate on the basis of race, color, national origin, age, disability, sex, sexual orientation, or gender identity.            Thank you!     Thank you for choosing [facility] DERMATOLOGY  for your care. Our goal is always to provide you with excellent care. Hearing back from our patients is one way we can continue to improve our services. Please take a few minutes to complete the written survey that you may receive in the mail after your visit with us. Thank you!             Your Updated Medication List - Protect others around you: Learn how to safely use, store and throw away your medicines at www.disposemymeds.org.          This list is accurate as of: 11/1/17  8:28 AM.  Always use your most recent med list.                   Brand Name Dispense Instructions for use Diagnosis    BIOTIN PO      Take  by mouth daily.        calcipotriene-betameth diprop 0.005-0.064 % Oint    TACLONEX    60 g    Externally apply  topically 2 times daily as needed.    Psoriasis       citalopram 10 MG tablet    celeXA     Take 10 mg by mouth daily        CONCERTA 18 MG CR tablet   Generic drug:  methylphenidate ER      Take 18 mg by mouth every morning.        DAILY MULTIVITAMIN PO      Take  by mouth daily.        fish oil-omega-3 fatty acids 1000 MG capsule      Take 2 g by mouth daily        fluocinonide 0.05 % solution    LIDEX    60 mL    Apply  topically 2 times daily for itchy scalp or scalp psoriasis    Psoriasis       olopatadine HCl 0.2 % Soln    PATADAY    1 Bottle    Place 1 drop into both eyes daily    Allergic conjunctivitis of both eyes       pimecrolimus 1 % cream    ELIDEL    60 g    Apply topically 2 times daily as needed For psoriasis of face or genitals    Psoriasis       REFRESH P.M. Oint     1 Tube    Apply 1 inch to eye nightly as needed    Tear film insufficiency, unspecified       tacrolimus 0.1 % ointment    PROTOPIC    60 g    Apply topically 2 times  daily Apply to areas around eyes as needed    Psoriasis       Vitamin C 100 MG Tabs      Take  by mouth daily.        VITAMIN D (CHOLECALCIFEROL) PO      Take by mouth daily

## 2017-11-01 NOTE — NURSING NOTE
[facility]at[facility] Phototherapy Record  1. Tony Mcnair is a 62 year old male is here today for phototherapy (UVB) treatment for Psoriasis.        Changes or new medications since last treatment:   NO    New medical conditions or problems since last treatment:   NO    Any problems with last phototherapy treatment?    NO    2. The patient tolerated phototherapy without complication    Patient will return for next UVB treatment, per protocol.     Patient to see provider every 4-12 weeks for follow-up during treatment.      All questions and concerns discussed with patient in clinic today.      Mary Aponte      Tony Mcnair comes into clinic today at the request of Hank Ordering Provider for NBUVB.    This service provided today was under the supervising provider of the [facility], who was available if needed.      Mary Aponte

## 2017-11-08 ENCOUNTER — ALLIED HEALTH/NURSE VISIT (OUTPATIENT)
Dept: DERMATOLOGY | Facility: CLINIC | Age: 62
End: 2017-11-08

## 2017-11-08 DIAGNOSIS — L40.9 PSORIASIS: ICD-10-CM

## 2017-11-08 NOTE — PROGRESS NOTES
[facility] Dermat[facility] Phototherapy Record  1. Tony Mcnair is a 62 year old male is here today for phototherapy (UVB) treatment for Psoriasis.        Changes or new medications since last treatment:   NO    New medical conditions or problems since last treatment:   NO    Any problems with last phototherapy treatment?    NO    2. The patient tolerated phototherapy without complication    Patient will return for next UVB treatment, per protocol.     Patient to see provider every 4-12 weeks for follow-up during treatment.      All questions and concerns discussed with patient in clinic today.      Lorrie Graham

## 2017-11-08 NOTE — MR AVS SNAPSHOT
After Visit Summary   11/8/2017    Tony Mcnair    MRN: [id number]           Patient Information     Date Of Birth          1955        Visit Information        Provider Department      11/8/2017 7:15 AM UC DERM LIGHT TREATMENT [facility]        Today's Diagnoses     Psoriasis           Follow-ups after your visit        Your next 10 appointments already scheduled     Nov 15, 2017  7:20 AM CST   (Arrive by 7:05 AM)   Light Treatment Visit with UC DERM LIGHT TREATMENT   [facility] ([facility])    [address] [place] [id number] [phone]            Nov 22, 2017  7:15 AM CST   (Arrive by 7:00 AM)   Light Treatment Visit with UC DERM LIGHT TREATMENT   [facility] ([facility])    [address] [place] [phone] [phone]            Nov 29, 2017  7:15 AM CST   (Arrive by 7:00 AM)   Light Treatment Visit with UC DERM LIGHT TREATMENT   [facility] ([facility])    [address] [place] [address] [phone] [phone]            Apr 30, 2018  7:00 AM CDT   (Arrive by 6:45 AM)   Return Visit with CHAZ Gupta CNP   [facility] ([facility])    [address] [place] [address] [id number] [phone]              Who to contact     Please call your clinic at [phone] to:    Ask questions about your health    Make or cancel appointments    Discuss your medicines    Learn about your test results    Speak to your doctor   If you have compliments or concerns about an experience at your clinic, or if you wish to file a complaint, please contact [facility] Patient Relations at [phone] or email us at [email or web address]         Additional Information About Your Visit        MyChart Information     MyChart gives  you secure access to your electronic health record. If you see a primary care provider, you can also send messages to your care team and make appointments. If you have questions, please call your primary care clinic.  If you do not have a primary care provider, please call [phone] and they will assist you.      [employer] is an electronic gateway that provides easy, online access to your medical records. With [employer], you can request a clinic appointment, read your test results, renew a prescription or communicate with your care team.     To access your existing account, please contact your [facility] or call [phone] for assistance.        Care EveryWhere ID     This is your Care EveryWhere ID. This could be used by other organizations to access your Mobile medical records  [id number]         Blood Pressure from Last 3 Encounters:   05/05/17 121/87   01/23/15 (!) 145/100   10/08/14 133/81    Weight from Last 3 Encounters:   05/05/17 76.4 kg (168 lb 8 oz)   01/23/15 78 kg (172 lb)   10/08/14 75.3 kg (166 lb)              We Performed the Following     CHARGE - PHOTOTHERAPY NBUVB (UV LIGHT)     PROCEDURE - PHOTOTHERAPY NBUVB        Primary Care Provider Office Phone # Fax #    William Morton [phone]       [address] [address]        Equal Access to Services     TORI EDWARDS : Hadii zina shoemakero Soanna, waaxda luqgerman, qaybta kaalmarenzo lainez. So [facility] [phone].    ATENCIÓN: Si habla español, tiene a garrett disposición servicios gratuitos de asistencia lingüística. Ze al [phone].    We comply with applicable federal civil rights laws and [place] laws. We do not discriminate on the basis of race, color, national origin, age, disability, sex, sexual orientation, or gender identity.            Thank you!     Thank you for choosing [facility]   for your care. Our goal is always to provide you with excellent care. Hearing back from our patients is one way we can continue to improve our services. Please take a few minutes to complete the written survey that you may receive in the mail after your visit with us. Thank you!             Your Updated Medication List - Protect others around you: Learn how to safely use, store and throw away your medicines at www.disposemymeds.org.          This list is accurate as of: 11/8/17  8:12 AM.  Always use your most recent med list.                   Brand Name Dispense Instructions for use Diagnosis    BIOTIN PO      Take  by mouth daily.        calcipotriene-betameth diprop 0.005-0.064 % Oint    TACLONEX    60 g    Externally apply  topically 2 times daily as needed.    Psoriasis       citalopram 10 MG tablet    celeXA     Take 10 mg by mouth daily        CONCERTA 18 MG CR tablet   Generic drug:  methylphenidate ER      Take 18 mg by mouth every morning.        DAILY MULTIVITAMIN PO      Take  by mouth daily.        fish oil-omega-3 fatty acids 1000 MG capsule      Take 2 g by mouth daily        fluocinonide 0.05 % solution    LIDEX    60 mL    Apply  topically 2 times daily for itchy scalp or scalp psoriasis    Psoriasis       olopatadine HCl 0.2 % Soln    PATADAY    1 Bottle    Place 1 drop into both eyes daily    Allergic conjunctivitis of both eyes       pimecrolimus 1 % cream    ELIDEL    60 g    Apply topically 2 times daily as needed For psoriasis of face or genitals    Psoriasis       REFRESH P.M. Oint     1 Tube    Apply 1 inch to eye nightly as needed    Tear film insufficiency, unspecified       tacrolimus 0.1 % ointment    PROTOPIC    60 g    Apply topically 2 times daily Apply to areas around eyes as needed    Psoriasis       Vitamin C 100 MG Tabs      Take  by mouth daily.        VITAMIN D (CHOLECALCIFEROL) PO      Take by mouth daily

## 2017-11-15 ENCOUNTER — ALLIED HEALTH/NURSE VISIT (OUTPATIENT)
Dept: DERMATOLOGY | Facility: CLINIC | Age: 62
End: 2017-11-15

## 2017-11-15 DIAGNOSIS — L40.9 PSORIASIS: ICD-10-CM

## 2017-11-15 NOTE — NURSING NOTE
[facility] Phototherapy Record  1. Tony Mcnair is a 62 year old male is here today for phototherapy (UVB) treatment for Psoriasis.        Changes or new medications since last treatment:   NO    New medical conditions or problems since last treatment:   NO    Any problems with last phototherapy treatment?    NO    2. The patient tolerated phototherapy without complication    Patient will return for next UVB treatment, per protocol.     Patient to see provider every 4-12 weeks for follow-up during treatment.      All questions and concerns discussed with patient in clinic today.      Mary Aponte    Tony Mcnair comes into clinic today at the request of [facility] Ordering Provider for Psoriasis.      This service provided today was under the supervising provider of the [facility], who was available if needed.      Mary Aponte

## 2017-11-15 NOTE — MR AVS SNAPSHOT
After Visit Summary   11/15/2017    Tony Mcnair    MRN: [id number]           Patient Information     Date Of Birth          1955        Visit Information        Provider Department      11/15/2017 7:20 AM UC DERM LIGHT TREATMENT [facility]        Today's Diagnoses     Psoriasis           Follow-ups after your visit        Your next 10 appointments already scheduled     Nov 22, 2017  7:15 AM CST   (Arrive by 7:00 AM)   Light Treatment Visit with UC DERM LIGHT TREATMENT   [facility] ([facility])    [address] [place] [address]   [phone]            Nov 29, 2017  7:15 AM CST   (Arrive by 7:00 AM)   Light Treatment Visit with UC DERM LIGHT TREATMENT   [facility] ([facility])    [address] [place] [address]   [phone]            Dec 06, 2017  7:25 AM CST   (Arrive by 7:10 AM)   Light Treatment Visit with UC DERM LIGHT TREATMENT   [facility])    [address] [place] [address]   [phone]            Dec 13, 2017  7:25 AM CST   (Arrive by 7:10 AM)   Light Treatment Visit with UC DERM LIGHT TREATMENT   [facility] ([facility])    [address] [place] [address]   [phone]            Dec 20, 2017  7:25 AM CST   (Arrive by 7:10 AM)   Light Treatment Visit with UC DERM LIGHT TREATMENT   [facility])    [address] [place] [address]   [phone]            Dec 27, 2017  7:25 AM CST   (Arrive by 7:10 AM)   Light Treatment Visit with UC DERM LIGHT TREATMENT   [facility] ([facility])    [address] [place] [address]   [phone]            Apr 30, 2018  7:00 AM CDT   (Arrive by 6:45  AM)   Return Visit with [facility]pta [facility] ([facility])    [address] [facility]  [address] [facility] [address] [phone]              Who to contact     Please call your clinic at [phone] to:    Ask questions about your health    Make or cancel appointments    Discuss your medicines    Learn about your test results    Speak to your doctor   If you have compliments or concerns about an experience at your clinic, or if you wish to file a complaint, please contact [facility] Patient Relations at [phone] or email us at [email or web address]         Additional Information About Your Visit        [employer] Information     [employer] gives you secure access to your electronic health record. If you see a primary care provider, you can also send messages to your care team and make appointments. If you have questions, please call your primary care clinic.  If you do not have a primary care provider, please call [phone] and they will assist you.      [employer] is an electronic gateway that provides easy, online access to your medical records. With [employer], you can request a clinic appointment, read your test results, renew a prescription or communicate with your care team.     To access your existing account, please contact your [facility] or call [phone] for assistance.        Care EveryWhere ID     This is your Care EveryWhere ID. This could be used by other organizations to access your [place] medical records  [id number]         Blood Pressure from Last 3 Encounters:   05/05/17 121/87   01/23/15 (!) 145/100   10/08/14 133/81    Weight from Last 3 Encounters:   05/05/17 76.4 kg (168 lb 8 oz)   01/23/15 78 kg (172 lb)   10/08/14 75.3 kg (166 lb)              We Performed the Following     CHARGE - PHOTOTHERAPY NBUVB (UV LIGHT)     PROCEDURE - PHOTOTHERAPY NBUVB         Primary Care Provider Office Phone # Fax #    William Morton [phone]       [facility] [address] [address]        Equal Access to Services     TORI EDWARDS : Hadii aad ku hadrlo Soanna, waaxda luqadaha, qaybta kaalmada saira, renzo gómez sorayatiffanie vargas meliza monroe. So [facility] [phone].    ATENCIÓN: Si habla español, tiene a garrett disposición servicios gratuitos de asistencia lingüística. Ze al [phone].    We comply with applicable federal civil rights laws and [place] laws. We do not discriminate on the basis of race, color, national origin, age, disability, sex, sexual orientation, or gender identity.            Thank you!     Thank you for choosing [facility]  for your care. Our goal is always to provide you with excellent care. Hearing back from our patients is one way we can continue to improve our services. Please take a few minutes to complete the written survey that you may receive in the mail after your visit with us. Thank you!             Your Updated Medication List - Protect others around you: Learn how to safely use, store and throw away your medicines at www.disposemymeds.org.          This list is accurate as of: 11/15/17  8:02 AM.  Always use your most recent med list.                   Brand Name Dispense Instructions for use Diagnosis    BIOTIN PO      Take  by mouth daily.        calcipotriene-betameth diprop 0.005-0.064 % Oint    TACLONEX    60 g    Externally apply  topically 2 times daily as needed.    Psoriasis       citalopram 10 MG tablet    celeXA     Take 10 mg by mouth daily        CONCERTA 18 MG CR tablet   Generic drug:  methylphenidate ER      Take 18 mg by mouth every morning.        DAILY MULTIVITAMIN PO      Take  by mouth daily.        fish oil-omega-3 fatty acids 1000 MG capsule      Take 2 g by mouth daily        fluocinonide 0.05 % solution    LIDEX    60 mL    Apply  topically 2 times daily  for itchy scalp or scalp psoriasis    Psoriasis       olopatadine HCl 0.2 % Soln    PATADAY    1 Bottle    Place 1 drop into both eyes daily    Allergic conjunctivitis of both eyes       pimecrolimus 1 % cream    ELIDEL    60 g    Apply topically 2 times daily as needed For psoriasis of face or genitals    Psoriasis       REFRESH P.M. Oint     1 Tube    Apply 1 inch to eye nightly as needed    Tear film insufficiency, unspecified       tacrolimus 0.1 % ointment    PROTOPIC    60 g    Apply topically 2 times daily Apply to areas around eyes as needed    Psoriasis       Vitamin C 100 MG Tabs      Take  by mouth daily.        VITAMIN D (CHOLECALCIFEROL) PO      Take by mouth daily

## 2017-11-22 ENCOUNTER — ALLIED HEALTH/NURSE VISIT (OUTPATIENT)
Dept: DERMATOLOGY | Facility: CLINIC | Age: 62
End: 2017-11-22

## 2017-11-22 DIAGNOSIS — L40.9 PSORIASIS: ICD-10-CM

## 2017-11-22 NOTE — MR AVS SNAPSHOT
After Visit Summary   11/22/2017    Tony Mcnair    MRN: [id number]           Patient Information     Date Of Birth          1955        Visit Information        Provider Department      11/22/2017 7:15 AM UC DERM LIGHT TREATMENT [facility]        Today's Diagnoses     Psoriasis           Follow-ups after your visit        Your next 10 appointments already scheduled     Nov 29, 2017  7:15 AM CST   (Arrive by 7:00 AM)   Light Treatment Visit with UC DERM LIGHT TREATMENT   [facility] ([facility])    [address] [place] [address]   [phone]            Dec 06, 2017  7:25 AM CST   (Arrive by 7:10 AM)   Light Treatment Visit with UC DERM LIGHT TREATMENT   [facility] ([facility])    [address] [place] [address] [phone]            Dec 13, 2017  7:25 AM CST   (Arrive by 7:10 AM)   Light Treatment Visit with UC DERM LIGHT TREATMENT   [facility] ([facility])    [address] [place] [phone]            Dec 20, 2017  7:25 AM CST   (Arrive by 7:10 AM)   Light Treatment Visit with UC DERM LIGHT TREATMENT   [facility] ([facility])    [address] [place] [phone] [phone]            Dec 27, 2017  7:25 AM CST   (Arrive by 7:10 AM)   Light Treatment Visit with UC DERM LIGHT TREATMENT   [facility] ([facility])    [address] [place] [phone]            Apr 30, 2018  7:00 AM CDT   (Arrive by 6:45 AM)   Return Visit with CHAZ Gupta CNP   [facility] ([facility])    [address] [place] [address] [phone]              Who to contact     Please call your clinic at  [phone] to:    Ask questions about your health    Make or cancel appointments    Discuss your medicines    Learn about your test results    Speak to your doctor   If you have compliments or concerns about an experience at your clinic, or if you wish to file a complaint, please contact [facility] Patient Relations at [phone] or email us at [email or web address]         Additional Information About Your Visit        [employer] Information     [employer] gives you secure access to your electronic health record. If you see a primary care provider, you can also send messages to your care team and make appointments. If you have questions, please call your primary care clinic.  If you do not have a primary care provider, please call [phone] and they will assist you.      [employer] is an electronic gateway that provides easy, online access to your medical records. With [employer], you can request a clinic appointment, read your test results, renew a prescription or communicate with your care team.     To access your existing account, please contact your [facility] or call [phone] for assistance.        Care EveryWhere ID     This is your Care EveryWhere ID. This could be used by other organizations to access your [place] medical records  [id number]         Blood Pressure from Last 3 Encounters:   05/05/17 121/87   01/23/15 (!) 145/100   10/08/14 133/81    Weight from Last 3 Encounters:   05/05/17 76.4 kg (168 lb 8 oz)   01/23/15 78 kg (172 lb)   10/08/14 75.3 kg (166 lb)              We Performed the Following     CHARGE - PHOTOTHERAPY NBUVB (UV LIGHT)     PROCEDURE - PHOTOTHERAPY NBUVB        Primary Care Provider Office Phone # Fax #    William Mroton [phone]       [address] [address]        Equal Access to Services     TORI EDWARDS : deepak Braxton  ryley kathleenfigueroarenzo stacy. So [facility] [phone].    ATENCIÓN: Si rose mary varghese, tiene a garrett disposición servicios gratuitos de asistencia lingüística. Ze al [phone].    We comply with applicable federal civil rights laws and [place] laws. We do not discriminate on the basis of race, color, national origin, age, disability, sex, sexual orientation, or gender identity.            Thank you!     Thank you for choosing [facility]  for your care. Our goal is always to provide you with excellent care. Hearing back from our patients is one way we can continue to improve our services. Please take a few minutes to complete the written survey that you may receive in the mail after your visit with us. Thank you!             Your Updated Medication List - Protect others around you: Learn how to safely use, store and throw away your medicines at www.disposemymeds.org.          This list is accurate as of: 11/22/17  7:46 AM.  Always use your most recent med list.                   Brand Name Dispense Instructions for use Diagnosis    BIOTIN PO      Take  by mouth daily.        calcipotriene-betameth diprop 0.005-0.064 % Oint    TACLONEX    60 g    Externally apply  topically 2 times daily as needed.    Psoriasis       citalopram 10 MG tablet    celeXA     Take 10 mg by mouth daily        CONCERTA 18 MG CR tablet   Generic drug:  methylphenidate ER      Take 18 mg by mouth every morning.        DAILY MULTIVITAMIN PO      Take  by mouth daily.        fish oil-omega-3 fatty acids 1000 MG capsule      Take 2 g by mouth daily        fluocinonide 0.05 % solution    LIDEX    60 mL    Apply  topically 2 times daily for itchy scalp or scalp psoriasis    Psoriasis       olopatadine HCl 0.2 % Soln    PATADAY    1 Bottle    Place 1 drop into both eyes daily    Allergic conjunctivitis of both eyes       pimecrolimus 1 % cream    ELIDEL    60 g    Apply topically 2 times  daily as needed For psoriasis of face or genitals    Psoriasis       REFRESH P.M. Oint     1 Tube    Apply 1 inch to eye nightly as needed    Tear film insufficiency, unspecified       tacrolimus 0.1 % ointment    PROTOPIC    60 g    Apply topically 2 times daily Apply to areas around eyes as needed    Psoriasis       Vitamin C 100 MG Tabs      Take  by mouth daily.        VITAMIN D (CHOLECALCIFEROL) PO      Take by mouth daily

## 2017-11-22 NOTE — NURSING NOTE
[facility] Phototherapy Record  1. Tony Mcnair is a 62 year old male is here today for phototherapy (UVB) treatment for Psoriasis.        Changes or new medications since last treatment:   NO    New medical conditions or problems since last treatment:   NO    Any problems with last phototherapy treatment?    NO    2. The patient tolerated phototherapy without complication    Patient will return for next UVB treatment, per protocol.     Patient to see provider every 4-12 weeks for follow-up during treatment.      All questions and concerns discussed with patient in clinic today.      Mary Aponte      Tony Mcnair comes into clinic today at the request of Hank Ordering Provider for NBUVB    This service provided today was under the supervising provider of the [facility], who was available if needed.    Mary Aponte

## 2017-11-29 ENCOUNTER — ALLIED HEALTH/NURSE VISIT (OUTPATIENT)
Dept: DERMATOLOGY | Facility: CLINIC | Age: 62
End: 2017-11-29

## 2017-11-29 DIAGNOSIS — L40.9 PSORIASIS: ICD-10-CM

## 2017-11-29 NOTE — MR AVS SNAPSHOT
After Visit Summary   11/29/2017    Tony Mcnair    MRN: [id number]           Patient Information     Date Of Birth          1955        Visit Information        Provider Department      11/29/2017 7:15 AM UC DERM LIGHT TREATMENT [facility]        Today's Diagnoses     Psoriasis           Follow-ups after your visit        Your next 10 appointments already scheduled     Dec 06, 2017  7:25 AM CST   (Arrive by 7:10 AM)   Light Treatment Visit with UC DERM LIGHT TREATMENT   [facility] ([facility])    [address] [place] [id number] [phone]            Dec 13, 2017  7:25 AM CST   (Arrive by 7:10 AM)   Light Treatment Visit with UC DERM LIGHT TREATMENT   [facility] ([facility])    [address] [place] [id number] [phone]            Dec 20, 2017  7:25 AM CST   (Arrive by 7:10 AM)   Light Treatment Visit with UC DERM LIGHT TREATMENT   [facility] ([facility])    [address] [place] [id number] [phone]            Dec 27, 2017  7:25 AM CST   (Arrive by 7:10 AM)   Light Treatment Visit with UC DERM LIGHT TREATMENT   [facility] ([facility])    [address] [place] [id number] [phone]            Apr 30, 2018  7:00 AM CDT   (Arrive by 6:45 AM)   Return Visit with CHAZ Gupta CNP   [facility] ([facility])    [address] [place] [id number] [phone]              Who to contact     Please call your clinic at [phone] to:    Ask questions about your health    Make or cancel appointments    Discuss your medicines    Learn about your test results    Speak to your doctor   If you have compliments or concerns about an experience at your clinic, or if you  wish to file a complaint, please contact [facility] Patient Relations at [phone] or email us at [email or web address]         Additional Information About Your Visit        [employer] Information     [employer] gives you secure access to your electronic health record. If you see a primary care provider, you can also send messages to your care team and make appointments. If you have questions, please call your primary care clinic.  If you do not have a primary care provider, please call [phone] and they will assist you.      [employer] is an electronic gateway that provides easy, online access to your medical records. With [employer], you can request a clinic appointment, read your test results, renew a prescription or communicate with your care team.     To access your existing account, please contact your [facility] or call [phone] for assistance.        Care EveryWhere ID     This is your Care EveryWhere ID. This could be used by other organizations to access your [place] medical records  [id number]         Blood Pressure from Last 3 Encounters:   05/05/17 121/87   01/23/15 (!) 145/100   10/08/14 133/81    Weight from Last 3 Encounters:   05/05/17 76.4 kg (168 lb 8 oz)   01/23/15 78 kg (172 lb)   10/08/14 75.3 kg (166 lb)              We Performed the Following     CHARGE - PHOTOTHERAPY NBUVB (UV LIGHT)     PROCEDURE - PHOTOTHERAPY NBUVB        Primary Care Provider Office Phone # Fax #    William Morton [phone]       [address] [address]        Equal Access to Services     DARYN EDWARDS : Hadii aad ku hadashjaden Soanna, waaxda luqadaha, qaybta kaalmada saira, renzo monroe. So [facility] [phone].    ATENCIÓN: Si habla español, tiene a garrett disposición servicios gratuitos de asistencia lingüística. Llame al [phone].    We comply with  applicable federal civil rights laws and [place] laws. We do not discriminate on the basis of race, color, national origin, age, disability, sex, sexual orientation, or gender identity.            Thank you!     Thank you for choosing [facility] DERMATOLOG[facility]  for your care. Our goal is always to provide you with excellent care. Hearing back from our patients is one way we can continue to improve our services. Please take a few minutes to complete the written survey that you may receive in the mail after your visit with us. Thank you!             Your Updated Medication List - Protect others around you: Learn how to safely use, store and throw away your medicines at www.disposemymeds.org.          This list is accurate as of: 11/29/17  8:55 AM.  Always use your most recent med list.                   Brand Name Dispense Instructions for use Diagnosis    BIOTIN PO      Take  by mouth daily.        calcipotriene-betameth diprop 0.005-0.064 % Oint    TACLONEX    60 g    Externally apply  topically 2 times daily as needed.    Psoriasis       citalopram 10 MG tablet    celeXA     Take 10 mg by mouth daily        CONCERTA 18 MG CR tablet   Generic drug:  methylphenidate ER      Take 18 mg by mouth every morning.        DAILY MULTIVITAMIN PO      Take  by mouth daily.        fish oil-omega-3 fatty acids 1000 MG capsule      Take 2 g by mouth daily        fluocinonide 0.05 % solution    LIDEX    60 mL    Apply  topically 2 times daily for itchy scalp or scalp psoriasis    Psoriasis       olopatadine HCl 0.2 % Soln    PATADAY    1 Bottle    Place 1 drop into both eyes daily    Allergic conjunctivitis of both eyes       pimecrolimus 1 % cream    ELIDEL    60 g    Apply topically 2 times daily as needed For psoriasis of face or genitals    Psoriasis       REFRESH P.M. Oint     1 Tube    Apply 1 inch to eye nightly as needed    Tear film insufficiency, unspecified       tacrolimus 0.1 % ointment    PROTOPIC    60 g    Apply  topically 2 times daily Apply to areas around eyes as needed    Psoriasis       Vitamin C 100 MG Tabs      Take  by mouth daily.        VITAMIN D (CHOLECALCIFEROL) PO      Take by mouth daily

## 2017-11-29 NOTE — PROGRESS NOTES
[facility] Phototherapy Record  1. Tony Mcnair is a 62 year old male is here today for phototherapy (UVB) treatment for Psoriasis.        Changes or new medications since last treatment:   NO    New medical conditions or problems since last treatment:   NO    Any problems with last phototherapy treatment?    NO    2. The patient tolerated phototherapy without complication    Patient will return for next UVB treatment, per protocol.     Patient to see provider every 4-12 weeks for follow-up during treatment.      All questions and concerns discussed with patient in clinic today.      Georgina Wills RN

## 2017-11-29 NOTE — NURSING NOTE
Tony Mcnair comes into clinic today at the request of Dr Mckinney Ordering Provider for NBUVB    This service provided today was under the supervising provider of the day Dr Waters, who was available if needed.    Georgina Wills RN

## 2017-12-06 ENCOUNTER — ALLIED HEALTH/NURSE VISIT (OUTPATIENT)
Dept: DERMATOLOGY | Facility: CLINIC | Age: 62
End: 2017-12-06

## 2017-12-06 DIAGNOSIS — L40.9 PSORIASIS: ICD-10-CM

## 2017-12-06 NOTE — MR AVS SNAPSHOT
After Visit Summary   12/6/2017    Tony Mcnair    MRN: [id number]           Patient Information     Date Of Birth          1955        Visit Information        Provider Department      12/6/2017 7:25 AM UC DERM LIGHT TREATMENT [facility] D[facility]at[facility]        Today's Diagnoses     Psoriasis           Follow-ups after your visit        Your next 10 appointments already scheduled     Dec 13, 2017  7:25 AM CST   (Arrive by 7:10 AM)   Light Treatment Visit with UC DERM LIGHT TREATMENT   [facility] ([facility])    [address] [place] [id number] [phone]            Dec 20, 2017  7:25 AM CST   (Arrive by 7:10 AM)   Light Treatment Visit with UC DERM LIGHT TREATMENT   [facility] ([facility])    [address] [place] [phone]            Dec 27, 2017  7:25 AM CST   (Arrive by 7:10 AM)   Light Treatment Visit with UC DERM LIGHT TREATMENT   [facility] ([facility])    [address] [place] [address] [phone]            Apr 30, 2018  7:00 AM CDT   (Arrive by 6:45 AM)   Return Visit with CHAZ Gupta CNP   [facility] ([facility])    [address] [place] [address] [phone]              Who to contact     Please call your clinic at [phone] to:    Ask questions about your health    Make or cancel appointments    Discuss your medicines    Learn about your test results    Speak to your doctor   If you have compliments or concerns about an experience at your clinic, or if you wish to file a complaint, please contact [facility] Patient Relations at [phone] or email us at [email or web address]         Additional Information About Your Visit        MyChart Information     MyChart gives  you secure access to your electronic health record. If you see a primary care provider, you can also send messages to your care team and make appointments. If you have questions, please call your primary care clinic.  If you do not have a primary care provider, please call [phone] and they will assist you.      [employer] is an electronic gateway that provides easy, online access to your medical records. With [employer], you can request a clinic appointment, read your test results, renew a prescription or communicate with your care team.     To access your existing account, please contact your [facility] or call [phone] for assistance.        Care EveryWhere ID     This is your Care EveryWhere ID. This could be used by other organizations to access your [place] medical records  [id number]         Blood Pressure from Last 3 Encounters:   05/05/17 121/87   01/23/15 (!) 145/100   10/08/14 133/81    Weight from Last 3 Encounters:   05/05/17 76.4 kg (168 lb 8 oz)   01/23/15 78 kg (172 lb)   10/08/14 75.3 kg (166 lb)              We Performed the Following     CHARGE - PHOTOTHERAPY NBUVB (UV LIGHT)     PROCEDURE - PHOTOTHERAPY NBUVB        Primary Care Provider Office Phone # Fax #    William Morton [phone]       [address] [address]        Equal Access to Services     TORI EDWARDS : Hadii zina shoemakero Soanna, waaxda luqgerman, qaybta kaalmarenzo lainez. So [facility] [phone].    ATENCIÓN: Si habla español, tiene a garrett disposición servicios gratuitos de asistencia lingüística. Ze al [phone].    We comply with applicable federal civil rights laws and [place] laws. We do not discriminate on the basis of race, color, national origin, age, disability, sex, sexual orientation, or gender identity.            Thank you!     Thank you for choosing [facility]   for your care. Our goal is always to provide you with excellent care. Hearing back from our patients is one way we can continue to improve our services. Please take a few minutes to complete the written survey that you may receive in the mail after your visit with us. Thank you!             Your Updated Medication List - Protect others around you: Learn how to safely use, store and throw away your medicines at www.disposemymeds.org.          This list is accurate as of: 12/6/17  7:55 AM.  Always use your most recent med list.                   Brand Name Dispense Instructions for use Diagnosis    BIOTIN PO      Take  by mouth daily.        calcipotriene-betameth diprop 0.005-0.064 % Oint    TACLONEX    60 g    Externally apply  topically 2 times daily as needed.    Psoriasis       citalopram 10 MG tablet    celeXA     Take 10 mg by mouth daily        CONCERTA 18 MG CR tablet   Generic drug:  methylphenidate ER      Take 18 mg by mouth every morning.        DAILY MULTIVITAMIN PO      Take  by mouth daily.        fish oil-omega-3 fatty acids 1000 MG capsule      Take 2 g by mouth daily        fluocinonide 0.05 % solution    LIDEX    60 mL    Apply  topically 2 times daily for itchy scalp or scalp psoriasis    Psoriasis       olopatadine HCl 0.2 % Soln    PATADAY    1 Bottle    Place 1 drop into both eyes daily    Allergic conjunctivitis of both eyes       pimecrolimus 1 % cream    ELIDEL    60 g    Apply topically 2 times daily as needed For psoriasis of face or genitals    Psoriasis       REFRESH P.M. Oint     1 Tube    Apply 1 inch to eye nightly as needed    Tear film insufficiency, unspecified       tacrolimus 0.1 % ointment    PROTOPIC    60 g    Apply topically 2 times daily Apply to areas around eyes as needed    Psoriasis       Vitamin C 100 MG Tabs      Take  by mouth daily.        VITAMIN D (CHOLECALCIFEROL) PO      Take by mouth daily

## 2017-12-06 NOTE — NURSING NOTE
[facility] Dermatology Phototherapy Record  1. Tony Mcnair is a 62 year old male is here today for phototherapy (UVB) treatment for psoriasis.        Changes or new medications since last treatment:   NO    New medical conditions or problems since last treatment:   NO    Any problems with last phototherapy treatment?    NO    2. The patient tolerated phototherapy without complication    Patient will return for next UVB treatment, per protocol.     Patient to see provider every 4-12 weeks for follow-up during treatment.      All questions and concerns discussed with patient in clinic today.      Mary Aponte      Tony Mcnair comes into clinic today at the request of Hank Ordering Provider for NBUVB      This service provided today was under the supervising provider of the [facility], who was available if needed.    Mary Aponte

## 2017-12-13 ENCOUNTER — ALLIED HEALTH/NURSE VISIT (OUTPATIENT)
Dept: DERMATOLOGY | Facility: CLINIC | Age: 62
End: 2017-12-13
Payer: COMMERCIAL

## 2017-12-13 DIAGNOSIS — L40.9 PSORIASIS: ICD-10-CM

## 2017-12-13 NOTE — NURSING NOTE
[facility] Dermat[facility] Phototherapy Record  1. Tony Mcnair is a 62 year old male is here today for phototherapy (UVB) treatment for Psoriasis.        Changes or new medications since last treatment:   NO    New medical conditions or problems since last treatment:   NO    Any problems with last phototherapy treatment?    NO    2. The patient tolerated phototherapy without complication    Patient will return for next UVB treatment, per protocol.     Patient to see provider every 4-12 weeks for follow-up during treatment.      All questions and concerns discussed with patient in clinic today.      Mary Aponte      Tony Mcnair comes into clinic today at the request of Hank Ordering Provider for NBUVB      This service provided today was under the supervising provider of the [facility], who was available if needed.    Mary Aponte

## 2017-12-13 NOTE — MR AVS SNAPSHOT
After Visit Summary   12/13/2017    Tony Mcnair    MRN: [id number]           Patient Information     Date Of Birth          1955        Visit Information        Provider Department      12/13/2017 7:25 AM UC DERM LIGHT TREATMENT [facility] Dermatology        Today's Diagnoses     Psoriasis           Follow-ups after your visit        Your next 10 appointments already scheduled     Dec 20, 2017  7:25 AM CST   (Arrive by 7:10 AM)   Light Treatment Visit with UC DERM LIGHT TREATMENT   [facility] ([facility])    [address] [place] [id number] [phone]            Dec 27, 2017  7:25 AM CST   (Arrive by 7:10 AM)   Light Treatment Visit with UC DERM LIGHT TREATMENT   [facility] ([facility])    [address] [place] [phone]            Apr 30, 2018  7:00 AM CDT   (Arrive by 6:45 AM)   Return Visit with CHAZ Gupta CNP   [facility] ([facility])    [address] [place] [phone]              Who to contact     Please call your clinic at [phone] to:    Ask questions about your health    Make or cancel appointments    Discuss your medicines    Learn about your test results    Speak to your doctor   If you have compliments or concerns about an experience at your clinic, or if you wish to file a complaint, please contact [facility] Patient Relations at [phone] or email us at [email or web address]         Additional Information About Your Visit        MyChart Information     [employer] gives you secure access to your electronic health record. If you see a primary care provider, you can also send messages to your care team and make appointments. If you have questions, please call your primary care clinic.  If you do not have a primary  care provider, please call [phone] and they will assist you.      [employer] is an electronic gateway that provides easy, online access to your medical records. With [employer], you can request a clinic appointment, read your test results, renew a prescription or communicate with your care team.     To access your existing account, please contact your [facility] or call [phone] for assistance.        Care EveryWhere ID     This is your Care EveryWhere ID. This could be used by other organizations to access your [place] medical records  [id number]         Blood Pressure from Last 3 Encounters:   05/05/17 121/87   01/23/15 (!) 145/100   10/08/14 133/81    Weight from Last 3 Encounters:   05/05/17 76.4 kg (168 lb 8 oz)   01/23/15 78 kg (172 lb)   10/08/14 75.3 kg (166 lb)              We Performed the Following     CHARGE - PHOTOTHERAPY NBUVB (UV LIGHT)     PROCEDURE - PHOTOTHERAPY NBUVB        Primary Care Provider Office Phone # Fax #    William Morton [phone]       [place] [address]        Equal Access to Services     TORI EDWARDS : Hadii zina rios hadasho Soblaineali, waaxda luqadaha, qaybta kaalmada adegerryda, renzo monroe. So [facility] [phone].    ATENCIÓN: Si habla español, tiene a garrett disposición servicios gratuitos de asistencia lingüística. Ze al [phone].    We comply with applicable federal civil rights laws and [place] laws. We do not discriminate on the basis of race, color, national origin, age, disability, sex, sexual orientation, or gender identity.            Thank you!     Thank you for choosing [facility] DERMATOLOGY  for your care. Our goal is always to provide you with excellent care. Hearing back from our patients is one way we can continue to improve our services. Please take a few minutes to complete the written survey that you may receive in the mail  after your visit with us. Thank you!             Your Updated Medication List - Protect others around you: Learn how to safely use, store and throw away your medicines at www.disposemymeds.org.          This list is accurate as of: 12/13/17  7:44 AM.  Always use your most recent med list.                   Brand Name Dispense Instructions for use Diagnosis    BIOTIN PO      Take  by mouth daily.        calcipotriene-betameth diprop 0.005-0.064 % Oint    TACLONEX    60 g    Externally apply  topically 2 times daily as needed.    Psoriasis       citalopram 10 MG tablet    celeXA     Take 10 mg by mouth daily        CONCERTA 18 MG CR tablet   Generic drug:  methylphenidate ER      Take 18 mg by mouth every morning.        DAILY MULTIVITAMIN PO      Take  by mouth daily.        fish oil-omega-3 fatty acids 1000 MG capsule      Take 2 g by mouth daily        fluocinonide 0.05 % solution    LIDEX    60 mL    Apply  topically 2 times daily for itchy scalp or scalp psoriasis    Psoriasis       olopatadine HCl 0.2 % Soln    PATADAY    1 Bottle    Place 1 drop into both eyes daily    Allergic conjunctivitis of both eyes       pimecrolimus 1 % cream    ELIDEL    60 g    Apply topically 2 times daily as needed For psoriasis of face or genitals    Psoriasis       REFRESH P.M. Oint     1 Tube    Apply 1 inch to eye nightly as needed    Tear film insufficiency, unspecified       tacrolimus 0.1 % ointment    PROTOPIC    60 g    Apply topically 2 times daily Apply to areas around eyes as needed    Psoriasis       Vitamin C 100 MG Tabs      Take  by mouth daily.        VITAMIN D (CHOLECALCIFEROL) PO      Take by mouth daily

## 2017-12-14 DIAGNOSIS — H10.13 ALLERGIC CONJUNCTIVITIS OF BOTH EYES: ICD-10-CM

## 2017-12-15 NOTE — TELEPHONE ENCOUNTER
pataday  Last Written Prescription Date:  8/28/17  Last Fill Quantity: 1,   # refills: 1  Last Office Visit: 8/28/17  Future Office visit:   No future appt  Last Note:Office Visit     8/28/2017  Eye Clinic    Reji Allison MD   Ophthalmology    Dry eyes, bilateral - Both Eyes +4 more   Dx    Follow Up For ; Referred by Self, Referred, MD   Reason for visit    Progress Notes      Mr. Mcnair is a 60 year old CM with h/o Dry eye syndrome.      Interval:  Patient was lost to f/up for over a year.  The patient reports that he continues to have dry eyes and symptoms of irritation, worse since last visit. He reports mild worsening of vision in OD. Left eye is more dry and harder to stay lubricated. Refuses eye pain and photophobia. Reports itching OU, denies runny nose.     Hx of psoriasis.     Reports worsening of floaters, no flashes.     Ocular meds:  Artificial tears ointment nightly both eyes   Preservative free artificial tears: 1-2x/day   Pataday: ran out  Fish oil  Warm compress: not doing     A/P:  1. 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                         Continue artificial tears. 3-4 times daily.                        Continue artificial tear ointment nightly both eyes.                         BLL collagen plug placed 18 months (previously tried silicone and rubbed them out almost immediately)                        Surface looks healthy. Can approach additional methods of treatment if symptoms/signs worsen - plugs, Restasis, BCL, Doxy, etc.       2. PVD OU                        Discussed Retinal detachment  Return precautions                        Retina attached OU, no holes/tears                        Monitor      3. Allergic conjunctivitis.                        Symptomatic                        Refilled Pataday        4. Iris Nevus OD                        stable in size compared to prior photos (dilated today)                        Monitor        F/u: 12 months          MIRIAM Rodriguez  Cornea fellow            Routing refill request to provider for review/approval because:  Drug not on the [facility], P or  Health refill protocol or controlled substance

## 2017-12-18 RX ORDER — OLOPATADINE HYDROCHLORIDE 2 MG/ML
SOLUTION/ DROPS OPHTHALMIC
Qty: 2.5 ML | Refills: 2 | Status: SHIPPED | OUTPATIENT
Start: 2017-12-18 | End: 2018-03-21

## 2017-12-20 ENCOUNTER — ALLIED HEALTH/NURSE VISIT (OUTPATIENT)
Dept: DERMATOLOGY | Facility: CLINIC | Age: 62
End: 2017-12-20
Payer: COMMERCIAL

## 2017-12-20 DIAGNOSIS — L40.9 PSORIASIS: ICD-10-CM

## 2017-12-20 NOTE — MR AVS SNAPSHOT
After Visit Summary   12/20/2017    Tony Mcnair    MRN: [id number]           Patient Information     Date Of Birth          1955        Visit Information        Provider Department      12/20/2017 7:25 AM UC DERM LIGHT TREATMENT [facility] Dermatology        Today's Diagnoses     Psoriasis           Follow-ups after your visit        Your next 10 appointments already scheduled     Dec 27, 2017  7:25 AM CST   (Arrive by 7:10 AM)   Light Treatment Visit with UC DERM LIGHT TREATMENT   [facility] Dermatology ([facility])    [address] [place] [id number] [phone]            Apr 30, 2018  7:00 AM CDT   (Arrive by 6:45 AM)   Return Visit with CHAZ Gupta CNP   [facility] Rheumatology ([facility])    [address] [place] [id number] [phone]              Who to contact     Please call your clinic at [phone] to:    Ask questions about your health    Make or cancel appointments    Discuss your medicines    Learn about your test results    Speak to your doctor   If you have compliments or concerns about an experience at your clinic, or if you wish to file a complaint, please contact [facility] Patient Relations at [phone] or email us at [email or web address]         Additional Information About Your Visit        MyChart Information     [employer] gives you secure access to your electronic health record. If you see a primary care provider, you can also send messages to your care team and make appointments. If you have questions, please call your primary care clinic.  If you do not have a primary care provider, please call [phone] and they will assist you.      [employer] is an electronic gateway that provides easy, online access to your medical records. With [employer], you can request a clinic appointment, read your test results, renew a  prescription or communicate with your care team.     To access your existing account, please contact your [facility] or call [phone] for assistance.        Care EveryWhere ID     This is your Care EveryWhere ID. This could be used by other organizations to access your [place] medical records  [id number]         Blood Pressure from Last 3 Encounters:   05/05/17 121/87   01/23/15 (!) 145/100   10/08/14 133/81    Weight from Last 3 Encounters:   05/05/17 76.4 kg (168 lb 8 oz)   01/23/15 78 kg (172 lb)   10/08/14 75.3 kg (166 lb)              We Performed the Following     CHARGE - PHOTOTHERAPY NBUVB (UV LIGHT)     PROCEDURE - PHOTOTHERAPY NBUVB     PROCEDURE - PHOTOTHERAPY NBUVB        Primary Care Provider Office Phone # Fax #    William Morton [phone]       [place] [address]        Equal Access to Services     TORI EDWARDS : Hadii zina ku hadasho Soomaali, waaxda luqadaha, qaybta kaalmada adeegyada, renzo haider . So [facility] [phone].    ATENCIÓN: Si habla español, tiene a garrett disposición servicios gratuitos de asistencia lingüística. Llame al [phone].    We comply with applicable federal civil rights laws and [place] laws. We do not discriminate on the basis of race, color, national origin, age, disability, sex, sexual orientation, or gender identity.            Thank you!     Thank you for choosing [facility] DERMATOLOGY  for your care. Our goal is always to provide you with excellent care. Hearing back from our patients is one way we can continue to improve our services. Please take a few minutes to complete the written survey that you may receive in the mail after your visit with us. Thank you!             Your Updated Medication List - Protect others around you: Learn how to safely use, store and throw away your medicines at www.disposemymeds.org.          This list is  accurate as of: 12/20/17  8:10 AM.  Always use your most recent med list.                   Brand Name Dispense Instructions for use Diagnosis    BIOTIN PO      Take  by mouth daily.        calcipotriene-betameth diprop 0.005-0.064 % Oint    TACLONEX    60 g    Externally apply  topically 2 times daily as needed.    Psoriasis       citalopram 10 MG tablet    celeXA     Take 10 mg by mouth daily        CONCERTA 18 MG CR tablet   Generic drug:  methylphenidate ER      Take 18 mg by mouth every morning.        DAILY MULTIVITAMIN PO      Take  by mouth daily.        fish oil-omega-3 fatty acids 1000 MG capsule      Take 2 g by mouth daily        fluocinonide 0.05 % solution    LIDEX    60 mL    Apply  topically 2 times daily for itchy scalp or scalp psoriasis    Psoriasis       olopatadine HCl 0.2 % Soln    PATADAY    2.5 mL    PLACE 1 DROP INTO BOTH EYES DAILY    Allergic conjunctivitis of both eyes       pimecrolimus 1 % cream    ELIDEL    60 g    Apply topically 2 times daily as needed For psoriasis of face or genitals    Psoriasis       REFRESH P.M. Oint     1 Tube    Apply 1 inch to eye nightly as needed    Tear film insufficiency, unspecified       tacrolimus 0.1 % ointment    PROTOPIC    60 g    Apply topically 2 times daily Apply to areas around eyes as needed    Psoriasis       Vitamin C 100 MG Tabs      Take  by mouth daily.        VITAMIN D (CHOLECALCIFEROL) PO      Take by mouth daily

## 2017-12-20 NOTE — NURSING NOTE
Tony Mcnair comes into clinic today at the request of Dr Corbett Ordering Provider for NBUVB.    This service provided today was under the supervising provider of the day Dr Waters, who was available if needed.    Georgina Wills RN

## 2017-12-20 NOTE — PROGRESS NOTES
[facility] Dermatology Phototherapy Record  1. Tony Mcnair is a 62 year old male is here today for phototherapy (UVB) treatment for Psoriasis.        Changes or new medications since last treatment:   NO    New medical conditions or problems since last treatment:   NO    Any problems with last phototherapy treatment?    NO    2. The patient tolerated phototherapy without complication    Patient will return for next UVB treatment, per protocol.     Patient to see provider every 4-12 weeks for follow-up during treatment.      All questions and concerns discussed with patient in clinic today.      Georgina Wills RN

## 2017-12-27 ENCOUNTER — ALLIED HEALTH/NURSE VISIT (OUTPATIENT)
Dept: DERMATOLOGY | Facility: CLINIC | Age: 62
End: 2017-12-27
Payer: COMMERCIAL

## 2017-12-27 DIAGNOSIS — L40.9 PSORIASIS: ICD-10-CM

## 2017-12-27 NOTE — PROGRESS NOTES
[facility] Dermatology Phototherapy Record  1. Tony Mcnair is a 62 year old male is here today for phototherapy (UVB) treatment for Psoriasis.        Changes or new medications since last treatment:   NO    New medical conditions or problems since last treatment:   NO    Any problems with last phototherapy treatment?    NO    2. The patient tolerated phototherapy without complication    Patient will return on  for next UVB treatment, per protocol.     Patient to see provider every 4-12 weeks for follow-up during treatment.      All questions and concerns discussed with patient in clinic today.      Cherie Zafar      Tony Mcnair comes into clinic today at the request of Dr. Mckinney Ordering Provider for NBUVB        This service provided today was under the supervising provider of the day Dr. Waters, who was available if needed.    Cherie Zafar

## 2017-12-27 NOTE — MR AVS SNAPSHOT
After Visit Summary   12/27/2017    Tony Mcnair    MRN: [id number]           Patient Information     Date Of Birth          1955        Visit Information        Provider Department      12/27/2017 7:25 AM UC DERM LIGHT TREATMENT [facility] Dermatology        Today's Diagnoses     Psoriasis           Follow-ups after your visit        Your next 10 appointments already scheduled     Jan 03, 2018  7:35 AM CST   (Arrive by 7:20 AM)   Light Treatment Visit with UC DERM LIGHT TREATMENT   [facility] Dermatology ([facility])    [address] [place] [phone]            Robbi 10, 2018  7:35 AM CST   (Arrive by 7:20 AM)   Light Treatment Visit with UC DERM LIGHT TREATMENT   [facility] Dermatology ([facility])    [address] [place] [id number] [phone]            Jan 17, 2018  7:35 AM CST   (Arrive by 7:20 AM)   Light Treatment Visit with UC DERM LIGHT TREATMENT   [facility] Dermatology ([facility])    [address] [place] [id number] [phone]            Jan 24, 2018  7:35 AM CST   (Arrive by 7:20 AM)   Light Treatment Visit with UC DERM LIGHT TREATMENT   [facility] Dermatology ([facility])    [address] [place] [id number] [phone]            Jan 31, 2018  7:35 AM CST   (Arrive by 7:20 AM)   Light Treatment Visit with UC DERM LIGHT TREATMENT   [facility] Dermatology ([facility])    [address] [place] [id number] [phone]            Apr 30, 2018  7:00 AM CDT   (Arrive by 6:45 AM)   Return Visit with CHAZ Gupta CNP   [facility] Rheumatology ([facility])    [address] [place] [id number] [phone]              Who to contact     Please call your clinic at  [phone] to:    Ask questions about your health    Make or cancel appointments    Discuss your medicines    Learn about your test results    Speak to your doctor   If you have compliments or concerns about an experience at your clinic, or if you wish to file a complaint, please contact [facility] Patient Relations at [phone] or email us at [email or web address]         Additional Information About Your Visit        [employer] Information     [employer] gives you secure access to your electronic health record. If you see a primary care provider, you can also send messages to your care team and make appointments. If you have questions, please call your primary care clinic.  If you do not have a primary care provider, please call [phone] and they will assist you.      [email or web address] is an electronic gateway that provides easy, online access to your medical records. With [email or web address], you can request a clinic appointment, read your test results, renew a prescription or communicate with your care team.     To access your existing account, please contact your [facility] or call [phone] for assistance.        Care EveryWhere ID     This is your Care EveryWhere ID. This could be used by other organizations to access your [place] medical records  [id number]         Blood Pressure from Last 3 Encounters:   05/05/17 121/87   01/23/15 (!) 145/100   10/08/14 133/81    Weight from Last 3 Encounters:   05/05/17 76.4 kg (168 lb 8 oz)   01/23/15 78 kg (172 lb)   10/08/14 75.3 kg (166 lb)              We Performed the Following     CHARGE - PHOTOTHERAPY NBUVB (UV LIGHT)     PROCEDURE - PHOTOTHERAPY NBUVB        Primary Care Provider Office Phone # Fax #    William Morton [phone]       [address] [address]        Equal Access to Services     TORI EDWARDS : deepak Braxton  ryley renzo correa. So [facility] [phone].    ATENCIÓN: Si rose mary varghese, tiene a garrett disposición servicios gratuitos de asistencia lingüística. Ze al [phone].    We comply with applicable federal civil rights laws and [place] laws. We do not discriminate on the basis of race, color, national origin, age, disability, sex, sexual orientation, or gender identity.            Thank you!     Thank you for choosing [facility] DERMATOLOGY  for your care. Our goal is always to provide you with excellent care. Hearing back from our patients is one way we can continue to improve our services. Please take a few minutes to complete the written survey that you may receive in the mail after your visit with us. Thank you!             Your Updated Medication List - Protect others around you: Learn how to safely use, store and throw away your medicines at www.disposemymeds.org.          This list is accurate as of: 12/27/17  8:28 AM.  Always use your most recent med list.                   Brand Name Dispense Instructions for use Diagnosis    BIOTIN PO      Take  by mouth daily.        calcipotriene-betameth diprop 0.005-0.064 % Oint    TACLONEX    60 g    Externally apply  topically 2 times daily as needed.    Psoriasis       citalopram 10 MG tablet    celeXA     Take 10 mg by mouth daily        CONCERTA 18 MG CR tablet   Generic drug:  methylphenidate ER      Take 18 mg by mouth every morning.        DAILY MULTIVITAMIN PO      Take  by mouth daily.        fish oil-omega-3 fatty acids 1000 MG capsule      Take 2 g by mouth daily        fluocinonide 0.05 % solution    LIDEX    60 mL    Apply  topically 2 times daily for itchy scalp or scalp psoriasis    Psoriasis       olopatadine HCl 0.2 % Soln    PATADAY    2.5 mL    PLACE 1 DROP INTO BOTH EYES DAILY    Allergic conjunctivitis of both eyes       pimecrolimus 1 % cream    ELIDEL    60 g    Apply topically 2 times  daily as needed For psoriasis of face or genitals    Psoriasis       REFRESH P.M. Oint     1 Tube    Apply 1 inch to eye nightly as needed    Tear film insufficiency, unspecified       tacrolimus 0.1 % ointment    PROTOPIC    60 g    Apply topically 2 times daily Apply to areas around eyes as needed    Psoriasis       Vitamin C 100 MG Tabs      Take  by mouth daily.        VITAMIN D (CHOLECALCIFEROL) PO      Take by mouth daily

## 2018-01-03 ENCOUNTER — ALLIED HEALTH/NURSE VISIT (OUTPATIENT)
Dept: DERMATOLOGY | Facility: CLINIC | Age: 63
End: 2018-01-03
Payer: COMMERCIAL

## 2018-01-03 DIAGNOSIS — L40.9 PSORIASIS: ICD-10-CM

## 2018-01-03 NOTE — PROGRESS NOTES
[facility] Dermatology Phototherapy Record  1. Tony Mcnair is a 62 year old male is here today for phototherapy (UVB) treatment for Psoriasis.        Changes or new medications since last treatment:   NO    New medical conditions or problems since last treatment:   NO    Any problems with last phototherapy treatment?    NO    2. The patient tolerated phototherapy without complication    Patient will return for next UVB treatment, per protocol.     Patient to see provider every 4-12 weeks for follow-up during treatment.      All questions and concerns discussed with patient in clinic today.      Georgina Wills RN

## 2018-01-03 NOTE — MR AVS SNAPSHOT
After Visit Summary   1/3/2018    Tony Mcnair    MRN: [id number]           Patient Information     Date Of Birth          1955        Visit Information        Provider Department      1/3/2018 7:35 AM UC DERM LIGHT TREATMENT [facility] Dermatology        Today's Diagnoses     Psoriasis           Follow-ups after your visit        Your next 10 appointments already scheduled     Robbi 10, 2018  7:35 AM CST   (Arrive by 7:20 AM)   Light Treatment Visit with UC DERM LIGHT TREATMENT   [facility] Dermatology ([facility])    [address] [place] [phone]            Jan 17, 2018  7:35 AM CST   (Arrive by 7:20 AM)   Light Treatment Visit with UC DERM LIGHT TREATMENT   [facility] Dermatology ([facility])    [address] [place] [phone]            Jan 24, 2018  7:35 AM CST   (Arrive by 7:20 AM)   Light Treatment Visit with UC DERM LIGHT TREATMENT   [facility] Dermatology ([facility])    [address] [place] [phone]            Jan 31, 2018  7:35 AM CST   (Arrive by 7:20 AM)   Light Treatment Visit with UC DERM LIGHT TREATMENT   [facility] Dermatology ([facility])    [address] [place] [phone]            Apr 30, 2018  7:00 AM CDT   (Arrive by 6:45 AM)   Return Visit with CHAZ Gupta CNP   [facility] Rheumatology ([facility])    [address] [place] [phone] [phone]              Who to contact     Please call your clinic at [phone] to:    Ask questions about your health    Make or cancel appointments    Discuss your medicines    Learn about your test results    Speak to your doctor   If you have compliments or concerns about an experience at your clinic, or if you wish  to file a complaint, please contact [facility] Patient Relations at [phone] or email us at [email or web address]         Additional Information About Your Visit        [employer] Information     [employer] gives you secure access to your electronic health record. If you see a primary care provider, you can also send messages to your care team and make appointments. If you have questions, please call your primary care clinic.  If you do not have a primary care provider, please call [phone] and they will assist you.      [employer] is an electronic gateway that provides easy, online access to your medical records. With [employer], you can request a clinic appointment, read your test results, renew a prescription or communicate with your care team.     To access your existing account, please contact your [facility] or call [phone] for assistance.        Care EveryWhere ID     This is your Care EveryWhere ID. This could be used by other organizations to access your [place] medical records  [id number]         Blood Pressure from Last 3 Encounters:   05/05/17 121/87   01/23/15 (!) 145/100   10/08/14 133/81    Weight from Last 3 Encounters:   05/05/17 76.4 kg (168 lb 8 oz)   01/23/15 78 kg (172 lb)   10/08/14 75.3 kg (166 lb)              We Performed the Following     CHARGE - PHOTOTHERAPY NBUVB (UV LIGHT)     PROCEDURE - PHOTOTHERAPY NBUVB        Primary Care Provider Office Phone # Fax #    William Morton [phone]       [address] [address]        Equal Access to Services     [facility]: Hadii zina mcdonald Soanna, waaxda luqadaha, qaybta kaalmada saira, renzo haider . So [facility] [phone].    ATENCIÓN: Si habla español, tiene a garrett disposición servicios gratuitos de asistencia lingüística. Llame al [phone].    We comply with applicable  federal civil rights laws and [place] laws. We do not discriminate on the basis of race, color, national origin, age, disability, sex, sexual orientation, or gender identity.            Thank you!     Thank you for choosing [facility] DERMATOLOGY  for your care. Our goal is always to provide you with excellent care. Hearing back from our patients is one way we can continue to improve our services. Please take a few minutes to complete the written survey that you may receive in the mail after your visit with us. Thank you!             Your Updated Medication List - Protect others around you: Learn how to safely use, store and throw away your medicines at www.disposemymeds.org.          This list is accurate as of: 1/3/18  8:48 AM.  Always use your most recent med list.                   Brand Name Dispense Instructions for use Diagnosis    BIOTIN PO      Take  by mouth daily.        calcipotriene-betameth diprop 0.005-0.064 % Oint    TACLONEX    60 g    Externally apply  topically 2 times daily as needed.    Psoriasis       citalopram 10 MG tablet    celeXA     Take 10 mg by mouth daily        CONCERTA 18 MG CR tablet   Generic drug:  methylphenidate ER      Take 18 mg by mouth every morning.        DAILY MULTIVITAMIN PO      Take  by mouth daily.        fish oil-omega-3 fatty acids 1000 MG capsule      Take 2 g by mouth daily        fluocinonide 0.05 % solution    LIDEX    60 mL    Apply  topically 2 times daily for itchy scalp or scalp psoriasis    Psoriasis       olopatadine HCl 0.2 % Soln    PATADAY    2.5 mL    PLACE 1 DROP INTO BOTH EYES DAILY    Allergic conjunctivitis of both eyes       pimecrolimus 1 % cream    ELIDEL    60 g    Apply topically 2 times daily as needed For psoriasis of face or genitals    Psoriasis       REFRESH P.M. Oint     1 Tube    Apply 1 inch to eye nightly as needed    Tear film insufficiency, unspecified       tacrolimus 0.1 % ointment    PROTOPIC    60 g    Apply topically 2 times  daily Apply to areas around eyes as needed    Psoriasis       Vitamin C 100 MG Tabs      Take  by mouth daily.        VITAMIN D (CHOLECALCIFEROL) PO      Take by mouth daily

## 2018-01-03 NOTE — NURSING NOTE
Tony Mcnair comes into clinic today at the request of Dr Mckinney Ordering Provider for NBUVB.    This service provided today was under the supervising provider of the day Dr Waters, who was available if needed.     Georgina Wills RN

## 2018-01-10 ENCOUNTER — ALLIED HEALTH/NURSE VISIT (OUTPATIENT)
Dept: DERMATOLOGY | Facility: CLINIC | Age: 63
End: 2018-01-10
Payer: COMMERCIAL

## 2018-01-10 DIAGNOSIS — L40.9 PSORIASIS: ICD-10-CM

## 2018-01-10 NOTE — MR AVS SNAPSHOT
After Visit Summary   1/10/2018    Tony Mcnair    MRN: [id number]           Patient Information     Date Of Birth          1955        Visit Information        Provider Department      1/10/2018 7:35 AM UC DERM LIGHT TREATMENT [facility]        Today's Diagnoses     Psoriasis           Follow-ups after your visit        Your next 10 appointments already scheduled     Jan 17, 2018  7:35 AM CST   (Arrive by 7:20 AM)   Light Treatment Visit with UC DERM LIGHT TREATMENT   [facility] ([facility])    [address] [place] [id number] [phone]            Jan 24, 2018  7:35 AM CST   (Arrive by 7:20 AM)   Light Treatment Visit with UC DERM LIGHT TREATMENT   [facility] ([facility])    [address] [place] [address] [id number] [phone]            Jan 31, 2018  7:35 AM CST   (Arrive by 7:20 AM)   Light Treatment Visit with UC DERM LIGHT TREATMENT   [facility] ([facility])    [address] [place] [address] [id number] [phone]            Apr 10, 2018  8:15 AM CDT   (Arrive by 8:00 AM)   Return Visit with Petar Corbett MD   [facility] ([facility])    [address] [place] [id number] [phone]            Apr 30, 2018  7:00 AM CDT   (Arrive by 6:45 AM)   Return Visit with CHAZ Gupta CNP   [facility] ([facility])    [address] [place] [address]  [address]  [facility] [phone] [phone]              Who to contact     Please call your clinic at [phone] to:    Ask questions about your health    Make or cancel appointments    Discuss your medicines    Learn about your test results    Speak to your doctor   If you have compliments or concerns about an experience at your clinic, or if you wish to file a  complaint, please contact [facility] Patient Relations at [phone] or email us at [email or web address]         Additional Information About Your Visit        CHiWAO Mobile Apphart Information     CloudMinet gives you secure access to your electronic health record. If you see a primary care provider, you can also send messages to your care team and make appointments. If you have questions, please call your primary care clinic.  If you do not have a primary care provider, please call [phone] and they will assist you.      Replicon is an electronic gateway that provides easy, online access to your medical records. With Replicon, you can request a clinic appointment, read your test results, renew a prescription or communicate with your care team.     To access your existing account, please contact your [facility] or call [phone] for assistance.        Care EveryWhere ID     This is your Care EveryWhere ID. This could be used by other organizations to access your [place] medical records  [id number]         Blood Pressure from Last 3 Encounters:   05/05/17 121/87   01/23/15 (!) 145/100   10/08/14 133/81    Weight from Last 3 Encounters:   05/05/17 76.4 kg (168 lb 8 oz)   01/23/15 78 kg (172 lb)   10/08/14 75.3 kg (166 lb)              We Performed the Following     CHARGE - PHOTOTHERAPY NBUVB (UV LIGHT)     PROCEDURE - PHOTOTHERAPY NBUVB        Primary Care Provider Office Phone # Fax #    William Morton [phone]       [address] [address]        Equal Access to Services     DARYN [facility] Center[facility]IN : Hadii zina mcdonald Soanna, waaxda luqadaha, qaybta kaalmada renzo chávez . So [facility] [phone].    ATENCIÓN: Si habla español, tiene a garrett disposición servicios gratuitos de asistencia lingüística. Llame al [phone].    We comply with applicable federal  civil rights laws and [place] laws. We do not discriminate on the basis of race, color, national origin, age, disability, sex, sexual orientation, or gender identity.            Thank you!     Thank you for choosing [facility]  for your care. Our goal is always to provide you with excellent care. Hearing back from our patients is one way we can continue to improve our services. Please take a few minutes to complete the written survey that you may receive in the mail after your visit with us. Thank you!             Your Updated Medication List - Protect others around you: Learn how to safely use, store and throw away your medicines at www.disposemymeds.org.          This list is accurate as of: 1/10/18  7:58 AM.  Always use your most recent med list.                   Brand Name Dispense Instructions for use Diagnosis    BIOTIN PO      Take  by mouth daily.        calcipotriene-betameth diprop 0.005-0.064 % Oint    TACLONEX    60 g    Externally apply  topically 2 times daily as needed.    Psoriasis       citalopram 10 MG tablet    celeXA     Take 10 mg by mouth daily        CONCERTA 18 MG CR tablet   Generic drug:  methylphenidate ER      Take 18 mg by mouth every morning.        DAILY MULTIVITAMIN PO      Take  by mouth daily.        fish oil-omega-3 fatty acids 1000 MG capsule      Take 2 g by mouth daily        fluocinonide 0.05 % solution    LIDEX    60 mL    Apply  topically 2 times daily for itchy scalp or scalp psoriasis    Psoriasis       olopatadine HCl 0.2 % Soln    PATADAY    2.5 mL    PLACE 1 DROP INTO BOTH EYES DAILY    Allergic conjunctivitis of both eyes       pimecrolimus 1 % cream    ELIDEL    60 g    Apply topically 2 times daily as needed For psoriasis of face or genitals    Psoriasis       REFRESH P.M. Oint     1 Tube    Apply 1 inch to eye nightly as needed    Tear film insufficiency, unspecified       tacrolimus 0.1 % ointment    PROTOPIC    60 g    Apply topically 2 times daily  Apply to areas around eyes as needed    Psoriasis       Vitamin C 100 MG Tabs      Take  by mouth daily.        VITAMIN D (CHOLECALCIFEROL) PO      Take by mouth daily

## 2018-01-10 NOTE — PROGRESS NOTES
[facility] Phototherapy Record  1. Tony Mcnair is a 62 year old male is here today for phototherapy (UVB) treatment for Psoriasis.        Changes or new medications since last treatment:   NO    New medical conditions or problems since last treatment:   NO    Any problems with last phototherapy treatment?    NO    2. The patient tolerated phototherapy without complication    Patient will return for next UVB treatment, per protocol.     Patient to see provider every 4-12 weeks for follow-up during treatment.      All questions and concerns discussed with patient in clinic today.      Georgina Wills RN

## 2018-01-17 ENCOUNTER — ALLIED HEALTH/NURSE VISIT (OUTPATIENT)
Dept: DERMATOLOGY | Facility: CLINIC | Age: 63
End: 2018-01-17
Payer: COMMERCIAL

## 2018-01-17 DIAGNOSIS — L40.9 PSORIASIS: ICD-10-CM

## 2018-01-17 NOTE — PROGRESS NOTES
[facility] Phototherapy Record  1. Tony Mcnair is a 62 year old male is here today for phototherapy (UVB) treatment for Psoriasis.        Changes or new medications since last treatment:   NO    New medical conditions or problems since last treatment:   NO    Any problems with last phototherapy treatment?    NO    2. The patient tolerated phototherapy without complication    Patient will return for next UVB treatment, per protocol.     Patient to see provider every 4-12 weeks for follow-up during treatment.      All questions and concerns discussed with patient in clinic today.      Georgina Wills RN

## 2018-01-17 NOTE — MR AVS SNAPSHOT
After Visit Summary   1/17/2018    Tony Mcnair    MRN: [id number]           Patient Information     Date Of Birth          1955        Visit Information        Provider Department      1/17/2018 7:35 AM UC DERM LIGHT TREATMENT [facility]        Today's Diagnoses     Psoriasis           Follow-ups after your visit        Your next 10 appointments already scheduled     Jan 24, 2018  7:35 AM CST   (Arrive by 7:20 AM)   Light Treatment Visit with UC DERM LIGHT TREATMENT   [facility] ([facility])    [address] [phone] [phone]            Jan 31, 2018  7:35 AM CST   (Arrive by 7:20 AM)   Light Treatment Visit with UC DERM LIGHT TREATMENT   [facility] ([facility])    [address] [phone] [phone]            Apr 10, 2018  8:15 AM CDT   (Arrive by 8:00 AM)   Return Visit with Petar Corbett MD   [facility] ([facility])    [address] [place] [id number] [phone]            Apr 30, 2018  7:00 AM CDT   (Arrive by 6:45 AM)   Return Visit with CHAZ Gupta [facility] ([facility])    [address] [place] [address]  [address]  [facility] [phone] [phone]              Who to contact     Please call your clinic at [phone] to:    Ask questions about your health    Make or cancel appointments    Discuss your medicines    Learn about your test results    Speak to your doctor   If you have compliments or concerns about an experience at your clinic, or if you wish to file a complaint, please contact [facility] Patient Relations at [phone] or email us at [email or web address]         Additional Information About Your Visit        MyChart Information     Geovannahart gives you secure  access to your electronic health record. If you see a primary care provider, you can also send messages to your care team and make appointments. If you have questions, please call your primary care clinic.  If you do not have a primary care provider, please call [phone] and they will assist you.      Sunrise Atelier is an electronic gateway that provides easy, online access to your medical records. With Sunrise Atelier, you can request a clinic appointment, read your test results, renew a prescription or communicate with your care team.     To access your existing account, please contact your [facility] or call [phone] for assistance.        Care EveryWhere ID     This is your Care EveryWhere ID. This could be used by other organizations to access your [place] medical records  [id number]         Blood Pressure from Last 3 Encounters:   05/05/17 121/87   01/23/15 (!) 145/100   10/08/14 133/81    Weight from Last 3 Encounters:   05/05/17 76.4 kg (168 lb 8 oz)   01/23/15 78 kg (172 lb)   10/08/14 75.3 kg (166 lb)              We Performed the Following     CHARGE - PHOTOTHERAPY NBUVB (UV LIGHT)     PROCEDURE - PHOTOTHERAPY NBUVB        Primary Care Provider Office Phone # Fax #    William Morton [phone]       [address] [address]        Equal Access to Services     TORI EDWARDS : Hadii zina shoemakero Soanna, waaxda luqadaha, qaybta kaalmada saira, renzo [place]. So [facility] [phone].    ATENCIÓN: Si habla español, tiene a garrett disposición servicios gratuitos de asistencia lingüística. Ze al [phone].    We comply with applicable federal civil rights laws and [place] laws. We do not discriminate on the basis of race, color, national origin, age, disability, sex, sexual orientation, or gender identity.            Thank you!     Thank you for choosing [facility]  for your  care. Our goal is always to provide you with excellent care. Hearing back from our patients is one way we can continue to improve our services. Please take a few minutes to complete the written survey that you may receive in the mail after your visit with us. Thank you!             Your Updated Medication List - Protect others around you: Learn how to safely use, store and throw away your medicines at www.disposemymeds.org.          This list is accurate as of: 1/17/18  8:33 AM.  Always use your most recent med list.                   Brand Name Dispense Instructions for use Diagnosis    BIOTIN PO      Take  by mouth daily.        calcipotriene-betameth diprop 0.005-0.064 % Oint    TACLONEX    60 g    Externally apply  topically 2 times daily as needed.    Psoriasis       citalopram 10 MG tablet    celeXA     Take 10 mg by mouth daily        CONCERTA 18 MG CR tablet   Generic drug:  methylphenidate ER      Take 18 mg by mouth every morning.        DAILY MULTIVITAMIN PO      Take  by mouth daily.        fish oil-omega-3 fatty acids 1000 MG capsule      Take 2 g by mouth daily        fluocinonide 0.05 % solution    LIDEX    60 mL    Apply  topically 2 times daily for itchy scalp or scalp psoriasis    Psoriasis       olopatadine HCl 0.2 % Soln    PATADAY    2.5 mL    PLACE 1 DROP INTO BOTH EYES DAILY    Allergic conjunctivitis of both eyes       pimecrolimus 1 % cream    ELIDEL    60 g    Apply topically 2 times daily as needed For psoriasis of face or genitals    Psoriasis       REFRESH P.M. Oint     1 Tube    Apply 1 inch to eye nightly as needed    Tear film insufficiency, unspecified       tacrolimus 0.1 % ointment    PROTOPIC    60 g    Apply topically 2 times daily Apply to areas around eyes as needed    Psoriasis       Vitamin C 100 MG Tabs      Take  by mouth daily.        VITAMIN D (CHOLECALCIFEROL) PO      Take by mouth daily

## 2018-01-24 ENCOUNTER — ALLIED HEALTH/NURSE VISIT (OUTPATIENT)
Dept: DERMATOLOGY | Facility: CLINIC | Age: 63
End: 2018-01-24
Payer: COMMERCIAL

## 2018-01-24 DIAGNOSIS — L40.9 PSORIASIS: ICD-10-CM

## 2018-01-24 NOTE — MR AVS SNAPSHOT
After Visit Summary   1/24/2018    Tony Mcnair    MRN: [id number]           Patient Information     Date Of Birth          1955        Visit Information        Provider Department      1/24/2018 7:35 AM UC DERM LIGHT TREATMENT [facility]        Today's Diagnoses     Psoriasis           Follow-ups after your visit        Your next 10 appointments already scheduled     Jan 31, 2018  7:35 AM CST   (Arrive by 7:20 AM)   Light Treatment Visit with UC DERM LIGHT TREATMENT   [facility] ([facility])    [address] [place] [address]   [phone]            Feb 07, 2018  7:20 AM CST   (Arrive by 7:05 AM)   Light Treatment Visit with UC DERM LIGHT TREATMENT   [facility] ([facility])    [address] [place] [address]   [phone]            Feb 14, 2018  7:20 AM CST   (Arrive by 7:05 AM)   Light Treatment Visit with UC DERM LIGHT TREATMENT   [facility] ([facility])    [address] [place] [address]   [phone]            Feb 21, 2018  7:20 AM CST   (Arrive by 7:05 AM)   Light Treatment Visit with UC DERM LIGHT TREATMENT   [facility] ([facility])    [address] [place] [address]   [phone]            Feb 28, 2018  7:25 AM CST   (Arrive by 7:10 AM)   Light Treatment Visit with UC DERM LIGHT TREATMENT   [facility] ([facility])    [address] [place] [address]   [phone]            Apr 10, 2018  8:15 AM CDT   (Arrive by 8:00 AM)   Return Visit with Petar Corbett MD   [facility] ([facility])    [address] [place] [address]   [phone]            Apr 30, 2018  7:00 AM CDT   (Arrive by 6:45 AM)   Return  Visit with CHAZ Gupta [facility] ([facility])    [address]  [address]  [facility] [address] [phone]              Who to contact     Please call your clinic at [phone] to:    Ask questions about your health    Make or cancel appointments    Discuss your medicines    Learn about your test results    Speak to your doctor   If you have compliments or concerns about an experience at your clinic, or if you wish to file a complaint, please contact [facility] Patient Relations at [phone] or email us at [email or web address]         Additional Information About Your Visit        Le CicogneharNudge Information     WorkingPoint gives you secure access to your electronic health record. If you see a primary care provider, you can also send messages to your care team and make appointments. If you have questions, please call your primary care clinic.  If you do not have a primary care provider, please call [phone] and they will assist you.      WorkingPoint is an electronic gateway that provides easy, online access to your medical records. With WorkingPoint, you can request a clinic appointment, read your test results, renew a prescription or communicate with your care team.     To access your existing account, please contact your [facility] or call [phone] for assistance.        Care EveryWhere ID     This is your Care EveryWhere ID. This could be used by other organizations to access your [place] medical records  [id number]         Blood Pressure from Last 3 Encounters:   05/05/17 121/87   01/23/15 (!) 145/100   10/08/14 133/81    Weight from Last 3 Encounters:   05/05/17 76.4 kg (168 lb 8 oz)   01/23/15 78 kg (172 lb)   10/08/14 75.3 kg (166 lb)              We Performed the Following     CHARGE - PHOTOTHERAPY NBUVB (UV LIGHT)     PROCEDURE - PHOTOTHERAPY NBUVB        Primary Care  Provider Office Phone # Fax #    iWlliam Morton [phone]       [address] [address]        Equal Access to Services     TORI EDWARDS : Hadii aad ku hadrljaden Elia, waphoenixda luqgerman, qaybta kaalmada saira, renzo gómez sorayatiffanie marygrayfco [place]. So [facility] [phone].    ATENCIÓN: Si habla español, tiene a garrett disposición servicios gratuitos de asistencia lingüística. Mickiame al [phone].    We comply with applicable federal civil rights laws and [place] laws. We do not discriminate on the basis of race, color, national origin, age, disability, sex, sexual orientation, or gender identity.            Thank you!     Thank you for choosing [facility]  for your care. Our goal is always to provide you with excellent care. Hearing back from our patients is one way we can continue to improve our services. Please take a few minutes to complete the written survey that you may receive in the mail after your visit with us. Thank you!             Your Updated Medication List - Protect others around you: Learn how to safely use, store and throw away your medicines at www.disposemymeds.org.          This list is accurate as of 1/24/18  8:14 AM.  Always use your most recent med list.                   Brand Name Dispense Instructions for use Diagnosis    BIOTIN PO      Take  by mouth daily.        calcipotriene-betameth diprop 0.005-0.064 % Oint    TACLONEX    60 g    Externally apply  topically 2 times daily as needed.    Psoriasis       citalopram 10 MG tablet    celeXA     Take 10 mg by mouth daily        CONCERTA 18 MG CR tablet   Generic drug:  methylphenidate ER      Take 18 mg by mouth every morning.        DAILY MULTIVITAMIN PO      Take  by mouth daily.        fish oil-omega-3 fatty acids 1000 MG capsule      Take 2 g by mouth daily        fluocinonide 0.05 % solution    LIDEX    60 mL    Apply  topically 2 times daily for itchy scalp  or scalp psoriasis    Psoriasis       olopatadine HCl 0.2 % Soln    PATADAY    2.5 mL    PLACE 1 DROP INTO BOTH EYES DAILY    Allergic conjunctivitis of both eyes       pimecrolimus 1 % cream    ELIDEL    60 g    Apply topically 2 times daily as needed For psoriasis of face or genitals    Psoriasis       REFRESH P.M. Oint     1 Tube    Apply 1 inch to eye nightly as needed    Tear film insufficiency, unspecified       tacrolimus 0.1 % ointment    PROTOPIC    60 g    Apply topically 2 times daily Apply to areas around eyes as needed    Psoriasis       Vitamin C 100 MG Tabs      Take  by mouth daily.        VITAMIN D (CHOLECALCIFEROL) PO      Take by mouth daily

## 2018-01-24 NOTE — PROGRESS NOTES
[facility] Phototherapy Record  1. Tony Mcnair is a 62 year old male is here today for phototherapy (UVB) treatment for Psoriasis.        Changes or new medications since last treatment:   NO    New medical conditions or problems since last treatment:   NO    Any problems with last phototherapy treatment?    NO    2. The patient tolerated phototherapy without complication    Patient will return for next UVB treatment, per protocol.     Patient to see provider every 4-12 weeks for follow-up during treatment.      All questions and concerns discussed with patient in clinic today.      Georgina Wills RN

## 2018-01-31 ENCOUNTER — ALLIED HEALTH/NURSE VISIT (OUTPATIENT)
Dept: DERMATOLOGY | Facility: CLINIC | Age: 63
End: 2018-01-31
Payer: COMMERCIAL

## 2018-01-31 DIAGNOSIS — L40.9 PSORIASIS: ICD-10-CM

## 2018-01-31 NOTE — NURSING NOTE
Tony Mcnair comes into clinic today at the request of Dr Mckinney Ordering Provider for NBUVB.    This service provided today was under the supervising provider of the day Dr Watres, who was available if needed.    Georgina Wills RN

## 2018-01-31 NOTE — PROGRESS NOTES
[facility] Phototherapy Record  1. Tony Mcnair is a 62 year old male is here today for phototherapy (UVB) treatment for Psoriasis.        Changes or new medications since last treatment:   NO    New medical conditions or problems since last treatment:   NO    Any problems with last phototherapy treatment?    NO    2. The patient tolerated phototherapy without complication    Patient will return for next UVB treatment, per protocol.     Patient to see provider every 4-12 weeks for follow-up during treatment.      All questions and concerns discussed with patient in clinic today.      Georgina Wills RN

## 2018-01-31 NOTE — MR AVS SNAPSHOT
After Visit Summary   1/31/2018    Tony Mcnair    MRN: [id number]           Patient Information     Date Of Birth          1955        Visit Information        Provider Department      1/31/2018 7:35 AM UC DERM LIGHT TREATMENT [facility] Dermatology        Today's Diagnoses     Psoriasis           Follow-ups after your visit        Your next 10 appointments already scheduled     Feb 07, 2018  7:20 AM CST   (Arrive by 7:05 AM)   Light Treatment Visit with UC DERM LIGHT TREATMENT   [facility] Dermatology ([facility])    [address] [place] [id number] [phone]            Feb 14, 2018  7:20 AM CST   (Arrive by 7:05 AM)   Light Treatment Visit with UC DERM LIGHT TREATMENT   [facility] Dermatology ([facility])    [address] [place] [phone] [phone]            Feb 21, 2018  7:20 AM CST   (Arrive by 7:05 AM)   Light Treatment Visit with UC DERM LIGHT TREATMENT   [facility] Dermatology ([facility])    [address] [place] [id number] [phone]            Feb 28, 2018  7:25 AM CST   (Arrive by 7:10 AM)   Light Treatment Visit with UC DERM LIGHT TREATMENT   [facility] Dermatology ([facility])    [address] [place] [id number] [phone]            Apr 10, 2018  8:15 AM CDT   (Arrive by 8:00 AM)   Return Visit with Petar Corbett MD   [facility] Dermatology ([facility])    [address] [place] [phone] [phone]            Apr 30, 2018  7:00 AM CDT   (Arrive by 6:45 AM)   Return Visit with CHAZ Gupta CNP   [facility] Rheumatology ([facility])    [address] [place] [id number] [phone]              Who to contact     Please call your clinic at [phone]  to:    Ask questions about your health    Make or cancel appointments    Discuss your medicines    Learn about your test results    Speak to your doctor   If you have compliments or concerns about an experience at your clinic, or if you wish to file a complaint, please contact [facility] Patient Relations at [phone] or email us at [email or web address]         Additional Information About Your Visit        MyChart Information     [employer] gives you secure access to your electronic health record. If you see a primary care provider, you can also send messages to your care team and make appointments. If you have questions, please call your primary care clinic.  If you do not have a primary care provider, please call [phone] and they will assist you.      [employer] is an electronic gateway that provides easy, online access to your medical records. With [employer], you can request a clinic appointment, read your test results, renew a prescription or communicate with your care team.     To access your existing account, please contact your [facility] or call [phone] for assistance.        Care EveryWhere ID     This is your Care EveryWhere ID. This could be used by other organizations to access your [place] medical records  [id number]         Blood Pressure from Last 3 Encounters:   05/05/17 121/87   01/23/15 (!) 145/100   10/08/14 133/81    Weight from Last 3 Encounters:   05/05/17 76.4 kg (168 lb 8 oz)   01/23/15 78 kg (172 lb)   10/08/14 75.3 kg (166 lb)              We Performed the Following     CHARGE - PHOTOTHERAPY NBUVB (UV LIGHT)     PROCEDURE - PHOTOTHERAPY NBUVB        Primary Care Provider Office Phone # Fax #    William Morton [phone]       [address] [address]        Equal Access to Services     TORI EDWARDS : deepak Braxton,  renzo correa ah. [facility] [phone].    ATENCIÓN: Si rose mary varghese, tiene a garrett disposición servicios gratuitos de asistencia lingüística. Ze al [phone].    We comply with applicable federal civil rights laws and [place] laws. We do not discriminate on the basis of race, color, national origin, age, disability, sex, sexual orientation, or gender identity.            Thank you!     Thank you for choosing [facility] DERMATOLOGY  for your care. Our goal is always to provide you with excellent care. Hearing back from our patients is one way we can continue to improve our services. Please take a few minutes to complete the written survey that you may receive in the mail after your visit with us. Thank you!             Your Updated Medication List - Protect others around you: Learn how to safely use, store and throw away your medicines at www.disposemymeds.org.          This list is accurate as of 1/31/18  8:08 AM.  Always use your most recent med list.                   Brand Name Dispense Instructions for use Diagnosis    BIOTIN PO      Take  by mouth daily.        calcipotriene-betameth diprop 0.005-0.064 % Oint    TACLONEX    60 g    Externally apply  topically 2 times daily as needed.    Psoriasis       citalopram 10 MG tablet    celeXA     Take 10 mg by mouth daily        CONCERTA 18 MG CR tablet   Generic drug:  methylphenidate ER      Take 18 mg by mouth every morning.        DAILY MULTIVITAMIN PO      Take  by mouth daily.        fish oil-omega-3 fatty acids 1000 MG capsule      Take 2 g by mouth daily        fluocinonide 0.05 % solution    LIDEX    60 mL    Apply  topically 2 times daily for itchy scalp or scalp psoriasis    Psoriasis       olopatadine HCl 0.2 % Soln    PATADAY    2.5 mL    PLACE 1 DROP INTO BOTH EYES DAILY    Allergic conjunctivitis of both eyes       pimecrolimus 1 % cream    ELIDEL    60 g    Apply topically 2 times daily as needed  For psoriasis of face or genitals    Psoriasis       REFRESH P.M. Oint     1 Tube    Apply 1 inch to eye nightly as needed    Tear film insufficiency, unspecified       tacrolimus 0.1 % ointment    PROTOPIC    60 g    Apply topically 2 times daily Apply to areas around eyes as needed    Psoriasis       Vitamin C 100 MG Tabs      Take  by mouth daily.        VITAMIN D (CHOLECALCIFEROL) PO      Take by mouth daily

## 2018-02-09 ENCOUNTER — ALLIED HEALTH/NURSE VISIT (OUTPATIENT)
Dept: DERMATOLOGY | Facility: CLINIC | Age: 63
End: 2018-02-09
Payer: COMMERCIAL

## 2018-02-09 DIAGNOSIS — L40.9 PSORIASIS: Primary | ICD-10-CM

## 2018-02-09 NOTE — PROGRESS NOTES
[facility] Dermatology Phototherapy Record  1. Tony Mcnair is a 62 year old male is here today for phototherapy (UVB) treatment for psoriasis.        Changes or new medications since last treatment:   NO    New medical conditions or problems since last treatment:   NO    Any problems with last phototherapy treatment?    NO    2. The patient tolerated phototherapy without complication    Patient will return for next UVB treatment, per protocol.     Patient to see provider every 4-12 weeks for follow-up during treatment.      All questions and concerns discussed with patient in clinic today.      Raquel Christine LPN

## 2018-02-09 NOTE — NURSING NOTE
Tony Mcnair comes into clinic today at the request of Dr. Mckinney Ordering Provider for NBUVB      This service provided today was under the supervising provider of the day Dr. Sharp, who was available if needed.    Raquel Christine LPN

## 2018-02-09 NOTE — MR AVS SNAPSHOT
After Visit Summary   2/9/2018    Tony Mcnair    MRN: [id number]           Patient Information     Date Of Birth          1955        Visit Information        Provider Department      2/9/2018 8:05 AM UC DERM LIGHT TREATMENT [facility] Dermatology        Today's Diagnoses     Psoriasis    -  1       Follow-ups after your visit        Your next 10 appointments already scheduled     Feb 14, 2018  7:20 AM CST   (Arrive by 7:05 AM)   Light Treatment Visit with UC DERM LIGHT TREATMENT   [facility] Dermatology ([facility])    [address] [place] [phone] [phone]            Feb 21, 2018  7:20 AM CST   (Arrive by 7:05 AM)   Light Treatment Visit with UC DERM LIGHT TREATMENT   [facility] Dermatology ([facility])    [address] [place] [phone]            Feb 28, 2018  7:25 AM CST   (Arrive by 7:10 AM)   Light Treatment Visit with UC DERM LIGHT TREATMENT   [facility] Dermatology ([facility])    [address] [place] [phone] [phone]            Apr 10, 2018  8:15 AM CDT   (Arrive by 8:00 AM)   Return Visit with Petar Corbett MD   [facility] Dermatology ([facility])    [address] [place] [address] [phone] [phone]            Apr 30, 2018  7:00 AM CDT   (Arrive by 6:45 AM)   Return Visit with CHAZ Gupta [facility] Rheumatology ([facility])    [address] [place] [address] [phone] [phone]              Who to contact     Please call your clinic at [phone] to:    Ask questions about your health    Make or cancel appointments    Discuss your medicines    Learn about your test results    Speak to your doctor            Additional Information About Your Visit        MyChart Information     MyChart gives you  secure access to your electronic health record. If you see a primary care provider, you can also send messages to your care team and make appointments. If you have questions, please call your primary care clinic.  If you do not have a primary care provider, please call [phone] and they will assist you.      [employer] is an electronic gateway that provides easy, online access to your medical records. With [employer], you can request a clinic appointment, read your test results, renew a prescription or communicate with your care team.     To access your existing account, please contact your [facility] or call [phone] for assistance.        Care EveryWhere ID     This is your Care EveryWhere ID. This could be used by other organizations to access your [place] medical records  [id number]         Blood Pressure from Last 3 Encounters:   05/05/17 121/87   01/23/15 (!) 145/100   10/08/14 133/81    Weight from Last 3 Encounters:   05/05/17 76.4 kg (168 lb 8 oz)   01/23/15 78 kg (172 lb)   10/08/14 75.3 kg (166 lb)              We Performed the Following     CHARGE - PHOTOTHERAPY NBUVB (UV LIGHT)     PROCEDURE - PHOTOTHERAPY NBUVB        Primary Care Provider Office Phone # Fax #    William Morton [phone]       [address] [address]        Equal Access to Services     TORI EDWARDS : Hadii zina shoemakero Soanna, waaxda luqadaha, qaybta kaalrenzo marsh. So [facility] [phone].    ATENCIÓN: Si habla español, tiene a garrett disposición servicios gratuitos de asistencia lingüística. Ze al [phone].    We comply with applicable federal civil rights laws and [place] laws. We do not discriminate on the basis of race, color, national origin, age, disability, sex, sexual orientation, or gender identity.            Thank you!     Thank you for choosing [facility]  for  your care. Our goal is always to provide you with excellent care. Hearing back from our patients is one way we can continue to improve our services. Please take a few minutes to complete the written survey that you may receive in the mail after your visit with us. Thank you!             Your Updated Medication List - Protect others around you: Learn how to safely use, store and throw away your medicines at www.disposemymeds.org.          This list is accurate as of 2/9/18  8:07 AM.  Always use your most recent med list.                   Brand Name Dispense Instructions for use Diagnosis    BIOTIN PO      Take  by mouth daily.        calcipotriene-betameth diprop 0.005-0.064 % Oint    TACLONEX    60 g    Externally apply  topically 2 times daily as needed.    Psoriasis       citalopram 10 MG tablet    celeXA     Take 10 mg by mouth daily        CONCERTA 18 MG CR tablet   Generic drug:  methylphenidate ER      Take 18 mg by mouth every morning.        DAILY MULTIVITAMIN PO      Take  by mouth daily.        fish oil-omega-3 fatty acids 1000 MG capsule      Take 2 g by mouth daily        fluocinonide 0.05 % solution    LIDEX    60 mL    Apply  topically 2 times daily for itchy scalp or scalp psoriasis    Psoriasis       olopatadine HCl 0.2 % Soln    PATADAY    2.5 mL    PLACE 1 DROP INTO BOTH EYES DAILY    Allergic conjunctivitis of both eyes       pimecrolimus 1 % cream    ELIDEL    60 g    Apply topically 2 times daily as needed For psoriasis of face or genitals    Psoriasis       REFRESH P.M. Oint     1 Tube    Apply 1 inch to eye nightly as needed    Tear film insufficiency, unspecified       tacrolimus 0.1 % ointment    PROTOPIC    60 g    Apply topically 2 times daily Apply to areas around eyes as needed    Psoriasis       Vitamin C 100 MG Tabs      Take  by mouth daily.        VITAMIN D (CHOLECALCIFEROL) PO      Take by mouth daily

## 2018-02-14 ENCOUNTER — ALLIED HEALTH/NURSE VISIT (OUTPATIENT)
Dept: DERMATOLOGY | Facility: CLINIC | Age: 63
End: 2018-02-14
Payer: COMMERCIAL

## 2018-02-14 DIAGNOSIS — L40.9 PSORIASIS: ICD-10-CM

## 2018-02-14 NOTE — PROGRESS NOTES
[facility] Dermatology Phototherapy Record  1. Tony Mcnair is a 62 year old male is here today for phototherapy (UVB) treatment for Psoriasis.        Changes or new medications since last treatment:   NO    New medical conditions or problems since last treatment:   NO    Any problems with last phototherapy treatment?    NO    2. The patient tolerated phototherapy without complication    Patient will return for next UVB treatment, per protocol.     Patient to see provider every 4-12 weeks for follow-up during treatment.      All questions and concerns discussed with patient in clinic today.      Georgina Wills RN

## 2018-02-14 NOTE — NURSING NOTE
Tony Mcnair comes into clinic today at the request of Dr Mckinney Ordering Provider for NBUVB.    This service provided today was under the supervising provider of the kristie Davies PA-C, who was available if needed.    Georgina Wills RN

## 2018-02-14 NOTE — MR AVS SNAPSHOT
After Visit Summary   2/14/2018    Tony Mcnair    MRN: [id number]           Patient Information     Date Of Birth          1955        Visit Information        Provider Department      2/14/2018 7:20 AM UC DERM LIGHT TREATMENT [facility] Dermatology        Today's Diagnoses     Psoriasis           Follow-ups after your visit        Your next 10 appointments already scheduled     Feb 21, 2018  7:20 AM CST   (Arrive by 7:05 AM)   Light Treatment Visit with UC DERM LIGHT TREATMENT   [facility] Dermatology ([facility])    [address] [place] [phone]            Feb 28, 2018  7:25 AM CST   (Arrive by 7:10 AM)   Light Treatment Visit with UC DERM LIGHT TREATMENT   [facility] Dermatology ([facility])    [address] [place] [phone]            Apr 10, 2018  8:15 AM CDT   (Arrive by 8:00 AM)   Return Visit with Petar Corbett MD   [facility] Dermatology ([facility])    [address] [place] [address] [phone]            Apr 30, 2018  7:00 AM CDT   (Arrive by 6:45 AM)   Return Visit with CHAZ Gupta [facility] Rheumatology ([facility])    [address] [place] [phone]              Who to contact     Please call your clinic at [phone] to:    Ask questions about your health    Make or cancel appointments    Discuss your medicines    Learn about your test results    Speak to your doctor            Additional Information About Your Visit        MyChart Information     [employer] gives you secure access to your electronic health record. If you see a primary care provider, you can also send messages to your care team and make appointments. If you have questions, please call your primary care clinic.  If you do not have a primary care  provider, please call [phone] and they will assist you.      [employer] is an electronic gateway that provides easy, online access to your medical records. With [employer], you can request a clinic appointment, read your test results, renew a prescription or communicate with your care team.     To access your existing account, please contact your [facility] or call [phone] for assistance.        Care EveryWhere ID     This is your Care EveryWhere ID. This could be used by other organizations to access your [place] medical records  [id number]         Blood Pressure from Last 3 Encounters:   05/05/17 121/87   01/23/15 (!) 145/100   10/08/14 133/81    Weight from Last 3 Encounters:   05/05/17 76.4 kg (168 lb 8 oz)   01/23/15 78 kg (172 lb)   10/08/14 75.3 kg (166 lb)              We Performed the Following     CHARGE - PHOTOTHERAPY NBUVB (UV LIGHT)     PROCEDURE - PHOTOTHERAPY NBUVB        Primary Care Provider Office Phone # Fax #    William Morton [phone]       [place] [address]        Equal Access to Services     TORI EDWARDS : Hadii aad ku hadasho Soanna, waaxda luqadaha, qaybta kaalmada adegerryda, renzo monroe. So [facility] [phone].    ATENCIÓN: Si habla español, tiene a garrett disposición servicios gratuitos de asistencia lingüística. Llame al [phone].    We comply with applicable federal civil rights laws and [place] laws. We do not discriminate on the basis of race, color, national origin, age, disability, sex, sexual orientation, or gender identity.            Thank you!     Thank you for choosing [facility] DERMATOLOGY  for your care. Our goal is always to provide you with excellent care. Hearing back from our patients is one way we can continue to improve our services. Please take a few minutes to complete the written survey that you may receive in the mail after  your visit with us. Thank you!             Your Updated Medication List - Protect others around you: Learn how to safely use, store and throw away your medicines at www.disposemymeds.org.          This list is accurate as of 2/14/18  7:52 AM.  Always use your most recent med list.                   Brand Name Dispense Instructions for use Diagnosis    BIOTIN PO      Take  by mouth daily.        calcipotriene-betameth diprop 0.005-0.064 % Oint    TACLONEX    60 g    Externally apply  topically 2 times daily as needed.    Psoriasis       citalopram 10 MG tablet    celeXA     Take 10 mg by mouth daily        CONCERTA 18 MG CR tablet   Generic drug:  methylphenidate ER      Take 18 mg by mouth every morning.        DAILY MULTIVITAMIN PO      Take  by mouth daily.        fish oil-omega-3 fatty acids 1000 MG capsule      Take 2 g by mouth daily        fluocinonide 0.05 % solution    LIDEX    60 mL    Apply  topically 2 times daily for itchy scalp or scalp psoriasis    Psoriasis       olopatadine HCl 0.2 % Soln    PATADAY    2.5 mL    PLACE 1 DROP INTO BOTH EYES DAILY    Allergic conjunctivitis of both eyes       pimecrolimus 1 % cream    ELIDEL    60 g    Apply topically 2 times daily as needed For psoriasis of face or genitals    Psoriasis       REFRESH P.M. Oint     1 Tube    Apply 1 inch to eye nightly as needed    Tear film insufficiency, unspecified       tacrolimus 0.1 % ointment    PROTOPIC    60 g    Apply topically 2 times daily Apply to areas around eyes as needed    Psoriasis       Vitamin C 100 MG Tabs      Take  by mouth daily.        VITAMIN D (CHOLECALCIFEROL) PO      Take by mouth daily

## 2018-02-21 ENCOUNTER — ALLIED HEALTH/NURSE VISIT (OUTPATIENT)
Dept: DERMATOLOGY | Facility: CLINIC | Age: 63
End: 2018-02-21
Payer: COMMERCIAL

## 2018-02-21 DIAGNOSIS — L40.9 PSORIASIS: ICD-10-CM

## 2018-02-21 NOTE — PROGRESS NOTES
[facility] Dermatology Phototherapy Record  1. Tony Mcnair is a 62 year old male is here today for phototherapy (UVB) treatment for Psoriasis.        Changes or new medications since last treatment:   NO    New medical conditions or problems since last treatment:   NO    Any problems with last phototherapy treatment?    NO    2. The patient tolerated phototherapy without complication    Patient will return for next UVB treatment, per protocol.     Patient to see provider every 4-12 weeks for follow-up during treatment.      All questions and concerns discussed with patient in clinic today.      Georgina Wills RN

## 2018-02-21 NOTE — MR AVS SNAPSHOT
After Visit Summary   2/21/2018    Tony Mcnair    MRN: [id number]           Patient Information     Date Of Birth          1955        Visit Information        Provider Department      2/21/2018 7:20 AM UC DERM LIGHT TREATMENT [facility] Dermatology        Today's Diagnoses     Psoriasis           Follow-ups after your visit        Your next 10 appointments already scheduled     Feb 28, 2018  7:25 AM CST   (Arrive by 7:10 AM)   Light Treatment Visit with UC DERM LIGHT TREATMENT   [facility] Dermatology ([facility])    [address] [id number] [phone]            Apr 10, 2018  8:15 AM CDT   (Arrive by 8:00 AM)   Return Visit with Petar Corbett MD   [facility] Dermatology ([facility])    [address] [place] [address] [phone]            Apr 30, 2018  7:00 AM CDT   (Arrive by 6:45 AM)   Return Visit with CHAZ Gupta CNP   [facility] Rheumatology ([facility])    [address] [place] [address]  [address]  [facility] [address] [phone]              Who to contact     Please call your clinic at [phone] to:    Ask questions about your health    Make or cancel appointments    Discuss your medicines    Learn about your test results    Speak to your doctor            Additional Information About Your Visit        [employer] Information     [employer] gives you secure access to your electronic health record. If you see a primary care provider, you can also send messages to your care team and make appointments. If you have questions, please call your primary care clinic.  If you do not have a primary care provider, please call [phone] and they will assist you.      [employer] is an electronic gateway that provides easy, online access to your medical records. With [employer], you can request a clinic appointment, read your test results, renew a  prescription or communicate with your care team.     To access your existing account, please contact your [facility] or call [phone] for assistance.        Care EveryWhere ID     This is your Care EveryWhere ID. This could be used by other organizations to access your [place] medical records  [id number]         Blood Pressure from Last 3 Encounters:   05/05/17 121/87   01/23/15 (!) 145/100   10/08/14 133/81    Weight from Last 3 Encounters:   05/05/17 76.4 kg (168 lb 8 oz)   01/23/15 78 kg (172 lb)   10/08/14 75.3 kg (166 lb)              We Performed the Following     CHARGE - PHOTOTHERAPY NBUVB (UV LIGHT)     PROCEDURE - PHOTOTHERAPY NBUVB        Primary Care Provider Office Phone # Fax #    William Morton [phone]       [place] [address]        Equal Access to Services     TORI EDWARDS : Hadii zina shoemakero Elia, waaxda lustevenadaha, qaybta kaalmada adeciara, renzo haider . So [facility] [phone].    ATENCIÓN: Si habla español, tiene a garrett disposición servicios gratuitos de asistencia lingüística. Llame al [phone].    We comply with applicable federal civil rights laws and [place] laws. We do not discriminate on the basis of race, color, national origin, age, disability, sex, sexual orientation, or gender identity.            Thank you!     Thank you for choosing [facility] DERMATOLOGY  for your care. Our goal is always to provide you with excellent care. Hearing back from our patients is one way we can continue to improve our services. Please take a few minutes to complete the written survey that you may receive in the mail after your visit with us. Thank you!             Your Updated Medication List - Protect others around you: Learn how to safely use, store and throw away your medicines at www.disposemymeds.org.          This list is accurate as of 2/21/18  7:27 AM.   Always use your most recent med list.                   Brand Name Dispense Instructions for use Diagnosis    BIOTIN PO      Take  by mouth daily.        calcipotriene-betameth diprop 0.005-0.064 % Oint    TACLONEX    60 g    Externally apply  topically 2 times daily as needed.    Psoriasis       citalopram 10 MG tablet    celeXA     Take 10 mg by mouth daily        CONCERTA 18 MG CR tablet   Generic drug:  methylphenidate ER      Take 18 mg by mouth every morning.        DAILY MULTIVITAMIN PO      Take  by mouth daily.        fish oil-omega-3 fatty acids 1000 MG capsule      Take 2 g by mouth daily        fluocinonide 0.05 % solution    LIDEX    60 mL    Apply  topically 2 times daily for itchy scalp or scalp psoriasis    Psoriasis       olopatadine HCl 0.2 % Soln    PATADAY    2.5 mL    PLACE 1 DROP INTO BOTH EYES DAILY    Allergic conjunctivitis of both eyes       pimecrolimus 1 % cream    ELIDEL    60 g    Apply topically 2 times daily as needed For psoriasis of face or genitals    Psoriasis       REFRESH P.M. Oint     1 Tube    Apply 1 inch to eye nightly as needed    Tear film insufficiency, unspecified       tacrolimus 0.1 % ointment    PROTOPIC    60 g    Apply topically 2 times daily Apply to areas around eyes as needed    Psoriasis       Vitamin C 100 MG Tabs      Take  by mouth daily.        VITAMIN D (CHOLECALCIFEROL) PO      Take by mouth daily

## 2018-02-28 ENCOUNTER — ALLIED HEALTH/NURSE VISIT (OUTPATIENT)
Dept: DERMATOLOGY | Facility: CLINIC | Age: 63
End: 2018-02-28
Payer: COMMERCIAL

## 2018-02-28 DIAGNOSIS — L40.9 PSORIASIS: ICD-10-CM

## 2018-02-28 NOTE — NURSING NOTE
Tony Mcnair comes into clinic today at the request of Dr Mckinney Ordering Provider for Phototherapy.    This service provided today was under the supervising provider of the day Dr Waters, who was available if needed.    Georgina Wills RN

## 2018-02-28 NOTE — PROGRESS NOTES
[facility] Dermatology Phototherapy Record  1. Tony Mcnair is a 62 year old male is here today for phototherapy (UVB) treatment for Psoriasis.        Changes or new medications since last treatment:   NO    New medical conditions or problems since last treatment:   NO    Any problems with last phototherapy treatment?    NO    2. The patient tolerated phototherapy without complication    Patient will return for next UVB treatment, per protocol.     Patient to see provider every 4-12 weeks for follow-up during treatment.      All questions and concerns discussed with patient in clinic today.      Georgina Wills RN

## 2018-02-28 NOTE — MR AVS SNAPSHOT
After Visit Summary   2/28/2018    Tony Mcnair    MRN: [id number]           Patient Information     Date Of Birth          1955        Visit Information        Provider Department      2/28/2018 7:25 AM UC DERM LIGHT TREATMENT [facility] Dermatology        Today's Diagnoses     Psoriasis           Follow-ups after your visit        Your next 10 appointments already scheduled     Apr 10, 2018  8:15 AM CDT   (Arrive by 8:00 AM)   Return Visit with Petar Corbett MD   [facility] Dermatology ([facility])    [address]  3rd Floor  [facility] [address] [phone]            Apr 30, 2018  7:00 AM CDT   (Arrive by 6:45 AM)   Return Visit with CHAZ Gupta CNP   [facility] Rheumatology ([facility])    [address]  [address]  [facility] [address] [phone]              Who to contact     Please call your clinic at [phone] to:    Ask questions about your health    Make or cancel appointments    Discuss your medicines    Learn about your test results    Speak to your doctor            Additional Information About Your Visit        [employer] Information     [employer] gives you secure access to your electronic health record. If you see a primary care provider, you can also send messages to your care team and make appointments. If you have questions, please call your primary care clinic.  If you do not have a primary care provider, please call [phone] and they will assist you.      [employer] is an electronic gateway that provides easy, online access to your medical records. With [employer], you can request a clinic appointment, read your test results, renew a prescription or communicate with your care team.     To access your existing account, please contact your [facility] or call [phone] for assistance.        Care EveryWhere ID     This is your Care EveryWhere ID. This could be  used by other organizations to access your [place] medical records  [id number]         Blood Pressure from Last 3 Encounters:   05/05/17 121/87   01/23/15 (!) 145/100   10/08/14 133/81    Weight from Last 3 Encounters:   05/05/17 76.4 kg (168 lb 8 oz)   01/23/15 78 kg (172 lb)   10/08/14 75.3 kg (166 lb)              We Performed the Following     CHARGE - PHOTOTHERAPY NBUVB (UV LIGHT)     PROCEDURE - PHOTOTHERAPY NBUVB        Primary Care Provider Office Phone # Fax #    William Morton [phone]       [address] [address]        Equal Access to Services     TORI EDWARDS : Stan Rodrigez, wakerry hedrick, qaybta kaalmada saira, renzo monroe. So [facility] [phone].    ATENCIÓN: Si habla español, tiene a garrett disposición servicios gratuitos de asistencia lingüística. [facility] [phone].    We comply with applicable federal civil rights laws and [place] laws. We do not discriminate on the basis of race, color, national origin, age, disability, sex, sexual orientation, or gender identity.            Thank you!     Thank you for choosing [facility] DERMATOLOGY  for your care. Our goal is always to provide you with excellent care. Hearing back from our patients is one way we can continue to improve our services. Please take a few minutes to complete the written survey that you may receive in the mail after your visit with us. Thank you!             Your Updated Medication List - Protect others around you: Learn how to safely use, store and throw away your medicines at www.disposemymeds.org.          This list is accurate as of 2/28/18  8:57 AM.  Always use your most recent med list.                   Brand Name Dispense Instructions for use Diagnosis    BIOTIN PO      Take  by mouth daily.        calcipotriene-betameth diprop 0.005-0.064 % Oint    TACLONEX    60 g    Externally apply  topically 2 times  daily as needed.    Psoriasis       citalopram 10 MG tablet    celeXA     Take 10 mg by mouth daily        CONCERTA 18 MG CR tablet   Generic drug:  methylphenidate ER      Take 18 mg by mouth every morning.        DAILY MULTIVITAMIN PO      Take  by mouth daily.        fish oil-omega-3 fatty acids 1000 MG capsule      Take 2 g by mouth daily        fluocinonide 0.05 % solution    LIDEX    60 mL    Apply  topically 2 times daily for itchy scalp or scalp psoriasis    Psoriasis       olopatadine HCl 0.2 % Soln    PATADAY    2.5 mL    PLACE 1 DROP INTO BOTH EYES DAILY    Allergic conjunctivitis of both eyes       pimecrolimus 1 % cream    ELIDEL    60 g    Apply topically 2 times daily as needed For psoriasis of face or genitals    Psoriasis       REFRESH P.M. Oint     1 Tube    Apply 1 inch to eye nightly as needed    Tear film insufficiency, unspecified       tacrolimus 0.1 % ointment    PROTOPIC    60 g    Apply topically 2 times daily Apply to areas around eyes as needed    Psoriasis       Vitamin C 100 MG Tabs      Take  by mouth daily.        VITAMIN D (CHOLECALCIFEROL) PO      Take by mouth daily

## 2018-03-07 ENCOUNTER — ALLIED HEALTH/NURSE VISIT (OUTPATIENT)
Dept: DERMATOLOGY | Facility: CLINIC | Age: 63
End: 2018-03-07
Payer: COMMERCIAL

## 2018-03-07 DIAGNOSIS — L40.9 PSORIASIS: ICD-10-CM

## 2018-03-07 NOTE — PROGRESS NOTES
[facility] Dermatology Phototherapy Record  1. Tony Mcnair is a 62 year old male is here today for phototherapy (UVB) treatment for psoriasis.        Changes or new medications since last treatment:   NO    New medical conditions or problems since last treatment:   NO    Any problems with last phototherapy treatment?    NO    2. The patient tolerated phototherapy without complication    Patient will return for next UVB treatment, per protocol.     Patient to see provider every 4-12 weeks for follow-up during treatment.      All questions and concerns discussed with patient in clinic today.      Raquel Christine LPN

## 2018-03-07 NOTE — NURSING NOTE
Tony Mcnair comes into clinic today at the request of Dr. Mckinney Ordering Provider for NBUVB.        This service provided today was under the supervising provider of the day Dr. Waters, who was available if needed.    Raquel Christine LPN

## 2018-03-07 NOTE — MR AVS SNAPSHOT
After Visit Summary   3/7/2018    Tony Mcnair    MRN: [id number]           Patient Information     Date Of Birth          1955        Visit Information        Provider Department      3/7/2018 7:20 AM UC DERM LIGHT TREATMENT [facility] Dermatology        Today's Diagnoses     Psoriasis           Follow-ups after your visit        Your next 10 appointments already scheduled     Mar 14, 2018  7:20 AM CDT   (Arrive by 7:05 AM)   Light Treatment Visit with UC DERM LIGHT TREATMENT   [facility] Dermatology ([facility])    [address] [place] [address]   [phone]            Mar 21, 2018  7:20 AM CDT   (Arrive by 7:05 AM)   Light Treatment Visit with UC DERM LIGHT TREATMENT   [facility] ([facility])    [address] [place] [address]   [phone]            Mar 28, 2018  7:20 AM CDT   (Arrive by 7:05 AM)   Light Treatment Visit with UC DERM LIGHT TREATMENT   [facility] Dermatology ([facility])    [address] [place] [address]   [phone]            Apr 04, 2018  7:20 AM CDT   (Arrive by 7:05 AM)   Light Treatment Visit with UC DERM LIGHT TREATMENT   [facility] ([facility])    [address] [place] [address]   [phone]            Apr 10, 2018  8:15 AM CDT   (Arrive by 8:00 AM)   Return Visit with Petar Corbett MD   [facility] ([facility])    [address] [place] [address]   [phone]            Apr 11, 2018  7:20 AM CDT   (Arrive by 7:05 AM)   Light Treatment Visit with UC DERM LIGHT TREATMENT   [facility] ([facility])    [address] [place] [address]   [phone]            Apr 18, 2018  7:20 AM CDT   (Arrive by 7:05 AM)   Light  Treatment Visit with UC DERM LIGHT TREATMENT   [facility] Dermatology ([facility])    [address]  3rd Floor  [facility] [id number] [phone]            Apr 25, 2018  7:20 AM CDT   (Arrive by 7:05 AM)   Light Treatment Visit with UC DERM LIGHT TREATMENT   [facility] Dermatology ([facility])    [address]  3rd [address]  [facility] [address] [phone]            Apr 30, 2018  7:00 AM CDT   (Arrive by 6:45 AM)   Return Visit with CHAZ Gupta [facility] Rheumatology ([facility])    [address]  [address]  [facility] [address] [phone]              Who to contact     Please call your clinic at [phone] to:    Ask questions about your health    Make or cancel appointments    Discuss your medicines    Learn about your test results    Speak to your doctor            Additional Information About Your Visit        [employer] Information     [employer] gives you secure access to your electronic health record. If you see a primary care provider, you can also send messages to your care team and make appointments. If you have questions, please call your primary care clinic.  If you do not have a primary care provider, please call [phone] and they will assist you.      [employer] is an electronic gateway that provides easy, online access to your medical records. With [employer], you can request a clinic appointment, read your test results, renew a prescription or communicate with your care team.     To access your existing account, please contact your [facility] or call [phone] for assistance.        Care EveryWhere ID     This is your Care EveryWhere ID. This could be used by other organizations to access your [place] medical records  [id number]         Blood Pressure from Last 3 Encounters:   05/05/17 121/87   01/23/15 (!) 145/100   10/08/14 133/81     Weight from Last 3 Encounters:   05/05/17 76.4 kg (168 lb 8 oz)   01/23/15 78 kg (172 lb)   10/08/14 75.3 kg (166 lb)              We Performed the Following     CHARGE - PHOTOTHERAPY NBUVB (UV LIGHT)     PROCEDURE - PHOTOTHERAPY NBUVB        Primary Care Provider Office Phone # Fax #    William Morton [phone]       [address] [address]        Equal Access to Services     TORI EDWARDS : Hadii aad ku hadasho Soomaali, waaxda luqadaha, qaybta kaalmada adeegyada, waxay idiin hayaan caleb monroe. So [facility] [phone].    ATENCIÓN: Si habla español, tiene a garrett disposición servicios gratuitos de asistencia lingüística. Ze al [phone].    We comply with applicable federal civil rights laws and [place] laws. We do not discriminate on the basis of race, color, national origin, age, disability, sex, sexual orientation, or gender identity.            Thank you!     Thank you for choosing [facility] DERMATOLOGY  for your care. Our goal is always to provide you with excellent care. Hearing back from our patients is one way we can continue to improve our services. Please take a few minutes to complete the written survey that you may receive in the mail after your visit with us. Thank you!             Your Updated Medication List - Protect others around you: Learn how to safely use, store and throw away your medicines at www.disposemymeds.org.          This list is accurate as of 3/7/18  7:27 AM.  Always use your most recent med list.                   Brand Name Dispense Instructions for use Diagnosis    BIOTIN PO      Take  by mouth daily.        calcipotriene-betameth diprop 0.005-0.064 % Oint    TACLONEX    60 g    Externally apply  topically 2 times daily as needed.    Psoriasis       citalopram 10 MG tablet    celeXA     Take 10 mg by mouth daily        CONCERTA 18 MG CR tablet   Generic drug:  methylphenidate ER      Take 18 mg by mouth  every morning.        DAILY MULTIVITAMIN PO      Take  by mouth daily.        fish oil-omega-3 fatty acids 1000 MG capsule      Take 2 g by mouth daily        fluocinonide 0.05 % solution    LIDEX    60 mL    Apply  topically 2 times daily for itchy scalp or scalp psoriasis    Psoriasis       olopatadine HCl 0.2 % Soln    PATADAY    2.5 mL    PLACE 1 DROP INTO BOTH EYES DAILY    Allergic conjunctivitis of both eyes       pimecrolimus 1 % cream    ELIDEL    60 g    Apply topically 2 times daily as needed For psoriasis of face or genitals    Psoriasis       REFRESH P.M. Oint     1 Tube    Apply 1 inch to eye nightly as needed    Tear film insufficiency, unspecified       tacrolimus 0.1 % ointment    PROTOPIC    60 g    Apply topically 2 times daily Apply to areas around eyes as needed    Psoriasis       Vitamin C 100 MG Tabs      Take  by mouth daily.        VITAMIN D (CHOLECALCIFEROL) PO      Take by mouth daily

## 2018-03-14 ENCOUNTER — ALLIED HEALTH/NURSE VISIT (OUTPATIENT)
Dept: DERMATOLOGY | Facility: CLINIC | Age: 63
End: 2018-03-14
Payer: COMMERCIAL

## 2018-03-14 DIAGNOSIS — L40.9 PSORIASIS: ICD-10-CM

## 2018-03-14 NOTE — PROGRESS NOTES
[facility] Dermatology Phototherapy Record  1. Tony Mcnair is a 62 year old male is here today for phototherapy (UVB) treatment for Psoriasis.        Changes or new medications since last treatment:   NO    New medical conditions or problems since last treatment:   NO    Any problems with last phototherapy treatment?    NO    2. The patient tolerated phototherapy without complication    Patient will return for next UVB treatment, per protocol.     Patient to see provider every 4-12 weeks for follow-up during treatment.      All questions and concerns discussed with patient in clinic today.      Georgina Wills RN

## 2018-03-14 NOTE — MR AVS SNAPSHOT
After Visit Summary   3/14/2018    Tony Mcnair    MRN: [id number]           Patient Information     Date Of Birth          1955        Visit Information        Provider Department      3/14/2018 7:20 AM UC DERM LIGHT TREATMENT [facility]        Today's Diagnoses     Psoriasis           Follow-ups after your visit        Your next 10 appointments already scheduled     Mar 21, 2018  7:20 AM CDT   (Arrive by 7:05 AM)   Light Treatment Visit with UC DERM LIGHT TREATMENT   [facility] ([facility])    [address] [place] [phone]   [phone]            Mar 28, 2018  7:20 AM CDT   (Arrive by 7:05 AM)   Light Treatment Visit with UC DERM LIGHT TREATMENT   [facility] ([facility])    [address] [place] [address]   [phone]            Apr 04, 2018  7:20 AM CDT   (Arrive by 7:05 AM)   Light Treatment Visit with UC DERM LIGHT TREATMENT   [facility] ([facility])    [address] [place] [address]   [phone]            Apr 10, 2018  8:15 AM CDT   (Arrive by 8:00 AM)   Return Visit with Petar Corbett MD   [facility] ([facility])    [address] [place] [address]   [phone]            Apr 11, 2018  7:20 AM CDT   (Arrive by 7:05 AM)   Light Treatment Visit with UC DERM LIGHT TREATMENT   [facility] ([facility])    [address] [place] [address]   [phone]            Apr 18, 2018  7:20 AM CDT   (Arrive by 7:05 AM)   Light Treatment Visit with UC DERM LIGHT TREATMENT   [facility] ([facility])    [address] [place] [phone]   [phone]            Apr 25, 2018  7:20 AM CDT   (Arrive by 7:05 AM)   Light  Treatment Visit with UC DERM LIGHT TREATMENT   [facility] ([facility])    [address]  [address]  [facility] [address] [phone]            Apr 30, 2018  7:00 AM CDT   (Arrive by 6:45 AM)   Return Visit with CHAZ Gupta CNP   [facility] ([facility])    [address]  [address]  [facility] [address] [phone]              Who to contact     Please call your clinic at [phone] to:    Ask questions about your health    Make or cancel appointments    Discuss your medicines    Learn about your test results    Speak to your doctor            Additional Information About Your Visit        Gl[employer] Information     [employer] gives you secure access to your electronic health record. If you see a primary care provider, you can also send messages to your care team and make appointments. If you have questions, please call your primary care clinic.  If you do not have a primary care provider, please call [phone] and they will assist you.      Gl[employer] is an electronic gateway that provides easy, online access to your medical records. With [employer], you can request a clinic appointment, read your test results, renew a prescription or communicate with your care team.     To access your existing account, please contact your [facility] or call [phone] for assistance.        Care EveryWhere ID     This is your Care EveryWhere ID. This could be used by other organizations to access your [place] medical records  [id number]         Blood Pressure from Last 3 Encounters:   05/05/17 121/87   01/23/15 (!) 145/100   10/08/14 133/81    Weight from Last 3 Encounters:   05/05/17 76.4 kg (168 lb 8 oz)   01/23/15 78 kg (172 lb)   10/08/14 75.3 kg (166 lb)              We Performed the Following     CHARGE - PHOTOTHERAPY NBUVB (UV LIGHT)     PROCEDURE - PHOTOTHERAPY NBUVB        Primary  Care Provider Office Phone # Fax #    William Morton [phone]       [address] [address]        Equal Access to Services     OTRI EDWARDS : Hadii aad ku hadalhaji Rodrigez, waphoenixda luqgerman, qaybta kaalmada saira, renzo gómez sorayatiffanie vargas meliza monroe. So [facility] [phone].    ATENCIÓN: Si habla español, tiene a garrett disposición servicios gratuitos de asistencia lingüística. Ze al [phone].    We comply with applicable federal civil rights laws and [place] laws. We do not discriminate on the basis of race, color, national origin, age, disability, sex, sexual orientation, or gender identity.            Thank you!     Thank you for choosing [facility]  for your care. Our goal is always to provide you with excellent care. Hearing back from our patients is one way we can continue to improve our services. Please take a few minutes to complete the written survey that you may receive in the mail after your visit with us. Thank you!             Your Updated Medication List - Protect others around you: Learn how to safely use, store and throw away your medicines at www.disposemymeds.org.          This list is accurate as of 3/14/18  7:40 AM.  Always use your most recent med list.                   Brand Name Dispense Instructions for use Diagnosis    BIOTIN PO      Take  by mouth daily.        calcipotriene-betameth diprop 0.005-0.064 % Oint    TACLONEX    60 g    Externally apply  topically 2 times daily as needed.    Psoriasis       citalopram 10 MG tablet    celeXA     Take 10 mg by mouth daily        CONCERTA 18 MG CR tablet   Generic drug:  methylphenidate ER      Take 18 mg by mouth every morning.        DAILY MULTIVITAMIN PO      Take  by mouth daily.        fish oil-omega-3 fatty acids 1000 MG capsule      Take 2 g by mouth daily        fluocinonide 0.05 % solution    LIDEX    60 mL    Apply  topically 2 times daily for itchy  scalp or scalp psoriasis    Psoriasis       olopatadine HCl 0.2 % Soln    PATADAY    2.5 mL    PLACE 1 DROP INTO BOTH EYES DAILY    Allergic conjunctivitis of both eyes       pimecrolimus 1 % cream    ELIDEL    60 g    Apply topically 2 times daily as needed For psoriasis of face or genitals    Psoriasis       REFRESH P.M. Oint     1 Tube    Apply 1 inch to eye nightly as needed    Tear film insufficiency, unspecified       tacrolimus 0.1 % ointment    PROTOPIC    60 g    Apply topically 2 times daily Apply to areas around eyes as needed    Psoriasis       Vitamin C 100 MG Tabs      Take  by mouth daily.        VITAMIN D (CHOLECALCIFEROL) PO      Take by mouth daily

## 2018-03-21 ENCOUNTER — ALLIED HEALTH/NURSE VISIT (OUTPATIENT)
Dept: DERMATOLOGY | Facility: CLINIC | Age: 63
End: 2018-03-21
Payer: COMMERCIAL

## 2018-03-21 DIAGNOSIS — H10.13 ALLERGIC CONJUNCTIVITIS OF BOTH EYES: ICD-10-CM

## 2018-03-21 DIAGNOSIS — L40.9 PSORIASIS: ICD-10-CM

## 2018-03-21 RX ORDER — OLOPATADINE HYDROCHLORIDE 2 MG/ML
SOLUTION/ DROPS OPHTHALMIC
Qty: 2.5 ML | Refills: 2 | Status: SHIPPED | OUTPATIENT
Start: 2018-03-21 | End: 2018-11-30

## 2018-03-21 NOTE — MR AVS SNAPSHOT
After Visit Summary   3/21/2018    Tony Mcnair    MRN: [id number]           Patient Information     Date Of Birth          1955        Visit Information        Provider Department      3/21/2018 7:20 AM UC DERM LIGHT TREATMENT [facility]        Today's Diagnoses     Psoriasis           Follow-ups after your visit        Your next 10 appointments already scheduled     Mar 28, 2018  7:20 AM CDT   (Arrive by 7:05 AM)   Light Treatment Visit with UC DERM LIGHT TREATMENT   [facility] ([facility])    [address] [place] [address]   [phone]            Apr 04, 2018  7:20 AM CDT   (Arrive by 7:05 AM)   Light Treatment Visit with UC DERM LIGHT TREATMENT   [facility] ([facility])    [address] [place] [address]   [phone]            Apr 10, 2018  8:15 AM CDT   (Arrive by 8:00 AM)   Return Visit with Petar Corbett MD   [facility] ([facility])    [address] [place] [address]   [phone]            Apr 11, 2018  7:20 AM CDT   (Arrive by 7:05 AM)   Light Treatment Visit with UC DERM LIGHT TREATMENT   [facility] ([facility])    [address] [place] [address]   [phone]            Apr 18, 2018  7:20 AM CDT   (Arrive by 7:05 AM)   Light Treatment Visit with UC DERM LIGHT TREATMENT   [facility] ([facility])    [address] [place] [address]   [phone]            Apr 25, 2018  7:20 AM CDT   (Arrive by 7:05 AM)   Light Treatment Visit with UC DERM LIGHT TREATMENT   [facility] ([facility])    [address] [place] [address]   [phone]            Apr 30, 2018  7:00 AM CDT   (Arrive by 6:45 AM)   Return  Visit with CHAZ Gupta [facility] ([facility])    [address]  [address]  [facility] [address] [phone]              Who to contact     Please call your clinic at [phone] to:    Ask questions about your health    Make or cancel appointments    Discuss your medicines    Learn about your test results    Speak to your doctor            Additional Information About Your Visit        [employer] Information     [employer] gives you secure access to your electronic health record. If you see a primary care provider, you can also send messages to your care team and make appointments. If you have questions, please call your primary care clinic.  If you do not have a primary care provider, please call [phone] and they will assist you.      [employer] is an electronic gateway that provides easy, online access to your medical records. With [employer], you can request a clinic appointment, read your test results, renew a prescription or communicate with your care team.     To access your existing account, please contact your [facility] or call [phone] for assistance.        Care EveryWhere ID     This is your Care EveryWhere ID. This could be used by other organizations to access your [place] medical records  [id number]         Blood Pressure from Last 3 Encounters:   05/05/17 121/87   01/23/15 (!) 145/100   10/08/14 133/81    Weight from Last 3 Encounters:   05/05/17 76.4 kg (168 lb 8 oz)   01/23/15 78 kg (172 lb)   10/08/14 75.3 kg (166 lb)              We Performed the Following     CHARGE - PHOTOTHERAPY NBUVB (UV LIGHT)     PROCEDURE - PHOTOTHERAPY NBUVB        Primary Care Provider Office Phone # Fax #    William Morton [phone]       [address]  [facility] [address]        Equal Access to Services     TORI EDWARDS : deepak Braxton  ryley kathleenfigueroarenzo stacy. So [facility] [phone].    ATENCIÓN: Si rose mary varghese, tiene a garrett disposición servicios gratuitos de asistencia lingüística. Ze al [phone].    We comply with applicable federal civil rights laws and [place] laws. We do not discriminate on the basis of race, color, national origin, age, disability, sex, sexual orientation, or gender identity.            Thank you!     Thank you for choosing [facility]  for your care. Our goal is always to provide you with excellent care. Hearing back from our patients is one way we can continue to improve our services. Please take a few minutes to complete the written survey that you may receive in the mail after your visit with us. Thank you!             Your Updated Medication List - Protect others around you: Learn how to safely use, store and throw away your medicines at www.disposemymeds.org.          This list is accurate as of 3/21/18  8:11 AM.  Always use your most recent med list.                   Brand Name Dispense Instructions for use Diagnosis    BIOTIN PO      Take  by mouth daily.        calcipotriene-betameth diprop 0.005-0.064 % Oint    TACLONEX    60 g    Externally apply  topically 2 times daily as needed.    Psoriasis       citalopram 10 MG tablet    celeXA     Take 10 mg by mouth daily        CONCERTA 18 MG CR tablet   Generic drug:  methylphenidate ER      Take 18 mg by mouth every morning.        DAILY MULTIVITAMIN PO      Take  by mouth daily.        fish oil-omega-3 fatty acids 1000 MG capsule      Take 2 g by mouth daily        fluocinonide 0.05 % solution    LIDEX    60 mL    Apply  topically 2 times daily for itchy scalp or scalp psoriasis    Psoriasis       olopatadine HCl 0.2 % Soln    PATADAY    2.5 mL    PLACE 1 DROP INTO BOTH EYES DAILY    Allergic conjunctivitis of both eyes       pimecrolimus 1 % cream    ELIDEL    60 g    Apply topically 2 times daily  as needed For psoriasis of face or genitals    Psoriasis       REFRESH P.M. Oint     1 Tube    Apply 1 inch to eye nightly as needed    Tear film insufficiency, unspecified       tacrolimus 0.1 % ointment    PROTOPIC    60 g    Apply topically 2 times daily Apply to areas around eyes as needed    Psoriasis       Vitamin C 100 MG Tabs      Take  by mouth daily.        VITAMIN D (CHOLECALCIFEROL) PO      Take by mouth daily

## 2018-03-21 NOTE — TELEPHONE ENCOUNTER
Medication: pataday  Last Written Prescription Date:  12/18/17  Last Fill Quantity: 2.5,   # refills: 2    Last Office Visit: 8/28/17  Future Office visit: no future appt

## 2018-03-21 NOTE — PROGRESS NOTES
[facility] Phototherapy Record  1. Tony Mcnair is a 62 year old male is here today for phototherapy (UVB) treatment for Psoriasis.        Changes or new medications since last treatment:   NO    New medical conditions or problems since last treatment:   NO    Any problems with last phototherapy treatment?    NO    2. The patient tolerated phototherapy without complication    Patient will return for next UVB treatment, per protocol.     Patient to see provider every 4-12 weeks for follow-up during treatment.      All questions and concerns discussed with patient in clinic today.      Georgina Wills RN

## 2018-03-23 DIAGNOSIS — L40.9 PSORIASIS: ICD-10-CM

## 2018-03-23 RX ORDER — FLUOCINONIDE TOPICAL SOLUTION USP, 0.05% 0.5 MG/ML
SOLUTION TOPICAL
Qty: 60 ML | Refills: 0 | Status: SHIPPED | OUTPATIENT
Start: 2018-03-23 | End: 2018-11-30

## 2018-03-23 NOTE — TELEPHONE ENCOUNTER
Received refill request for lidex as the resident on call. Reviewed patient's chart and attached communication. Patient last seen 09/2017 for psoriasis. RTC 4/10/18. After reviewing the medication list and assessment and plan from last visit, the refill request was accepted, will order 1 month supply to last until follow up appointment in April.    Brendan Cornell MD  Dermatology Resident, PGY2

## 2018-03-23 NOTE — TELEPHONE ENCOUNTER
Last visit: 9/12/17  Next visit: 4/40/18    Impression/Plan:  1. Limited type plaque psoriasis. Patient has flare of LE in context of his once a week phototherapy being cut down because he started citalopram, which may increase photosensitivity. Phototherapy is being increased by 50 mJ/session and he's now at 1115 mJ. Patient reports besides his LE his psoriasis at his elbows, scalp and groin is fairly well controlled. Discussed that he can apply Taclonex to his LE and wrap in Seran wrap before bed to decrease activity there.     Continue UVB phototherapy 1/week    Continue Elidel 1% cream for face and groin BID PRN    Continue Lidex 0.05% solution for scalp BID     Continue Protopic 0.1% around eyes BID PRN    Continue taclonex ointment to affected areas BID PRN        Follow-up in 6 months, earlier for new or changing lesions.      Staff Involved:  Scribed by Joanne Serrano, MS4 for Dr. Corbett.       The medical student acted as a scribe with respect to this patient.  I have performed all the key elements of the history and physical

## 2018-03-28 ENCOUNTER — ALLIED HEALTH/NURSE VISIT (OUTPATIENT)
Dept: DERMATOLOGY | Facility: CLINIC | Age: 63
End: 2018-03-28
Payer: COMMERCIAL

## 2018-03-28 DIAGNOSIS — L40.9 PSORIASIS: ICD-10-CM

## 2018-03-28 NOTE — MR AVS SNAPSHOT
After Visit Summary   3/28/2018    Tony Mcnair    MRN: [id number]           Patient Information     Date Of Birth          1955        Visit Information        Provider Department      3/28/2018 7:20 AM UC DERM LIGHT TREATMENT [facility]        Today's Diagnoses     Psoriasis           Follow-ups after your visit        Your next 10 appointments already scheduled     Apr 04, 2018  7:20 AM CDT   (Arrive by 7:05 AM)   Light Treatment Visit with UC DERM LIGHT TREATMENT   [facility] ([facility])    [address] [place] [phone]            Apr 10, 2018  8:15 AM CDT   (Arrive by 8:00 AM)   Return Visit with Petar Corbett MD   [facility] ([facility])    [address] [place] [phone]            Apr 11, 2018  7:20 AM CDT   (Arrive by 7:05 AM)   Light Treatment Visit with UC DERM LIGHT TREATMENT   [facility] ([facility])    [address] [place] [phone]            Apr 18, 2018  7:20 AM CDT   (Arrive by 7:05 AM)   Light Treatment Visit with UC DERM LIGHT TREATMENT   [facility] ([facility])    [address] [place] [phone]            Apr 25, 2018  7:20 AM CDT   (Arrive by 7:05 AM)   Light Treatment Visit with UC DERM LIGHT TREATMENT   [facility] ([facility])    [address] [place] [phone]            Apr 30, 2018  7:00 AM CDT   (Arrive by 6:45 AM)   Return Visit with CHAZ Gupta CNP   [facility] ([facility])    [address] [place] [phone]              Who to contact     Please call your clinic at [phone]  to:    Ask questions about your health    Make or cancel appointments    Discuss your medicines    Learn about your test results    Speak to your doctor            Additional Information About Your Visit        [employer] Information     [employer] gives you secure access to your electronic health record. If you see a primary care provider, you can also send messages to your care team and make appointments. If you have questions, please call your primary care clinic.  If you do not have a primary care provider, please call [phone] and they will assist you.      [employer] is an electronic gateway that provides easy, online access to your medical records. With [employer], you can request a clinic appointment, read your test results, renew a prescription or communicate with your care team.     To access your existing account, please contact your [facility] or call [phone] for assistance.        Care EveryWhere ID     This is your Care EveryWhere ID. This could be used by other organizations to access your [place] medical records  [id number]         Blood Pressure from Last 3 Encounters:   05/05/17 121/87   01/23/15 (!) 145/100   10/08/14 133/81    Weight from Last 3 Encounters:   05/05/17 76.4 kg (168 lb 8 oz)   01/23/15 78 kg (172 lb)   10/08/14 75.3 kg (166 lb)              We Performed the Following     CHARGE - PHOTOTHERAPY NBUVB (UV LIGHT)     PROCEDURE - PHOTOTHERAPY NBUVB        Primary Care Provider Office Phone # Fax #    William Morton [phone]       [address] [address]        Equal Access to Services     TORI EDWARDS : Hadii aad ku hadasho Soomaali, waaxda luqadaha, qaybta kaalmada adeegyada, renzo monroe. So [facility] [phone].    ATENCIÓN: Si habla español, tiene a garrett disposición servicios gratuitos de asistencia lingüística. Llame al [phone].    We comply with applicable  federal civil rights laws and [place] laws. We do not discriminate on the basis of race, color, national origin, age, disability, sex, sexual orientation, or gender identity.            Thank you!     Thank you for choosing [facility]  for your care. Our goal is always to provide you with excellent care. Hearing back from our patients is one way we can continue to improve our services. Please take a few minutes to complete the written survey that you may receive in the mail after your visit with us. Thank you!             Your Updated Medication List - Protect others around you: Learn how to safely use, store and throw away your medicines at www.disposemymeds.org.          This list is accurate as of 3/28/18  7:46 AM.  Always use your most recent med list.                   Brand Name Dispense Instructions for use Diagnosis    BIOTIN PO      Take  by mouth daily.        calcipotriene-betameth diprop 0.005-0.064 % Oint    TACLONEX    60 g    Externally apply  topically 2 times daily as needed.    Psoriasis       citalopram 10 MG tablet    celeXA     Take 10 mg by mouth daily        CONCERTA 18 MG CR tablet   Generic drug:  methylphenidate ER      Take 18 mg by mouth every morning.        DAILY MULTIVITAMIN PO      Take  by mouth daily.        fish oil-omega-3 fatty acids 1000 MG capsule      Take 2 g by mouth daily        fluocinonide 0.05 % solution    LIDEX    60 mL    Apply  topically 2 times daily for itchy scalp or scalp psoriasis    Psoriasis       olopatadine HCl 0.2 % Soln    PATADAY    2.5 mL    PLACE 1 DROP INTO BOTH EYES DAILY    Allergic conjunctivitis of both eyes       pimecrolimus 1 % cream    ELIDEL    60 g    Apply topically 2 times daily as needed For psoriasis of face or genitals    Psoriasis       REFRESH P.M. Oint     1 Tube    Apply 1 inch to eye nightly as needed    Tear film insufficiency, unspecified       tacrolimus 0.1 % ointment    PROTOPIC    60 g    Apply topically 2 times  daily Apply to areas around eyes as needed    Psoriasis       Vitamin C 100 MG Tabs      Take  by mouth daily.        VITAMIN D (CHOLECALCIFEROL) PO      Take by mouth daily

## 2018-03-28 NOTE — PROGRESS NOTES
[facility]ermat[facility] Phototherapy Record  1. Tony Mcnair is a 62 year old male is here today for phototherapy (UVB) treatment for Psoriasis.        Changes or new medications since last treatment:   NO    New medical conditions or problems since last treatment:   NO    Any problems with last phototherapy treatment?    NO    2. The patient tolerated phototherapy without complication    Patient will return for next UVB treatment, per protocol.     Patient to see provider every 4-12 weeks for follow-up during treatment.      All questions and concerns discussed with patient in clinic today.      Georgina Wills RN

## 2018-04-02 ENCOUNTER — TRANSFERRED RECORDS (OUTPATIENT)
Dept: PHYSICAL THERAPY | Facility: CLINIC | Age: 63
End: 2018-04-02

## 2018-04-04 ENCOUNTER — ALLIED HEALTH/NURSE VISIT (OUTPATIENT)
Dept: DERMATOLOGY | Facility: CLINIC | Age: 63
End: 2018-04-04
Payer: COMMERCIAL

## 2018-04-04 DIAGNOSIS — L40.9 PSORIASIS: ICD-10-CM

## 2018-04-04 NOTE — NURSING NOTE
Tony Mcnair comes into clinic today at the request of  Ordering Provider for NBUVB.        This service provided today was under the supervising provider of the day Dr. Waters, who was available if needed.    Raquel Christine LPN

## 2018-04-04 NOTE — PROGRESS NOTES
[facility]erm[facility] Phototherapy Record  1. Tony Mcnair is a 62 year old male is here today for phototherapy (UVB) treatment for Psoriasis.        Changes or new medications since last treatment:   NO    New medical conditions or problems since last treatment:   NO    Any problems with last phototherapy treatment?    NO    2. The patient tolerated phototherapy without complication    Patient will return for next UVB treatment, per protocol.     Patient to see provider every 4-12 weeks for follow-up during treatment.      All questions and concerns discussed with patient in clinic today.      Raquel Christine LPN

## 2018-04-04 NOTE — MR AVS SNAPSHOT
After Visit Summary   4/4/2018    Tony Mcnair    MRN: [id number]           Patient Information     Date Of Birth          1955        Visit Information        Provider Department      4/4/2018 7:20 AM UC DERM LIGHT TREATMENT [facility]        Today's Diagnoses     Psoriasis           Follow-ups after your visit        Your next 10 appointments already scheduled     Apr 10, 2018  8:15 AM CDT   (Arrive by 8:00 AM)   Return Visit with Petar Corbett MD   [facility] D[facility]at[facility] ([facility])    [address] [place] [id number] [phone]            Apr 11, 2018  7:20 AM CDT   (Arrive by 7:05 AM)   Light Treatment Visit with UC DERM LIGHT TREATMENT   [facility] D[facility] ([facility])    [address] [place] [address] [id number] [phone]            Apr 18, 2018  7:20 AM CDT   (Arrive by 7:05 AM)   Light Treatment Visit with UC DERM LIGHT TREATMENT   [facility] ([facility])    [address] [place] [address] [id number] [phone]            Apr 25, 2018  7:20 AM CDT   (Arrive by 7:05 AM)   Light Treatment Visit with UC DERM LIGHT TREATMENT   [facility] ([facility])    [address] [place] [address] [id number] [phone]            Apr 30, 2018  7:00 AM CDT   (Arrive by 6:45 AM)   Return Visit with CHAZ Gupta CNP   [facility] Rheumat[facility] ([facility])    [address] [place] [address] [id number] [phone]              Who to contact     Please call your clinic at [phone] to:    Ask questions about your health    Make or cancel appointments    Discuss your medicines    Learn about your test results    Speak to your doctor            Additional Information About Your Visit        MyChart Information     MyChart gives you  secure access to your electronic health record. If you see a primary care provider, you can also send messages to your care team and make appointments. If you have questions, please call your primary care clinic.  If you do not have a primary care provider, please call [phone] and they will assist you.      [employer] is an electronic gateway that provides easy, online access to your medical records. With [employer], you can request a clinic appointment, read your test results, renew a prescription or communicate with your care team.     To access your existing account, please contact your [facility] or call [phone] for assistance.        Care EveryWhere ID     This is your Care EveryWhere ID. This could be used by other organizations to access your [facility] medical records  [id number]         Blood Pressure from Last 3 Encounters:   05/05/17 121/87   01/23/15 (!) 145/100   10/08/14 133/81    Weight from Last 3 Encounters:   05/05/17 76.4 kg (168 lb 8 oz)   01/23/15 78 kg (172 lb)   10/08/14 75.3 kg (166 lb)              We Performed the Following     CHARGE - PHOTOTHERAPY NBUVB (UV LIGHT)     PROCEDURE - PHOTOTHERAPY NBUVB        Primary Care Provider Office Phone # Fax #    William Morton [phone]       [address] [address]        Equal Access to Services     TORI EDWARDS : Hadii zina shoemakero Soanna, waaxda luqadaha, qaybta kaalrenzo marsh. So [facility] [phone].    ATENCIÓN: Si habla español, tiene a garrett disposición servicios gratuitos de asistencia lingüística. Ze al [phone].    We comply with applicable federal civil rights laws and [place] laws. We do not discriminate on the basis of race, color, national origin, age, disability, sex, sexual orientation, or gender identity.            Thank you!     Thank you for choosing [facility]  for  your care. Our goal is always to provide you with excellent care. Hearing back from our patients is one way we can continue to improve our services. Please take a few minutes to complete the written survey that you may receive in the mail after your visit with us. Thank you!             Your Updated Medication List - Protect others around you: Learn how to safely use, store and throw away your medicines at www.disposemymeds.org.          This list is accurate as of 4/4/18  7:37 AM.  Always use your most recent med list.                   Brand Name Dispense Instructions for use Diagnosis    BIOTIN PO      Take  by mouth daily.        calcipotriene-betameth diprop 0.005-0.064 % Oint    TACLONEX    60 g    Externally apply  topically 2 times daily as needed.    Psoriasis       citalopram 10 MG tablet    celeXA     Take 10 mg by mouth daily        CONCERTA 18 MG CR tablet   Generic drug:  methylphenidate ER      Take 18 mg by mouth every morning.        DAILY MULTIVITAMIN PO      Take  by mouth daily.        fish oil-omega-3 fatty acids 1000 MG capsule      Take 2 g by mouth daily        fluocinonide 0.05 % solution    LIDEX    60 mL    Apply  topically 2 times daily for itchy scalp or scalp psoriasis    Psoriasis       olopatadine HCl 0.2 % Soln    PATADAY    2.5 mL    PLACE 1 DROP INTO BOTH EYES DAILY    Allergic conjunctivitis of both eyes       pimecrolimus 1 % cream    ELIDEL    60 g    Apply topically 2 times daily as needed For psoriasis of face or genitals    Psoriasis       REFRESH P.M. Oint     1 Tube    Apply 1 inch to eye nightly as needed    Tear film insufficiency, unspecified       tacrolimus 0.1 % ointment    PROTOPIC    60 g    Apply topically 2 times daily Apply to areas around eyes as needed    Psoriasis       Vitamin C 100 MG Tabs      Take  by mouth daily.        VITAMIN D (CHOLECALCIFEROL) PO      Take by mouth daily

## 2018-04-10 ENCOUNTER — OFFICE VISIT (OUTPATIENT)
Dept: DERMATOLOGY | Facility: CLINIC | Age: 63
End: 2018-04-10
Payer: COMMERCIAL

## 2018-04-10 DIAGNOSIS — L40.9 PSORIASIS: Primary | ICD-10-CM

## 2018-04-10 ASSESSMENT — PAIN SCALES - GENERAL: PAINLEVEL: NO PAIN (0)

## 2018-04-10 NOTE — PROGRESS NOTES
CHIEF COMPLAINT:  Followup on plaque-type psoriasis.      SUBJECTIVE:  Tony is a very pleasant, 62-year-old male with long-standing plaque-type psoriasis affecting less than 20% of his total body surface area.  His persistent areas of involvement have been on the lower extremities below the knees.  He has been on narrow-band UVB phototherapy for many years.  He was last seen in our clinic on 09/12/2017, at which time he was doing fairly well on narrow-band UVB once per week supplementing with topical steroids, including Taclonex for his lower extremities.  At that time we continued that plan and also suggested he could use Taclonex under Saran wrap occlusion for recalcitrant areas on the shins.  Today he reports that he is continuing to do well with no significant flares.  He thinks that the Saran wrap occlusion overnight is extremely helpful when he has a flare-up.  His persistent areas of involvement are around his ankles, but otherwise he is doing well.  He also has a history of mild psoriatic arthritis and previously has been managed with physical therapy and NSAIDs.  He has a followup appointment with Rheumatology in 1 week to discuss a mild recent flare.  He thinks these symptoms are manageable and would prefer not to take additional medications if possible.  He has 2 other additional questions today:   1.  He has noticed small, whitish macules on his upper chest over the past month and is wondering what these may be.  They are more obvious to him since he has been in the light beck more recently.   2.  He had an episode within the last month, which he is wondering is his Raynaud disease.  For approximately 4 days, his hands felt like they had been out in the cold and were in the process of rewarming, and he also noticed swelling.  He denies any lower extremity swelling and is not on any antihypertensive medications or other new medicines.      REVIEW OF SYSTEMS:  No recent fevers or chills.  Mild morning  stiffness as noted above.      PHYSICAL EXAMINATION:   GENERAL:  This is a well-appearing, well-nourished male with a normal mood and affect who is oriented x3.   SKIN:  A cutaneous exam of the head, neck, bilateral upper and lower extremities was performed.  On the bilateral lower shins and lateral ankles, there are well-marginated, thin, pink, scaly plaques consistent with partially treated psoriasis.      ASSESSMENT AND PLAN:  Limited plaque-type psoriasis, generally with good control of long-term narrow-band UVB phototherapy.  Also, mild symptoms of psoriatic arthritis, for which he will see Rheumatology shortly.  Today's plan is as follows:   1.  He will continue narrow-band UVB phototherapy once weekly.   2.  He will continue Taclonex as needed for flares, with bedtime occlusion with Saran wrap.   3.  He will also use Protopic for affected areas around his eyes and Lidex solution for his scalp once to twice daily.   4.  Hand swelling.  I do not believe his symptoms represent the Raynaud phenomenon.  It would be unusual for a 62-year-old male to develop Raynaud phenomenon; typically, this would herald onset of a new autoimmune disease.  The exact etiology of his symptoms is unclear to me, but I do not believe it is typical Raynaud's.  I suggested that if this happens again he take photos and contact us directly for a more specific diagnosis.   5.  Hypopigmented macules on the chest.  KOH scraping is negative for fungal elements.  These may represent subtle stucco keratoses.  Reassurance was provided.   6.  He will follow up in our clinic in 6 months.       Petar Corbett MD  Dermatology Attending

## 2018-04-10 NOTE — LETTER
4/10/2018       RE: Tony Mcnair  [address] [place] [phone]     Dear Colleague,    Thank you for referring your patient, Tony Mcnair, to the [facility] DERMATOLOGY at [facility]. Please see a copy of my visit note below.    CHIEF COMPLAINT:  Followup on plaque-type psoriasis.      SUBJECTIVE:  Tony is a very pleasant, 62-year-old male with long-standing plaque-type psoriasis affecting less than 20% of his total body surface area.  His persistent areas of involvement have been on the lower extremities below the knees.  He has been on narrow-band UVB phototherapy for many years.  He was last seen in our clinic on 09/12/2017, at which time he was doing fairly well on narrow-band UVB once per week supplementing with topical steroids, including Taclonex for his lower extremities.  At that time we continued that plan and also suggested he could use Taclonex under Saran wrap occlusion for recalcitrant areas on the shins.  Today he reports that he is continuing to do well with no significant flares.  He thinks that the Saran wrap occlusion overnight is extremely helpful when he has a flare-up.  His persistent areas of involvement are around his ankles, but otherwise he is doing well.  He also has a history of mild psoriatic arthritis and previously has been managed with physical therapy and NSAIDs.  He has a followup appointment with Rheumatology in 1 week to discuss a mild recent flare.  He thinks these symptoms are manageable and would prefer not to take additional medications if possible.  He has 2 other additional questions today:   1.  He has noticed small, whitish macules on his upper chest over the past month and is wondering what these may be.  They are more obvious to him since he has been in the light beck more recently.   2.  He had an episode within the last month, which he is wondering is his Raynaud disease.  For approximately 4 days, his hands felt like  they had been out in the cold and were in the process of rewarming, and he also noticed swelling.  He denies any lower extremity swelling and is not on any antihypertensive medications or other new medicines.      REVIEW OF SYSTEMS:  No recent fevers or chills.  Mild morning stiffness as noted above.      PHYSICAL EXAMINATION:   GENERAL:  This is a well-appearing, well-nourished male with a normal mood and affect who is oriented x3.   SKIN:  A cutaneous exam of the head, neck, bilateral upper and lower extremities was performed.  On the bilateral lower shins and lateral ankles, there are well-marginated, thin, pink, scaly plaques consistent with partially treated psoriasis.      ASSESSMENT AND PLAN:  Limited plaque-type psoriasis, generally with good control of long-term narrow-band UVB phototherapy.  Also, mild symptoms of psoriatic arthritis, for which he will see Rheumatology shortly.  Today's plan is as follows:   1.  He will continue narrow-band UVB phototherapy once weekly.   2.  He will continue Taclonex as needed for flares, with bedtime occlusion with Saran wrap.   3.  He will also use Protopic for affected areas around his eyes and Lidex solution for his scalp once to twice daily.   4.  Hand swelling.  I do not believe his symptoms represent the Raynaud phenomenon.  It would be unusual for a 62-year-old male to develop Raynaud phenomenon; typically, this would herald onset of a new autoimmune disease.  The exact etiology of his symptoms is unclear to me, but I do not believe it is typical Raynaud's.  I suggested that if this happens again he take photos and contact us directly for a more specific diagnosis.   5.  Hypopigmented macules on the chest.  KOH scraping is negative for fungal elements.  These may represent subtle stucco keratoses.  Reassurance was provided.   6.  He will follow up in our clinic in 6 months.       Petar Corbett MD  Dermatology Attending

## 2018-04-10 NOTE — NURSING NOTE
"Dermatology Rooming Note    Tony Mcnair's goals for this visit include:   Chief Complaint   Patient presents with     Derm Problem     Psoriasis, Tony states \" It has flared up the past couple days but for the most part it is good.\"     Alexia Howe LPN  "

## 2018-04-10 NOTE — PATIENT INSTRUCTIONS
Suggestions for general practicioners:  Dr. Phan Durham    Preventive Care:    Colorectal Cancer Screening: During our visit today, we discussed that it is recommended you receive colorectal cancer screening. Please call or make an appointment with your primary care provider to discuss this. You may also call the [facility] scheduling line ([phone]) to set up a colonoscopy appointment.

## 2018-04-10 NOTE — MR AVS SNAPSHOT
After Visit Summary   4/10/2018    Tony Mcnair    MRN: [id number]           Patient Information     Date Of Birth          1955        Visit Information        Provider Department      4/10/2018 8:15 AM Petar Corbett MD [facility] Dermatology        Care Instructions    Suggestions for general practicioners:  Dr. Phan Durham    Preventive Care:    Colorectal Cancer Screening: During our visit today, we discussed that it is recommended you receive colorectal cancer screening. Please call or make an appointment with your primary care provider to discuss this. You may also call the [facility] scheduling line ([phone]) to set up a colonoscopy appointment.              Follow-ups after your visit        Your next 10 appointments already scheduled     Apr 11, 2018  7:20 AM CDT   (Arrive by 7:05 AM)   Light Treatment Visit with UC DERM LIGHT TREATMENT   [facility] Dermatology ([facility])    [address] [place] [address] [id number] [phone]            Apr 18, 2018  7:20 AM CDT   (Arrive by 7:05 AM)   Light Treatment Visit with UC DERM LIGHT TREATMENT   [facility] Derm[facility] ([facility])    [address] [place] [address] [id number] [phone]            Apr 25, 2018  7:20 AM CDT   (Arrive by 7:05 AM)   Light Treatment Visit with UC DERM LIGHT TREATMENT   [facility] Derm[facility] ([facility])    [address] [place] [id number] [phone]            Apr 30, 2018  7:00 AM CDT   (Arrive by 6:45 AM)   Return Visit with CHAZ Gupta CNP   [facility] Rheumatology ([facility])    [address] [place] [address]  [address] [place] [address] [id number] [phone]              Who to contact     Please call your clinic at [phone] to:    Ask questions about your health    Make or cancel  appointments    Discuss your medicines    Learn about your test results    Speak to your doctor            Additional Information About Your Visit        [employer] Information     [employer] gives you secure access to your electronic health record. If you see a primary care provider, you can also send messages to your care team and make appointments. If you have questions, please call your primary care clinic.  If you do not have a primary care provider, please call [phone] and they will assist you.      [employer] is an electronic gateway that provides easy, online access to your medical records. With [employer], you can request a clinic appointment, read your test results, renew a prescription or communicate with your care team.     To access your existing account, please contact your [facility] or call [phone] for assistance.        Care EveryWhere ID     This is your Care EveryWhere ID. This could be used by other organizations to access your [facility] medical records  [id number]         Blood Pressure from Last 3 Encounters:   05/05/17 121/87   01/23/15 (!) 145/100   10/08/14 133/81    Weight from Last 3 Encounters:   05/05/17 76.4 kg (168 lb 8 oz)   01/23/15 78 kg (172 lb)   10/08/14 75.3 kg (166 lb)              Today, you had the following     No orders found for display       Primary Care Provider Office Phone # Fax #    William Morton [phone]       [address] [address]        Equal Access to Services     TORI EDWARDS : Hadii aad ku hadasho Soblaineali, waaxda luqadaha, qaybta kaalmada adeegyada, renzo monroe. So [facility] [phone].    ATENCIÓN: Si habla español, tiene a garrett disposición servicios gratuitos de asistencia lingüística. Llnaa al [phone].    We comply with applicable federal civil rights laws and [place] laws. We do not discriminate on the basis of race, color,  national origin, age, disability, sex, sexual orientation, or gender identity.            Thank you!     Thank you for choosing [facility] DERMATOLOGY  for your care. Our goal is always to provide you with excellent care. Hearing back from our patients is one way we can continue to improve our services. Please take a few minutes to complete the written survey that you may receive in the mail after your visit with us. Thank you!             Your Updated Medication List - Protect others around you: Learn how to safely use, store and throw away your medicines at www.disposemymeds.org.          This list is accurate as of 4/10/18  8:41 AM.  Always use your most recent med list.                   Brand Name Dispense Instructions for use Diagnosis    BIOTIN PO      Take  by mouth daily.        calcipotriene-betameth diprop 0.005-0.064 % Oint    TACLONEX    60 g    Externally apply  topically 2 times daily as needed.    Psoriasis       citalopram 10 MG tablet    celeXA     Take 10 mg by mouth daily        CONCERTA 18 MG CR tablet   Generic drug:  methylphenidate ER      Take 18 mg by mouth every morning.        DAILY MULTIVITAMIN PO      Take  by mouth daily.        fish oil-omega-3 fatty acids 1000 MG capsule      Take 2 g by mouth daily        fluocinonide 0.05 % solution    LIDEX    60 mL    Apply  topically 2 times daily for itchy scalp or scalp psoriasis    Psoriasis       olopatadine HCl 0.2 % Soln    PATADAY    2.5 mL    PLACE 1 DROP INTO BOTH EYES DAILY    Allergic conjunctivitis of both eyes       pimecrolimus 1 % cream    ELIDEL    60 g    Apply topically 2 times daily as needed For psoriasis of face or genitals    Psoriasis       REFRESH P.M. Oint     1 Tube    Apply 1 inch to eye nightly as needed    Tear film insufficiency, unspecified       tacrolimus 0.1 % ointment    PROTOPIC    60 g    Apply topically 2 times daily Apply to areas around eyes as needed    Psoriasis       Vitamin C 100 MG Tabs      Take  by  mouth daily.        VITAMIN D (CHOLECALCIFEROL) PO      Take by mouth daily

## 2018-04-11 ENCOUNTER — ALLIED HEALTH/NURSE VISIT (OUTPATIENT)
Dept: DERMATOLOGY | Facility: CLINIC | Age: 63
End: 2018-04-11
Payer: COMMERCIAL

## 2018-04-11 DIAGNOSIS — L40.9 PSORIASIS: ICD-10-CM

## 2018-04-11 NOTE — PROGRESS NOTES
[facility] Dermatology Phototherapy Record  1. Tony Mcnair is a 62 year old male is here today for phototherapy (UVB) treatment for Psoriasis.        Changes or new medications since last treatment:   NO    New medical conditions or problems since last treatment:   NO    Any problems with last phototherapy treatment?    NO    2. The patient tolerated phototherapy without complication    Patient will return for next UVB treatment, per protocol.     Patient to see provider every 4-12 weeks for follow-up during treatment.      All questions and concerns discussed with patient in clinic today.      Georgina Wills RN

## 2018-04-11 NOTE — MR AVS SNAPSHOT
After Visit Summary   4/11/2018    Tony Mcnair    MRN: [id number]           Patient Information     Date Of Birth          1955        Visit Information        Provider Department      4/11/2018 7:20 AM UC DERM LIGHT TREATMENT [facility] D[facility]        Today's Diagnoses     Psoriasis           Follow-ups after your visit        Your next 10 appointments already scheduled     Apr 18, 2018  7:20 AM CDT   (Arrive by 7:05 AM)   Light Treatment Visit with UC DERM LIGHT TREATMENT   [facility] D[facility] ([facility])    [address]  3rd [facility] [address] [phone]            Apr 25, 2018  7:20 AM CDT   (Arrive by 7:05 AM)   Light Treatment Visit with UC DERM LIGHT TREATMENT   [facility] ([facility])    [address] [address] [phone]            Apr 30, 2018  7:00 AM CDT   (Arrive by 6:45 AM)   Return Visit with CHAZ Gupta [facility] ([facility])    [address] [place] [address]  [address]  [facility] [address] [phone]              Who to contact     Please call your clinic at [phone] to:    Ask questions about your health    Make or cancel appointments    Discuss your medicines    Learn about your test results    Speak to your doctor            Additional Information About Your Visit        [employer] Information     [employer] gives you secure access to your electronic health record. If you see a primary care provider, you can also send messages to your care team and make appointments. If you have questions, please call your primary care clinic.  If you do not have a primary care provider, please call [phone] and they will assist you.      [employer] is an electronic gateway that provides easy, online access to your medical records. With [employer], you can request a clinic appointment, read your test results, renew  a prescription or communicate with your care team.     To access your existing account, please contact your [facility] or call [phone] for assistance.        Care EveryWhere ID     This is your Care EveryWhere ID. This could be used by other organizations to access your [place] medical records  [id number]         Blood Pressure from Last 3 Encounters:   05/05/17 121/87   01/23/15 (!) 145/100   10/08/14 133/81    Weight from Last 3 Encounters:   05/05/17 76.4 kg (168 lb 8 oz)   01/23/15 78 kg (172 lb)   10/08/14 75.3 kg (166 lb)              We Performed the Following     CHARGE - PHOTOTHERAPY NBUVB (UV LIGHT)     PROCEDURE - PHOTOTHERAPY NBUVB        Primary Care Provider Office Phone # Fax #    William Morton [phone]       [place] [address]        Equal Access to Services     TORI EDWARDS : Hadii zina shoemakero Elia, waaxda lustevenadaha, qaybta kaalmada adeciara, renzo haider . So [facility] [phone].    ATENCIÓN: Si habla español, tiene a garrett disposición servicios gratuitos de asistencia lingüística. Llame al [phone].    We comply with applicable federal civil rights laws and [place] laws. We do not discriminate on the basis of race, color, national origin, age, disability, sex, sexual orientation, or gender identity.            Thank you!     Thank you for choosing [facility] DERMAT[facility]  for your care. Our goal is always to provide you with excellent care. Hearing back from our patients is one way we can continue to improve our services. Please take a few minutes to complete the written survey that you may receive in the mail after your visit with us. Thank you!             Your Updated Medication List - Protect others around you: Learn how to safely use, store and throw away your medicines at www.disposemymeds.org.          This list is accurate as of 4/11/18  7:35 AM.   Always use your most recent med list.                   Brand Name Dispense Instructions for use Diagnosis    BIOTIN PO      Take  by mouth daily.        calcipotriene-betameth diprop 0.005-0.064 % Oint    TACLONEX    60 g    Externally apply  topically 2 times daily as needed.    Psoriasis       citalopram 10 MG tablet    celeXA     Take 10 mg by mouth daily        CONCERTA 18 MG CR tablet   Generic drug:  methylphenidate ER      Take 18 mg by mouth every morning.        DAILY MULTIVITAMIN PO      Take  by mouth daily.        fish oil-omega-3 fatty acids 1000 MG capsule      Take 2 g by mouth daily        fluocinonide 0.05 % solution    LIDEX    60 mL    Apply  topically 2 times daily for itchy scalp or scalp psoriasis    Psoriasis       olopatadine HCl 0.2 % Soln    PATADAY    2.5 mL    PLACE 1 DROP INTO BOTH EYES DAILY    Allergic conjunctivitis of both eyes       pimecrolimus 1 % cream    ELIDEL    60 g    Apply topically 2 times daily as needed For psoriasis of face or genitals    Psoriasis       REFRESH P.M. Oint     1 Tube    Apply 1 inch to eye nightly as needed    Tear film insufficiency, unspecified       tacrolimus 0.1 % ointment    PROTOPIC    60 g    Apply topically 2 times daily Apply to areas around eyes as needed    Psoriasis       Vitamin C 100 MG Tabs      Take  by mouth daily.        VITAMIN D (CHOLECALCIFEROL) PO      Take by mouth daily

## 2018-04-18 ENCOUNTER — ALLIED HEALTH/NURSE VISIT (OUTPATIENT)
Dept: DERMATOLOGY | Facility: CLINIC | Age: 63
End: 2018-04-18
Payer: COMMERCIAL

## 2018-04-18 DIAGNOSIS — L40.9 PSORIASIS: ICD-10-CM

## 2018-04-18 NOTE — MR AVS SNAPSHOT
After Visit Summary   4/18/2018    Tony Mcnair    MRN: [id number]           Patient Information     Date Of Birth          1955        Visit Information        Provider Department      4/18/2018 7:20 AM UC DERM LIGHT TREATMENT [facility] D[facility]at[facility]        Today's Diagnoses     Psoriasis           Follow-ups after your visit        Your next 10 appointments already scheduled     Apr 25, 2018  7:20 AM CDT   (Arrive by 7:05 AM)   Light Treatment Visit with UC DERM LIGHT TREATMENT   [facility] ([facility])    [address]  3rd Floor  [facility] [address] [phone]            Apr 30, 2018  7:00 AM CDT   (Arrive by 6:45 AM)   Return Visit with CHAZ Gupta CNP   [facility] ([facility])    [address]  [address]  [facility] [address] [phone]              Who to contact     Please call your clinic at [phone] to:    Ask questions about your health    Make or cancel appointments    Discuss your medicines    Learn about your test results    Speak to your doctor            Additional Information About Your Visit        [employer] Information     [employer] gives you secure access to your electronic health record. If you see a primary care provider, you can also send messages to your care team and make appointments. If you have questions, please call your primary care clinic.  If you do not have a primary care provider, please call [phone] and they will assist you.      [employer] is an electronic gateway that provides easy, online access to your medical records. With [employer], you can request a clinic appointment, read your test results, renew a prescription or communicate with your care team.     To access your existing account, please contact your [facility] or call [phone] for assistance.        Care EveryWhere ID     This is your Care EveryWhere ID.  This could be used by other organizations to access your [place] medical records  [id number]         Blood Pressure from Last 3 Encounters:   05/05/17 121/87   01/23/15 (!) 145/100   10/08/14 133/81    Weight from Last 3 Encounters:   05/05/17 76.4 kg (168 lb 8 oz)   01/23/15 78 kg (172 lb)   10/08/14 75.3 kg (166 lb)              We Performed the Following     CHARGE - PHOTOTHERAPY NBUVB (UV LIGHT)     PROCEDURE - PHOTOTHERAPY NBUVB        Primary Care Provider Office Phone # Fax #    William Morton [phone]       [address] [address]        Equal Access to Services     TORI EDWARDS : Stan Rodrigez, waaxda lustevenadaha, qaybta kaalmada saira, renzo monroe. So [facility] [phone].    ATENCIÓN: Si habla español, tiene a garrett disposición servicios gratuitos de asistencia lingüística. eZ al [phone].    We comply with applicable federal civil rights laws and [place] laws. We do not discriminate on the basis of race, color, national origin, age, disability, sex, sexual orientation, or gender identity.            Thank you!     Thank you for choosing [facility] DERMATOLOG[facility]  for your care. Our goal is always to provide you with excellent care. Hearing back from our patients is one way we can continue to improve our services. Please take a few minutes to complete the written survey that you may receive in the mail after your visit with us. Thank you!             Your Updated Medication List - Protect others around you: Learn how to safely use, store and throw away your medicines at www.disposemymeds.org.          This list is accurate as of 4/18/18  9:21 AM.  Always use your most recent med list.                   Brand Name Dispense Instructions for use Diagnosis    BIOTIN PO      Take  by mouth daily.        calcipotriene-betameth diprop 0.005-0.064 % Oint    TACLONEX    60 g    Externally apply   topically 2 times daily as needed.    Psoriasis       citalopram 10 MG tablet    celeXA     Take 10 mg by mouth daily        CONCERTA 18 MG CR tablet   Generic drug:  methylphenidate ER      Take 18 mg by mouth every morning.        DAILY MULTIVITAMIN PO      Take  by mouth daily.        fish oil-omega-3 fatty acids 1000 MG capsule      Take 2 g by mouth daily        fluocinonide 0.05 % solution    LIDEX    60 mL    Apply  topically 2 times daily for itchy scalp or scalp psoriasis    Psoriasis       olopatadine HCl 0.2 % Soln    PATADAY    2.5 mL    PLACE 1 DROP INTO BOTH EYES DAILY    Allergic conjunctivitis of both eyes       pimecrolimus 1 % cream    ELIDEL    60 g    Apply topically 2 times daily as needed For psoriasis of face or genitals    Psoriasis       REFRESH P.M. Oint     1 Tube    Apply 1 inch to eye nightly as needed    Tear film insufficiency, unspecified       tacrolimus 0.1 % ointment    PROTOPIC    60 g    Apply topically 2 times daily Apply to areas around eyes as needed    Psoriasis       Vitamin C 100 MG Tabs      Take  by mouth daily.        VITAMIN D (CHOLECALCIFEROL) PO      Take by mouth daily

## 2018-04-18 NOTE — PROGRESS NOTES
[facility] Dermatology Phototherapy Record  1. Tony Mcnair is a 62 year old male is here today for phototherapy (UVB) treatment for Psoriasis.        Changes or new medications since last treatment:   NO    New medical conditions or problems since last treatment:   NO    Any problems with last phototherapy treatment?    NO    2. The patient tolerated phototherapy without complication    Patient will return  for next UVB treatment, per protocol.     Patient to see provider every 4-12 weeks for follow-up during treatment.      All questions and concerns discussed with patient in clinic today.      Georgina Wills RN

## 2018-04-25 ENCOUNTER — ALLIED HEALTH/NURSE VISIT (OUTPATIENT)
Dept: DERMATOLOGY | Facility: CLINIC | Age: 63
End: 2018-04-25
Payer: COMMERCIAL

## 2018-04-25 DIAGNOSIS — L40.9 PSORIASIS: ICD-10-CM

## 2018-04-25 NOTE — MR AVS SNAPSHOT
After Visit Summary   4/25/2018    Tony Mcnair    MRN: [id number]           Patient Information     Date Of Birth          1955        Visit Information        Provider Department      4/25/2018 7:20 AM UC DERM LIGHT TREATMENT [facility] Dermat[facility]        Today's Diagnoses     Psoriasis           Follow-ups after your visit        Your next 10 appointments already scheduled     Apr 25, 2018  7:20 AM CDT   (Arrive by 7:05 AM)   Light Treatment Visit with UC DERM LIGHT TREATMENT   [facility] Derm[facility] ([facility])    [address]  3rd Floor  [facility] [address] [phone]            May 07, 2018  7:30 AM CDT   (Arrive by 7:15 AM)   Return Visit with CHAZ Gupta CNP   [facility] Rheumat[facility] ([facility])    [address]  [address]  [facility] [address] [phone]              Who to contact     Please call your clinic at [phone] to:    Ask questions about your health    Make or cancel appointments    Discuss your medicines    Learn about your test results    Speak to your doctor            Additional Information About Your Visit        [employer] Information     [employer] gives you secure access to your electronic health record. If you see a primary care provider, you can also send messages to your care team and make appointments. If you have questions, please call your primary care clinic.  If you do not have a primary care provider, please call [phone] and they will assist you.      [employer] is an electronic gateway that provides easy, online access to your medical records. With [employer], you can request a clinic appointment, read your test results, renew a prescription or communicate with your care team.     To access your existing account, please contact your [facility] or call [phone] for assistance.        Care EveryWhere ID     This is your Care EveryWhere ID.  This could be used by other organizations to access your [place] medical records  [id number]         Blood Pressure from Last 3 Encounters:   05/05/17 121/87   01/23/15 (!) 145/100   10/08/14 133/81    Weight from Last 3 Encounters:   05/05/17 76.4 kg (168 lb 8 oz)   01/23/15 78 kg (172 lb)   10/08/14 75.3 kg (166 lb)              We Performed the Following     CHARGE - PHOTOTHERAPY NBUVB (UV LIGHT)     PROCEDURE - PHOTOTHERAPY NBUVB        Primary Care Provider Office Phone # Fax #    William Morton [phone]       [address] [address]        Equal Access to Services     TORI EDWARDS : Stan shoemakero Elia, waaxda lustevenadaha, qaybta kaalmada saira, renzo monroe. So [facility] [phone].    ATENCIÓN: Si habla español, tiene a garrett disposición servicios gratuitos de asistencia lingüística. Ze al [phone].    We comply with applicable federal civil rights laws and [place] laws. We do not discriminate on the basis of race, color, national origin, age, disability, sex, sexual orientation, or gender identity.            Thank you!     Thank you for choosing [facility] DERMATOLOGY  for your care. Our goal is always to provide you with excellent care. Hearing back from our patients is one way we can continue to improve our services. Please take a few minutes to complete the written survey that you may receive in the mail after your visit with us. Thank you!             Your Updated Medication List - Protect others around you: Learn how to safely use, store and throw away your medicines at www.disposemymeds.org.          This list is accurate as of 4/25/18  7:20 AM.  Always use your most recent med list.                   Brand Name Dispense Instructions for use Diagnosis    BIOTIN PO      Take  by mouth daily.        calcipotriene-betameth diprop 0.005-0.064 % Oint    TACLONEX    60 g    Externally apply   topically 2 times daily as needed.    Psoriasis       citalopram 10 MG tablet    celeXA     Take 10 mg by mouth daily        CONCERTA 18 MG CR tablet   Generic drug:  methylphenidate ER      Take 18 mg by mouth every morning.        DAILY MULTIVITAMIN PO      Take  by mouth daily.        fish oil-omega-3 fatty acids 1000 MG capsule      Take 2 g by mouth daily        fluocinonide 0.05 % solution    LIDEX    60 mL    Apply  topically 2 times daily for itchy scalp or scalp psoriasis    Psoriasis       olopatadine HCl 0.2 % Soln    PATADAY    2.5 mL    PLACE 1 DROP INTO BOTH EYES DAILY    Allergic conjunctivitis of both eyes       pimecrolimus 1 % cream    ELIDEL    60 g    Apply topically 2 times daily as needed For psoriasis of face or genitals    Psoriasis       REFRESH P.M. Oint     1 Tube    Apply 1 inch to eye nightly as needed    Tear film insufficiency, unspecified       tacrolimus 0.1 % ointment    PROTOPIC    60 g    Apply topically 2 times daily Apply to areas around eyes as needed    Psoriasis       Vitamin C 100 MG Tabs      Take  by mouth daily.        VITAMIN D (CHOLECALCIFEROL) PO      Take by mouth daily

## 2018-04-25 NOTE — PROGRESS NOTES
[facility] Dermat[facility] Phototherapy Record  1. Tony Mcnair is a 62 year old male is here today for phototherapy (UVB) treatment for Psoriasis.        Changes or new medications since last treatment:   NO    New medical conditions or problems since last treatment:   NO    Any problems with last phototherapy treatment?    NO    2. The patient tolerated phototherapy without complication    Patient will return for next UVB treatment, per protocol.     Patient to see provider every 4-12 weeks for follow-up during treatment.      All questions and concerns discussed with patient in clinic today.      Georgina Wills RN

## 2018-05-07 ENCOUNTER — OFFICE VISIT (OUTPATIENT)
Dept: RHEUMATOLOGY | Facility: CLINIC | Age: 63
End: 2018-05-07
Attending: NURSE PRACTITIONER
Payer: COMMERCIAL

## 2018-05-07 VITALS
DIASTOLIC BLOOD PRESSURE: 75 MMHG | SYSTOLIC BLOOD PRESSURE: 130 MMHG | WEIGHT: 171 LBS | TEMPERATURE: 98.3 F | HEIGHT: 70 IN | HEART RATE: 70 BPM | BODY MASS INDEX: 24.48 KG/M2

## 2018-05-07 DIAGNOSIS — M75.42 IMPINGEMENT SYNDROME OF SHOULDER REGION, LEFT: Primary | ICD-10-CM

## 2018-05-07 DIAGNOSIS — L40.9 PSORIASIS: ICD-10-CM

## 2018-05-07 PROCEDURE — G0463 HOSPITAL OUTPT CLINIC VISIT: HCPCS | Mod: ZF

## 2018-05-07 ASSESSMENT — PAIN SCALES - GENERAL: PAINLEVEL: MILD PAIN (2)

## 2018-05-07 NOTE — MR AVS SNAPSHOT
After Visit Summary   5/7/2018    Tony Mcnair    MRN: [id number]           Patient Information     Date Of Birth          1955        Visit Information        Provider Department      5/7/2018 7:30 AM Juaquin Saxena APRN Counts include 234 beds at the [facility] Rheumatology        Today's Diagnoses     Impingement syndrome of shoulder region, left    -  1    Psoriasis          Care Instructions    Preventive Care:    Colorectal Cancer Screening: During our visit today, we discussed that it is recommended you receive colorectal cancer screening. Please call or make an appointment with your primary care provider to discuss this. You may also call the [facility] scheduling line ([phone]) to set up a colonoscopy appointment.      [facility] [place], Dr Camacho   Dermatologist there Dr. Rogel    Shingrix vaccine. Recommended shingrix. CDC recommends this vaccine 2 doses,  by 2-6 months for adults 50 years and older. It is killed virus and ok to be used in immunocompromised patients. Shingrix reduces the risk of shingles and post-herpetic neuralgia (PH) by more than 90% in people 50 and older.      Possible side-effects include: pain, redness and swelling at the inj site, myalgia, fatigue, headaches, shivering, fever and stomach upset. Shingrix is an inactive, subunit vaccine for H. zoster, >50 yrs, given as 2 IM doses (0.5 mL each), 2nd dose 2-6 months after 1st. Shingrix is more effective than Zostavax but has more possible side effects.                       Follow-ups after your visit        Additional Services     ORTHO  REFERRAL       [facility] Services is referring you to the Orthopedic  Services at [facility].       The  Representative will assist you in the coordination of your Orthopedic and Musculoskeletal Care as prescribed by your physician.    The  Representative will call you within 1 business day to help schedule your appointment,  or you may contact the [facility] Representative at:    All areas ~ [phone]     Type of Referral : Non Surgical       Timeframe requested: Within 2 weeks    Coverage of these services is subject to the terms and limitations of your health insurance plan.  Please call member services at your health plan with any benefit or coverage questions.      If X-rays, CT or MRI's have been performed, please contact the facility where they were done to arrange for , prior to your scheduled appointment.  Please bring this referral request to your appointment and present it to your specialist.                  Follow-up notes from your care team     Return in about 1 year (around 5/7/2019), or if symptoms worsen or fail to improve.      Who to contact     If you have questions or need follow up information about today's clinic visit or your schedule please contact [facility] RHEUMATOLOGY directly at [phone].  Normal or non-critical lab and imaging results will be communicated to you by MyChart, letter or phone within 4 business days after the clinic has received the results. If you do not hear from us within 7 days, please contact the clinic through [employer] or phone. If you have a critical or abnormal lab result, we will notify you by phone as soon as possible.  Submit refill requests through [employer] or call your pharmacy and they will forward the refill request to us. Please allow 3 business days for your refill to be completed.          Additional Information About Your Visit        [employer] Information     [employer] gives you secure access to your electronic health record. If you see a primary care provider, you can also send messages to your care team and make appointments. If you have questions, please call your primary care clinic.  If you do not have a primary care provider, please call [phone] and they will assist you.        Care EveryWhere ID     This is your Care EveryWhere ID. This could be used by  "other organizations to access your [place] medical records  [id number]        Your Vitals Were     Pulse Temperature Height BMI (Body Mass Index)          70 98.3  F (36.8  C) (Oral) 1.778 m (5' 10\") 24.54 kg/m2         Blood Pressure from Last 3 Encounters:   05/07/18 130/75   05/05/17 121/87   01/23/15 (!) 145/100    Weight from Last 3 Encounters:   05/07/18 77.6 kg (171 lb)   05/05/17 76.4 kg (168 lb 8 oz)   01/23/15 78 kg (172 lb)              We Performed the Following     ORTHO  REFERRAL          Today's Medication Changes          These changes are accurate as of 5/7/18  8:14 AM.  If you have any questions, ask your nurse or doctor.               Stop taking these medicines if you haven't already. Please contact your care team if you have questions.     Vitamin C 100 MG Tabs   Stopped by:  uJaquin Saxena APRN CNP                    Primary Care Provider Office Phone # Fax #    William Morton [phone]       [place] [address]        Equal Access to Services     DARYN EDWARDS : Hadii zina rios hadasho Soanna, waaxda luqadaha, qaybta kaalmada césarda, renzo monroe. So [facility] [phone].    ATENCIÓN: Si habla español, tiene a garrett disposición servicios gratuitos de asistencia lingüística. Ze al [phone].    We comply with applicable federal civil rights laws and [place] laws. We do not discriminate on the basis of race, color, national origin, age, disability, sex, sexual orientation, or gender identity.            Thank you!     Thank you for choosing [facility] RHEUMATOLOGY  for your care. Our goal is always to provide you with excellent care. Hearing back from our patients is one way we can continue to improve our services. Please take a few minutes to complete the written survey that you may receive in the mail after your visit with us. Thank you!             Your Updated Medication List " - Protect others around you: Learn how to safely use, store and throw away your medicines at www.disposemymeds.org.          This list is accurate as of 5/7/18  8:14 AM.  Always use your most recent med list.                   Brand Name Dispense Instructions for use Diagnosis    BIOTIN PO      Take  by mouth daily.        calcipotriene-betameth diprop 0.005-0.064 % Oint    TACLONEX    60 g    Externally apply  topically 2 times daily as needed.    Psoriasis       citalopram 10 MG tablet    celeXA     Take 10 mg by mouth daily        CONCERTA 18 MG CR tablet   Generic drug:  methylphenidate ER      Take 18 mg by mouth every morning.        DAILY MULTIVITAMIN PO      Take  by mouth daily.        fish oil-omega-3 fatty acids 1000 MG capsule      Take 2 g by mouth daily        fluocinonide 0.05 % solution    LIDEX    60 mL    Apply  topically 2 times daily for itchy scalp or scalp psoriasis    Psoriasis       olopatadine HCl 0.2 % Soln    PATADAY    2.5 mL    PLACE 1 DROP INTO BOTH EYES DAILY    Allergic conjunctivitis of both eyes       pimecrolimus 1 % cream    ELIDEL    60 g    Apply topically 2 times daily as needed For psoriasis of face or genitals    Psoriasis       REFRESH P.M. Oint     1 Tube    Apply 1 inch to eye nightly as needed    Tear film insufficiency, unspecified       tacrolimus 0.1 % ointment    PROTOPIC    60 g    Apply topically 2 times daily Apply to areas around eyes as needed    Psoriasis       VITAMIN D (CHOLECALCIFEROL) PO      Take by mouth daily

## 2018-05-07 NOTE — LETTER
5/7/2018      RE: Tony Mcnair  [address] [address]       Rheumatology Visit     Date of Visit: May 7, 2018  Last seen: May 5, 2017  Primary care provider: William Morton          Assessment/Plan:     1. Left shoulder pain, impingement. No clear psoriatic arthritis today.     2. Psoriasis per derm     Plan:  1. Referral to sports medicine left shoulder. May try ice and NSAIDs (short-term) to help alleviate pain.    2. RTC 1 yr sooner prn  3. I educated him on more s/sx psoriatic arthritis and when he would need to come back to consider immunosuppression therapy.  Annual physical with PCP and did advise him to discuss shingrix vaccine with PCP/pharmacy    The patient understood the rational for the diagnosis and treatment plan. Patient shared in the decision making. All questions were answered to best of my ability and the patient's satisfaction  Juaquin WARE, CNP, MSN  [facility]  Department of Rheumatology & Autoimmune Disorders          History of Present Illness:   Tony Mcnair is a 62 year old male who presents as a follow-up for psoriatic arthritis.     Copy forward 1-2015: Review: Prior  (asymmetric oligoarthralgias including L hand 3rd and 4th DIP, L malleolus, R hip, morning stiffness, psoriasis, and fatigue). He has had psoriasis since ~ age 42. He developed gradual polyarthralgia over decades. Treatment for psoriatic arthritis began with NSAID monotherapy in 10-14.    : He had experienced pain in different joints for varying amounts of time (L foot/malleolus - ~20 years, R hip - ~10-15 years, L achilles - 1-2 months). Pain had gotten gradually worse over time. Hip pain can wake up Mr. Mcnair once a night, he takes ibuprofen and sits up and for 1-2 hours and then is able to fall back asleep. Foot pain (low level, but constant) can increase rapidly and cause him to feel lame and unable to walk for a few minutes. DIP pain is pressure sensitive and only  reports that it bothers him when he hits the joint with some force. Stiffness and pain is worse in the morning, improves quickly after waking up and then pain level/function is maintained for the rest of the day. Mr. Mcnair has noted that exercising regularly he experiences less pain, but that sometimes it's hard to find the motivation when he's in pain (even though he believes it helps - called it a catch-22).   1-2015:   Pt states that since Oct, he took the piroxicam which brought his pain from a 5-7/10 down to a 1-2/10 and now is not having any stiffness.  He states that he is doing much better and stopped the piroxicam a number of weeks ago.  He states that he did note some acid reflux but that resolved after a week of stopping the NSAID.  He did not try taking an prilosec or PPI.  He is very happy with how his joints are doing and is happy with his functional status.  He states that he did have trauma to the dorsum of his left foot a number of years ago and he still will have occasional left dorsum foot pain. The pt states that his psoriasis is doing very well too.  He continues to follow with dermatology and is using topical therapy and getting light therapy once/week.  He states that his psorasis is well controlled for him especially during the winter months in which it can be worse.     May 7, 2018    Left hip had immediate improvement with the PT I ordered last year. Psoriasis controlled, sees Dr. Corbett. No EAS, synovitis, tendonitis or dactylitis. No infections. States does workout 3 days week cardio and weight lifting. No pain in hands or feet. No loss ROM or functions. Left shoulder has been bothering his chronically. No know injuries, does bother to lift overhead, no swelling. No neuro changes. No plantar fascititis or achilles tendonitis. No inflammatory back symptoms. No flares. No prednisone use. Rare NSAID use     PMSH-  Injuries-left foot broke   No Blood transfusions  Medical-Shingles 1998 not  "anymore left eye \"damage vision\" distance, psoriasis   Surgical-left knee spur, hernia left/right repair, left shoulder (tear) , iris nevus right, lesion right upper lid excision     Family-  No autoimmune disorders, psoriasis, UC, crohn's, SLE, RA, PsA, gout.   Mom-breast CA, alzheimer, psoriasis \"fingers\" act up arthritis-passed  Dad-passed. CABG, CVI, carotid artery stenosis   Brother- passed heart disease, kidney and liver   1 other siblings healthy  Brother with MS 4 mth ago with at [place]. Slow kind   MGF-passed heart  MGM-lived long time   PGF-heart -passed   Dads side --aneursym or CVI     Social-  Ocassionlly now none alcohol. Never Smoking. NO Children. 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 No acute distress, swollen glands  EYES: No vision change, diplopia, pain in eyes or red eyes   EARS, NOSE, MOUTH, THROAT: No tinnitus or hearing change, no epistaxis or nasal discharge, no oral lesions, throat clear. Normal saliva pool.  No drymouth. No thyroid enlargement  CARDIOVASCULAR: No chest pain, palpitations, or pain with walking, no orthopnea or PND   RESPIRATORY: No dyspnea, cough, shortness of breath or wheezing. No pleurisy.   GI: No nausea, vomiting, diarrhea or constipation, no abdominal pain, or blood in stools.   : No change in urine, no dysuria or hematuria   MUSCKL: No swollen, tender, red or painful joints. No nodules. No enthesitis, plantar fascitis or heel pain.   INTEGUMENTARY: No concerning lesions or moles   NEURO: No loss of strength or sensation, no numbness or tingling, no tremor, no dizziness, no headache. No falls   ENDO: No polyuria or polydipsia, no temperature intolerance   HEME/LYMPH:No concerning bumps, bleeding problems, or swollen lymph nodes. No recent infections, hospitalizations or new illnesses.   ALLERGY: No environmental allergies   PSYCH:No depression or anxiety, no sleep " problems.  Otherwise 14 point ROS obtained, reviewed and found negative.     PE-  Constitutional: WD-WN-WG cooperative  Eyes: nl EOM, PERRLA, vision, conjunctiva, sclera  ENT: nl external ears, nose, hearing, lips, teeth, gums, throat. Nl saliva pool  No mucous membrane lesions, normal saliva pool  Neck: no mass or thyroid enlargement. non-tender.  Resp: lungs clear to auscultation, nl to palpation, nl breath sounds  CV: RRR, no murmurs, rubs or gallops, no edema  GI: no ABD mass or tenderness, no HSM. No RUQ pain.   : not tested  Lymph: no cervical, supraclavicular, inguinal or epitrochlear nodes  MSK: left shoulder +impingement. Cool. All other TMJ, neck, shoulder, elbow, wrist, MCP/PIP/DIP, spine, hip, knee, ankle, and foot MTP/IP joints were examined and  found normal. No active synovitis or deformity. Full ROM.  NL prayer sign. No periuncle erythema. Normal  strength. No dactylitis,  tenosynovitis, enthespathy. Negative MCP and MTP squeeze. No impingment signs of shoulders. Negative Lhermitte's sign.   Skin: No nail pitting, alopecia, rash, nodules or lesions.   Neuro: nl cranial nerves, strength, sensation  Psych: nl judgement, orientation, memory, affect.         Labs/Imaging:   Reviewed medications, labs, imaging, and EMR.   Additional review:  Labs with Pt: NO     Outside Records/Xrays: YES  Reviewed Rheumatology Lab Flowsheet & Lab Flowsheet    Results for orders placed or performed in visit on 05/05/17   CBC with platelets differential   Result Value Ref Range    WBC 7.0 4.0 - 11.0 10e9/L    RBC Count 4.60 4.4 - 5.9 10e12/L    Hemoglobin 14.8 13.3 - 17.7 g/dL    Hematocrit 41.5 40.0 - 53.0 %    MCV 90 78 - 100 fl    MCH 32.2 26.5 - 33.0 pg    MCHC 35.7 31.5 - 36.5 g/dL    RDW 11.8 10.0 - 15.0 %    Platelet Count 155 150 - 450 10e9/L    Diff Method Automated Method     % Neutrophils 57.4 %    % Lymphocytes 26.6 %    % Monocytes 10.4 %    % Eosinophils 4.1 %    % Basophils 0.9 %    % Immature  Granulocytes 0.6 %    Nucleated RBCs 0 0 /100    Absolute Neutrophil 4.0 1.6 - 8.3 10e9/L    Absolute Lymphocytes 1.9 0.8 - 5.3 10e9/L    Absolute Monocytes 0.7 0.0 - 1.3 10e9/L    Absolute Eosinophils 0.3 0.0 - 0.7 10e9/L    Absolute Basophils 0.1 0.0 - 0.2 10e9/L    Abs Immature Granulocytes 0.0 0 - 0.4 10e9/L    Absolute Nucleated RBC 0.0    AST   Result Value Ref Range    AST 18 0 - 45 U/L   ALT   Result Value Ref Range    ALT 27 0 - 70 U/L   Albumin level   Result Value Ref Range    Albumin 3.9 3.4 - 5.0 g/dL   Creatinine   Result Value Ref Range    Creatinine 1.05 0.66 - 1.25 mg/dL    GFR Estimate 72 >60 mL/min/1.7m2    GFR Estimate If Black 87 >60 mL/min/1.7m2   CRP inflammation   Result Value Ref Range    CRP Inflammation <2.9 0.0 - 8.0 mg/L     Recent Labs   Lab Test  05/05/17   1622  10/08/14   1003   WBC  7.0  5.1   HGB  14.8  14.5   HCT  41.5  41.2   MCV  90  93   PLT  155  131*   AST  18  16   ALT  27  22              Education:   1. Discussed routine annual physicals, cancer screening, cardiac risk monitoring, bone health and primary care with primary care provider.   2. Educated on diagnosis, prognosis, labs, imaging, treatment options (including risks, benefits, risk of no treatment), medications (use, dose, side-effects, risks of medications including infection/cancer/bone marrow suppression, lab monitoring), infections what to do, plan of cares, goals of treatment.       Juaquin Saxena APRN, CNP, MSN  [facility]  Department of Rheumatology & Autoimmune Disorders  [facility]: [phone] (opt.2 then opt. 1 scheduling, opt. 3 nurse)  [facility]: [phone]

## 2018-05-07 NOTE — PROGRESS NOTES
Rheumatology Visit     Date of Visit: May 7, 2018  Last seen: May 5, 2017  Primary care provider: William Morton          Assessment/Plan:     1. Left shoulder pain, impingement. No clear psoriatic arthritis today.     2. Psoriasis per derm     Plan:  1. Referral to sports medicine left shoulder. May try ice and NSAIDs (short-term) to help alleviate pain.    2. RTC 1 yr sooner prn  3. I educated him on more s/sx psoriatic arthritis and when he would need to come back to consider immunosuppression therapy.  Annual physical with PCP and did advise him to discuss shingrix vaccine with PCP/pharmacy    The patient understood the rational for the diagnosis and treatment plan. Patient shared in the decision making. All questions were answered to best of my ability and the patient's satisfaction  Juaquin WARE, CNP, MSN  [facility]  Department of Rheumatology & Autoimmune Disorders          History of Present Illness:   Tony Mcnair is a 62 year old male who presents as a follow-up for psoriatic arthritis.     Copy forward 1-2015: Review: Prior  (asymmetric oligoarthralgias including L hand 3rd and 4th DIP, L malleolus, R hip, morning stiffness, psoriasis, and fatigue). He has had psoriasis since ~ age 42. He developed gradual polyarthralgia over decades. Treatment for psoriatic arthritis began with NSAID monotherapy in 10-14.    : He had experienced pain in different joints for varying amounts of time (L foot/malleolus - ~20 years, R hip - ~10-15 years, L achilles - 1-2 months). Pain had gotten gradually worse over time. Hip pain can wake up Mr. Mcnair once a night, he takes ibuprofen and sits up and for 1-2 hours and then is able to fall back asleep. Foot pain (low level, but constant) can increase rapidly and cause him to feel lame and unable to walk for a few minutes. DIP pain is pressure sensitive and only reports that it bothers him when he hits the joint with some force. Stiffness and  "pain is worse in the morning, improves quickly after waking up and then pain level/function is maintained for the rest of the day. Mr. Mcnair has noted that exercising regularly he experiences less pain, but that sometimes it's hard to find the motivation when he's in pain (even though he believes it helps - called it a catch-22).   1-2015:   Pt states that since Oct, he took the piroxicam which brought his pain from a 5-7/10 down to a 1-2/10 and now is not having any stiffness.  He states that he is doing much better and stopped the piroxicam a number of weeks ago.  He states that he did note some acid reflux but that resolved after a week of stopping the NSAID.  He did not try taking an prilosec or PPI.  He is very happy with how his joints are doing and is happy with his functional status.  He states that he did have trauma to the dorsum of his left foot a number of years ago and he still will have occasional left dorsum foot pain. The pt states that his psoriasis is doing very well too.  He continues to follow with dermatology and is using topical therapy and getting light therapy once/week.  He states that his psorasis is well controlled for him especially during the winter months in which it can be worse.     May 7, 2018    Left hip had immediate improvement with the PT I ordered last year. Psoriasis controlled, sees Dr. Corbett. No EAS, synovitis, tendonitis or dactylitis. No infections. States does workout 3 days week cardio and weight lifting. No pain in hands or feet. No loss ROM or functions. Left shoulder has been bothering his chronically. No know injuries, does bother to lift overhead, no swelling. No neuro changes. No plantar fascititis or achilles tendonitis. No inflammatory back symptoms. No flares. No prednisone use. Rare NSAID use     PMSH-  Injuries-left foot broke   No Blood transfusions  Medical-Shingles 1998 not anymore left eye \"damage vision\" distance, psoriasis   Surgical-left knee spur, " "hernia left/right repair, left shoulder (tear) , iris nevus right, lesion right upper lid excision     Family-  No autoimmune disorders, psoriasis, UC, crohn's, SLE, RA, PsA, gout.   Mom-breast CA, alzheimer, psoriasis \"fingers\" act up arthritis-passed  Dad-passed. CABG, CVI, carotid artery stenosis   Brother- passed heart disease, kidney and liver   1 other siblings healthy  Brother with MS 4 mth ago with at [facility]. Slow kind   MGF-passed heart  MGM-lived long time   PGF-heart -passed   Dads side --aneursym or CVI     Social-  Ocassionlly now none alcohol. Never Smoking. NO Children. 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 No acute distress, swollen glands  EYES: No vision change, diplopia, pain in eyes or red eyes   EARS, NOSE, MOUTH, THROAT: No tinnitus or hearing change, no epistaxis or nasal discharge, no oral lesions, throat clear. Normal saliva pool.  No drymouth. No thyroid enlargement  CARDIOVASCULAR: No chest pain, palpitations, or pain with walking, no orthopnea or PND   RESPIRATORY: No dyspnea, cough, shortness of breath or wheezing. No pleurisy.   GI: No nausea, vomiting, diarrhea or constipation, no abdominal pain, or blood in stools.   : No change in urine, no dysuria or hematuria   MUSCKL: No swollen, tender, red or painful joints. No nodules. No enthesitis, plantar fascitis or heel pain.   INTEGUMENTARY: No concerning lesions or moles   NEURO: No loss of strength or sensation, no numbness or tingling, no tremor, no dizziness, no headache. No falls   ENDO: No polyuria or polydipsia, no temperature intolerance   HEME/LYMPH:No concerning bumps, bleeding problems, or swollen lymph nodes. No recent infections, hospitalizations or new illnesses.   ALLERGY: No environmental allergies   PSYCH:No depression or anxiety, no sleep problems.  Otherwise 14 point ROS obtained, reviewed and found negative. "     PE-  Constitutional: WD-WN-WG cooperative  Eyes: nl EOM, PERRLA, vision, conjunctiva, sclera  ENT: nl external ears, nose, hearing, lips, teeth, gums, throat. Nl saliva pool  No mucous membrane lesions, normal saliva pool  Neck: no mass or thyroid enlargement. non-tender.  Resp: lungs clear to auscultation, nl to palpation, nl breath sounds  CV: RRR, no murmurs, rubs or gallops, no edema  GI: no ABD mass or tenderness, no HSM. No RUQ pain.   : not tested  Lymph: no cervical, supraclavicular, inguinal or epitrochlear nodes  MSK: left shoulder +impingement. Cool. All other TMJ, neck, shoulder, elbow, wrist, MCP/PIP/DIP, spine, hip, knee, ankle, and foot MTP/IP joints were examined and  found normal. No active synovitis or deformity. Full ROM.  NL prayer sign. No periuncle erythema. Normal  strength. No dactylitis,  tenosynovitis, enthespathy. Negative MCP and MTP squeeze. No impingment signs of shoulders. Negative Lhermitte's sign.   Skin: No nail pitting, alopecia, rash, nodules or lesions.   Neuro: nl cranial nerves, strength, sensation  Psych: nl judgement, orientation, memory, affect.         Labs/Imaging:   Reviewed medications, labs, imaging, and EMR.   Additional review:  Labs with Pt: NO     Outside Records/Xrays: YES  Reviewed Rheumatology Lab Flowsheet & Lab Flowsheet    Results for orders placed or performed in visit on 05/05/17   CBC with platelets differential   Result Value Ref Range    WBC 7.0 4.0 - 11.0 10e9/L    RBC Count 4.60 4.4 - 5.9 10e12/L    Hemoglobin 14.8 13.3 - 17.7 g/dL    Hematocrit 41.5 40.0 - 53.0 %    MCV 90 78 - 100 fl    MCH 32.2 26.5 - 33.0 pg    MCHC 35.7 31.5 - 36.5 g/dL    RDW 11.8 10.0 - 15.0 %    Platelet Count 155 150 - 450 10e9/L    Diff Method Automated Method     % Neutrophils 57.4 %    % Lymphocytes 26.6 %    % Monocytes 10.4 %    % Eosinophils 4.1 %    % Basophils 0.9 %    % Immature Granulocytes 0.6 %    Nucleated RBCs 0 0 /100    Absolute Neutrophil 4.0 1.6 -  8.3 10e9/L    Absolute Lymphocytes 1.9 0.8 - 5.3 10e9/L    Absolute Monocytes 0.7 0.0 - 1.3 10e9/L    Absolute Eosinophils 0.3 0.0 - 0.7 10e9/L    Absolute Basophils 0.1 0.0 - 0.2 10e9/L    Abs Immature Granulocytes 0.0 0 - 0.4 10e9/L    Absolute Nucleated RBC 0.0    AST   Result Value Ref Range    AST 18 0 - 45 U/L   ALT   Result Value Ref Range    ALT 27 0 - 70 U/L   Albumin level   Result Value Ref Range    Albumin 3.9 3.4 - 5.0 g/dL   Creatinine   Result Value Ref Range    Creatinine 1.05 0.66 - 1.25 mg/dL    GFR Estimate 72 >60 mL/min/1.7m2    GFR Estimate If Black 87 >60 mL/min/1.7m2   CRP inflammation   Result Value Ref Range    CRP Inflammation <2.9 0.0 - 8.0 mg/L     Recent Labs   Lab Test  05/05/17   1622  10/08/14   1003   WBC  7.0  5.1   HGB  14.8  14.5   HCT  41.5  41.2   MCV  90  93   PLT  155  131*   AST  18  16   ALT  27  22              Education:   1. Discussed routine annual physicals, cancer screening, cardiac risk monitoring, bone health and primary care with primary care provider.   2. Educated on diagnosis, prognosis, labs, imaging, treatment options (including risks, benefits, risk of no treatment), medications (use, dose, side-effects, risks of medications including infection/cancer/bone marrow suppression, lab monitoring), infections what to do, plan of cares, goals of treatment.       Juaquin Saxena APRN, CNP, MSN  [facility]  Department of Rheumatology & Autoimmune Disorders  [facility]: [phone] (opt.2 then opt. 1 scheduling, opt. 3 nurse)  [facility]: [phone]

## 2018-05-07 NOTE — PATIENT INSTRUCTIONS
Preventive Care:    Colorectal Cancer Screening: During our visit today, we discussed that it is recommended you receive colorectal cancer screening. Please call or make an appointment with your primary care provider to discuss this. You may also call the  [facility] scheduling line ([phone]) to set up a colonoscopy appointment.      Chely Lebron, Dr Camacho   Dermatologist there Dr. Rogel    Shingrix vaccine. Recommended shingrix. CDC recommends this vaccine 2 doses,  by 2-6 months for adults 50 years and older. It is killed virus and ok to be used in immunocompromised patients. Shingrix reduces the risk of shingles and post-herpetic neuralgia (PH) by more than 90% in people 50 and older.      Possible side-effects include: pain, redness and swelling at the inj site, myalgia, fatigue, headaches, shivering, fever and stomach upset. Shingrix is an inactive, subunit vaccine for H. zoster, >50 yrs, given as 2 IM doses (0.5 mL each), 2nd dose 2-6 months after 1st. Shingrix is more effective than Zostavax but has more possible side effects.

## 2018-05-11 ENCOUNTER — ALLIED HEALTH/NURSE VISIT (OUTPATIENT)
Dept: DERMATOLOGY | Facility: CLINIC | Age: 63
End: 2018-05-11
Payer: COMMERCIAL

## 2018-05-11 DIAGNOSIS — L40.9 PSORIASIS: ICD-10-CM

## 2018-05-11 NOTE — NURSING NOTE
Tony Mcnair comes into clinic today at the request of Dr Mckinney Ordering Provider for [facility].    This service provided today was under the supervising provider of the day Dr Roberts, who was available if needed.    Georgina Wills RN

## 2018-05-11 NOTE — PROGRESS NOTES
[facility] Dermatology Phototherapy Record  1. Tony Mcnair is a 62 year old male is here today for phototherapy (UVB) treatment for Psoriasis.        Changes or new medications since last treatment:   NO    New medical conditions or problems since last treatment:   NO    Any problems with last phototherapy treatment?    NO    2. The patient tolerated phototherapy without complication    Patient will return for next UVB treatment, per protocol.     Patient to see provider every 4-12 weeks for follow-up during treatment.      All questions and concerns discussed with patient in clinic today.      Georgina Wills RN

## 2018-05-11 NOTE — MR AVS SNAPSHOT
After Visit Summary   5/11/2018    Tony Mcnair    MRN: [id number]           Patient Information     Date Of Birth          1955        Visit Information        Provider Department      5/11/2018 8:40 AM UC DERM LIGHT TREATMENT [facility] Dermatology        Today's Diagnoses     Psoriasis           Follow-ups after your visit        Your next 10 appointments already scheduled     May 16, 2018  7:50 AM CDT   (Arrive by 7:35 AM)   Light Treatment Visit with UC DERM LIGHT TREATMENT   [facility] ([facility])    [address] [place] [address]   [phone]            May 23, 2018  7:50 AM CDT   (Arrive by 7:35 AM)   Light Treatment Visit with UC DERM LIGHT TREATMENT   [facility] ([facility])    [address] [place] [address]   [phone]            May 30, 2018  7:50 AM CDT   (Arrive by 7:35 AM)   Light Treatment Visit with UC DERM LIGHT TREATMENT   [facility])    [address] [place] [address]   [phone]            Jun 06, 2018  7:20 AM CDT   (Arrive by 7:05 AM)   Light Treatment Visit with UC DERM LIGHT TREATMENT   [facility] ([facility])    [address] [place] [address]   [phone]            Jun 13, 2018  7:50 AM CDT   (Arrive by 7:35 AM)   Light Treatment Visit with UC DERM LIGHT TREATMENT   [facility])    [address] [place] [address]   [phone]            Jun 20, 2018  7:50 AM CDT   (Arrive by 7:35 AM)   Light Treatment Visit with UC DERM LIGHT TREATMENT   [facility] ([facility])    [address] [place] [address]   [phone]            Jun 27, 2018  7:50 AM CDT   (Arrive by 7:35 AM)    Light Treatment Visit with  DERM LIGHT TREATMENT   [facility] ([facility])    [address]  3rd Floor  [facility] [address] [phone]              Who to contact     Please call your clinic at [phone] to:    Ask questions about your health    Make or cancel appointments    Discuss your medicines    Learn about your test results    Speak to your doctor            Additional Information About Your Visit        [facility] Information     [facility] gives you secure access to your electronic health record. If you see a primary care provider, you can also send messages to your care team and make appointments. If you have questions, please call your primary care clinic.  If you do not have a primary care provider, please call [phone] and they will assist you.      [employer] is an electronic gateway that provides easy, online access to your medical records. With [employer], you can request a clinic appointment, read your test results, renew a prescription or communicate with your care team.     To access your existing account, please contact your [facility] or call [phone] for assistance.        Care EveryWhere ID     This is your Care EveryWhere ID. This could be used by other organizations to access your [place] medical records  [id number]         Blood Pressure from Last 3 Encounters:   05/07/18 130/75   05/05/17 121/87   01/23/15 (!) 145/100    Weight from Last 3 Encounters:   05/07/18 77.6 kg (171 lb)   05/05/17 76.4 kg (168 lb 8 oz)   01/23/15 78 kg (172 lb)              We Performed the Following     CHARGE - PHOTOTHERAPY [facility] (UV LIGHT)     PROCEDURE - PHOTOTHERAPY [facility]        Primary Care Provider Office Phone # Fax #    William Morton [phone]       [address] [address]        Equal Access to Services     TORI EDWARDS AH: Stan mcdonald  Soblaineali, waphoenixda luqadaha, qaybta kaaltrevon chávez, renzo monroe. So [facility] [phone].    ATENCIÓN: Si rose mary varghese, tiene a garrett disposición servicios gratuitos de asistencia lingüística. Ze al [phone].    We comply with applicable federal civil rights laws and [place] laws. We do not discriminate on the basis of race, color, national origin, age, disability, sex, sexual orientation, or gender identity.            Thank you!     Thank you for choosing [facility] DERMAT[facility]  for your care. Our goal is always to provide you with excellent care. Hearing back from our patients is one way we can continue to improve our services. Please take a few minutes to complete the written survey that you may receive in the mail after your visit with us. Thank you!             Your Updated Medication List - Protect others around you: Learn how to safely use, store and throw away your medicines at www.disposemymeds.org.          This list is accurate as of 5/11/18 11:24 AM.  Always use your most recent med list.                   Brand Name Dispense Instructions for use Diagnosis    BIOTIN PO      Take  by mouth daily.        calcipotriene-betameth diprop 0.005-0.064 % Oint    TACLONEX    60 g    Externally apply  topically 2 times daily as needed.    Psoriasis       citalopram 10 MG tablet    celeXA     Take 10 mg by mouth daily        CONCERTA 18 MG CR tablet   Generic drug:  methylphenidate ER      Take 18 mg by mouth every morning.        DAILY MULTIVITAMIN PO      Take  by mouth daily.        fish oil-omega-3 fatty acids 1000 MG capsule      Take 2 g by mouth daily        fluocinonide 0.05 % solution    LIDEX    60 mL    Apply  topically 2 times daily for itchy scalp or scalp psoriasis    Psoriasis       olopatadine HCl 0.2 % Soln    PATADAY    2.5 mL    PLACE 1 DROP INTO BOTH EYES DAILY    Allergic conjunctivitis of both eyes       pimecrolimus 1 % cream    ELIDEL    60 g    Apply  topically 2 times daily as needed For psoriasis of face or genitals    Psoriasis       REFRESH P.M. Oint     1 Tube    Apply 1 inch to eye nightly as needed    Tear film insufficiency, unspecified       tacrolimus 0.1 % ointment    PROTOPIC    60 g    Apply topically 2 times daily Apply to areas around eyes as needed    Psoriasis       VITAMIN D (CHOLECALCIFEROL) PO      Take by mouth daily

## 2018-05-16 ENCOUNTER — ALLIED HEALTH/NURSE VISIT (OUTPATIENT)
Dept: DERMATOLOGY | Facility: CLINIC | Age: 63
End: 2018-05-16
Payer: COMMERCIAL

## 2018-05-16 DIAGNOSIS — L40.9 PSORIASIS: ICD-10-CM

## 2018-05-16 NOTE — NURSING NOTE
Tony Mcnair comes into clinic today at the request of Dr Mckinney Ordering Provider for [facility].    This service provided today was under the supervising provider of the day Dr Mckinney, who was available if needed.    Georgina Wills RN

## 2018-05-16 NOTE — MR AVS SNAPSHOT
After Visit Summary   5/16/2018    Tony Mcnair    MRN: [id number]           Patient Information     Date Of Birth          1955        Visit Information        Provider Department      5/16/2018 7:50 AM UC DERM LIGHT TREATMENT [facility]        Today's Diagnoses     Psoriasis           Follow-ups after your visit        Your next 10 appointments already scheduled     May 22, 2018  8:00 AM CDT   New Visit with Kali Ruiz MD   [facility] [place] ([facility] [place] [place])    [address] [place], [place] [address] [place] [address]   [phone]            May 23, 2018  7:50 AM CDT   (Arrive by 7:35 AM)   Light Treatment Visit with UC DERM LIGHT TREATMENT   [facility])    [address] [place] [address]   [phone]            May 30, 2018  7:50 AM CDT   (Arrive by 7:35 AM)   Light Treatment Visit with UC DERM LIGHT TREATMENT   [facility])    [address] [place] [address]   [phone]            Jun 06, 2018  7:20 AM CDT   (Arrive by 7:05 AM)   Light Treatment Visit with UC DERM LIGHT TREATMENT   [facility])    [address] [place] [address]   [phone]            Jun 13, 2018  7:50 AM CDT   (Arrive by 7:35 AM)   Light Treatment Visit with UC DERM LIGHT TREATMENT   [facility])    [address] [place] [address]   [phone]            Jun 20, 2018  7:50 AM CDT   (Arrive by 7:35 AM)   Light Treatment Visit with UC DERM LIGHT TREATMENT   [facility])    [address] [place] [address]   [phone]            Jun 27, 2018  7:50 AM CDT   (Arrive by 7:35 AM)   Light  Treatment Visit with  DERM LIGHT TREATMENT   [facility] ([facility])    [address]  3rd Floor  [facility] [address] [phone]              Who to contact     Please call your clinic at [phone] to:    Ask questions about your health    Make or cancel appointments    Discuss your medicines    Learn about your test results    Speak to your doctor            Additional Information About Your Visit        JumpInharLIFEmee Information     Ringly gives you secure access to your electronic health record. If you see a primary care provider, you can also send messages to your care team and make appointments. If you have questions, please call your primary care clinic.  If you do not have a primary care provider, please call [phone] and they will assist you.      Ringly is an electronic gateway that provides easy, online access to your medical records. With Ringly, you can request a clinic appointment, read your test results, renew a prescription or communicate with your care team.     To access your existing account, please contact your [facility] or call [phone] for assistance.        Care EveryWhere ID     This is your Care EveryWhere ID. This could be used by other organizations to access your [facility] medical records  [id number]         Blood Pressure from Last 3 Encounters:   05/07/18 130/75   05/05/17 121/87   01/23/15 (!) 145/100    Weight from Last 3 Encounters:   05/07/18 77.6 kg (171 lb)   05/05/17 76.4 kg (168 lb 8 oz)   01/23/15 78 kg (172 lb)              We Performed the Following     CHARGE - PHOTOTHERAPY NBUVB (UV LIGHT)     PROCEDURE - PHOTOTHERAPY NBUVB        Primary Care Provider Office Phone # Fax #    William Morton [phone]       [address] [address]        Equal Access to Services     TORI EDWARDS AH: deepak Braxton  ryley renzo correa. So [facility] [phone].    ATENCIÓN: Si rose mary varghese, tiene a garrett disposición servicios gratuitos de asistencia lingüística. Ze al [phone].    We comply with applicable federal civil rights laws and [place] laws. We do not discriminate on the basis of race, color, national origin, age, disability, sex, sexual orientation, or gender identity.            Thank you!     Thank you for choosing [facility]  for your care. Our goal is always to provide you with excellent care. Hearing back from our patients is one way we can continue to improve our services. Please take a few minutes to complete the written survey that you may receive in the mail after your visit with us. Thank you!             Your Updated Medication List - Protect others around you: Learn how to safely use, store and throw away your medicines at www.disposemymeds.org.          This list is accurate as of 5/16/18 10:32 AM.  Always use your most recent med list.                   Brand Name Dispense Instructions for use Diagnosis    BIOTIN PO      Take  by mouth daily.        calcipotriene-betameth diprop 0.005-0.064 % Oint    TACLONEX    60 g    Externally apply  topically 2 times daily as needed.    Psoriasis       citalopram 10 MG tablet    celeXA     Take 10 mg by mouth daily        CONCERTA 18 MG CR tablet   Generic drug:  methylphenidate ER      Take 18 mg by mouth every morning.        DAILY MULTIVITAMIN PO      Take  by mouth daily.        fish oil-omega-3 fatty acids 1000 MG capsule      Take 2 g by mouth daily        fluocinonide 0.05 % solution    LIDEX    60 mL    Apply  topically 2 times daily for itchy scalp or scalp psoriasis    Psoriasis       olopatadine HCl 0.2 % Soln    PATADAY    2.5 mL    PLACE 1 DROP INTO BOTH EYES DAILY    Allergic conjunctivitis of both eyes       pimecrolimus 1 % cream    ELIDEL    60 g    Apply topically 2 times daily  as needed For psoriasis of face or genitals    Psoriasis       REFRESH P.M. Oint     1 Tube    Apply 1 inch to eye nightly as needed    Tear film insufficiency, unspecified       tacrolimus 0.1 % ointment    PROTOPIC    60 g    Apply topically 2 times daily Apply to areas around eyes as needed    Psoriasis       VITAMIN D (CHOLECALCIFEROL) PO      Take by mouth daily

## 2018-05-16 NOTE — PROGRESS NOTES
[facility] Phototherapy Record  1. Tony Mcnair is a 62 year old male is here today for phototherapy (UVB) treatment for Psoriasis.        Changes or new medications since last treatment:   NO    New medical conditions or problems since last treatment:   NO    Any problems with last phototherapy treatment?    NO    2. The patient tolerated phototherapy without complication    Patient will return for next UVB treatment, per protocol.     Patient to see provider every 4-12 weeks for follow-up during treatment.      All questions and concerns discussed with patient in clinic today.      Georgina Wills RN

## 2018-05-22 ENCOUNTER — OFFICE VISIT (OUTPATIENT)
Dept: ORTHOPEDICS | Facility: CLINIC | Age: 63
End: 2018-05-22
Payer: COMMERCIAL

## 2018-05-22 VITALS
WEIGHT: 171 LBS | HEIGHT: 70 IN | SYSTOLIC BLOOD PRESSURE: 118 MMHG | DIASTOLIC BLOOD PRESSURE: 79 MMHG | BODY MASS INDEX: 24.48 KG/M2

## 2018-05-22 DIAGNOSIS — M75.02 ADHESIVE CAPSULITIS OF LEFT SHOULDER: Primary | ICD-10-CM

## 2018-05-22 PROCEDURE — 99243 OFF/OP CNSLTJ NEW/EST LOW 30: CPT | Performed by: FAMILY MEDICINE

## 2018-05-22 ASSESSMENT — ENCOUNTER SYMPTOMS: FOCAL WEAKNESS: 0

## 2018-05-22 NOTE — LETTER
5/22/2018         RE: Tony Mcnair  [address] [facility] [address]        Dear Colleague,    Thank you for referring your patient, Tony Mcnair, to the [facility] [place]. Please see a copy of my visit note below.    HPI   [facility]   Clinic Visit s May 22, 2018    PCP: William Morton      Tony is a 62 year old male who is seen in consultation at the request of Dr. Sxaena for   Chief Complaint   Patient presents with     Shoulder Pain       Injury: Reports insidious onset without acute precipitating event. Felt a pop last august while doing PHYSICAL THERAPY for hip. Now trouble sleeping.      right-hand dominant    Location of Pain: left shoulder, superior, nonradiating   Duration of Pain: 4 year(s), worse lately  Rating of Pain at worst: 9/10  Rating of Pain Currently: 2/10  Pain is better with: activity avoidance   Pain is worse with: extension and flexion of the shoulder   Treatment so far consists of: cortisone injection, somewhat helpful, in March 2018, by PA at [facility]  Associated symptoms: no distal numbness or tingling; denies swelling or warmth  Recent imaging completed: MRI Feb 2018  Prior History of related problems: bone spur surgery 15 years ago    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     Past Medical History:   Diagnosis Date     Arthritis      Zoster 1988    LEFT EYE       Patient Active Problem List    Diagnosis Date Noted     Pain in both feet 05/12/2017     Priority: Medium     Iliotibial band syndrome, unspecified laterality 05/12/2017     Priority: Medium     Psoriasis 10/24/2012     Priority: Medium       Family History   Problem Relation Age of Onset     CANCER No family hx of      skin cancer     Glaucoma No family hx of      Macular Degeneration No family hx of      Retinal detachment No family hx of      Skin Cancer No family hx of      Melanoma No family  "hx of        Social History     Social History     Marital status:      Spouse name: N/A     Number of children: N/A     Years of education: N/A     Social History Main Topics     Smoking status: Never Smoker     Smokeless tobacco: Never Used     Alcohol use Not on file     Past Surgical History:   Procedure Laterality Date     IRIS NEVUS Right      LESION RIGHT UPPER LID Right 1999    EXCISION       Review of Systems   Musculoskeletal: Positive for joint pain.   Skin: Positive for rash.   Neurological: Negative for focal weakness.   All other systems reviewed and are negative.        Physical Exam  /79  Ht 5' 10\" (1.778 m)  Wt 171 lb (77.6 kg)  BMI 24.54 kg/m2  Constitutional:well-developed, well-nourished, and in no distress.   Cardiovascular: Intact distal pulses.    Neurological: alert. Gait Normal:   Gait, station, stance, and balance appear normal for age  Skin: Skin is warm and dry.   Psychiatric: Mood and affect normal.   Respiratory: unlabored, speaks in full sentences  Lymph: no LAD, no lymphangitis          Left Shoulder Exam     Tenderness   None    Range of Motion   Active Abduction:                       Abnormal  Passive Abduction:                    Abnormal  Extension:                                  Normal  Forward Flexion:                        160  External Rotation:                      60  Internal Rotation 0 degrees:      Lumbar  Internal Rotation 90 degrees:    N/t    Muscle Strength   Normal left shoulder strength    Tests   Impingement:   Positive  Thomas:          Positive  Cross Arm:      Negative  Drop Arm:        Negative  Apprehension: n/t  Sulcus:            Negative    Right Shoulder Exam     Tenderness   None    Range of Motion   Active Abduction:                       Normal  Passive Abduction:                    Normal  Extension:                                  Normal  Forward Flexion:                        Normal  External Rotation:                      " Normal  Internal Rotation 0 degrees:      Normal  Internal Rotation 90 degrees:    Normal    Muscle Strength   Abduction:            5/5  Internal Rotation:  5/5  External Rotation: 5/5  Supraspinatus:     5/5  Subscapularis:     5/5  Biceps:                 5/5    Tests   Impingement:   Negative  Thomas:          Negative  Cross Arm:      Negative  Drop Arm:        Negative  Apprehension: Negative  Sulcus:            Negative          Final Report  MR MRI SHOULDER WITHOUT CONTRAST    Show Printer-Friendly Version   Patient Name: Tony Mcnair  :   ID: [id number]([facility])  Study Date: Mar- 07:34  MRI SHOULDER WITHOUT CONTRAST LEFT  3/6/2018 8:03 AM     HISTORY: Chronic left shoulder pain and decreased range of motion. No   specific injury. Prior shoulder surgery 15 years ago.     TECHNIQUE: Multisequence and multiplanar MR imaging without contrast.     COMPARISON: None.     FINDINGS:  Osseous acromial outlet: Previous resection of bone about the   acromioclavicular joint. The distal end of the partially resected   clavicle slightly indents the supraspinatus muscle (image 8 of series   8). Micrometallic artifacts are also seen around this area of   previous surgery. There is also likely been previous acromioplasty   and the remaining distal acromion shows type I morphology with   negative slope on oblique sagittal images and no lateral downsloping   on oblique coronal images. There is no abnormal fluid in the   subacromial/subdeltoid bursa.     Rotator cuff: The supraspinatus, infraspinatus, teres minor, and   subscapularis muscles and tendons are normal.     Labrum: The glenoid labrum is normal as visualized.     Biceps tendon: The biceps tendon is normal proximally at the biceps   anchor and distally in the bicipital groove.      Osseous structures and cartilaginous surfaces: No acute bony   abnormalities. Articular cartilages of the glenohumeral joint are   normal.     Additional findings: There  is thickening without edema involving the   inferior joint capsule including the anterior and posterior bands of   the inferior glenohumeral ligament. There is also mild nodular   thickening of the coracohumeral ligament (image 9 of series 8) and   effacement of the fat in the adjacent subcoracoid triangle region.   These findings may be associated with adhesive capsulitis and   clinical correlation is recommended.     IMPRESSION:   1. Findings consistent with prior acromioplasty and resection of bone   about the acromioclavicular joint.  2. No rotator cuff, labral or biceps tendon or bony pathology.  3. MRI findings associated with adhesive capsulitis.  Signed by: Noe Ferrer    Signed on: Mar- 15:50    ASSESSMENT/PLAN    ICD-10-CM    1. Adhesive capsulitis of left shoulder M75.02 [facility], AND CHIROPRACTIC REFERRAL     MRI indicates frozen shoulder and loss of range of motion is consistent with that.  Strength intact and no rotator cuff tearing on MRI.  It is unclear whether he received a subacromial or a glenohumeral injection for his shoulder in March, but it appears to be for impingement syndrome rather than frozen shoulder.  Nature of disorder discussed at length with Tony and I suggested a return to physical therapy for gentle steady persistent stretching program and consideration for a glenohumeral injection under ultrasound guidance.  He was eager to proceed.  Referral for PT placed and he will return at his convenience for an ultrasound-guided injection    Again, thank you for allowing me to participate in the care of your patient.        Sincerely,        Kali Ruiz MD

## 2018-05-22 NOTE — PROGRESS NOTES
[facility]   [facility]   Clinic Visit s May 22, 2018    PCP: William Morton      Tony is a 62 year old male who is seen in consultation at the request of Dr. Saxena for   Chief Complaint   Patient presents with     Shoulder Pain       Injury: Reports insidious onset without acute precipitating event. Felt a pop last august while doing PHYSICAL THERAPY for hip. Now trouble sleeping.      right-hand dominant    Location of Pain: left shoulder, superior, nonradiating   Duration of Pain: 4 year(s), worse lately  Rating of Pain at worst: 9/10  Rating of Pain Currently: 2/10  Pain is better with: activity avoidance   Pain is worse with: extension and flexion of the shoulder   Treatment so far consists of: cortisone injection, somewhat helpful, in March 2018, by PA at [facility]  Associated symptoms: no distal numbness or tingling; denies swelling or warmth  Recent imaging completed: MRI Feb 2018  Prior History of related problems: bone spur surgery 15 years ago    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     Past Medical History:   Diagnosis Date     Arthritis      Zoster 1988    LEFT EYE       Patient Active Problem List    Diagnosis Date Noted     Pain in both feet 05/12/2017     Priority: Medium     Iliotibial band syndrome, unspecified laterality 05/12/2017     Priority: Medium     Psoriasis 10/24/2012     Priority: Medium       Family History   Problem Relation Age of Onset     CANCER No family hx of      skin cancer     Glaucoma No family hx of      Macular Degeneration No family hx of      Retinal detachment No family hx of      Skin Cancer No family hx of      Melanoma No family hx of        Social History     Social History     Marital status:      Spouse name: N/A     Number of children: N/A     Years of education: N/A     Social History Main Topics     Smoking status: Never Smoker     Smokeless tobacco: Never Used     Alcohol  "use Not on file     Past Surgical History:   Procedure Laterality Date     IRIS NEVUS Right      LESION RIGHT UPPER LID Right 1999    EXCISION       Review of Systems   Musculoskeletal: Positive for joint pain.   Skin: Positive for rash.   Neurological: Negative for focal weakness.   All other systems reviewed and are negative.        Physical Exam  /79  Ht 5' 10\" (1.778 m)  Wt 171 lb (77.6 kg)  BMI 24.54 kg/m2  Constitutional:well-developed, well-nourished, and in no distress.   Cardiovascular: Intact distal pulses.    Neurological: alert. Gait Normal:   Gait, station, stance, and balance appear normal for age  Skin: Skin is warm and dry.   Psychiatric: Mood and affect normal.   Respiratory: unlabored, speaks in full sentences  Lymph: no LAD, no lymphangitis          Left Shoulder Exam     Tenderness   None    Range of Motion   Active Abduction:                       Abnormal  Passive Abduction:                    Abnormal  Extension:                                  Normal  Forward Flexion:                        160  External Rotation:                      60  Internal Rotation 0 degrees:      Lumbar  Internal Rotation 90 degrees:    N/t    Muscle Strength   Normal left shoulder strength    Tests   Impingement:   Positive  Thomas:          Positive  Cross Arm:      Negative  Drop Arm:        Negative  Apprehension: n/t  Sulcus:            Negative    Right Shoulder Exam     Tenderness   None    Range of Motion   Active Abduction:                       Normal  Passive Abduction:                    Normal  Extension:                                  Normal  Forward Flexion:                        Normal  External Rotation:                      Normal  Internal Rotation 0 degrees:      Normal  Internal Rotation 90 degrees:    Normal    Muscle Strength   Abduction:            5/5  Internal Rotation:  5/5  External Rotation: 5/5  Supraspinatus:     5/5  Subscapularis:     5/5  Biceps:                 " 5/5    Tests   Impingement:   Negative  Thomas:          Negative  Cross Arm:      Negative  Drop Arm:        Negative  Apprehension: Negative  Sulcus:            Negative          Final Report  MR MRI SHOULDER WITHOUT CONTRAST    Show Printer-Friendly Version   Patient Name: Tony Mcnair  :   ID: [id number]([facility])  Study Date: Mar- 07:34  MRI SHOULDER WITHOUT CONTRAST LEFT  3/6/2018 8:03 AM     HISTORY: Chronic left shoulder pain and decreased range of motion. No   specific injury. Prior shoulder surgery 15 years ago.     TECHNIQUE: Multisequence and multiplanar MR imaging without contrast.     COMPARISON: None.     FINDINGS:  Osseous acromial outlet: Previous resection of bone about the   acromioclavicular joint. The distal end of the partially resected   clavicle slightly indents the supraspinatus muscle (image 8 of series   8). Micrometallic artifacts are also seen around this area of   previous surgery. There is also likely been previous acromioplasty   and the remaining distal acromion shows type I morphology with   negative slope on oblique sagittal images and no lateral downsloping   on oblique coronal images. There is no abnormal fluid in the   subacromial/subdeltoid bursa.     Rotator cuff: The supraspinatus, infraspinatus, teres minor, and   subscapularis muscles and tendons are normal.     Labrum: The glenoid labrum is normal as visualized.     Biceps tendon: The biceps tendon is normal proximally at the biceps   anchor and distally in the bicipital groove.      Osseous structures and cartilaginous surfaces: No acute bony   abnormalities. Articular cartilages of the glenohumeral joint are   normal.     Additional findings: There is thickening without edema involving the   inferior joint capsule including the anterior and posterior bands of   the inferior glenohumeral ligament. There is also mild nodular   thickening of the coracohumeral ligament (image 9 of series 8) and    effacement of the fat in the adjacent subcoracoid triangle region.   These findings may be associated with adhesive capsulitis and   clinical correlation is recommended.     IMPRESSION:   1. Findings consistent with prior acromioplasty and resection of bone   about the acromioclavicular joint.  2. No rotator cuff, labral or biceps tendon or bony pathology.  3. MRI findings associated with adhesive capsulitis.  Signed by: Noe Ferrer    Signed on: Mar- 15:50    ASSESSMENT/PLAN    ICD-10-CM    1. Adhesive capsulitis of left shoulder M75.02 [facility], HAND, AND CHIROPRACTIC REFERRAL     MRI indicates frozen shoulder and loss of range of motion is consistent with that.  Strength intact and no rotator cuff tearing on MRI.  It is unclear whether he received a subacromial or a glenohumeral injection for his shoulder in March, but it appears to be for impingement syndrome rather than frozen shoulder.  Nature of disorder discussed at length with Tony and I suggested a return to physical therapy for gentle steady persistent stretching program and consideration for a glenohumeral injection under ultrasound guidance.  He was eager to proceed.  Referral for PT placed and he will return at his convenience for an ultrasound-guided injection

## 2018-05-22 NOTE — MR AVS SNAPSHOT
After Visit Summary   5/22/2018    Tony Mcnair    MRN: [id number]           Patient Information     Date Of Birth          1955        Visit Information        Provider Department      5/22/2018 8:00 AM Kali Ruiz MD [facility] [place]        Today's Diagnoses     Adhesive capsulitis of left shoulder    -  1       Follow-ups after your visit        Additional Services     JEN PT, HAND, AND CHIROPRACTIC REFERRAL       **This order will print in the [facility] Scheduling Office**    Physical Therapy, Hand Therapy and Chiropractic Care are available through:    *[facility]  *[facility]  *[facility]    Call one number to schedule at any of the above locations: [phone].    Your provider has referred you to: Physical Therapy at [facility] or FS - [facility] EP with Alexander Conti    Indication/Reason for Referral: Adhesive capsulitis of left shoulder    Onset of Illness: 7 months  Therapy Orders: Evaluate and Treat  Special Programs: None  Special Request: None    Perez Todd      Additional Comments for the Therapist or Chiropractor:     Please be aware that coverage of these services is subject to the terms and limitations of your health insurance plan.  Call member services at your health plan with any benefit or coverage questions.      Please bring the following to your appointment:    *Your personal calendar for scheduling future appointments  *Comfortable clothing                  Your next 10 appointments already scheduled     May 23, 2018  7:50 AM CDT   (Arrive by 7:35 AM)   Light Treatment Visit with  DERM LIGHT TREATMENT   [facility] ([facility])    [address] [place] [address] [phone]            May 25, 2018  7:40 AM CDT   PROCEDURE with Kali Ruiz MD, EC PROC RM 1   [facility] [place]  ([facility] [place])    [address] [place] [address]  [place] [address] [phone]            May 30, 2018  7:50 AM CDT   (Arrive by 7:35 AM)   Light Treatment Visit with UC DERM LIGHT TREATMENT   [facility] ([facility])    [address] [address]   [phone]            Jun 06, 2018  7:20 AM CDT   (Arrive by 7:05 AM)   Light Treatment Visit with UC DERM LIGHT TREATMENT   [facility] ([facility])    [address] [address]   [phone]            Jun 13, 2018  7:50 AM CDT   (Arrive by 7:35 AM)   Light Treatment Visit with UC DERM LIGHT TREATMENT   [facility] ([facility])    [address] [address]   [phone]            Jun 20, 2018  7:50 AM CDT   (Arrive by 7:35 AM)   Light Treatment Visit with UC DERM LIGHT TREATMENT   [facility] ([facility])    [address] [address]   [phone]            Jun 27, 2018  7:50 AM CDT   (Arrive by 7:35 AM)   Light Treatment Visit with UC DERM LIGHT TREATMENT   [facility] ([facility])    [address] [place] [address]   [phone]              Who to contact     If you have questions or need follow up information about today's clinic visit or your schedule please contact [facility] [place] directly at [phone].  Normal or non-critical lab and imaging results will be communicated to you by MyChart, letter or phone within 4 business days after the clinic has received the results. If you do not hear from us within 7 days, please contact the clinic through MyChart or phone. If you have a critical or abnormal lab result, we will notify you by phone as soon as possible.  Submit  "refill requests through [employer] or call your pharmacy and they will forward the refill request to us. Please allow 3 business days for your refill to be completed.          Additional Information About Your Visit        InteraXonhart Information     [employer] gives you secure access to your electronic health record. If you see a primary care provider, you can also send messages to your care team and make appointments. If you have questions, please call your primary care clinic.  If you do not have a primary care provider, please call [phone] and they will assist you.        Care EveryWhere ID     This is your Care EveryWhere ID. This could be used by other organizations to access your [facility] medical records  [id number]        Your Vitals Were     Height BMI (Body Mass Index)                5' 10\" (1.778 m) 24.54 kg/m2           Blood Pressure from Last 3 Encounters:   05/22/18 118/79   05/07/18 130/75   05/05/17 121/87    Weight from Last 3 Encounters:   05/22/18 171 lb (77.6 kg)   05/07/18 171 lb (77.6 kg)   05/05/17 168 lb 8 oz (76.4 kg)              We Performed the Following     JEN PT, HAND, AND CHIROPRACTIC REFERRAL        Primary Care Provider Office Phone # Fax #    William Morton [phone]       [address] [address]        Equal Access to Services     TORI EDWARDS : Hadii zina rios hadasho Soanna, waaxda luqadaha, qaybta kaalmada saira, renzo monroe. So [facility] [phone].    ATENCIÓN: Si habla español, tiene a garrett disposición servicios gratuitos de asistencia lingüística. Ze al [phone].    We comply with applicable federal civil rights laws and [place] laws. We do not discriminate on the basis of race, color, national origin, age, disability, sex, sexual orientation, or gender identity.            Thank you!     Thank you for choosing [facility] [place]  for your care. " Our goal is always to provide you with excellent care. Hearing back from our patients is one way we can continue to improve our services. Please take a few minutes to complete the written survey that you may receive in the mail after your visit with us. Thank you!             Your Updated Medication List - Protect others around you: Learn how to safely use, store and throw away your medicines at www.disposemymeds.org.          This list is accurate as of 5/22/18  9:52 AM.  Always use your most recent med list.                   Brand Name Dispense Instructions for use Diagnosis    BIOTIN PO      Take  by mouth daily.        calcipotriene-betameth diprop 0.005-0.064 % Oint    TACLONEX    60 g    Externally apply  topically 2 times daily as needed.    Psoriasis       citalopram 10 MG tablet    celeXA     Take 10 mg by mouth daily        CONCERTA 18 MG CR tablet   Generic drug:  methylphenidate ER      Take 18 mg by mouth every morning.        DAILY MULTIVITAMIN PO      Take  by mouth daily.        fish oil-omega-3 fatty acids 1000 MG capsule      Take 2 g by mouth daily        fluocinonide 0.05 % solution    LIDEX    60 mL    Apply  topically 2 times daily for itchy scalp or scalp psoriasis    Psoriasis       olopatadine HCl 0.2 % Soln    PATADAY    2.5 mL    PLACE 1 DROP INTO BOTH EYES DAILY    Allergic conjunctivitis of both eyes       pimecrolimus 1 % cream    ELIDEL    60 g    Apply topically 2 times daily as needed For psoriasis of face or genitals    Psoriasis       REFRESH P.M. Oint     1 Tube    Apply 1 inch to eye nightly as needed    Tear film insufficiency, unspecified       tacrolimus 0.1 % ointment    PROTOPIC    60 g    Apply topically 2 times daily Apply to areas around eyes as needed    Psoriasis       VITAMIN D (CHOLECALCIFEROL) PO      Take by mouth daily

## 2018-05-23 ENCOUNTER — ALLIED HEALTH/NURSE VISIT (OUTPATIENT)
Dept: DERMATOLOGY | Facility: CLINIC | Age: 63
End: 2018-05-23
Payer: COMMERCIAL

## 2018-05-23 DIAGNOSIS — L40.9 PSORIASIS: ICD-10-CM

## 2018-05-23 NOTE — PROGRESS NOTES
[facility] Phototherapy Record  1. Tony Mcnair is a 62 year old male is here today for phototherapy (UVB) treatment for Psoriasis.        Changes or new medications since last treatment:   NO    New medical conditions or problems since last treatment:   NO    Any problems with last phototherapy treatment?    NO    2. The patient tolerated phototherapy without complication    Patient will return for next UVB treatment, per protocol.     Patient to see provider every 4-12 weeks for follow-up during treatment.      All questions and concerns discussed with patient in clinic today.      Georgina Wills RN

## 2018-05-23 NOTE — MR AVS SNAPSHOT
After Visit Summary   5/23/2018    Tony Mcnair    MRN: [id number]           Patient Information     Date Of Birth          1955        Visit Information        Provider Department      5/23/2018 7:50 AM UC DERM LIGHT TREATMENT [facility]at[facility]        Today's Diagnoses     Psoriasis           Follow-ups after your visit        Your next 10 appointments already scheduled     May 25, 2018  7:40 AM CDT   PROCEDURE with Kali Ruiz MD, EC PROC RM 1   [facility] [place] ([facility] [place])    [address] [place], [place] [address] [address]   [phone]            May 30, 2018  7:50 AM CDT   (Arrive by 7:35 AM)   Light Treatment Visit with UC DERM LIGHT TREATMENT   [facility])    [address] [place] [address]   [phone]            Jun 06, 2018  7:20 AM CDT   (Arrive by 7:05 AM)   Light Treatment Visit with UC DERM LIGHT TREATMENT   [facility])    [address] [place] [address]   [phone]            Jun 13, 2018  7:50 AM CDT   (Arrive by 7:35 AM)   Light Treatment Visit with UC DERM LIGHT TREATMENT   [facility])    [address] [place] [address]   [phone]            Jun 20, 2018  7:50 AM CDT   (Arrive by 7:35 AM)   Light Treatment Visit with UC DERM LIGHT TREATMENT   [facility])    [address] [place] [address]   [phone]            Jun 27, 2018  7:50 AM CDT   (Arrive by 7:35 AM)   Light Treatment Visit with UC DERM LIGHT TREATMENT   [facility])    [address] [place] [address]   [phone]              Who to contact     Please call your clinic  at [phone] to:    Ask questions about your health    Make or cancel appointments    Discuss your medicines    Learn about your test results    Speak to your doctor            Additional Information About Your Visit        Sensorflare PCharThinkature Information     [employer] gives you secure access to your electronic health record. If you see a primary care provider, you can also send messages to your care team and make appointments. If you have questions, please call your primary care clinic.  If you do not have a primary care provider, please call [phone] and they will assist you.      [employer] is an electronic gateway that provides easy, online access to your medical records. With [employer], you can request a clinic appointment, read your test results, renew a prescription or communicate with your care team.     To access your existing account, please contact your [facility] or call [phone] for assistance.        Care EveryWhere ID     This is your Care EveryWhere ID. This could be used by other organizations to access your [facility] medical records  [id number]         Blood Pressure from Last 3 Encounters:   05/22/18 118/79   05/07/18 130/75   05/05/17 121/87    Weight from Last 3 Encounters:   05/22/18 77.6 kg (171 lb)   05/07/18 77.6 kg (171 lb)   05/05/17 76.4 kg (168 lb 8 oz)              We Performed the Following     CHARGE - PHOTOTHERAPY NBUVB (UV LIGHT)     PROCEDURE - PHOTOTHERAPY NBUVB        Primary Care Provider Office Phone # Fax #    William Morton [phone]       [address] [address]        Equal Access to Services     TORI EDWARDS : Hadii zina rios hadasho Soblaineali, waaxda luqadaha, qaybta kaalmada adeciara, renzo monroe. So [facility] [phone].    ATENCIÓN: Si habla español, tiene a garrett disposición servicios gratuitos de asistencia lingüística. Llame al [phone].    We comply  with applicable federal civil rights laws and [place] laws. We do not discriminate on the basis of race, color, national origin, age, disability, sex, sexual orientation, or gender identity.            Thank you!     Thank you for choosing [facility] DER[facility]  for your care. Our goal is always to provide you with excellent care. Hearing back from our patients is one way we can continue to improve our services. Please take a few minutes to complete the written survey that you may receive in the mail after your visit with us. Thank you!             Your Updated Medication List - Protect others around you: Learn how to safely use, store and throw away your medicines at www.disposemymeds.org.          This list is accurate as of 5/23/18  8:34 AM.  Always use your most recent med list.                   Brand Name Dispense Instructions for use Diagnosis    BIOTIN PO      Take  by mouth daily.        calcipotriene-betameth diprop 0.005-0.064 % Oint    TACLONEX    60 g    Externally apply  topically 2 times daily as needed.    Psoriasis       citalopram 10 MG tablet    celeXA     Take 10 mg by mouth daily        CONCERTA 18 MG CR tablet   Generic drug:  methylphenidate ER      Take 18 mg by mouth every morning.        DAILY MULTIVITAMIN PO      Take  by mouth daily.        fish oil-omega-3 fatty acids 1000 MG capsule      Take 2 g by mouth daily        fluocinonide 0.05 % solution    LIDEX    60 mL    Apply  topically 2 times daily for itchy scalp or scalp psoriasis    Psoriasis       olopatadine HCl 0.2 % Soln    PATADAY    2.5 mL    PLACE 1 DROP INTO BOTH EYES DAILY    Allergic conjunctivitis of both eyes       pimecrolimus 1 % cream    ELIDEL    60 g    Apply topically 2 times daily as needed For psoriasis of face or genitals    Psoriasis       REFRESH P.M. Oint     1 Tube    Apply 1 inch to eye nightly as needed    Tear film insufficiency, unspecified       tacrolimus 0.1 % ointment    PROTOPIC    60 g    Apply  topically 2 times daily Apply to areas around eyes as needed    Psoriasis       VITAMIN D (CHOLECALCIFEROL) PO      Take by mouth daily

## 2018-05-25 ENCOUNTER — THERAPY VISIT (OUTPATIENT)
Dept: PHYSICAL THERAPY | Facility: CLINIC | Age: 63
End: 2018-05-25
Payer: COMMERCIAL

## 2018-05-25 ENCOUNTER — OFFICE VISIT (OUTPATIENT)
Dept: ORTHOPEDICS | Facility: CLINIC | Age: 63
End: 2018-05-25
Payer: COMMERCIAL

## 2018-05-25 DIAGNOSIS — M25.512 LEFT SHOULDER PAIN: Primary | ICD-10-CM

## 2018-05-25 DIAGNOSIS — M75.02 ADHESIVE CAPSULITIS OF LEFT SHOULDER: Primary | ICD-10-CM

## 2018-05-25 PROCEDURE — 97161 PT EVAL LOW COMPLEX 20 MIN: CPT | Mod: GP | Performed by: PHYSICAL THERAPIST

## 2018-05-25 PROCEDURE — 20611 DRAIN/INJ JOINT/BURSA W/US: CPT | Mod: LT | Performed by: FAMILY MEDICINE

## 2018-05-25 PROCEDURE — 97110 THERAPEUTIC EXERCISES: CPT | Mod: GP | Performed by: PHYSICAL THERAPIST

## 2018-05-25 RX ORDER — METHYLPREDNISOLONE ACETATE 40 MG/ML
40 INJECTION, SUSPENSION INTRA-ARTICULAR; INTRALESIONAL; INTRAMUSCULAR; SOFT TISSUE ONCE
Qty: 1 ML | Refills: 0 | OUTPATIENT
Start: 2018-05-25 | End: 2018-05-25

## 2018-05-25 RX ORDER — LIDOCAINE HYDROCHLORIDE 10 MG/ML
9 INJECTION, SOLUTION INFILTRATION; PERINEURAL ONCE
Qty: 9 ML | Refills: 0 | OUTPATIENT
Start: 2018-05-25 | End: 2018-05-25

## 2018-05-25 NOTE — MR AVS SNAPSHOT
After Visit Summary   5/25/2018    Tony Mcnair    MRN: [id number]           Patient Information     Date Of Birth          1955        Visit Information        Provider Department      5/25/2018 4:30 PM Bill Kennedy, PT [facility] [place] [facility]        Today's Diagnoses     Left shoulder pain    -  1       Follow-ups after your visit        Your next 10 appointments already scheduled     May 30, 2018  7:50 AM CDT   (Arrive by 7:35 AM)   Light Treatment Visit with UC DERM LIGHT TREATMENT   [facility])    [address] [place] [address]   [phone]            Jun 05, 2018  7:40 AM CDT   JEN Extremity with Bill Kennedy PT   [facility] [place] [facility] ([facility] [place])    [address] [place] [address]  [address]  [place] [address]   [phone]            Jun 06, 2018  7:20 AM CDT   (Arrive by 7:05 AM)   Light Treatment Visit with UC DERM LIGHT TREATMENT   [facility])    [address] [place] [address]   [phone]            Jun 12, 2018  7:40 AM CDT   JEN Extremity with Bill Kennedy PT   [facility] [place] [facility] ([facility] [place])    [address] [place] [address]  [address]  [place] [address]   [phone]            Jun 13, 2018  7:50 AM CDT   (Arrive by 7:35 AM)   Light Treatment Visit with UC DERM LIGHT TREATMENT   [facility] ([facility])    [address] [place] [address]   [phone]            Jun 20, 2018  7:50 AM CDT   (Arrive by 7:35 AM)   Light Treatment Visit with UC DERM LIGHT TREATMENT   [facility])    [address] [place] [address]   [phone]            Jun 27, 2018   7:50 AM CDT   (Arrive by 7:35 AM)   Light Treatment Visit with  DERM LIGHT TREATMENT   [facility] ([facility] [place])    [address] [facility]  [address] [facility] [address] [phone]              Who to contact     If you have questions or need follow up information about today's clinic visit or your schedule please contact [facility] directly at [phone].  Normal or non-critical lab and imaging results will be communicated to you by MyPrepApphart, letter or phone within 4 business days after the clinic has received the results. If you do not hear from us within 7 days, please contact the clinic through LIQVIDt or phone. If you have a critical or abnormal lab result, we will notify you by phone as soon as possible.  Submit refill requests through [employer] or call your pharmacy and they will forward the refill request to us. Please allow 3 business days for your refill to be completed.          Additional Information About Your Visit        MyPrepAppharSmart[employer] Learning Information     [employer] gives you secure access to your electronic health record. If you see a primary care provider, you can also send messages to your care team and make appointments. If you have questions, please call your primary care clinic.  If you do not have a primary care provider, please call [phone] and they will assist you.        Care EveryWhere ID     This is your Care EveryWhere ID. This could be used by other organizations to access your [facility] medical records  [id number]         Blood Pressure from Last 3 Encounters:   05/22/18 118/79   05/07/18 130/75   05/05/17 121/87    Weight from Last 3 Encounters:   05/22/18 77.6 kg (171 lb)   05/07/18 77.6 kg (171 lb)   05/05/17 76.4 kg (168 lb 8 oz)              We Performed the Following     HC PT EVAL, LOW COMPLEXITY     JEN INITIAL EVAL REPORT     THERAPEUTIC EXERCISES          Today's Medication Changes           These changes are accurate as of 5/25/18  4:51 PM.  If you have any questions, ask your nurse or doctor.               Start taking these medicines.        Dose/Directions    lidocaine 1 % injection   Used for:  Adhesive capsulitis of left shoulder   Started by:  Kali Ruiz MD        Dose:  9 mL   9 mLs by Other route once for 1 dose   Quantity:  9 mL   Refills:  0       methylPREDNISolone acetate 40 MG/ML injection   Commonly known as:  DEPO-MEDROL   Used for:  Adhesive capsulitis of left shoulder   Started by:  Kali Ruiz MD        Dose:  40 mg   1 mL (40 mg) by INTRA-ARTICULAR route once for 1 dose MEDICATION: Depo Medrol 40mg/ml ROUTE: Intra-articular joint injection  SITE: shoulder  DOSE: 40 LOT #: [id number] : Pfizer EXPIRATION DATE:  4/20 NDC: 3034-0623-30   Quantity:  1 mL   Refills:  0            Where to get your medicines      Some of these will need a paper prescription and others can be bought over the counter.  Ask your nurse if you have questions.     You don't need a prescription for these medications     lidocaine 1 % injection    methylPREDNISolone acetate 40 MG/ML injection                Primary Care Provider Office Phone # Fax #    William Morton [phone]       [place] [address]        Equal Access to Services     TORI EDWARDS AH: Stan shoemakero Soanna, waaxda luqadaha, qaybta kaalmada adeegyada, renzo monroe. So [facility] [phone].    ATENCIÓN: Si habla español, tiene a garrett disposición servicios gratuitos de asistencia lingüística. Llame al [phone].    We comply with applicable federal civil rights laws and [place] laws. We do not discriminate on the basis of race, color, national origin, age, disability, sex, sexual orientation, or gender identity.            Thank you!     Thank you for choosing [facility]  for your care. Our goal is always to provide you with excellent care. Hearing back from our patients is one way we can continue to improve our services. Please take a few minutes to complete the written survey that you may receive in the mail after your visit with us. Thank you!             Your Updated Medication List - Protect others around you: Learn how to safely use, store and throw away your medicines at www.disposemymeds.org.          This list is accurate as of 5/25/18  4:51 PM.  Always use your most recent med list.                   Brand Name Dispense Instructions for use Diagnosis    BIOTIN PO      Take  by mouth daily.        calcipotriene-betameth diprop 0.005-0.064 % Oint    TACLONEX    60 g    Externally apply  topically 2 times daily as needed.    Psoriasis       citalopram 10 MG tablet    celeXA     Take 10 mg by mouth daily        CONCERTA 18 MG CR tablet   Generic drug:  methylphenidate ER      Take 18 mg by mouth every morning.        DAILY MULTIVITAMIN PO      Take  by mouth daily.        fish oil-omega-3 fatty acids 1000 MG capsule      Take 2 g by mouth daily        fluocinonide 0.05 % solution    LIDEX    60 mL    Apply  topically 2 times daily for itchy scalp or scalp psoriasis    Psoriasis       lidocaine 1 % injection     9 mL    9 mLs by Other route once for 1 dose    Adhesive capsulitis of left shoulder       methylPREDNISolone acetate 40 MG/ML injection    DEPO-MEDROL    1 mL    1 mL (40 mg) by INTRA-ARTICULAR route once for 1 dose MEDICATION: Depo Medrol 40mg/ml ROUTE: Intra-articular joint injection  SITE: shoulder  DOSE: 40 LOT #: [id number] : Pfizer EXPIRATION DATE:  4/20 NDC: 8859-6325-90    Adhesive capsulitis of left shoulder       olopatadine HCl 0.2 % Soln    PATADAY    2.5 mL    PLACE 1 DROP INTO BOTH EYES DAILY    Allergic conjunctivitis of both eyes       pimecrolimus 1 % cream    ELIDEL    60 g    Apply topically 2 times daily as needed For  psoriasis of face or genitals    Psoriasis       REFRESH P.M. Oint     1 Tube    Apply 1 inch to eye nightly as needed    Tear film insufficiency, unspecified       tacrolimus 0.1 % ointment    PROTOPIC    60 g    Apply topically 2 times daily Apply to areas around eyes as needed    Psoriasis       VITAMIN D (CHOLECALCIFEROL) PO      Take by mouth daily

## 2018-05-25 NOTE — MR AVS SNAPSHOT
After Visit Summary   5/25/2018    Tony Mcnair    MRN: [id number]           Patient Information     Date Of Birth          1955        Visit Information        Provider Department      5/25/2018 7:40 AM Kali Ruiz MD; EC PROC RM 1 [facility] [place]        Today's Diagnoses     Adhesive capsulitis of left shoulder    -  1       Follow-ups after your visit        Your next 10 appointments already scheduled     May 30, 2018  7:50 AM CDT   (Arrive by 7:35 AM)   Light Treatment Visit with UC DERM LIGHT TREATMENT   [facility] ([facility])    [address] [place] [id number]   [phone]            Jun 06, 2018  7:20 AM CDT   (Arrive by 7:05 AM)   Light Treatment Visit with UC DERM LIGHT TREATMENT   [facility])    [address] [place] [id number]   [phone]            Jun 13, 2018  7:50 AM CDT   (Arrive by 7:35 AM)   Light Treatment Visit with UC DERM LIGHT TREATMENT   [facility] ([facility])    [address] [place] [id number]   [phone]            Jun 20, 2018  7:50 AM CDT   (Arrive by 7:35 AM)   Light Treatment Visit with UC DERM LIGHT TREATMENT   [facility] ([facility])    [address] [place] [phone]   [phone]            Jun 27, 2018  7:50 AM CDT   (Arrive by 7:35 AM)   Light Treatment Visit with UC DERM LIGHT TREATMENT   [facility])    [address] [place] [phone]   [phone]              Who to contact     If you have questions or need follow up information about today's clinic visit or your schedule please contact [facility] [place] directly at [phone].  Normal or  non-critical lab and imaging results will be communicated to you by MyChart, letter or phone within 4 business days after the clinic has received the results. If you do not hear from us within 7 days, please contact the clinic through [employer] or phone. If you have a critical or abnormal lab result, we will notify you by phone as soon as possible.  Submit refill requests through [employer]stream or call your pharmacy and they will forward the refill request to us. Please allow 3 business days for your refill to be completed.          Additional Information About Your Visit        Qstream Information     [employer]stream gives you secure access to your electronic health record. If you see a primary care provider, you can also send messages to your care team and make appointments. If you have questions, please call your primary care clinic.  If you do not have a primary care provider, please call [phone] and they will assist you.        Care EveryWhere ID     This is your Care EveryWhere ID. This could be used by other organizations to access your [place] medical records  [id number]         Blood Pressure from Last 3 Encounters:   05/22/18 118/79   05/07/18 130/75   05/05/17 121/87    Weight from Last 3 Encounters:   05/22/18 171 lb (77.6 kg)   05/07/18 171 lb (77.6 kg)   05/05/17 168 lb 8 oz (76.4 kg)              We Performed the Following     HC ARTHROCENTESIS ASPIR&/INJ MAJOR JT/BURSA W/US     METHYLPREDNISOLONE 40 MG INJ          Today's Medication Changes          These changes are accurate as of 5/25/18  8:44 AM.  If you have any questions, ask your nurse or doctor.               Start taking these medicines.        Dose/Directions    lidocaine 1 % injection   Used for:  Adhesive capsulitis of left shoulder        Dose:  9 mL   9 mLs by Other route once for 1 dose   Quantity:  9 mL   Refills:  0       methylPREDNISolone acetate 40 MG/ML injection   Commonly known as:  DEPO-MEDROL   Used for:  Adhesive capsulitis of left  shoulder        Dose:  40 mg   1 mL (40 mg) by INTRA-ARTICULAR route once for 1 dose MEDICATION: Depo Medrol 40mg/ml ROUTE: Intra-articular joint injection  SITE: shoulder  DOSE: 40 LOT #: [id number] : Pfizer EXPIRATION DATE:  4/20 NDC: 4179-0508-88   Quantity:  1 mL   Refills:  0            Where to get your medicines      Some of these will need a paper prescription and others can be bought over the counter.  Ask your nurse if you have questions.     You don't need a prescription for these medications     lidocaine 1 % injection    methylPREDNISolone acetate 40 MG/ML injection                Primary Care Provider Office Phone # Fax #    William Morton [phone]       [place] [address]        Equal Access to Services     TORI EDWARDS AH: Stan shoemakero Soanna, waaxda luqadaha, qaybta kaalmada adeegyakoby, renzo monroe. So [facility] [phone].    ATENCIÓN: Si habla español, tiene a garrett disposición servicios gratuitos de asistencia lingüística. [facility] [phone].    We comply with applicable federal civil rights laws and [place] laws. We do not discriminate on the basis of race, color, national origin, age, disability, sex, sexual orientation, or gender identity.            Thank you!     Thank you for choosing [facility]  for your care. Our goal is always to provide you with excellent care. Hearing back from our patients is one way we can continue to improve our services. Please take a few minutes to complete the written survey that you may receive in the mail after your visit with us. Thank you!             Your Updated Medication List - Protect others around you: Learn how to safely use, store and throw away your medicines at www.disposemymeds.org.          This list is accurate as of 5/25/18  8:44 AM.  Always use your most recent med list.                    Brand Name Dispense Instructions for use Diagnosis    BIOTIN PO      Take  by mouth daily.        calcipotriene-betameth diprop 0.005-0.064 % Oint    TACLONEX    60 g    Externally apply  topically 2 times daily as needed.    Psoriasis       citalopram 10 MG tablet    celeXA     Take 10 mg by mouth daily        CONCERTA 18 MG CR tablet   Generic drug:  methylphenidate ER      Take 18 mg by mouth every morning.        DAILY MULTIVITAMIN PO      Take  by mouth daily.        fish oil-omega-3 fatty acids 1000 MG capsule      Take 2 g by mouth daily        fluocinonide 0.05 % solution    LIDEX    60 mL    Apply  topically 2 times daily for itchy scalp or scalp psoriasis    Psoriasis       lidocaine 1 % injection     9 mL    9 mLs by Other route once for 1 dose    Adhesive capsulitis of left shoulder       methylPREDNISolone acetate 40 MG/ML injection    DEPO-MEDROL    1 mL    1 mL (40 mg) by INTRA-ARTICULAR route once for 1 dose MEDICATION: Depo Medrol 40mg/ml ROUTE: Intra-articular joint injection  SITE: shoulder  DOSE: 40 LOT #: [id number] : Pfizer EXPIRATION DATE:  4/20 NDC: 2536-3916-20    Adhesive capsulitis of left shoulder       olopatadine HCl 0.2 % Soln    PATADAY    2.5 mL    PLACE 1 DROP INTO BOTH EYES DAILY    Allergic conjunctivitis of both eyes       pimecrolimus 1 % cream    ELIDEL    60 g    Apply topically 2 times daily as needed For psoriasis of face or genitals    Psoriasis       REFRESH P.M. Oint     1 Tube    Apply 1 inch to eye nightly as needed    Tear film insufficiency, unspecified       tacrolimus 0.1 % ointment    PROTOPIC    60 g    Apply topically 2 times daily Apply to areas around eyes as needed    Psoriasis       VITAMIN D (CHOLECALCIFEROL) PO      Take by mouth daily

## 2018-05-25 NOTE — LETTER
5/25/2018         RE: Tony Mcnair  [address] [place] [id number]        Dear Colleague,    Thank you for referring your patient, Tony Mcnair, to [facility]. Please see a copy of my visit note below.    Left Glenohumeral Joint Corticosteroid Injection     Diagnoses (preoperative and postoperative): Left frozen shoulder  Current Procedure (include preoperative): Sonographically guided left glenohumeral joint corticosteroid injection  Current Indication (include preoperative): Alleviation of pain    REASON FOR PROCEDURE: A left glenohumeral joint corticosteroid injection for alleviation of pain. Sonographic guidance will be used to ensure accurate placement of the medication within the joint space.  PATIENT EDUCATION: Ready to learn with no apparent learning barriers identified. Learning preferences include listening. Explained diagnosis and treatment plan as well as treatment alternatives. Patient expressed understanding of the content.  Following denial of allergy and review of potential side effects and complications including but not necessarily limited to infection, bleeding, allergic reaction, post-injection flare, local tissue breakdown (including but not limited to potential for skin depigmentation and/or subcutaneous fat atrophy), systemic effects of corticosteroids, elevation of blood glucose, injury to soft tissue and/or nerves and seizure, patient indicated their understanding and agreed to proceed. Written and signed consent form has been obtained and is scanned into the chart.  PROCEDURE: Prior to the procedure, the left posterior glenohumeral joint was examined with a 4 MHz curvilinear transducer to visualize the joint space and determine the approach for the procedure.  Prior to procedure, probe and cord were prepared in a sanitary fashion. Procedure was carried out using sterile technique, including chloroprep and sterile transducer gel.      PROCEDURAL PAUSE: Procedural pause conducted to verify correct patient identity, procedure to be performed, and as applicable, correct side/site, correct patient position, special equipment, or special requirements.  Patient position: RIGHT  lateral recumbent  Transducer type: 4 MHz curvilinear transducer  Approach: Lateral to medial parallel to long axis of transducer  Local Anesthesia: Sonographically guided 25-gauge 2.5 inch needle was directly visualized anesthetizing the skin and subcutaneous tissue with 5 ml of 1% Lidocaine   Injectate:  Sonographically guided  22-gauge 3.5 inch needle was entered down into the glenohumeral joint between the labrum and articular cartilage.  After confirming needle tip position a solution of 1 ml of 40 mg/ml Depo Medrol and 9 ml of 1% Lidocaine was injected after reconfirming needle tip position under low resistance and seen flowing into the joint space.  AFTERCARE:  Patient tolerated the procedure without complication. After a short observation period, patient was discharged under their own power and in excellent condition.    Patient noted to have less pain after completion of the procedure.    Kali Ruiz MD  [facility]    Again, thank you for allowing me to participate in the care of your patient.        Sincerely,        Kali Ruiz MD

## 2018-05-25 NOTE — PROGRESS NOTES
Left Glenohumeral Joint Corticosteroid Injection     Diagnoses (preoperative and postoperative): Left frozen shoulder  Current Procedure (include preoperative): Sonographically guided left glenohumeral joint corticosteroid injection  Current Indication (include preoperative): Alleviation of pain    REASON FOR PROCEDURE: A left glenohumeral joint corticosteroid injection for alleviation of pain. Sonographic guidance will be used to ensure accurate placement of the medication within the joint space.  PATIENT EDUCATION: Ready to learn with no apparent learning barriers identified. Learning preferences include listening. Explained diagnosis and treatment plan as well as treatment alternatives. Patient expressed understanding of the content.  Following denial of allergy and review of potential side effects and complications including but not necessarily limited to infection, bleeding, allergic reaction, post-injection flare, local tissue breakdown (including but not limited to potential for skin depigmentation and/or subcutaneous fat atrophy), systemic effects of corticosteroids, elevation of blood glucose, injury to soft tissue and/or nerves and seizure, patient indicated their understanding and agreed to proceed. Written and signed consent form has been obtained and is scanned into the chart.  PROCEDURE: Prior to the procedure, the left posterior glenohumeral joint was examined with a 4 MHz curvilinear transducer to visualize the joint space and determine the approach for the procedure.  Prior to procedure, probe and cord were prepared in a sanitary fashion. Procedure was carried out using sterile technique, including chloroprep and sterile transducer gel.     PROCEDURAL PAUSE: Procedural pause conducted to verify correct patient identity, procedure to be performed, and as applicable, correct side/site, correct patient position, special equipment, or special requirements.  Patient position: RIGHT  lateral  recumbent  Transducer type: 4 MHz curvilinear transducer  Approach: Lateral to medial parallel to long axis of transducer  Local Anesthesia: Sonographically guided 25-gauge 2.5 inch needle was directly visualized anesthetizing the skin and subcutaneous tissue with 5 ml of 1% Lidocaine   Injectate:  Sonographically guided  22-gauge 3.5 inch needle was entered down into the glenohumeral joint between the labrum and articular cartilage.  After confirming needle tip position a solution of 1 ml of 40 mg/ml Depo Medrol and 9 ml of 1% Lidocaine was injected after reconfirming needle tip position under low resistance and seen flowing into the joint space.  AFTERCARE:  Patient tolerated the procedure without complication. After a short observation period, patient was discharged under their own power and in excellent condition.    Patient noted to have less pain after completion of the procedure.    Kali Ruiz MD  [facility]

## 2018-05-25 NOTE — PROGRESS NOTES
[facility] Initial Evaluation  Subjective:  Patient is a 62 year old male presenting with rehab left shoulder hpi.   Tony Mcnair is a 62 year old male with a left shoulder condition.  Condition occurred with:  Repetition/overuse.  Condition occurred: in the community.  This is a chronic condition  Patient is referred with c/o L shoulder pain which began in August 2017. He recalls performing some hip strengthening ex. and felt a sharp pain in the anterior L shoulder. He has noted a gradual loss of motion and pain when reaching out to the side or behind the back. Recent MRI was negative and he had a HC injection earlier today. Reports dull ache at rest..    Patient reports pain:  In the joint and anterior.  Radiates to:  Upper arm.  Pain is described as aching and is constant and reported as 5/10.  Associated symptoms:  Loss of motion/stiffness and painful arc. Pain is the same all the time.  Symptoms are exacerbated by using arm at shoulder level, using arm behind back and lying on extremity and relieved by nothing.  Since onset symptoms are unchanged.  Special tests:  MRI.  Previous treatment: HC injection performed on 5-25-18.    General health as reported by patient is excellent.  Pertinent medical history includes:  Rheumatoid arthritis.          Patient is working in normal job with restrictions.  Employment tasks: computer work.    Barriers include:  None as reported by the patient.    Red flags:  None as reported by the patient.                        Objective:  System                   Shoulder Evaluation:  ROM:  AROM:    Flexion:  Left:  138    Right:  145    Abduction:  Left: 98   Right:  155                      PROM:            Internal Rotation:  Left:  45    Right:  57  External Rotation:  Left:  70    Right:  102                    Strength:  normal                      Stability Testing:  normal      Special Tests:    Left shoulder positive for the following special tests:   Impingement    Palpation:  not assessed      Mobility Tests:    Glenohumeral anterior left:  Hypomobile  Glenohumeral anterior right:  Normal  Glenohumeral posterior left:  Hypomobile  Glenohumeral posterior right:  Normal  Glenohumeral inferior left:  Hypomobile  Glenohumeral inferior right:  Normal                                             General     ROS    Assessment/Plan:    Patient is a 62 year old male with left side shoulder complaints.    Patient has the following significant findings with corresponding treatment plan.                Diagnosis 1:  L shoulder pain  Pain -  hot/cold therapy, manual therapy, self management, education and home program  Decreased joint mobility - manual therapy, therapeutic exercise and home program  Impaired muscle performance - neuro re-education and home program  Decreased function - therapeutic activities and home program    Therapy Evaluation Codes:   1) History comprised of:   Personal factors that impact the plan of care:      Time since onset of symptoms.    Comorbidity factors that impact the plan of care are:      Rheumatoid arthritis.     Medications impacting care: None.  2) Examination of Body Systems comprised of:   Body structures and functions that impact the plan of care:      Shoulder.   Activity limitations that impact the plan of care are:      Dressing and Lifting.  3) Clinical presentation characteristics are:   Stable/Uncomplicated.  4) Decision-Making    Low complexity using standardized patient assessment instrument and/or measureable assessment of functional outcome.  Cumulative Therapy Evaluation is: Low complexity.    Previous and current functional limitations:  (See Goal Flow Sheet for this information)    Short term and Long term goals: (See Goal Flow Sheet for this information)     Communication ability:  Patient appears to be able to clearly communicate and understand verbal and written communication and follow directions correctly.  Treatment  Explanation - The following has been discussed with the patient:   RX ordered/plan of care  Anticipated outcomes  Possible risks and side effects  This patient would benefit from PT intervention to resume normal activities.   Rehab potential is excellent.    Frequency:  1 X week, once daily  Duration:  for 4 weeks  Discharge Plan:  Achieve all LTG.  Independent in home treatment program.  Reach maximal therapeutic benefit.    Please refer to the daily flowsheet for treatment today, total treatment time and time spent performing 1:1 timed codes.

## 2018-05-30 ENCOUNTER — ALLIED HEALTH/NURSE VISIT (OUTPATIENT)
Dept: DERMATOLOGY | Facility: CLINIC | Age: 63
End: 2018-05-30
Payer: COMMERCIAL

## 2018-05-30 DIAGNOSIS — L40.9 PSORIASIS: ICD-10-CM

## 2018-05-30 NOTE — MR AVS SNAPSHOT
After Visit Summary   5/30/2018    Tony Mcnair    MRN: [id number]           Patient Information     Date Of Birth          1955        Visit Information        Provider Department      5/30/2018 7:50 AM UC DERM LIGHT TREATMENT [facility]        Today's Diagnoses     Psoriasis           Follow-ups after your visit        Your next 10 appointments already scheduled     May 30, 2018  7:50 AM CDT   (Arrive by 7:35 AM)   Light Treatment Visit with UC DERM LIGHT TREATMENT   [facility])    [address] [place] [address]   [phone]            Jun 05, 2018  7:40 AM CDT   JEN Extremity with Bill Kennedy, PT   [facility] ([facility])    [address] [place] [address]  [address]  [place] [address]   [phone]            Jun 06, 2018  7:20 AM CDT   (Arrive by 7:05 AM)   Light Treatment Visit with UC DERM LIGHT TREATMENT   [facility])    [address] [place] [address]   [phone]            Jun 12, 2018  7:40 AM CDT   JEN Extremity with Bill Kennedy, PT   [facility] ([facility])    [address] [place] [address]  [address]  [place] [address]   [phone]            Jun 13, 2018  7:50 AM CDT   (Arrive by 7:35 AM)   Light Treatment Visit with UC DERM LIGHT TREATMENT   [facility])    [address] [place] [address]   [phone]            Jun 20, 2018  7:50 AM CDT   (Arrive by 7:35 AM)   Light Treatment Visit with UC DERM LIGHT TREATMENT   [facility])    [address] [place] [address]   [phone]            Jun 27, 2018  7:50 AM CDT   (Arrive by 7:35 AM)   Light Treatment  Visit with  DERM LIGHT TREATMENT   [facility] ([facility])    [address]  3rd Floor  [facility] [address] [phone]              Who to contact     Please call your clinic at [phone] to:    Ask questions about your health    Make or cancel appointments    Discuss your medicines    Learn about your test results    Speak to your doctor            Additional Information About Your Visit        "[employer]hart Information     SheerID gives you secure access to your electronic health record. If you see a primary care provider, you can also send messages to your care team and make appointments. If you have questions, please call your primary care clinic.  If you do not have a primary care provider, please call [phone] and they will assist you.      SheerID is an electronic gateway that provides easy, online access to your medical records. With [employer], you can request a clinic appointment, read your test results, renew a prescription or communicate with your care team.     To access your existing account, please contact your [facility] or call [phone] for assistance.        Care EveryWhere ID     This is your Care EveryWhere ID. This could be used by other organizations to access your [place] medical records  [id number]         Blood Pressure from Last 3 Encounters:   05/22/18 118/79   05/07/18 130/75   05/05/17 121/87    Weight from Last 3 Encounters:   05/22/18 77.6 kg (171 lb)   05/07/18 77.6 kg (171 lb)   05/05/17 76.4 kg (168 lb 8 oz)              We Performed the Following     CHARGE - PHOTOTHERAPY NBUVB (UV LIGHT)     PROCEDURE - PHOTOTHERAPY NBUVB        Primary Care Provider Office Phone # Fax #    William Morton [phone]       [address] [address]        Equal Access to Services     TORI EDWARDS : deepak Braxton,  renzo correa ah. Cle[facility] [phone].    ATENCIÓN: Si rose mary varghese, tiene a garrett disposición servicios gratuitos de asistencia lingüística. Ze al [phone].    We comply with applicable federal civil rights laws and [place] laws. We do not discriminate on the basis of race, color, national origin, age, disability, sex, sexual orientation, or gender identity.            Thank you!     Thank you for choosing [facility]  for your care. Our goal is always to provide you with excellent care. Hearing back from our patients is one way we can continue to improve our services. Please take a few minutes to complete the written survey that you may receive in the mail after your visit with us. Thank you!             Your Updated Medication List - Protect others around you: Learn how to safely use, store and throw away your medicines at www.disposemymeds.org.          This list is accurate as of 5/30/18  7:32 AM.  Always use your most recent med list.                   Brand Name Dispense Instructions for use Diagnosis    BIOTIN PO      Take  by mouth daily.        calcipotriene-betameth diprop 0.005-0.064 % Oint    TACLONEX    60 g    Externally apply  topically 2 times daily as needed.    Psoriasis       citalopram 10 MG tablet    celeXA     Take 10 mg by mouth daily        CONCERTA 18 MG CR tablet   Generic drug:  methylphenidate ER      Take 18 mg by mouth every morning.        DAILY MULTIVITAMIN PO      Take  by mouth daily.        fish oil-omega-3 fatty acids 1000 MG capsule      Take 2 g by mouth daily        fluocinonide 0.05 % solution    LIDEX    60 mL    Apply  topically 2 times daily for itchy scalp or scalp psoriasis    Psoriasis       olopatadine HCl 0.2 % Soln    PATADAY    2.5 mL    PLACE 1 DROP INTO BOTH EYES DAILY    Allergic conjunctivitis of both eyes       pimecrolimus 1 % cream    ELIDEL    60 g    Apply topically 2 times daily as needed  For psoriasis of face or genitals    Psoriasis       REFRESH P.M. Oint     1 Tube    Apply 1 inch to eye nightly as needed    Tear film insufficiency, unspecified       tacrolimus 0.1 % ointment    PROTOPIC    60 g    Apply topically 2 times daily Apply to areas around eyes as needed    Psoriasis       VITAMIN D (CHOLECALCIFEROL) PO      Take by mouth daily

## 2018-05-30 NOTE — PROGRESS NOTES
[facility] Phototherapy Record  1. Tony Mcnair is a 62 year old male is here today for phototherapy (UVB) treatment for Psoriasis.        Changes or new medications since last treatment:   NO    New medical conditions or problems since last treatment:   NO    Any problems with last phototherapy treatment?    NO    2. The patient tolerated phototherapy without complication    Patient will return for next UVB treatment, per protocol.     Patient to see provider every 4-12 weeks for follow-up during treatment.      All questions and concerns discussed with patient in clinic today.      Georgina Wills RN

## 2018-06-05 ENCOUNTER — THERAPY VISIT (OUTPATIENT)
Dept: PHYSICAL THERAPY | Facility: CLINIC | Age: 63
End: 2018-06-05
Payer: COMMERCIAL

## 2018-06-05 DIAGNOSIS — M25.512 LEFT SHOULDER PAIN: ICD-10-CM

## 2018-06-05 PROCEDURE — 97140 MANUAL THERAPY 1/> REGIONS: CPT | Mod: GP | Performed by: PHYSICAL THERAPIST

## 2018-06-05 PROCEDURE — 97110 THERAPEUTIC EXERCISES: CPT | Mod: GP | Performed by: PHYSICAL THERAPIST

## 2018-06-06 ENCOUNTER — ALLIED HEALTH/NURSE VISIT (OUTPATIENT)
Dept: DERMATOLOGY | Facility: CLINIC | Age: 63
End: 2018-06-06
Payer: COMMERCIAL

## 2018-06-06 DIAGNOSIS — L40.9 PSORIASIS: ICD-10-CM

## 2018-06-06 NOTE — MR AVS SNAPSHOT
After Visit Summary   6/6/2018    Tony Mcnair    MRN: [id number]           Patient Information     Date Of Birth          1955        Visit Information        Provider Department      6/6/2018 7:20 AM UC DERM LIGHT TREATMENT [facility]        Today's Diagnoses     Psoriasis           Follow-ups after your visit        Your next 10 appointments already scheduled     Jun 12, 2018  7:40 AM CDT   JEN Extremity with Bill Kennedy PT   [facility] ([facility] [place] [place])    [address] [place] [address]  [address]  [place] [address] [phone]            Jun 13, 2018  7:50 AM CDT   (Arrive by 7:35 AM)   Light Treatment Visit with UC DERM LIGHT TREATMENT   [facility] ([facility])    [address] [place] [id number] [phone]            Jun 20, 2018  7:50 AM CDT   (Arrive by 7:35 AM)   Light Treatment Visit with UC DERM LIGHT TREATMENT   [facility] ([facility])    [address] [address] [phone]            Jun 27, 2018  7:50 AM CDT   (Arrive by 7:35 AM)   Light Treatment Visit with UC DERM LIGHT TREATMENT   [facility] ([facility])    [address] [place] [id number] [phone]              Who to contact     Please call your clinic at [phone] to:    Ask questions about your health    Make or cancel appointments    Discuss your medicines    Learn about your test results    Speak to your doctor            Additional Information About Your Visit        MyChart Information     In [employer] gives you secure access to your electronic health record. If you see a primary care provider, you can also send messages to your care team and make appointments. If you have questions, please call your primary care clinic.  If you do not have a primary  care provider, please call [phone] and they will assist you.      Bridj is an electronic gateway that provides easy, online access to your medical records. With Bridj, you can request a clinic appointment, read your test results, renew a prescription or communicate with your care team.     To access your existing account, please contact your [facility] or call [phone] for assistance.        Care EveryWhere ID     This is your Care EveryWhere ID. This could be used by other organizations to access your [place] medical records  [id number]         Blood Pressure from Last 3 Encounters:   05/22/18 118/79   05/07/18 130/75   05/05/17 121/87    Weight from Last 3 Encounters:   05/22/18 77.6 kg (171 lb)   05/07/18 77.6 kg (171 lb)   05/05/17 76.4 kg (168 lb 8 oz)              We Performed the Following     CHARGE - PHOTOTHERAPY NBUVB (UV LIGHT)     PROCEDURE - PHOTOTHERAPY NBUVB        Primary Care Provider Office Phone # Fax #    William Morton [phone]       [place] [address]        Equal Access to Services     TORI EDWARDS : Hadii aad ku hadasho Soanna, waaxda luqadaha, qaybta kaalmada adegerryda, renzo monroe. So [facility] [phone].    ATENCIÓN: Si habla español, tiene a garrett disposición servicios gratuitos de asistencia lingüística. Mickiame al [phone].    We comply with applicable federal civil rights laws and [place] laws. We do not discriminate on the basis of race, color, national origin, age, disability, sex, sexual orientation, or gender identity.            Thank you!     Thank you for choosing [facility]  for your care. Our goal is always to provide you with excellent care. Hearing back from our patients is one way we can continue to improve our services. Please take a few minutes to complete the written survey that you may receive in the mail after  your visit with us. Thank you!             Your Updated Medication List - Protect others around you: Learn how to safely use, store and throw away your medicines at www.disposemymeds.org.          This list is accurate as of 6/6/18  7:46 AM.  Always use your most recent med list.                   Brand Name Dispense Instructions for use Diagnosis    BIOTIN PO      Take  by mouth daily.        calcipotriene-betameth diprop 0.005-0.064 % Oint    TACLONEX    60 g    Externally apply  topically 2 times daily as needed.    Psoriasis       citalopram 10 MG tablet    celeXA     Take 10 mg by mouth daily        CONCERTA 18 MG CR tablet   Generic drug:  methylphenidate ER      Take 18 mg by mouth every morning.        DAILY MULTIVITAMIN PO      Take  by mouth daily.        fish oil-omega-3 fatty acids 1000 MG capsule      Take 2 g by mouth daily        fluocinonide 0.05 % solution    LIDEX    60 mL    Apply  topically 2 times daily for itchy scalp or scalp psoriasis    Psoriasis       olopatadine HCl 0.2 % Soln    PATADAY    2.5 mL    PLACE 1 DROP INTO BOTH EYES DAILY    Allergic conjunctivitis of both eyes       pimecrolimus 1 % cream    ELIDEL    60 g    Apply topically 2 times daily as needed For psoriasis of face or genitals    Psoriasis       REFRESH P.M. Oint     1 Tube    Apply 1 inch to eye nightly as needed    Tear film insufficiency, unspecified       tacrolimus 0.1 % ointment    PROTOPIC    60 g    Apply topically 2 times daily Apply to areas around eyes as needed    Psoriasis       VITAMIN D (CHOLECALCIFEROL) PO      Take by mouth daily

## 2018-06-06 NOTE — PROGRESS NOTES
[facility] Phototherapy Record  1. Tony Mcnair is a 62 year old male is here today for phototherapy (UVB) treatment for Psoriasis.        Changes or new medications since last treatment:   NO    New medical conditions or problems since last treatment:   NO    Any problems with last phototherapy treatment?    NO    2. The patient tolerated phototherapy without complication    Patient will return for next UVB treatment, per protocol.     Patient to see provider every 4-12 weeks for follow-up during treatment.      All questions and concerns discussed with patient in clinic today.      Georgina Wills RN

## 2018-06-12 ENCOUNTER — THERAPY VISIT (OUTPATIENT)
Dept: PHYSICAL THERAPY | Facility: CLINIC | Age: 63
End: 2018-06-12
Payer: COMMERCIAL

## 2018-06-12 DIAGNOSIS — M25.512 LEFT SHOULDER PAIN: ICD-10-CM

## 2018-06-12 PROCEDURE — 97112 NEUROMUSCULAR REEDUCATION: CPT | Mod: GP | Performed by: PHYSICAL THERAPIST

## 2018-06-12 PROCEDURE — 97110 THERAPEUTIC EXERCISES: CPT | Mod: GP | Performed by: PHYSICAL THERAPIST

## 2018-06-13 ENCOUNTER — ALLIED HEALTH/NURSE VISIT (OUTPATIENT)
Dept: DERMATOLOGY | Facility: CLINIC | Age: 63
End: 2018-06-13
Payer: COMMERCIAL

## 2018-06-13 DIAGNOSIS — L40.9 PSORIASIS: ICD-10-CM

## 2018-06-13 NOTE — PROGRESS NOTES
[facility] Phototherapy Record  1. Tony Mcnair is a 62 year old male is here today for phototherapy (UVB) treatment for Psoriasis.        Changes or new medications since last treatment:   NO    New medical conditions or problems since last treatment:   NO    Any problems with last phototherapy treatment?    NO    2. The patient tolerated phototherapy without complication    Patient will return for next UVB treatment, per protocol.     Patient to see provider every 4-12 weeks for follow-up during treatment.      All questions and concerns discussed with patient in clinic today.      Georgina Wills RN

## 2018-06-13 NOTE — MR AVS SNAPSHOT
After Visit Summary   6/13/2018    Tony Mcnair    MRN: [id number]           Patient Information     Date Of Birth          1955        Visit Information        Provider Department      6/13/2018 7:50 AM UC DERM LIGHT TREATMENT [facility]        Today's Diagnoses     Psoriasis           Follow-ups after your visit        Your next 10 appointments already scheduled     Jun 20, 2018  7:50 AM CDT   (Arrive by 7:35 AM)   Light Treatment Visit with UC DERM LIGHT TREATMENT   [facility] ([facility])    [address] [place] [address] [phone]            Jun 27, 2018  7:50 AM CDT   (Arrive by 7:35 AM)   Light Treatment Visit with UC DERM LIGHT TREATMENT   [facility] ([facility])    [address] [place] [address] [phone]              Who to contact     Please call your clinic at [phone] to:    Ask questions about your health    Make or cancel appointments    Discuss your medicines    Learn about your test results    Speak to your doctor            Additional Information About Your Visit        [employer] Information     [employer] gives you secure access to your electronic health record. If you see a primary care provider, you can also send messages to your care team and make appointments. If you have questions, please call your primary care clinic.  If you do not have a primary care provider, please call [phone] and they will assist you.      [employer] is an electronic gateway that provides easy, online access to your medical records. With [employer], you can request a clinic appointment, read your test results, renew a prescription or communicate with your care team.     To access your existing account, please contact your [facility] or call [phone] for assistance.        Care EveryWhere ID     This is your Care EveryWhere  ID. This could be used by other organizations to access your [place] medical records  [id number]         Blood Pressure from Last 3 Encounters:   05/22/18 118/79   05/07/18 130/75   05/05/17 121/87    Weight from Last 3 Encounters:   05/22/18 77.6 kg (171 lb)   05/07/18 77.6 kg (171 lb)   05/05/17 76.4 kg (168 lb 8 oz)              We Performed the Following     CHARGE - PHOTOTHERAPY NBUVB (UV LIGHT)     PROCEDURE - PHOTOTHERAPY NBUVB        Primary Care Provider Office Phone # Fax #    William Morton [phone]       [address] [address]        Equal Access to Services     TORI EDWARDS : Stan Rodrigez, waaxda luqadaha, qaybta kaalmada saira, renzo monroe. So [facility] [phone].    ATENCIÓN: Si habla español, tiene a garrett disposición servicios gratuitos de asistencia lingüística. Ze al [phone].    We comply with applicable federal civil rights laws and [place] laws. We do not discriminate on the basis of race, color, national origin, age, disability, sex, sexual orientation, or gender identity.            Thank you!     Thank you for choosing [facility] DER[facility]  for your care. Our goal is always to provide you with excellent care. Hearing back from our patients is one way we can continue to improve our services. Please take a few minutes to complete the written survey that you may receive in the mail after your visit with us. Thank you!             Your Updated Medication List - Protect others around you: Learn how to safely use, store and throw away your medicines at www.disposemymeds.org.          This list is accurate as of 6/13/18  7:56 AM.  Always use your most recent med list.                   Brand Name Dispense Instructions for use Diagnosis    BIOTIN PO      Take  by mouth daily.        calcipotriene-betameth diprop 0.005-0.064 % Oint    TACLONEX    60 g    Externally apply   topically 2 times daily as needed.    Psoriasis       citalopram 10 MG tablet    celeXA     Take 10 mg by mouth daily        CONCERTA 18 MG CR tablet   Generic drug:  methylphenidate ER      Take 18 mg by mouth every morning.        DAILY MULTIVITAMIN PO      Take  by mouth daily.        fish oil-omega-3 fatty acids 1000 MG capsule      Take 2 g by mouth daily        fluocinonide 0.05 % solution    LIDEX    60 mL    Apply  topically 2 times daily for itchy scalp or scalp psoriasis    Psoriasis       olopatadine HCl 0.2 % Soln    PATADAY    2.5 mL    PLACE 1 DROP INTO BOTH EYES DAILY    Allergic conjunctivitis of both eyes       pimecrolimus 1 % cream    ELIDEL    60 g    Apply topically 2 times daily as needed For psoriasis of face or genitals    Psoriasis       REFRESH P.M. Oint     1 Tube    Apply 1 inch to eye nightly as needed    Tear film insufficiency, unspecified       tacrolimus 0.1 % ointment    PROTOPIC    60 g    Apply topically 2 times daily Apply to areas around eyes as needed    Psoriasis       VITAMIN D (CHOLECALCIFEROL) PO      Take by mouth daily

## 2018-06-20 ENCOUNTER — ALLIED HEALTH/NURSE VISIT (OUTPATIENT)
Dept: DERMATOLOGY | Facility: CLINIC | Age: 63
End: 2018-06-20
Payer: COMMERCIAL

## 2018-06-20 DIAGNOSIS — L40.9 PSORIASIS: ICD-10-CM

## 2018-06-20 NOTE — MR AVS SNAPSHOT
After Visit Summary   6/20/2018    Tony Mcnair    MRN: [id number]           Patient Information     Date Of Birth          1955        Visit Information        Provider Department      6/20/2018 7:50 AM UC DERM LIGHT TREATMENT [facility]        Today's Diagnoses     Psoriasis           Follow-ups after your visit        Your next 10 appointments already scheduled     Jun 27, 2018  7:50 AM CDT   (Arrive by 7:35 AM)   Light Treatment Visit with UC DERM LIGHT TREATMENT   [facility] ([facility])    [address] [address] [phone]              Who to contact     Please call your clinic at [phone] to:    Ask questions about your health    Make or cancel appointments    Discuss your medicines    Learn about your test results    Speak to your doctor            Additional Information About Your Visit        [employer] Information     [employer] gives you secure access to your electronic health record. If you see a primary care provider, you can also send messages to your care team and make appointments. If you have questions, please call your primary care clinic.  If you do not have a primary care provider, please call [phone] and they will assist you.      [employer] is an electronic gateway that provides easy, online access to your medical records. With [employer], you can request a clinic appointment, read your test results, renew a prescription or communicate with your care team.     To access your existing account, please contact your [facility] or call [phone] for assistance.        Care EveryWhere ID     This is your Care EveryWhere ID. This could be used by other organizations to access your [place] medical records  [id number]         Blood Pressure from Last 3 Encounters:   05/22/18 118/79   05/07/18 130/75   05/05/17 121/87    Weight from Last 3 Encounters:   05/22/18 77.6  kg (171 lb)   05/07/18 77.6 kg (171 lb)   05/05/17 76.4 kg (168 lb 8 oz)              We Performed the Following     CHARGE - PHOTOTHERAPY NBUVB (UV LIGHT)     PROCEDURE - PHOTOTHERAPY NBUVB        Primary Care Provider Office Phone # Fax #    William Morton [phone]       [address] [address]        Equal Access to Services     TORI EDWARDS : Hadii aad ku hadasho Soomaali, waaxda luqadaha, qaybta kaalmada adeegyada, waxay idiin hayaan adetiffanie monroe. So [facility] [phone].    ATENCIÓN: Si rose mary varghese, tiene a garrett disposición servicios gratuitos de asistencia lingüística. [facility] [phone].    We comply with applicable federal civil rights laws and [place] laws. We do not discriminate on the basis of race, color, national origin, age, disability, sex, sexual orientation, or gender identity.            Thank you!     Thank you for choosing [facility] DERMATOLOGY  for your care. Our goal is always to provide you with excellent care. Hearing back from our patients is one way we can continue to improve our services. Please take a few minutes to complete the written survey that you may receive in the mail after your visit with us. Thank you!             Your Updated Medication List - Protect others around you: Learn how to safely use, store and throw away your medicines at www.disposemymeds.org.          This list is accurate as of 6/20/18  8:08 AM.  Always use your most recent med list.                   Brand Name Dispense Instructions for use Diagnosis    BIOTIN PO      Take  by mouth daily.        calcipotriene-betameth diprop 0.005-0.064 % Oint    TACLONEX    60 g    Externally apply  topically 2 times daily as needed.    Psoriasis       citalopram 10 MG tablet    celeXA     Take 10 mg by mouth daily        CONCERTA 18 MG CR tablet   Generic drug:  methylphenidate ER      Take 18 mg by mouth every morning.        DAILY MULTIVITAMIN PO       Take  by mouth daily.        fish oil-omega-3 fatty acids 1000 MG capsule      Take 2 g by mouth daily        fluocinonide 0.05 % solution    LIDEX    60 mL    Apply  topically 2 times daily for itchy scalp or scalp psoriasis    Psoriasis       olopatadine HCl 0.2 % Soln    PATADAY    2.5 mL    PLACE 1 DROP INTO BOTH EYES DAILY    Allergic conjunctivitis of both eyes       pimecrolimus 1 % cream    ELIDEL    60 g    Apply topically 2 times daily as needed For psoriasis of face or genitals    Psoriasis       REFRESH P.M. Oint     1 Tube    Apply 1 inch to eye nightly as needed    Tear film insufficiency, unspecified       tacrolimus 0.1 % ointment    PROTOPIC    60 g    Apply topically 2 times daily Apply to areas around eyes as needed    Psoriasis       VITAMIN D (CHOLECALCIFEROL) PO      Take by mouth daily

## 2018-06-27 ENCOUNTER — ALLIED HEALTH/NURSE VISIT (OUTPATIENT)
Dept: DERMATOLOGY | Facility: CLINIC | Age: 63
End: 2018-06-27
Payer: COMMERCIAL

## 2018-06-27 DIAGNOSIS — L40.9 PSORIASIS: ICD-10-CM

## 2018-06-27 NOTE — MR AVS SNAPSHOT
After Visit Summary   6/27/2018    Tony Mcnair    MRN: [id number]           Patient Information     Date Of Birth          1955        Visit Information        Provider Department      6/27/2018 7:50 AM UC DERM LIGHT TREATMENT [facility] Dermatology        Today's Diagnoses     Psoriasis           Follow-ups after your visit        Who to contact     Please call your clinic at [phone] to:    Ask questions about your health    Make or cancel appointments    Discuss your medicines    Learn about your test results    Speak to your doctor            Additional Information About Your Visit        MyChart Information     [employer] gives you secure access to your electronic health record. If you see a primary care provider, you can also send messages to your care team and make appointments. If you have questions, please call your primary care clinic.  If you do not have a primary care provider, please call [phone] and they will assist you.      [employer] is an electronic gateway that provides easy, online access to your medical records. With [employer], you can request a clinic appointment, read your test results, renew a prescription or communicate with your care team.     To access your existing account, please contact your [facility] or call [phone] for assistance.        Care EveryWhere ID     This is your Care EveryWhere ID. This could be used by other organizations to access your [place] medical records  [id number]         Blood Pressure from Last 3 Encounters:   05/22/18 118/79   05/07/18 130/75   05/05/17 121/87    Weight from Last 3 Encounters:   05/22/18 77.6 kg (171 lb)   05/07/18 77.6 kg (171 lb)   05/05/17 76.4 kg (168 lb 8 oz)              We Performed the Following     CHARGE - PHOTOTHERAPY NBUVB (UV LIGHT)     PROCEDURE - PHOTOTHERAPY NBUVB        Primary Care Provider Office Phone # Fax #    William Morton [phone]        [address] [address]        Equal Access to Services     MAURILIODARYN JERRY : Hadii zina Rodrigez, waaxda ryley, qaybta davejermankoby chávez, renzo hernandezgrayfco monroe. So [facility] [phone].    ATENCIÓN: Si habla español, tiene a garrett disposición servicios gratuitos de asistencia lingüística. Ze al [phone].    We comply with applicable federal civil rights laws and [place] laws. We do not discriminate on the basis of race, color, national origin, age, disability, sex, sexual orientation, or gender identity.            Thank you!     Thank you for choosing [facility] DERMATOLOGY  for your care. Our goal is always to provide you with excellent care. Hearing back from our patients is one way we can continue to improve our services. Please take a few minutes to complete the written survey that you may receive in the mail after your visit with us. Thank you!             Your Updated Medication List - Protect others around you: Learn how to safely use, store and throw away your medicines at www.disposemymeds.org.          This list is accurate as of 6/27/18  8:14 AM.  Always use your most recent med list.                   Brand Name Dispense Instructions for use Diagnosis    BIOTIN PO      Take  by mouth daily.        calcipotriene-betameth diprop 0.005-0.064 % Oint    TACLONEX    60 g    Externally apply  topically 2 times daily as needed.    Psoriasis       citalopram 10 MG tablet    celeXA     Take 10 mg by mouth daily        CONCERTA 18 MG CR tablet   Generic drug:  methylphenidate ER      Take 18 mg by mouth every morning.        DAILY MULTIVITAMIN PO      Take  by mouth daily.        fish oil-omega-3 fatty acids 1000 MG capsule      Take 2 g by mouth daily        fluocinonide 0.05 % solution    LIDEX    60 mL    Apply  topically 2 times daily for itchy scalp or scalp psoriasis    Psoriasis       olopatadine HCl 0.2 % Soln    PATADAY     2.5 mL    PLACE 1 DROP INTO BOTH EYES DAILY    Allergic conjunctivitis of both eyes       pimecrolimus 1 % cream    ELIDEL    60 g    Apply topically 2 times daily as needed For psoriasis of face or genitals    Psoriasis       REFRESH P.M. Oint     1 Tube    Apply 1 inch to eye nightly as needed    Tear film insufficiency, unspecified       tacrolimus 0.1 % ointment    PROTOPIC    60 g    Apply topically 2 times daily Apply to areas around eyes as needed    Psoriasis       VITAMIN D (CHOLECALCIFEROL) PO      Take by mouth daily

## 2018-06-27 NOTE — PROGRESS NOTES
[facility] Dermatology Phototherapy Record  1. Tony Mcnair is a 62 year old male is here today for phototherapy (UVB) treatment for Psoriasis.        Changes or new medications since last treatment:   NO    New medical conditions or problems since last treatment:   NO    Any problems with last phototherapy treatment?    NO    2. The patient tolerated phototherapy without complication    Patient will return for next UVB treatment, per protocol.     Patient to see provider every 4-12 weeks for follow-up during treatment.      All questions and concerns discussed with patient in clinic today.      Georgina Wills RN

## 2018-07-11 ENCOUNTER — ALLIED HEALTH/NURSE VISIT (OUTPATIENT)
Dept: DERMATOLOGY | Facility: CLINIC | Age: 63
End: 2018-07-11
Payer: COMMERCIAL

## 2018-07-11 DIAGNOSIS — L40.9 PSORIASIS: ICD-10-CM

## 2018-07-11 NOTE — PROGRESS NOTES
[facility] Dermatology Phototherapy Record  1. Tony Mcnair is a 62 year old male is here today for phototherapy (UVB) treatment for Psoriasis.        Changes or new medications since last treatment:   NO    New medical conditions or problems since last treatment:   NO    Any problems with last phototherapy treatment?    NO    2. The patient tolerated phototherapy without complication    Patient will return for next UVB treatment, per protocol.     Patient to see provider every 4-12 weeks for follow-up during treatment.      All questions and concerns discussed with patient in clinic today.      Georgina Wills RN

## 2018-07-11 NOTE — NURSING NOTE
Tony Mcnair comes into clinic today at the request of Dr Mckineny Ordering Provider for NBUVB.    This service provided today was under the supervising provider of the day Dr Waters, who was available if needed.    Georgina Wills RN

## 2018-07-11 NOTE — MR AVS SNAPSHOT
After Visit Summary   7/11/2018    Tony Mcnair    MRN: [id number]           Patient Information     Date Of Birth          1955        Visit Information        Provider Department      7/11/2018 8:50 AM UC DERM LIGHT TREATMENT [facility] Dermatology        Today's Diagnoses     Psoriasis           Follow-ups after your visit        Your next 10 appointments already scheduled     Jul 18, 2018  8:25 AM CDT   (Arrive by 8:10 AM)   Light Treatment Visit with UC DERM LIGHT TREATMENT   [facility] ([facility])    [address] [place] [address]   [phone]            Jul 25, 2018  8:20 AM CDT   (Arrive by 8:05 AM)   Light Treatment Visit with UC DERM LIGHT TREATMENT   [facility] ([facility])    [address] [place] [address]   [phone]            Aug 01, 2018  8:15 AM CDT   (Arrive by 8:00 AM)   Light Treatment Visit with UC DERM LIGHT TREATMENT   [facility])    [address] [place] [address]   [phone]            Aug 08, 2018  8:15 AM CDT   (Arrive by 8:00 AM)   Light Treatment Visit with UC DERM LIGHT TREATMENT   [facility] ([facility])    [address] [place] [address]   [phone]            Aug 15, 2018  8:15 AM CDT   (Arrive by 8:00 AM)   Light Treatment Visit with UC DERM LIGHT TREATMENT   [facility])    [address] [place] [address]   [phone]            Aug 22, 2018  8:15 AM CDT   (Arrive by 8:00 AM)   Light Treatment Visit with UC DERM LIGHT TREATMENT   [facility] ([facility])    [address] [place] [address]   [phone]            Aug 29, 2018  8:15 AM CDT   (Arrive by 8:00 AM)    Light Treatment Visit with  DERM LIGHT TREATMENT   [facility] Dermatology ([facility])    [address]  3rd Floor  [facility] [address] [phone]              Who to contact     Please call your clinic at [phone] to:    Ask questions about your health    Make or cancel appointments    Discuss your medicines    Learn about your test results    Speak to your doctor            Additional Information About Your Visit        [employer] Information     [employer] gives you secure access to your electronic health record. If you see a primary care provider, you can also send messages to your care team and make appointments. If you have questions, please call your primary care clinic.  If you do not have a primary care provider, please call [phone] and they will assist you.      [employer] is an electronic gateway that provides easy, online access to your medical records. With [employer], you can request a clinic appointment, read your test results, renew a prescription or communicate with your care team.     To access your existing account, please contact your [facility] or call [phone] for assistance.        Care EveryWhere ID     This is your Care EveryWhere ID. This could be used by other organizations to access your [place] medical records  [id number]         Blood Pressure from Last 3 Encounters:   05/22/18 118/79   05/07/18 130/75   05/05/17 121/87    Weight from Last 3 Encounters:   05/22/18 77.6 kg (171 lb)   05/07/18 77.6 kg (171 lb)   05/05/17 76.4 kg (168 lb 8 oz)              We Performed the Following     CHARGE - PHOTOTHERAPY - UVA1     PROCEDURE - PHOTOTHERAPY NBUVB        Primary Care Provider Office Phone # Fax #    William Morton [phone]       [address] [address]        Equal Access to Services     TORI GIBBONS: deepak Braxton  ryley renzo correa. So [facility] [phone].    ATENCIÓN: Si rose mary varghese, tiene a garrett disposición servicios gratuitos de asistencia lingüística. Ze al [phone].    We comply with applicable federal civil rights laws and [place] laws. We do not discriminate on the basis of race, color, national origin, age, disability, sex, sexual orientation, or gender identity.            Thank you!     Thank you for choosing [facility] DERMATOLOGY  for your care. Our goal is always to provide you with excellent care. Hearing back from our patients is one way we can continue to improve our services. Please take a few minutes to complete the written survey that you may receive in the mail after your visit with us. Thank you!             Your Updated Medication List - Protect others around you: Learn how to safely use, store and throw away your medicines at www.disposemymeds.org.          This list is accurate as of 7/11/18  9:32 AM.  Always use your most recent med list.                   Brand Name Dispense Instructions for use Diagnosis    BIOTIN PO      Take  by mouth daily.        calcipotriene-betameth diprop 0.005-0.064 % Oint    TACLONEX    60 g    Externally apply  topically 2 times daily as needed.    Psoriasis       citalopram 10 MG tablet    celeXA     Take 10 mg by mouth daily        CONCERTA 18 MG CR tablet   Generic drug:  methylphenidate ER      Take 18 mg by mouth every morning.        DAILY MULTIVITAMIN PO      Take  by mouth daily.        fish oil-omega-3 fatty acids 1000 MG capsule      Take 2 g by mouth daily        fluocinonide 0.05 % solution    LIDEX    60 mL    Apply  topically 2 times daily for itchy scalp or scalp psoriasis    Psoriasis       olopatadine HCl 0.2 % Soln    PATADAY    2.5 mL    PLACE 1 DROP INTO BOTH EYES DAILY    Allergic conjunctivitis of both eyes       pimecrolimus 1 % cream    ELIDEL    60 g    Apply topically 2 times daily  as needed For psoriasis of face or genitals    Psoriasis       REFRESH P.M. Oint     1 Tube    Apply 1 inch to eye nightly as needed    Tear film insufficiency, unspecified       tacrolimus 0.1 % ointment    PROTOPIC    60 g    Apply topically 2 times daily Apply to areas around eyes as needed    Psoriasis       VITAMIN D (CHOLECALCIFEROL) PO      Take by mouth daily

## 2018-07-18 ENCOUNTER — ALLIED HEALTH/NURSE VISIT (OUTPATIENT)
Dept: DERMATOLOGY | Facility: CLINIC | Age: 63
End: 2018-07-18
Payer: COMMERCIAL

## 2018-07-18 DIAGNOSIS — L40.9 PSORIASIS: ICD-10-CM

## 2018-07-18 NOTE — PROGRESS NOTES
[facility] Dermatology Phototherapy Record  1. Tony Mcnair is a 62 year old male is here today for phototherapy (UVB) treatment for Psoriasis.        Changes or new medications since last treatment:   NO    New medical conditions or problems since last treatment:   NO    Any problems with last phototherapy treatment?    NO    2. The patient tolerated phototherapy without complication    Patient will return for next UVB treatment, per protocol.     Patient to see provider every 4-12 weeks for follow-up during treatment.      All questions and concerns discussed with patient in clinic today.      Georgina Wills RN

## 2018-07-18 NOTE — MR AVS SNAPSHOT
After Visit Summary   7/18/2018    Tony Mcnair    MRN: [id number]           Patient Information     Date Of Birth          1955        Visit Information        Provider Department      7/18/2018 8:25 AM UC DERM LIGHT TREATMENT [facility] Dermatology        Today's Diagnoses     Psoriasis           Follow-ups after your visit        Your next 10 appointments already scheduled     Jul 25, 2018  7:00 AM CDT   (Arrive by 6:45 AM)   Light Treatment Visit with UC DERM LIGHT TREATMENT   [facility] Dermatology ([facility])    [address] [place] [address]   [phone]            Aug 01, 2018  7:00 AM CDT   (Arrive by 6:45 AM)   Light Treatment Visit with UC DERM LIGHT TREATMENT   [facility])    [address] [place] [address]   [phone]            Aug 08, 2018  7:00 AM CDT   Light Treatment Visit with UC DERM LIGHT TREATMENT   [facility])    [address] [place] [address]   [phone]            Aug 15, 2018  7:00 AM CDT   (Arrive by 6:45 AM)   Light Treatment Visit with UC DERM LIGHT TREATMENT   [facility] Dermatology ([facility])    [address] [place] [address]   [phone]            Aug 22, 2018  7:00 AM CDT   (Arrive by 6:45 AM)   Light Treatment Visit with UC DERM LIGHT TREATMENT   [facility] ([facility])    [address] [place] [address]   [phone]            Aug 29, 2018  7:00 AM CDT   (Arrive by 6:45 AM)   Light Treatment Visit with UC DERM LIGHT TREATMENT   [facility] ([facility])    [address] [place] [address]   [phone]            Sep 05, 2018  7:00 AM CDT   (Arrive by 6:45 AM)   Light Treatment  Visit with UC DERM LIGHT TREATMENT   [facility] Dermatology ([facility])    [address] [place] [address]   [phone]            Sep 12, 2018  7:00 AM CDT   (Arrive by 6:45 AM)   Light Treatment Visit with UC DERM LIGHT TREATMENT   [facility] ([facility])    [address] [place] [address]   [phone]            Sep 19, 2018  7:00 AM CDT   (Arrive by 6:45 AM)   Light Treatment Visit with UC DERM LIGHT TREATMENT   [facility] ([facility])    [address] [place] [address]   [phone]            Sep 26, 2018  7:00 AM CDT   (Arrive by 6:45 AM)   Light Treatment Visit with UC DERM LIGHT TREATMENT   [facility] ([facility])    [address] [place] [address] [phone]              Who to contact     Please call your clinic at [phone] to:    Ask questions about your health    Make or cancel appointments    Discuss your medicines    Learn about your test results    Speak to your doctor            Additional Information About Your Visit        [employer] Information     [employer] gives you secure access to your electronic health record. If you see a primary care provider, you can also send messages to your care team and make appointments. If you have questions, please call your primary care clinic.  If you do not have a primary care provider, please call [phone] and they will assist you.      [employer] is an electronic gateway that provides easy, online access to your medical records. With [employer], you can request a clinic appointment, read your test results, renew a prescription or communicate with your care team.     To access your existing account, please contact your [facility] or call [phone] for assistance.        Care EveryWhere ID      This is your Care EveryWhere ID. This could be used by other organizations to access your [place] medical records  [id number]         Blood Pressure from Last 3 Encounters:   05/22/18 118/79   05/07/18 130/75   05/05/17 121/87    Weight from Last 3 Encounters:   05/22/18 77.6 kg (171 lb)   05/07/18 77.6 kg (171 lb)   05/05/17 76.4 kg (168 lb 8 oz)              We Performed the Following     CHARGE - PHOTOTHERAPY NBUVB (UV LIGHT)     PROCEDURE - PHOTOTHERAPY NBUVB        Primary Care Provider Office Phone # Fax #    William Morton [phone]       [place] [address]        Equal Access to Services     TORI EDWARDS : Stan Rodrigez, waaxda luqadaha, qaybta kaalmada calebyakoby, renzo [place]. So [facility] [phone].    ATENCIÓN: Si habla español, tiene a garrett disposición servicios gratuitos de asistencia lingüística. Llame al [phone].    We comply with applicable federal civil rights laws and [place] laws. We do not discriminate on the basis of race, color, national origin, age, disability, sex, sexual orientation, or gender identity.            Thank you!     Thank you for choosing [facility] DERMATOLOGY  for your care. Our goal is always to provide you with excellent care. Hearing back from our patients is one way we can continue to improve our services. Please take a few minutes to complete the written survey that you may receive in the mail after your visit with us. Thank you!             Your Updated Medication List - Protect others around you: Learn how to safely use, store and throw away your medicines at www.disposemymeds.org.          This list is accurate as of 7/18/18  9:00 AM.  Always use your most recent med list.                   Brand Name Dispense Instructions for use Diagnosis    BIOTIN PO      Take  by mouth daily.        calcipotriene-betameth diprop 0.005-0.064 % Oint    TACLONEX     60 g    Externally apply  topically 2 times daily as needed.    Psoriasis       citalopram 10 MG tablet    celeXA     Take 10 mg by mouth daily        CONCERTA 18 MG CR tablet   Generic drug:  methylphenidate ER      Take 18 mg by mouth every morning.        DAILY MULTIVITAMIN PO      Take  by mouth daily.        fish oil-omega-3 fatty acids 1000 MG capsule      Take 2 g by mouth daily        fluocinonide 0.05 % solution    LIDEX    60 mL    Apply  topically 2 times daily for itchy scalp or scalp psoriasis    Psoriasis       olopatadine HCl 0.2 % Soln    PATADAY    2.5 mL    PLACE 1 DROP INTO BOTH EYES DAILY    Allergic conjunctivitis of both eyes       pimecrolimus 1 % cream    ELIDEL    60 g    Apply topically 2 times daily as needed For psoriasis of face or genitals    Psoriasis       REFRESH P.M. Oint     1 Tube    Apply 1 inch to eye nightly as needed    Tear film insufficiency, unspecified       tacrolimus 0.1 % ointment    PROTOPIC    60 g    Apply topically 2 times daily Apply to areas around eyes as needed    Psoriasis       VITAMIN D (CHOLECALCIFEROL) PO      Take by mouth daily

## 2018-07-25 ENCOUNTER — ALLIED HEALTH/NURSE VISIT (OUTPATIENT)
Dept: DERMATOLOGY | Facility: CLINIC | Age: 63
End: 2018-07-25
Payer: COMMERCIAL

## 2018-07-25 DIAGNOSIS — L40.0 PLAQUE PSORIASIS: Primary | ICD-10-CM

## 2018-07-25 NOTE — PROGRESS NOTES
[facility] Dermatology Phototherapy Record  1. Tony Mcnair is a 63 year old male is here today for phototherapy (UVB) treatment for plaque psoriasis.        Changes or new medications since last treatment:   NO    New medical conditions or problems since last treatment:   NO    Any problems with last phototherapy treatment?    NO    2. The patient tolerated phototherapy without complication    Patient will return for next UVB treatment, per protocol.     Patient to see provider every 4-12 weeks for follow-up during treatment.      All questions and concerns discussed with patient in clinic today.      Raquel Christine LPN

## 2018-07-25 NOTE — MR AVS SNAPSHOT
After Visit Summary   7/25/2018    Tony Mcnair    MRN: [id number]           Patient Information     Date Of Birth          1955        Visit Information        Provider Department      7/25/2018 7:00 AM UC DERM LIGHT TREATMENT [facility] Dermatology        Today's Diagnoses     Plaque psoriasis    -  1       Follow-ups after your visit        Your next 10 appointments already scheduled     Aug 01, 2018  7:00 AM CDT   (Arrive by 6:45 AM)   Light Treatment Visit with UC DERM LIGHT TREATMENT   [facility] ([facility])    [address] [place] [address]   [phone]            Aug 08, 2018  7:00 AM CDT   Light Treatment Visit with UC DERM LIGHT TREATMENT   [facility] ([facility])    [address] [place] [address]   [phone]            Aug 15, 2018  7:00 AM CDT   (Arrive by 6:45 AM)   Light Treatment Visit with UC DERM LIGHT TREATMENT   [facility] Dermatology ([facility])    [address] [place] [address]   [phone]            Aug 22, 2018  7:00 AM CDT   (Arrive by 6:45 AM)   Light Treatment Visit with UC DERM LIGHT TREATMENT   [facility] ([facility])    [address] [place] [address]   [phone]            Aug 29, 2018  7:00 AM CDT   (Arrive by 6:45 AM)   Light Treatment Visit with UC DERM LIGHT TREATMENT   [facility])    [address] [place] [address]   [phone]            Sep 05, 2018  7:00 AM CDT   (Arrive by 6:45 AM)   Light Treatment Visit with UC DERM LIGHT TREATMENT   [facility] ([facility])    [address] [place] [address]   [phone]            Sep 12, 2018  7:00 AM CDT   (Arrive by 6:45 AM)   Light  Treatment Visit with UC DERM LIGHT TREATMENT   [facility] ([facility])    [address] [place] [address]   [phone]            Sep 19, 2018  7:00 AM CDT   (Arrive by 6:45 AM)   Light Treatment Visit with UC DERM LIGHT TREATMENT   [facility] ([facility])    [address] [place] [address]   [phone]            Sep 26, 2018  7:00 AM CDT   (Arrive by 6:45 AM)   Light Treatment Visit with UC DERM LIGHT TREATMENT   [facility] ([facility])    [address] [place] [address]   [phone]            Oct 03, 2018  7:15 AM CDT   (Arrive by 7:00 AM)   Light Treatment Visit with UC DERM LIGHT TREATMENT   [facility] ([facility])    [address] [place] [address]   [phone]              Future tests that were ordered for you today     Open Standing Orders        Priority Remaining Interval Expires Ordered    PROCEDURE - PHOTOTHERAPY NBUVB Routine 99/99  10/30/2018 7/25/2018            Who to contact     Please call your clinic at [phone] to:    Ask questions about your health    Make or cancel appointments    Discuss your medicines    Learn about your test results    Speak to your doctor            Additional Information About Your Visit        [employer] Information     [employer] gives you secure access to your electronic health record. If you see a primary care provider, you can also send messages to your care team and make appointments. If you have questions, please call your primary care clinic.  If you do not have a primary care provider, please call [phone] and they will assist you.      [employer]ant is an electronic gateway that provides easy, online access to your medical records. With [employer], you can request a clinic appointment, read your test results, renew a  prescription or communicate with your care team.     To access your existing account, please contact your [facility] or call [phone] for assistance.        Care EveryWhere ID     This is your Care EveryWhere ID. This could be used by other organizations to access your [place] medical records  [id number]         Blood Pressure from Last 3 Encounters:   05/22/18 118/79   05/07/18 130/75   05/05/17 121/87    Weight from Last 3 Encounters:   05/22/18 77.6 kg (171 lb)   05/07/18 77.6 kg (171 lb)   05/05/17 76.4 kg (168 lb 8 oz)              We Performed the Following     CHARGE - PHOTOTHERAPY NBUVB (UV LIGHT)        Primary Care Provider Office Phone # Fax #    William Morton [phone]       [address] [address]        Equal Access to Services     TORI EDWARDS : Hadii zina shoemakero Elia, waaxda lustevenadaha, qaybta kaalmada calebyakoby, renzo haider . So [facility] [phone].    ATENCIÓN: Si habla español, tiene a garrett disposición servicios gratuitos de asistencia lingüística. Ze al [phone].    We comply with applicable federal civil rights laws and [place] laws. We do not discriminate on the basis of race, color, national origin, age, disability, sex, sexual orientation, or gender identity.            Thank you!     Thank you for choosing [facility] DERMATOLOGY  for your care. Our goal is always to provide you with excellent care. Hearing back from our patients is one way we can continue to improve our services. Please take a few minutes to complete the written survey that you may receive in the mail after your visit with us. Thank you!             Your Updated Medication List - Protect others around you: Learn how to safely use, store and throw away your medicines at www.disposemymeds.org.          This list is accurate as of 7/25/18  7:45 AM.  Always use your most recent med list.                    Brand Name Dispense Instructions for use Diagnosis    BIOTIN PO      Take  by mouth daily.        calcipotriene-betameth diprop 0.005-0.064 % Oint    TACLONEX    60 g    Externally apply  topically 2 times daily as needed.    Psoriasis       citalopram 10 MG tablet    celeXA     Take 10 mg by mouth daily        CONCERTA 18 MG CR tablet   Generic drug:  methylphenidate ER      Take 18 mg by mouth every morning.        DAILY MULTIVITAMIN PO      Take  by mouth daily.        fish oil-omega-3 fatty acids 1000 MG capsule      Take 2 g by mouth daily        fluocinonide 0.05 % solution    LIDEX    60 mL    Apply  topically 2 times daily for itchy scalp or scalp psoriasis    Psoriasis       olopatadine HCl 0.2 % Soln    PATADAY    2.5 mL    PLACE 1 DROP INTO BOTH EYES DAILY    Allergic conjunctivitis of both eyes       pimecrolimus 1 % cream    ELIDEL    60 g    Apply topically 2 times daily as needed For psoriasis of face or genitals    Psoriasis       REFRESH P.M. Oint     1 Tube    Apply 1 inch to eye nightly as needed    Tear film insufficiency, unspecified       tacrolimus 0.1 % ointment    PROTOPIC    60 g    Apply topically 2 times daily Apply to areas around eyes as needed    Psoriasis       VITAMIN D (CHOLECALCIFEROL) PO      Take by mouth daily

## 2018-07-25 NOTE — NURSING NOTE
Tony Mcnair comes into clinic today at the request of Dr. Corbett Ordering Provider for NBUVB.      This service provided today was under the supervising provider of the day Dr. Waters, who was available if needed.    Raquel Christine LPN

## 2018-08-01 ENCOUNTER — ALLIED HEALTH/NURSE VISIT (OUTPATIENT)
Dept: DERMATOLOGY | Facility: CLINIC | Age: 63
End: 2018-08-01
Payer: COMMERCIAL

## 2018-08-01 DIAGNOSIS — L40.0 PLAQUE PSORIASIS: ICD-10-CM

## 2018-08-01 NOTE — PROGRESS NOTES
[facility] Dermatology Phototherapy Record  1. Tony Mcnair is a 63 year old male is here today for phototherapy (UVB) treatment for Psoriasis.        Changes or new medications since last treatment:   NO    New medical conditions or problems since last treatment:   NO    Any problems with last phototherapy treatment?    NO    2. The patient tolerated phototherapy without complication    Patient will return for next UVB treatment, per protocol.     Patient to see provider every 4-12 weeks for follow-up during treatment.      All questions and concerns discussed with patient in clinic today.      Georgina Wills RN

## 2018-08-01 NOTE — MR AVS SNAPSHOT
After Visit Summary   8/1/2018    Tony Mcnair    MRN: [id number]           Patient Information     Date Of Birth          1955        Visit Information        Provider Department      8/1/2018 7:00 AM UC DERM LIGHT TREATMENT [facility] Dermatology        Today's Diagnoses     Plaque psoriasis           Follow-ups after your visit        Your next 10 appointments already scheduled     Aug 08, 2018  7:00 AM CDT   Light Treatment Visit with UC DERM LIGHT TREATMENT   [facility] ([facility])    [address] [facility] [address]   [phone]            Aug 15, 2018  7:00 AM CDT   (Arrive by 6:45 AM)   Light Treatment Visit with UC DERM LIGHT TREATMENT   [facility] ([facility])    [address] [facility] [address]   [phone]            Aug 22, 2018  7:00 AM CDT   (Arrive by 6:45 AM)   Light Treatment Visit with UC DERM LIGHT TREATMENT   [facility] Dermatology ([facility])    [address]  [address] [facility] [address]   [phone]            Aug 29, 2018  7:00 AM CDT   (Arrive by 6:45 AM)   Light Treatment Visit with UC DERM LIGHT TREATMENT   [facility] Dermatology ([facility])    [address] [address]   [phone]            Sep 05, 2018  7:00 AM CDT   (Arrive by 6:45 AM)   Light Treatment Visit with UC DERM LIGHT TREATMENT   [facility] ([facility])    [address]  [address] [facility] [address]   [phone]            Sep 12, 2018  7:00 AM CDT   (Arrive by 6:45 AM)   Light Treatment Visit with UC DERM LIGHT TREATMENT   [facility] ([facility])    [address] [facility] [address]   [phone]            Sep 19, 2018  7:00 AM CDT   (Arrive by 6:45 AM)   Light Treatment  Visit with UC DERM LIGHT TREATMENT   [facility] Dermatology ([facility])    [address] [place] [address]   [phone]            Sep 26, 2018  7:00 AM CDT   (Arrive by 6:45 AM)   Light Treatment Visit with UC DERM LIGHT TREATMENT   [facility] ([facility])    [address] [place] [address]   [phone]            Oct 03, 2018  7:15 AM CDT   (Arrive by 7:00 AM)   Light Treatment Visit with UC DERM LIGHT TREATMENT   [facility] ([facility])    [address] [place] [address]   [phone]            Oct 10, 2018  7:15 AM CDT   (Arrive by 7:00 AM)   Light Treatment Visit with UC DERM LIGHT TREATMENT   [facility] ([facility])    [address] [place] [address]   [phone]              Who to contact     Please call your clinic at [phone] to:    Ask questions about your health    Make or cancel appointments    Discuss your medicines    Learn about your test results    Speak to your doctor            Additional Information About Your Visit        [employer] Information     [employer] gives you secure access to your electronic health record. If you see a primary care provider, you can also send messages to your care team and make appointments. If you have questions, please call your primary care clinic.  If you do not have a primary care provider, please call [phone] and they will assist you.      [employer] is an electronic gateway that provides easy, online access to your medical records. With [employer], you can request a clinic appointment, read your test results, renew a prescription or communicate with your care team.     To access your existing account, please contact your [facility] or call [phone] for assistance.        Care EveryWhere ID      This is your Care EveryWhere ID. This could be used by other organizations to access your [place] medical records  [id number]         Blood Pressure from Last 3 Encounters:   05/22/18 118/79   05/07/18 130/75   05/05/17 121/87    Weight from Last 3 Encounters:   05/22/18 77.6 kg (171 lb)   05/07/18 77.6 kg (171 lb)   05/05/17 76.4 kg (168 lb 8 oz)              We Performed the Following     CHARGE - PHOTOTHERAPY NBUVB (UV LIGHT)     PROCEDURE - PHOTOTHERAPY NBUVB     PROCEDURE - PHOTOTHERAPY NBUVB        Primary Care Provider Office Phone # Fax #    William Morton [phone]       [place] [address]        Equal Access to Services     TORI EDWARDS : Stan Rodrigez, waaxda luqadaha, qaybta kaalmada saira, renzo monroe. So [facility] [phone].    ATENCIÓN: Si habla español, tiene a garrett disposición servicios gratuitos de asistencia lingüística. Mickiame al [phone].    We comply with applicable federal civil rights laws and [place] laws. We do not discriminate on the basis of race, color, national origin, age, disability, sex, sexual orientation, or gender identity.            Thank you!     Thank you for choosing [facility] DERMATOLOGY  for your care. Our goal is always to provide you with excellent care. Hearing back from our patients is one way we can continue to improve our services. Please take a few minutes to complete the written survey that you may receive in the mail after your visit with us. Thank you!             Your Updated Medication List - Protect others around you: Learn how to safely use, store and throw away your medicines at www.disposemymeds.org.          This list is accurate as of 8/1/18  8:38 AM.  Always use your most recent med list.                   Brand Name Dispense Instructions for use Diagnosis    BIOTIN PO      Take  by mouth daily.        calcipotriene-betameth  diprop 0.005-0.064 % Oint    TACLONEX    60 g    Externally apply  topically 2 times daily as needed.    Psoriasis       citalopram 10 MG tablet    celeXA     Take 10 mg by mouth daily        CONCERTA 18 MG CR tablet   Generic drug:  methylphenidate ER      Take 18 mg by mouth every morning.        DAILY MULTIVITAMIN PO      Take  by mouth daily.        fish oil-omega-3 fatty acids 1000 MG capsule      Take 2 g by mouth daily        fluocinonide 0.05 % solution    LIDEX    60 mL    Apply  topically 2 times daily for itchy scalp or scalp psoriasis    Psoriasis       olopatadine HCl 0.2 % Soln    PATADAY    2.5 mL    PLACE 1 DROP INTO BOTH EYES DAILY    Allergic conjunctivitis of both eyes       pimecrolimus 1 % cream    ELIDEL    60 g    Apply topically 2 times daily as needed For psoriasis of face or genitals    Psoriasis       REFRESH P.M. Oint     1 Tube    Apply 1 inch to eye nightly as needed    Tear film insufficiency, unspecified       tacrolimus 0.1 % ointment    PROTOPIC    60 g    Apply topically 2 times daily Apply to areas around eyes as needed    Psoriasis       VITAMIN D (CHOLECALCIFEROL) PO      Take by mouth daily

## 2018-08-01 NOTE — NURSING NOTE
Tony Mcnair comes into clinic today at the request of Dr Mckinney Ordering Provider for NBUVB.    This service provided today was under the supervising provider of the day Dr Betancourt, who was available if needed.    Georgina Wills RN

## 2018-08-08 ENCOUNTER — ALLIED HEALTH/NURSE VISIT (OUTPATIENT)
Dept: DERMATOLOGY | Facility: CLINIC | Age: 63
End: 2018-08-08
Payer: COMMERCIAL

## 2018-08-08 DIAGNOSIS — L40.0 PLAQUE PSORIASIS: ICD-10-CM

## 2018-08-08 NOTE — MR AVS SNAPSHOT
After Visit Summary   8/8/2018    Tony Mcnair    MRN: [id number]           Patient Information     Date Of Birth          1955        Visit Information        Provider Department      8/8/2018 7:00 AM UC DERM LIGHT TREATMENT [facility] Dermatology        Today's Diagnoses     Plaque psoriasis           Follow-ups after your visit        Your next 10 appointments already scheduled     Aug 15, 2018  7:00 AM CDT   (Arrive by 6:45 AM)   Light Treatment Visit with UC DERM LIGHT TREATMENT   [facility] ([facility])    [address] [place] [address]   [phone]            Aug 22, 2018  7:00 AM CDT   (Arrive by 6:45 AM)   Light Treatment Visit with UC DERM LIGHT TREATMENT   [facility] ([facility])    [address] [place] [address]   [phone]            Aug 29, 2018  7:00 AM CDT   (Arrive by 6:45 AM)   Light Treatment Visit with UC DERM LIGHT TREATMENT   [facility] Dermatology ([facility])    [address] [place] [address]   [phone]            Sep 05, 2018  7:00 AM CDT   (Arrive by 6:45 AM)   Light Treatment Visit with UC DERM LIGHT TREATMENT   [facility] ([facility])    [address] [place] [address]   [phone]            Sep 12, 2018  7:00 AM CDT   (Arrive by 6:45 AM)   Light Treatment Visit with UC DERM LIGHT TREATMENT   [facility])    [address] [place] [address]   [phone]            Sep 19, 2018  7:00 AM CDT   (Arrive by 6:45 AM)   Light Treatment Visit with UC DERM LIGHT TREATMENT   [facility] ([facility])    [address] [place] [address]   [phone]            Sep 26, 2018  7:00 AM CDT   (Arrive by 6:45  AM)   Light Treatment Visit with UC DERM LIGHT TREATMENT   [facility] Dermatology ([facility])    [address] [address]   [phone]            Oct 03, 2018  7:15 AM CDT   (Arrive by 7:00 AM)   Light Treatment Visit with UC DERM LIGHT TREATMENT   [facility] ([facility])    [address] [address]   [phone]            Oct 10, 2018  7:15 AM CDT   (Arrive by 7:00 AM)   Light Treatment Visit with UC DERM LIGHT TREATMENT   [facility] ([facility])    [address] [place] [address]   [phone]            Oct 17, 2018  7:15 AM CDT   (Arrive by 7:00 AM)   Light Treatment Visit with UC DERM LIGHT TREATMENT   [facility] ([facility])    [address] [place] [address] [phone]              Who to contact     Please call your clinic at [phone] to:    Ask questions about your health    Make or cancel appointments    Discuss your medicines    Learn about your test results    Speak to your doctor            Additional Information About Your Visit        [employer] Information     [employer] gives you secure access to your electronic health record. If you see a primary care provider, you can also send messages to your care team and make appointments. If you have questions, please call your primary care clinic.  If you do not have a primary care provider, please call [phone] and they will assist you.      [employer] is an electronic gateway that provides easy, online access to your medical records. With [employer], you can request a clinic appointment, read your test results, renew a prescription or communicate with your care team.     To access your existing account, please contact your [facility] or call [phone] for assistance.         Care EveryWhere ID     This is your Care EveryWhere ID. This could be used by other organizations to access your [place] medical records  [id number]         Blood Pressure from Last 3 Encounters:   05/22/18 118/79   05/07/18 130/75   05/05/17 121/87    Weight from Last 3 Encounters:   05/22/18 77.6 kg (171 lb)   05/07/18 77.6 kg (171 lb)   05/05/17 76.4 kg (168 lb 8 oz)              We Performed the Following     CHARGE - PHOTOTHERAPY NBUVB (UV LIGHT)     PROCEDURE - PHOTOTHERAPY NBUVB        Primary Care Provider Office Phone # Fax #    William Morton [phone]       [place] [address]        Equal Access to Services     TORI EDWARDS : Stan Rodrigez, wakerry luqadaha, qaybta kaalmada saira, renzo monroe. So [facility] [phone].    ATENCIÓN: Si habla español, tiene a garrett disposición servicios gratuitos de asistencia lingüística. Ze al [phone].    We comply with applicable federal civil rights laws and [place] laws. We do not discriminate on the basis of race, color, national origin, age, disability, sex, sexual orientation, or gender identity.            Thank you!     Thank you for choosing [facility] DERMATOLOGY  for your care. Our goal is always to provide you with excellent care. Hearing back from our patients is one way we can continue to improve our services. Please take a few minutes to complete the written survey that you may receive in the mail after your visit with us. Thank you!             Your Updated Medication List - Protect others around you: Learn how to safely use, store and throw away your medicines at www.disposemymeds.org.          This list is accurate as of 8/8/18  8:11 AM.  Always use your most recent med list.                   Brand Name Dispense Instructions for use Diagnosis    BIOTIN PO      Take  by mouth daily.        calcipotriene-betameth diprop 0.005-0.064  % Oint    TACLONEX    60 g    Externally apply  topically 2 times daily as needed.    Psoriasis       citalopram 10 MG tablet    celeXA     Take 10 mg by mouth daily        CONCERTA 18 MG CR tablet   Generic drug:  methylphenidate ER      Take 18 mg by mouth every morning.        DAILY MULTIVITAMIN PO      Take  by mouth daily.        fish oil-omega-3 fatty acids 1000 MG capsule      Take 2 g by mouth daily        fluocinonide 0.05 % solution    LIDEX    60 mL    Apply  topically 2 times daily for itchy scalp or scalp psoriasis    Psoriasis       olopatadine HCl 0.2 % Soln    PATADAY    2.5 mL    PLACE 1 DROP INTO BOTH EYES DAILY    Allergic conjunctivitis of both eyes       pimecrolimus 1 % cream    ELIDEL    60 g    Apply topically 2 times daily as needed For psoriasis of face or genitals    Psoriasis       REFRESH P.M. Oint     1 Tube    Apply 1 inch to eye nightly as needed    Tear film insufficiency, unspecified       tacrolimus 0.1 % ointment    PROTOPIC    60 g    Apply topically 2 times daily Apply to areas around eyes as needed    Psoriasis       VITAMIN D (CHOLECALCIFEROL) PO      Take by mouth daily

## 2018-08-08 NOTE — PROGRESS NOTES
[facility] Dermatology Phototherapy Record  1. Tony Mcnair is a 63 year old male is here today for phototherapy (UVB) treatment for Psoriasis.        Changes or new medications since last treatment:   NO    New medical conditions or problems since last treatment:   NO    Any problems with last phototherapy treatment?    NO    2. The patient tolerated phototherapy without complication    Patient will return for next UVB treatment, per protocol.     Patient to see provider every 4-12 weeks for follow-up during treatment.      All questions and concerns discussed with patient in clinic today.      Georgina Wills RN

## 2018-08-15 ENCOUNTER — ALLIED HEALTH/NURSE VISIT (OUTPATIENT)
Dept: DERMATOLOGY | Facility: CLINIC | Age: 63
End: 2018-08-15
Payer: COMMERCIAL

## 2018-08-15 DIAGNOSIS — L40.0 PLAQUE PSORIASIS: ICD-10-CM

## 2018-08-15 NOTE — NURSING NOTE
Tony Mcnair comes into clinic today at the request of Dr Mckinney Ordering Provider for NBUVB    This service provided today was under the supervising provider of the day Dr Blanco, who was available if needed.    Georgina Wills RN

## 2018-08-15 NOTE — PROGRESS NOTES
[facility] Dermatology Phototherapy Record  1. Tony Mcnair is a 63 year old male is here today for phototherapy (UVB) treatment for Psoriasis.        Changes or new medications since last treatment:   NO    New medical conditions or problems since last treatment:   NO    Any problems with last phototherapy treatment?    NO    2. The patient tolerated phototherapy without complication    Patient will return for next UVB treatment, per protocol.     Patient to see provider every 4-12 weeks for follow-up during treatment.      All questions and concerns discussed with patient in clinic today.      Georgina Wills RN

## 2018-08-15 NOTE — MR AVS SNAPSHOT
After Visit Summary   8/15/2018    Tony Mcnair    MRN: [id number]           Patient Information     Date Of Birth          1955        Visit Information        Provider Department      8/15/2018 7:00 AM UC DERM LIGHT TREATMENT [facility]        Today's Diagnoses     Plaque psoriasis           Follow-ups after your visit        Your next 10 appointments already scheduled     Aug 22, 2018  7:00 AM CDT   (Arrive by 6:45 AM)   Light Treatment Visit with UC DERM LIGHT TREATMENT   [facility] ([facility])    [address] [place] [address]   [phone]            Aug 29, 2018  7:00 AM CDT   (Arrive by 6:45 AM)   Light Treatment Visit with UC DERM LIGHT TREATMENT   [facility] ([facility])    [address] [place] [address]   [phone]            Sep 05, 2018  7:00 AM CDT   (Arrive by 6:45 AM)   Light Treatment Visit with UC DERM LIGHT TREATMENT   [facility] ([facility])    [address] [place] [address]   [phone]            Sep 12, 2018  7:00 AM CDT   (Arrive by 6:45 AM)   Light Treatment Visit with UC DERM LIGHT TREATMENT   [facility] ([facility])    [address] [place] [address]   [phone]            Sep 19, 2018  7:00 AM CDT   (Arrive by 6:45 AM)   Light Treatment Visit with UC DERM LIGHT TREATMENT   [facility])    [address] [place] [address]   [phone]            Sep 26, 2018  7:00 AM CDT   (Arrive by 6:45 AM)   Light Treatment Visit with UC DERM LIGHT TREATMENT   [facility] ([facility])    [address] [place] [address]   [phone]            Oct 03, 2018  7:15 AM CDT   (Arrive by  7:00 AM)   Light Treatment Visit with UC DERM LIGHT TREATMENT   [facility] ([facility])    [address] [place] [address] [phone]            Oct 10, 2018  7:15 AM CDT   (Arrive by 7:00 AM)   Light Treatment Visit with UC DERM LIGHT TREATMENT   [facility] ([facility])    [address] [place] [address] [phone]            Oct 17, 2018  7:15 AM CDT   (Arrive by 7:00 AM)   Light Treatment Visit with UC DERM LIGHT TREATMENT   [facility] ([facility])    [address] [place] [address] [phone]            Oct 23, 2018  8:45 AM CDT   (Arrive by 8:30 AM)   Return Visit with Petar Corbett MD   [facility] ([facility])    [address] [place] [phone]              Who to contact     Please call your clinic at [phone] to:    Ask questions about your health    Make or cancel appointments    Discuss your medicines    Learn about your test results    Speak to your doctor            Additional Information About Your Visit        [employer] Information     [employer] gives you secure access to your electronic health record. If you see a primary care provider, you can also send messages to your care team and make appointments. If you have questions, please call your primary care clinic.  If you do not have a primary care provider, please call [phone] and they will assist you.      [employer] is an electronic gateway that provides easy, online access to your medical records. With [employer], you can request a clinic appointment, read your test results, renew a prescription or communicate with your care team.     To access your existing account, please contact your [facility] or call [phone] for assistance.        Care  EveryWhere ID     This is your Care EveryWhere ID. This could be used by other organizations to access your [place] medical records  [id number]         Blood Pressure from Last 3 Encounters:   05/22/18 118/79   05/07/18 130/75   05/05/17 121/87    Weight from Last 3 Encounters:   05/22/18 77.6 kg (171 lb)   05/07/18 77.6 kg (171 lb)   05/05/17 76.4 kg (168 lb 8 oz)              We Performed the Following     CHARGE - PHOTOTHERAPY NBUVB (UV LIGHT)     PROCEDURE - PHOTOTHERAPY NBUVB     PROCEDURE - PHOTOTHERAPY NBUVB        Primary Care Provider Office Phone # Fax #    William Morton [phone]       [place] [address]        Equal Access to Services     TORI EDWARDS : Stan Rodrigez, wakerry hedrick, qaybta kaalmakoby chávez, renzo monroe. So [facility] [phone].    ATENCIÓN: Si habla español, tiene a garrett disposición servicios gratuitos de asistencia lingüística. Ze al [phone].    We comply with applicable federal civil rights laws and [place] laws. We do not discriminate on the basis of race, color, national origin, age, disability, sex, sexual orientation, or gender identity.            Thank you!     Thank you for choosing [facility]  for your care. Our goal is always to provide you with excellent care. Hearing back from our patients is one way we can continue to improve our services. Please take a few minutes to complete the written survey that you may receive in the mail after your visit with us. Thank you!             Your Updated Medication List - Protect others around you: Learn how to safely use, store and throw away your medicines at www.disposemymeds.org.          This list is accurate as of 8/15/18  7:36 AM.  Always use your most recent med list.                   Brand Name Dispense Instructions for use Diagnosis    BIOTIN PO      Take  by mouth daily.         calcipotriene-betameth diprop 0.005-0.064 % Oint    TACLONEX    60 g    Externally apply  topically 2 times daily as needed.    Psoriasis       citalopram 10 MG tablet    celeXA     Take 10 mg by mouth daily        CONCERTA 18 MG CR tablet   Generic drug:  methylphenidate ER      Take 18 mg by mouth every morning.        DAILY MULTIVITAMIN PO      Take  by mouth daily.        fish oil-omega-3 fatty acids 1000 MG capsule      Take 2 g by mouth daily        fluocinonide 0.05 % solution    LIDEX    60 mL    Apply  topically 2 times daily for itchy scalp or scalp psoriasis    Psoriasis       olopatadine HCl 0.2 % Soln    PATADAY    2.5 mL    PLACE 1 DROP INTO BOTH EYES DAILY    Allergic conjunctivitis of both eyes       pimecrolimus 1 % cream    ELIDEL    60 g    Apply topically 2 times daily as needed For psoriasis of face or genitals    Psoriasis       REFRESH P.M. Oint     1 Tube    Apply 1 inch to eye nightly as needed    Tear film insufficiency, unspecified       tacrolimus 0.1 % ointment    PROTOPIC    60 g    Apply topically 2 times daily Apply to areas around eyes as needed    Psoriasis       VITAMIN D (CHOLECALCIFEROL) PO      Take by mouth daily

## 2018-08-17 NOTE — NURSING NOTE
Tony Mcnair comes into clinic today at the request of Dr Mckinney Ordering Provider for NBUVB.    This service provided today was under the supervising provider of the day Dr Waters, who was available if needed.    Georgina Wills RN    
78

## 2018-08-22 ENCOUNTER — ALLIED HEALTH/NURSE VISIT (OUTPATIENT)
Dept: DERMATOLOGY | Facility: CLINIC | Age: 63
End: 2018-08-22
Payer: COMMERCIAL

## 2018-08-22 DIAGNOSIS — L40.0 PLAQUE PSORIASIS: ICD-10-CM

## 2018-08-22 NOTE — MR AVS SNAPSHOT
After Visit Summary   8/22/2018    Tony Mcnair    MRN: [id number]           Patient Information     Date Of Birth          1955        Visit Information        Provider Department      8/22/2018 7:00 AM UC DERM LIGHT TREATMENT [facility]        Today's Diagnoses     Plaque psoriasis           Follow-ups after your visit        Your next 10 appointments already scheduled     Aug 29, 2018  7:00 AM CDT   (Arrive by 6:45 AM)   Light Treatment Visit with UC DERM LIGHT TREATMENT   [facility] ([facility])    [address] [place] [address]   [phone]            Sep 05, 2018  7:00 AM CDT   (Arrive by 6:45 AM)   Light Treatment Visit with UC DERM LIGHT TREATMENT   [facility] ([facility])    [address] [place] [address]   [phone]            Sep 12, 2018  7:00 AM CDT   (Arrive by 6:45 AM)   Light Treatment Visit with UC DERM LIGHT TREATMENT   [facility] ([facility])    [address] [place] [address]   [phone]            Sep 19, 2018  7:00 AM CDT   (Arrive by 6:45 AM)   Light Treatment Visit with UC DERM LIGHT TREATMENT   [facility] ([facility])    [address] [place] [address]   [phone]            Sep 26, 2018  7:00 AM CDT   (Arrive by 6:45 AM)   Light Treatment Visit with UC DERM LIGHT TREATMENT   [facility])    [address] [place] [address]   [phone]            Oct 03, 2018  7:15 AM CDT   (Arrive by 7:00 AM)   Light Treatment Visit with UC DERM LIGHT TREATMENT   [facility] ([facility])    [address] [place] [address]   [phone]            Oct 10, 2018  7:15 AM CDT   (Arrive by  7:00 AM)   Light Treatment Visit with UC DERM LIGHT TREATMENT   [facility] ([facility])    [address] [place] [address] [phone]            Oct 17, 2018  7:15 AM CDT   (Arrive by 7:00 AM)   Light Treatment Visit with UC DERM LIGHT TREATMENT   [facility] ([facility])    [address] [place] [address] [phone]            Oct 23, 2018  8:45 AM CDT   (Arrive by 8:30 AM)   Return Visit with Petar Corbett MD   [facility] ([facility])    [address] [place] [address] [phone]            Oct 24, 2018  7:15 AM CDT   (Arrive by 7:00 AM)   Light Treatment Visit with UC DERM LIGHT TREATMENT   [facility] ([facility])    [address] [place] [phone] [phone]              Who to contact     Please call your clinic at [phone] to:    Ask questions about your health    Make or cancel appointments    Discuss your medicines    Learn about your test results    Speak to your doctor            Additional Information About Your Visit        [employer] Information     [employer] gives you secure access to your electronic health record. If you see a primary care provider, you can also send messages to your care team and make appointments. If you have questions, please call your primary care clinic.  If you do not have a primary care provider, please call [phone] and they will assist you.      [employer] is an electronic gateway that provides easy, online access to your medical records. With [employer], you can request a clinic appointment, read your test results, renew a prescription or communicate with your care team.     To access your existing account, please contact your [facility] or call [phone] for assistance.        Care  EveryWhere ID     This is your Care EveryWhere ID. This could be used by other organizations to access your [place] medical records  [id number]         Blood Pressure from Last 3 Encounters:   05/22/18 118/79   05/07/18 130/75   05/05/17 121/87    Weight from Last 3 Encounters:   05/22/18 77.6 kg (171 lb)   05/07/18 77.6 kg (171 lb)   05/05/17 76.4 kg (168 lb 8 oz)              We Performed the Following     CHARGE - PHOTOTHERAPY NBUVB (UV LIGHT)     PROCEDURE - PHOTOTHERAPY NBUVB     PROCEDURE - PHOTOTHERAPY NBUVB        Primary Care Provider Office Phone # Fax #    William Morton [phone]       [place] [address]        Equal Access to Services     TORI EDWARDS : Stan Rodrigez, wakerry hedrick, qaybta kaalmakoby chávez, renzo monroe. So [facility] [phone].    ATENCIÓN: Si habla español, tiene a garrett disposición servicios gratuitos de asistencia lingüística. Ze al [phone].    We comply with applicable federal civil rights laws and [place] laws. We do not discriminate on the basis of race, color, national origin, age, disability, sex, sexual orientation, or gender identity.            Thank you!     Thank you for choosing [facility]  for your care. Our goal is always to provide you with excellent care. Hearing back from our patients is one way we can continue to improve our services. Please take a few minutes to complete the written survey that you may receive in the mail after your visit with us. Thank you!             Your Updated Medication List - Protect others around you: Learn how to safely use, store and throw away your medicines at www.disposemymeds.org.          This list is accurate as of 8/22/18  7:36 AM.  Always use your most recent med list.                   Brand Name Dispense Instructions for use Diagnosis    BIOTIN PO      Take  by mouth daily.         calcipotriene-betameth diprop 0.005-0.064 % Oint    TACLONEX    60 g    Externally apply  topically 2 times daily as needed.    Psoriasis       citalopram 10 MG tablet    celeXA     Take 10 mg by mouth daily        CONCERTA 18 MG CR tablet   Generic drug:  methylphenidate ER      Take 18 mg by mouth every morning.        DAILY MULTIVITAMIN PO      Take  by mouth daily.        fish oil-omega-3 fatty acids 1000 MG capsule      Take 2 g by mouth daily        fluocinonide 0.05 % solution    LIDEX    60 mL    Apply  topically 2 times daily for itchy scalp or scalp psoriasis    Psoriasis       olopatadine HCl 0.2 % Soln    PATADAY    2.5 mL    PLACE 1 DROP INTO BOTH EYES DAILY    Allergic conjunctivitis of both eyes       pimecrolimus 1 % cream    ELIDEL    60 g    Apply topically 2 times daily as needed For psoriasis of face or genitals    Psoriasis       REFRESH P.M. Oint     1 Tube    Apply 1 inch to eye nightly as needed    Tear film insufficiency, unspecified       tacrolimus 0.1 % ointment    PROTOPIC    60 g    Apply topically 2 times daily Apply to areas around eyes as needed    Psoriasis       VITAMIN D (CHOLECALCIFEROL) PO      Take by mouth daily

## 2018-08-22 NOTE — PROGRESS NOTES
[facility] Phototherapy Record  1. Tony Mcnair is a 63 year old male is here today for phototherapy (UVB) treatment for Psoriasis.        Changes or new medications since last treatment:   NO    New medical conditions or problems since last treatment:   NO    Any problems with last phototherapy treatment?    NO    2. The patient tolerated phototherapy without complication    Patient will return for next UVB treatment, per protocol.     Patient to see provider every 4-12 weeks for follow-up during treatment.      All questions and concerns discussed with patient in clinic today.      Georgina Wills RN

## 2018-08-22 NOTE — NURSING NOTE
Tony Mcnair comes into clinic today at the request of Dr Mckinney Ordering Provider for NBUVB.    This service provided today was under the supervising provider of the day Dr Blanco, who was available if needed.    Georgina Wills RN

## 2018-08-29 ENCOUNTER — ALLIED HEALTH/NURSE VISIT (OUTPATIENT)
Dept: DERMATOLOGY | Facility: CLINIC | Age: 63
End: 2018-08-29
Payer: COMMERCIAL

## 2018-08-29 DIAGNOSIS — L40.0 PLAQUE PSORIASIS: ICD-10-CM

## 2018-09-05 ENCOUNTER — ALLIED HEALTH/NURSE VISIT (OUTPATIENT)
Dept: DERMATOLOGY | Facility: CLINIC | Age: 63
End: 2018-09-05
Payer: COMMERCIAL

## 2018-09-05 DIAGNOSIS — L40.0 PLAQUE PSORIASIS: ICD-10-CM

## 2018-09-05 NOTE — PROGRESS NOTES
[facility] Phototherapy Record  1. Tony Mcnair is a 63 year old male is here today for phototherapy (UVB) treatment for Psoriasis.        Changes or new medications since last treatment:   NO    New medical conditions or problems since last treatment:   NO    Any problems with last phototherapy treatment?    NO    2. The patient tolerated phototherapy without complication    Patient will return for next UVB treatment, per protocol.     Patient to see provider every 4-12 weeks for follow-up during treatment.      All questions and concerns discussed with patient in clinic today.      Georgina Wills RN

## 2018-09-05 NOTE — MR AVS SNAPSHOT
After Visit Summary   9/5/2018    Tony Mcnair    MRN: [id number]           Patient Information     Date Of Birth          1955        Visit Information        Provider Department      9/5/2018 7:00 AM UC DERM LIGHT TREATMENT [facility]        Today's Diagnoses     Plaque psoriasis           Follow-ups after your visit        Your next 10 appointments already scheduled     Sep 12, 2018  7:00 AM CDT   (Arrive by 6:45 AM)   Light Treatment Visit with UC DERM LIGHT TREATMENT   [facility] ([facility])    [address] [place] [address]   [phone]            Sep 19, 2018  7:00 AM CDT   (Arrive by 6:45 AM)   Light Treatment Visit with UC DERM LIGHT TREATMENT   [facility] ([facility])    [address] [place] [address]   [phone]            Sep 26, 2018  7:00 AM CDT   (Arrive by 6:45 AM)   Light Treatment Visit with UC DERM LIGHT TREATMENT   [facility] ([facility])    [address] [place] [address]   [phone]            Oct 03, 2018  7:15 AM CDT   (Arrive by 7:00 AM)   Light Treatment Visit with UC DERM LIGHT TREATMENT   [facility] ([facility])    [address] [place] [address]   [phone]            Oct 10, 2018  7:15 AM CDT   (Arrive by 7:00 AM)   Light Treatment Visit with UC DERM LIGHT TREATMENT   [facility])    [address] [place] [address]   [phone]            Oct 17, 2018  7:15 AM CDT   (Arrive by 7:00 AM)   Light Treatment Visit with UC DERM LIGHT TREATMENT   [facility] ([facility])    [address] [place] [address]   [phone]            Oct 23, 2018  8:45 AM CDT   (Arrive by 8:30  AM)   Return Visit with Petar Corbett MD   [facility] ([facility])    [address] [phone] [phone]            Oct 24, 2018  7:15 AM CDT   (Arrive by 7:00 AM)   Light Treatment Visit with UC DERM LIGHT TREATMENT   [facility] ([facility])    [address] [phone] [phone]            Oct 31, 2018  7:15 AM CDT   (Arrive by 7:00 AM)   Light Treatment Visit with UC DERM LIGHT TREATMENT   [facility] ([facility])    [address] [phone] [phone]              Who to contact     Please call your clinic at [phone] to:    Ask questions about your health    Make or cancel appointments    Discuss your medicines    Learn about your test results    Speak to your doctor            Additional Information About Your Visit        [employer] Information     [employer] gives you secure access to your electronic health record. If you see a primary care provider, you can also send messages to your care team and make appointments. If you have questions, please call your primary care clinic.  If you do not have a primary care provider, please call [phone] and they will assist you.      [employer] is an electronic gateway that provides easy, online access to your medical records. With [employer], you can request a clinic appointment, read your test results, renew a prescription or communicate with your care team.     To access your existing account, please contact your [facility] or call [phone] for assistance.        Care EveryWhere ID     This is your Care EveryWhere ID. This could be used by other organizations to access your [place] medical records  [id number]         Blood Pressure from Last 3 Encounters:   05/22/18 118/79   05/07/18 130/75   05/05/17 121/87     Weight from Last 3 Encounters:   05/22/18 77.6 kg (171 lb)   05/07/18 77.6 kg (171 lb)   05/05/17 76.4 kg (168 lb 8 oz)              We Performed the Following     CHARGE - PHOTOTHERAPY NBUVB (UV LIGHT)     PROCEDURE - PHOTOTHERAPY NBUVB        Primary Care Provider Office Phone # Fax #    William Morton [phone]       [address] [address]        Equal Access to Services     TORI EDWARDS : Hadii zina ku hadasho Soomaali, waaxda luqadaha, qaybta kaalmada adeegyada, waxay kindrain hayzana monroe. So [facility] [phone].    ATENCIÓN: Si habla español, tiene a garrett disposición servicios gratuitos de asistencia lingüística. [facility] [phone].    We comply with applicable federal civil rights laws and [place] laws. We do not discriminate on the basis of race, color, national origin, age, disability, sex, sexual orientation, or gender identity.            Thank you!     Thank you for choosing [facility] DERMAT[facility]  for your care. Our goal is always to provide you with excellent care. Hearing back from our patients is one way we can continue to improve our services. Please take a few minutes to complete the written survey that you may receive in the mail after your visit with us. Thank you!             Your Updated Medication List - Protect others around you: Learn how to safely use, store and throw away your medicines at www.disposemymeds.org.          This list is accurate as of 9/5/18  8:09 AM.  Always use your most recent med list.                   Brand Name Dispense Instructions for use Diagnosis    BIOTIN PO      Take  by mouth daily.        calcipotriene-betameth diprop 0.005-0.064 % Oint    TACLONEX    60 g    Externally apply  topically 2 times daily as needed.    Psoriasis       citalopram 10 MG tablet    celeXA     Take 10 mg by mouth daily        CONCERTA 18 MG CR tablet   Generic drug:  methylphenidate ER      Take 18 mg by  mouth every morning.        DAILY MULTIVITAMIN PO      Take  by mouth daily.        fish oil-omega-3 fatty acids 1000 MG capsule      Take 2 g by mouth daily        fluocinonide 0.05 % solution    LIDEX    60 mL    Apply  topically 2 times daily for itchy scalp or scalp psoriasis    Psoriasis       olopatadine HCl 0.2 % Soln    PATADAY    2.5 mL    PLACE 1 DROP INTO BOTH EYES DAILY    Allergic conjunctivitis of both eyes       pimecrolimus 1 % cream    ELIDEL    60 g    Apply topically 2 times daily as needed For psoriasis of face or genitals    Psoriasis       REFRESH P.M. Oint     1 Tube    Apply 1 inch to eye nightly as needed    Tear film insufficiency, unspecified       tacrolimus 0.1 % ointment    PROTOPIC    60 g    Apply topically 2 times daily Apply to areas around eyes as needed    Psoriasis       VITAMIN D (CHOLECALCIFEROL) PO      Take by mouth daily

## 2018-09-12 ENCOUNTER — ALLIED HEALTH/NURSE VISIT (OUTPATIENT)
Dept: DERMATOLOGY | Facility: CLINIC | Age: 63
End: 2018-09-12
Payer: COMMERCIAL

## 2018-09-12 DIAGNOSIS — L40.0 PLAQUE PSORIASIS: ICD-10-CM

## 2018-09-12 NOTE — NURSING NOTE
Tony Mcnair comes into clinic today at the request of Dr. Mckinney Ordering Provider for Plaque psoriasis.        This service provided today was under the supervising provider of the day Dr. Blanco, who was available if needed.    MAYRA Sultana

## 2018-09-12 NOTE — MR AVS SNAPSHOT
After Visit Summary   9/12/2018    Tony Mcnair    MRN: [id number]           Patient Information     Date Of Birth          1955        Visit Information        Provider Department      9/12/2018 7:00 AM UC DERM LIGHT TREATMENT [facility] Dermatology        Today's Diagnoses     Plaque psoriasis           Follow-ups after your visit        Your next 10 appointments already scheduled     Sep 19, 2018  7:00 AM CDT   (Arrive by 6:45 AM)   Light Treatment Visit with UC DERM LIGHT TREATMENT   [facility] Dermatology ([facility])    [address] [place] [address]   [phone]            Sep 26, 2018  7:00 AM CDT   (Arrive by 6:45 AM)   Light Treatment Visit with UC DERM LIGHT TREATMENT   [facility] Dermatology ([facility])    [address] [place] [address]   [phone]            Oct 03, 2018  7:15 AM CDT   (Arrive by 7:00 AM)   Light Treatment Visit with UC DERM LIGHT TREATMENT   [facility] Dermatology ([facility])    [address] [place] [address]   [phone]            Oct 10, 2018  7:15 AM CDT   (Arrive by 7:00 AM)   Light Treatment Visit with UC DERM LIGHT TREATMENT   [facility] Dermatology ([facility])    [address] [place] [address]   [phone]            Oct 17, 2018  7:15 AM CDT   (Arrive by 7:00 AM)   Light Treatment Visit with UC DERM LIGHT TREATMENT   [facility] ([facility])    [address] [place] [address]   [phone]            Oct 23, 2018  8:45 AM CDT   (Arrive by 8:30 AM)   Return Visit with Petar Corbett MD   [facility] ([facility])    [address] [place] [address]   [phone]            Oct 24, 2018  7:15 AM CDT   (Arrive by 7:00 AM)    Light Treatment Visit with UC DERM LIGHT TREATMENT   [facility] Dermatology ([facility])    [address] [address] [phone]            Oct 31, 2018  7:15 AM CDT   (Arrive by 7:00 AM)   Light Treatment Visit with UC DERM LIGHT TREATMENT   [facility] ([facility])    [address] [id number] [phone]              Who to contact     Please call your clinic at [phone] to:    Ask questions about your health    Make or cancel appointments    Discuss your medicines    Learn about your test results    Speak to your doctor            Additional Information About Your Visit        [employer] Information     [employer] gives you secure access to your electronic health record. If you see a primary care provider, you can also send messages to your care team and make appointments. If you have questions, please call your primary care clinic.  If you do not have a primary care provider, please call [phone] and they will assist you.      [employer]APP[employer] is an electronic gateway that provides easy, online access to your medical records. With INAPPIN, you can request a clinic appointment, read your test results, renew a prescription or communicate with your care team.     To access your existing account, please contact your [facility] or call [phone] for assistance.        Care EveryWhere ID     This is your Care EveryWhere ID. This could be used by other organizations to access your [facility] medical records  [id number]         Blood Pressure from Last 3 Encounters:   05/22/18 118/79   05/07/18 130/75   05/05/17 121/87    Weight from Last 3 Encounters:   05/22/18 77.6 kg (171 lb)   05/07/18 77.6 kg (171 lb)   05/05/17 76.4 kg (168 lb 8 oz)              We Performed the Following     CHARGE - PHOTOTHERAPY NBUVB (UV LIGHT)     PROCEDURE - PHOTOTHERAPY NBUVB         Primary Care Provider Office Phone # Fax #    William Morton [phone]       [facility] [address] [address]        Equal Access to Services     TORI EDWARDS : Stan rios saharao Elia, waaxda luqadaha, qaybta kaalmada saira, renzo gómez sorayatiffanie vargas meliza monroe. So [facility] [phone].    ATENCIÓN: Si habla español, tiene a garrett disposición servicios gratuitos de asistencia lingüística. Ze al [phone].    We comply with applicable federal civil rights laws and [place] laws. We do not discriminate on the basis of race, color, national origin, age, disability, sex, sexual orientation, or gender identity.            Thank you!     Thank you for choosing [facility] DERMATOLOGY  for your care. Our goal is always to provide you with excellent care. Hearing back from our patients is one way we can continue to improve our services. Please take a few minutes to complete the written survey that you may receive in the mail after your visit with us. Thank you!             Your Updated Medication List - Protect others around you: Learn how to safely use, store and throw away your medicines at www.disposemymeds.org.          This list is accurate as of 9/12/18  7:51 AM.  Always use your most recent med list.                   Brand Name Dispense Instructions for use Diagnosis    BIOTIN PO      Take  by mouth daily.        calcipotriene-betameth diprop 0.005-0.064 % Oint    TACLONEX    60 g    Externally apply  topically 2 times daily as needed.    Psoriasis       citalopram 10 MG tablet    celeXA     Take 10 mg by mouth daily        CONCERTA 18 MG CR tablet   Generic drug:  methylphenidate ER      Take 18 mg by mouth every morning.        DAILY MULTIVITAMIN PO      Take  by mouth daily.        fish oil-omega-3 fatty acids 1000 MG capsule      Take 2 g by mouth daily        fluocinonide 0.05 % solution    LIDEX    60 mL    Apply  topically 2 times daily  for itchy scalp or scalp psoriasis    Psoriasis       olopatadine HCl 0.2 % Soln    PATADAY    2.5 mL    PLACE 1 DROP INTO BOTH EYES DAILY    Allergic conjunctivitis of both eyes       pimecrolimus 1 % cream    ELIDEL    60 g    Apply topically 2 times daily as needed For psoriasis of face or genitals    Psoriasis       REFRESH P.M. Oint     1 Tube    Apply 1 inch to eye nightly as needed    Tear film insufficiency, unspecified       tacrolimus 0.1 % ointment    PROTOPIC    60 g    Apply topically 2 times daily Apply to areas around eyes as needed    Psoriasis       VITAMIN D (CHOLECALCIFEROL) PO      Take by mouth daily

## 2018-09-12 NOTE — PROGRESS NOTES
[facility] Dermatology Phototherapy Record  1. Tony Mcnair is a 63 year old male is here today for phototherapy (UVB) treatment for Plaque psoriasis.        Changes or new medications since last treatment:   NO    New medical conditions or problems since last treatment:   NO    Any problems with last phototherapy treatment?    NO    2. The patient tolerated phototherapy without complication    Patient will return on for next UVB treatment, per protocol.     Patient to see provider every 4-12 weeks for follow-up during treatment.      All questions and concerns discussed with patient in clinic today.      MAYRA Sultana

## 2018-09-19 ENCOUNTER — ALLIED HEALTH/NURSE VISIT (OUTPATIENT)
Dept: DERMATOLOGY | Facility: CLINIC | Age: 63
End: 2018-09-19
Payer: COMMERCIAL

## 2018-09-19 DIAGNOSIS — L40.0 PLAQUE PSORIASIS: ICD-10-CM

## 2018-09-19 NOTE — MR AVS SNAPSHOT
After Visit Summary   9/19/2018    Tony Mcnair    MRN: [id number]           Patient Information     Date Of Birth          1955        Visit Information        Provider Department      9/19/2018 7:00 AM UC DERM LIGHT TREATMENT [facility] Dermatology        Today's Diagnoses     Plaque psoriasis           Follow-ups after your visit        Your next 10 appointments already scheduled     Sep 26, 2018  7:00 AM CDT   (Arrive by 6:45 AM)   Light Treatment Visit with UC DERM LIGHT TREATMENT   [facility] Dermatology ([facility])    [address] [place] [address]   [phone]            Oct 03, 2018  7:15 AM CDT   (Arrive by 7:00 AM)   Light Treatment Visit with UC DERM LIGHT TREATMENT   [facility] ([facility])    [address] [place] [address]   [phone]            Oct 10, 2018  7:15 AM CDT   (Arrive by 7:00 AM)   Light Treatment Visit with UC DERM LIGHT TREATMENT   [facility] Dermatology ([facility])    [address] [place] [address]   [phone]            Oct 17, 2018  7:15 AM CDT   (Arrive by 7:00 AM)   Light Treatment Visit with UC DERM LIGHT TREATMENT   [facility] ([facility])    [address] [place] [address]   [phone]            Oct 23, 2018  8:45 AM CDT   (Arrive by 8:30 AM)   Return Visit with Petar Corbett MD   [facility])    [address] [place] [address]   [phone]            Oct 24, 2018  7:15 AM CDT   (Arrive by 7:00 AM)   Light Treatment Visit with UC DERM LIGHT TREATMENT   [facility] ([facility])    [address] [place] [address]   [phone]            Oct 31, 2018  7:15 AM CDT   (Arrive by 7:00 AM)    Light Treatment Visit with  DERM LIGHT TREATMENT   [facility] Dermatology ([facility])    [address] [facility]  3rd Floor  [facility] [address] [phone]              Who to contact     Please call your clinic at [phone] to:    Ask questions about your health    Make or cancel appointments    Discuss your medicines    Learn about your test results    Speak to your doctor            Additional Information About Your Visit        [employer] Information     [employer] gives you secure access to your electronic health record. If you see a primary care provider, you can also send messages to your care team and make appointments. If you have questions, please call your primary care clinic.  If you do not have a primary care provider, please call [phone] and they will assist you.      [employer] is an electronic gateway that provides easy, online access to your medical records. With [employer], you can request a clinic appointment, read your test results, renew a prescription or communicate with your care team.     To access your existing account, please contact your [facility] or call [phone] for assistance.        Care EveryWhere ID     This is your Care EveryWhere ID. This could be used by other organizations to access your [facility] medical records  [id number]         Blood Pressure from Last 3 Encounters:   05/22/18 118/79   05/07/18 130/75   05/05/17 121/87    Weight from Last 3 Encounters:   05/22/18 77.6 kg (171 lb)   05/07/18 77.6 kg (171 lb)   05/05/17 76.4 kg (168 lb 8 oz)              We Performed the Following     CHARGE - PHOTOTHERAPY NBUVB (UV LIGHT)     PROCEDURE - PHOTOTHERAPY NBUVB        Primary Care Provider Office Phone # Fax #    William Morton [phone]       [address] [address]        Equal Access to Services     TORI EDWARDS AH: Stan Rodrigez  waaxda luqadaha, qaybta kaaltrevon chávez, renzo thompsonaaindu ah. So [facility] [phone].    ATENCIÓN: Si rose mary varghese, tiene a garrett disposición servicios gratuitos de asistencia lingüística. Ze al [phone].    We comply with applicable federal civil rights laws and [place] laws. We do not discriminate on the basis of race, color, national origin, age, disability, sex, sexual orientation, or gender identity.            Thank you!     Thank you for choosing [facility] DERMATOLOGY  for your care. Our goal is always to provide you with excellent care. Hearing back from our patients is one way we can continue to improve our services. Please take a few minutes to complete the written survey that you may receive in the mail after your visit with us. Thank you!             Your Updated Medication List - Protect others around you: Learn how to safely use, store and throw away your medicines at www.disposemymeds.org.          This list is accurate as of 9/19/18  8:30 AM.  Always use your most recent med list.                   Brand Name Dispense Instructions for use Diagnosis    BIOTIN PO      Take  by mouth daily.        calcipotriene-betameth diprop 0.005-0.064 % Oint    TACLONEX    60 g    Externally apply  topically 2 times daily as needed.    Psoriasis       citalopram 10 MG tablet    celeXA     Take 10 mg by mouth daily        CONCERTA 18 MG CR tablet   Generic drug:  methylphenidate ER      Take 18 mg by mouth every morning.        DAILY MULTIVITAMIN PO      Take  by mouth daily.        fish oil-omega-3 fatty acids 1000 MG capsule      Take 2 g by mouth daily        fluocinonide 0.05 % solution    LIDEX    60 mL    Apply  topically 2 times daily for itchy scalp or scalp psoriasis    Psoriasis       olopatadine HCl 0.2 % Soln    PATADAY    2.5 mL    PLACE 1 DROP INTO BOTH EYES DAILY    Allergic conjunctivitis of both eyes       pimecrolimus 1 % cream    ELIDEL    60 g    Apply topically 2  times daily as needed For psoriasis of face or genitals    Psoriasis       REFRESH P.M. Oint     1 Tube    Apply 1 inch to eye nightly as needed    Tear film insufficiency, unspecified       tacrolimus 0.1 % ointment    PROTOPIC    60 g    Apply topically 2 times daily Apply to areas around eyes as needed    Psoriasis       VITAMIN D (CHOLECALCIFEROL) PO      Take by mouth daily

## 2018-09-26 ENCOUNTER — ALLIED HEALTH/NURSE VISIT (OUTPATIENT)
Dept: DERMATOLOGY | Facility: CLINIC | Age: 63
End: 2018-09-26
Payer: COMMERCIAL

## 2018-09-26 DIAGNOSIS — L40.0 PLAQUE PSORIASIS: ICD-10-CM

## 2018-09-26 NOTE — NURSING NOTE
Tony Mcnair comes into clinic today at the request of Dr Mckinney Ordering Provider for NBUVB.    This service provided today was under the supervising provider of the day Dr Kent, who was available if needed.    Georgina Wills RN

## 2018-09-26 NOTE — MR AVS SNAPSHOT
After Visit Summary   9/26/2018    Tony Mcnair    MRN: [id number]           Patient Information     Date Of Birth          1955        Visit Information        Provider Department      9/26/2018 7:00 AM UC DERM LIGHT TREATMENT [facility] Dermatology        Today's Diagnoses     Plaque psoriasis           Follow-ups after your visit        Your next 10 appointments already scheduled     Oct 03, 2018  7:15 AM CDT   (Arrive by 7:00 AM)   Light Treatment Visit with UC DERM LIGHT TREATMENT   [facility] Dermatology ([facility])    [address] [place] [address]   [phone]            Oct 10, 2018  7:15 AM CDT   (Arrive by 7:00 AM)   Light Treatment Visit with UC DERM LIGHT TREATMENT   [facility] ([facility])    [address] [place] [address]   [phone]            Oct 17, 2018  7:15 AM CDT   (Arrive by 7:00 AM)   Light Treatment Visit with UC DERM LIGHT TREATMENT   [facility] Dermatology ([facility])    [address] [place] [address]   [phone]            Oct 23, 2018  8:45 AM CDT   (Arrive by 8:30 AM)   Return Visit with Petar Corbett MD   [facility] ([facility])    [address] [place] [address]   [phone]            Oct 24, 2018  7:15 AM CDT   (Arrive by 7:00 AM)   Light Treatment Visit with UC DERM LIGHT TREATMENT   [facility])    [address] [place] [address]   [phone]            Oct 31, 2018  7:15 AM CDT   (Arrive by 7:00 AM)   Light Treatment Visit with UC DERM LIGHT TREATMENT   [facility] ([facility])    [address] [place] [address]   [phone]              Who to contact     Please call your clinic at  [phone] to:    Ask questions about your health    Make or cancel appointments    Discuss your medicines    Learn about your test results    Speak to your doctor            Additional Information About Your Visit        [employer] Information     [employer] gives you secure access to your electronic health record. If you see a primary care provider, you can also send messages to your care team and make appointments. If you have questions, please call your primary care clinic.  If you do not have a primary care provider, please call [phone] and they will assist you.      [employer] is an electronic gateway that provides easy, online access to your medical records. With [employer], you can request a clinic appointment, read your test results, renew a prescription or communicate with your care team.     To access your existing account, please contact your [facility] or call [phone] for assistance.        Care EveryWhere ID     This is your Care EveryWhere ID. This could be used by other organizations to access your [place] medical records  [id number]         Blood Pressure from Last 3 Encounters:   05/22/18 118/79   05/07/18 130/75   05/05/17 121/87    Weight from Last 3 Encounters:   05/22/18 77.6 kg (171 lb)   05/07/18 77.6 kg (171 lb)   05/05/17 76.4 kg (168 lb 8 oz)              We Performed the Following     CHARGE - PHOTOTHERAPY NBUVB (UV LIGHT)     PROCEDURE - PHOTOTHERAPY NBUVB        Primary Care Provider Office Phone # Fax #    William Morton [phone]       [address] [address]        Equal Access to Services     TORI EDWARDS : Hadii zina ku hadasho Soomaali, waaxda luqadaha, qaybta kaalmada adeegyakoby, renzo haider . So [facility] [phone].    ATENCIÓN: Si habla español, tiene a garrett disposición servicios gratuitos de asistencia lingüística. Llame al [phone].    We comply with  applicable federal civil rights laws and [place] laws. We do not discriminate on the basis of race, color, national origin, age, disability, sex, sexual orientation, or gender identity.            Thank you!     Thank you for choosing [facility] DERMATOLOGY  for your care. Our goal is always to provide you with excellent care. Hearing back from our patients is one way we can continue to improve our services. Please take a few minutes to complete the written survey that you may receive in the mail after your visit with us. Thank you!             Your Updated Medication List - Protect others around you: Learn how to safely use, store and throw away your medicines at www.disposemymeds.org.          This list is accurate as of 9/26/18  7:47 AM.  Always use your most recent med list.                   Brand Name Dispense Instructions for use Diagnosis    BIOTIN PO      Take  by mouth daily.        calcipotriene-betameth diprop 0.005-0.064 % Oint    TACLONEX    60 g    Externally apply  topically 2 times daily as needed.    Psoriasis       citalopram 10 MG tablet    celeXA     Take 10 mg by mouth daily        CONCERTA 18 MG CR tablet   Generic drug:  methylphenidate ER      Take 18 mg by mouth every morning.        DAILY MULTIVITAMIN PO      Take  by mouth daily.        fish oil-omega-3 fatty acids 1000 MG capsule      Take 2 g by mouth daily        fluocinonide 0.05 % solution    LIDEX    60 mL    Apply  topically 2 times daily for itchy scalp or scalp psoriasis    Psoriasis       olopatadine HCl 0.2 % Soln    PATADAY    2.5 mL    PLACE 1 DROP INTO BOTH EYES DAILY    Allergic conjunctivitis of both eyes       pimecrolimus 1 % cream    ELIDEL    60 g    Apply topically 2 times daily as needed For psoriasis of face or genitals    Psoriasis       REFRESH P.M. Oint     1 Tube    Apply 1 inch to eye nightly as needed    Tear film insufficiency, unspecified       tacrolimus 0.1 % ointment    PROTOPIC    60 g    Apply topically  2 times daily Apply to areas around eyes as needed    Psoriasis       VITAMIN D (CHOLECALCIFEROL) PO      Take by mouth daily

## 2018-09-26 NOTE — PROGRESS NOTES
[facility] Dermatology Phototherapy Record  1. Tony Mcnair is a 63 year old male is here today for phototherapy (UVB) treatment for Psoriasis.        Changes or new medications since last treatment:   NO    New medical conditions or problems since last treatment:   NO    Any problems with last phototherapy treatment?    NO    2. The patient tolerated phototherapy without complication    Patient will return for next UVB treatment, per protocol.     Patient to see provider every 4-12 weeks for follow-up during treatment.      All questions and concerns discussed with patient in clinic today.      Georgina Wills RN

## 2018-10-03 ENCOUNTER — ALLIED HEALTH/NURSE VISIT (OUTPATIENT)
Dept: DERMATOLOGY | Facility: CLINIC | Age: 63
End: 2018-10-03
Payer: COMMERCIAL

## 2018-10-03 DIAGNOSIS — L40.0 PLAQUE PSORIASIS: ICD-10-CM

## 2018-10-03 NOTE — NURSING NOTE
Tony Mcnair comes into clinic today at the request of Dr Corbett Ordering Provider for NBUVB.    This service provided today was under the supervising provider of the day Dr Blanco, who was available if needed.    Georgina Wills RN

## 2018-10-03 NOTE — PROGRESS NOTES
[facility] Dermatology Phototherapy Record  1. Tony Mcnair is a 63 year old male is here today for phototherapy (UVB) treatment for Psoriasis.        Changes or new medications since last treatment:   NO    New medical conditions or problems since last treatment:   NO    Any problems with last phototherapy treatment?    NO    2. The patient tolerated phototherapy without complication    Patient will return for next UVB treatment, per protocol.     Patient to see provider every 4-12 weeks for follow-up during treatment.      All questions and concerns discussed with patient in clinic today.      Georgina Wills RN

## 2018-10-03 NOTE — MR AVS SNAPSHOT
After Visit Summary   10/3/2018    Tony Mcnair    MRN: [id number]           Patient Information     Date Of Birth          1955        Visit Information        Provider Department      10/3/2018 7:15 AM UC DERM LIGHT TREATMENT [facility] Dermatology        Today's Diagnoses     Plaque psoriasis           Follow-ups after your visit        Your next 10 appointments already scheduled     Oct 10, 2018  7:15 AM CDT   (Arrive by 7:00 AM)   Light Treatment Visit with UC DERM LIGHT TREATMENT   [facility] Dermatology ([facility])    [address] [place] [address] [phone]            Oct 17, 2018  7:15 AM CDT   (Arrive by 7:00 AM)   Light Treatment Visit with UC DERM LIGHT TREATMENT   [facility] Dermatology ([facility])    [address] [place] [address] [phone]            Oct 23, 2018  8:45 AM CDT   (Arrive by 8:30 AM)   Return Visit with Petar Corbett MD   [facility] Dermatology ([facility])    [address] [place] [address] [phone]            Oct 24, 2018  7:15 AM CDT   (Arrive by 7:00 AM)   Light Treatment Visit with UC DERM LIGHT TREATMENT   [facility] Dermatology ([facility])    [address] [place] [id number] [phone]            Oct 31, 2018  7:15 AM CDT   (Arrive by 7:00 AM)   Light Treatment Visit with UC DERM LIGHT TREATMENT   [facility])    [address] [place] [id number] [phone]              Who to contact     Please call your clinic at [phone] to:    Ask questions about your health    Make or cancel appointments    Discuss your medicines    Learn about your test results    Speak to your doctor            Additional Information About Your Visit        MyChart Information     MyChart  gives you secure access to your electronic health record. If you see a primary care provider, you can also send messages to your care team and make appointments. If you have questions, please call your primary care clinic.  If you do not have a primary care provider, please call [phone] and they will assist you.      [employer] is an electronic gateway that provides easy, online access to your medical records. With [employer], you can request a clinic appointment, read your test results, renew a prescription or communicate with your care team.     To access your existing account, please contact your [facility] or call [phone] for assistance.        Care EveryWhere ID     This is your Care EveryWhere ID. This could be used by other organizations to access your [place] medical records  [id number]         Blood Pressure from Last 3 Encounters:   05/22/18 118/79   05/07/18 130/75   05/05/17 121/87    Weight from Last 3 Encounters:   05/22/18 77.6 kg (171 lb)   05/07/18 77.6 kg (171 lb)   05/05/17 76.4 kg (168 lb 8 oz)              We Performed the Following     CHARGE - PHOTOTHERAPY NBUVB (UV LIGHT)     PROCEDURE - PHOTOTHERAPY NBUVB     PROCEDURE - PHOTOTHERAPY NBUVB        Primary Care Provider Office Phone # Fax #    William Morton [phone]       [address] [address]        Equal Access to Services     TORI EDWARDS : Hadii zina mcdonald Soanna, waaxda luqadaha, qaybta kaalmada saira, renzo haider . So [facility] [phone].    ATENCIÓN: Si habla español, tiene a garrett disposición servicios gratuitos de asistencia lingüística. Llame al [phone].    We comply with applicable federal civil rights laws and [place] laws. We do not discriminate on the basis of race, color, national origin, age, disability, sex, sexual orientation, or gender identity.            Thank you!     Thank  you for choosing [facility] DERMATOLOGY  for your care. Our goal is always to provide you with excellent care. Hearing back from our patients is one way we can continue to improve our services. Please take a few minutes to complete the written survey that you may receive in the mail after your visit with us. Thank you!             Your Updated Medication List - Protect others around you: Learn how to safely use, store and throw away your medicines at www.disposemymeds.org.          This list is accurate as of 10/3/18  7:40 AM.  Always use your most recent med list.                   Brand Name Dispense Instructions for use Diagnosis    BIOTIN PO      Take  by mouth daily.        calcipotriene-betameth diprop 0.005-0.064 % Oint    TACLONEX    60 g    Externally apply  topically 2 times daily as needed.    Psoriasis       citalopram 10 MG tablet    celeXA     Take 10 mg by mouth daily        CONCERTA 18 MG CR tablet   Generic drug:  methylphenidate ER      Take 18 mg by mouth every morning.        DAILY MULTIVITAMIN PO      Take  by mouth daily.        fish oil-omega-3 fatty acids 1000 MG capsule      Take 2 g by mouth daily        fluocinonide 0.05 % solution    LIDEX    60 mL    Apply  topically 2 times daily for itchy scalp or scalp psoriasis    Psoriasis       olopatadine HCl 0.2 % Soln    PATADAY    2.5 mL    PLACE 1 DROP INTO BOTH EYES DAILY    Allergic conjunctivitis of both eyes       pimecrolimus 1 % cream    ELIDEL    60 g    Apply topically 2 times daily as needed For psoriasis of face or genitals    Psoriasis       REFRESH P.M. Oint     1 Tube    Apply 1 inch to eye nightly as needed    Tear film insufficiency, unspecified       tacrolimus 0.1 % ointment    PROTOPIC    60 g    Apply topically 2 times daily Apply to areas around eyes as needed    Psoriasis       VITAMIN D (CHOLECALCIFEROL) PO      Take by mouth daily

## 2018-10-10 ENCOUNTER — ALLIED HEALTH/NURSE VISIT (OUTPATIENT)
Dept: DERMATOLOGY | Facility: CLINIC | Age: 63
End: 2018-10-10
Payer: COMMERCIAL

## 2018-10-10 DIAGNOSIS — L40.0 PLAQUE PSORIASIS: ICD-10-CM

## 2018-10-10 NOTE — MR AVS SNAPSHOT
After Visit Summary   10/10/2018    Tony Mcnair    MRN: [id number]           Patient Information     Date Of Birth          1955        Visit Information        Provider Department      10/10/2018 7:15 AM UC DERM LIGHT TREATMENT [facility] Dermatology        Today's Diagnoses     Plaque psoriasis           Follow-ups after your visit        Your next 10 appointments already scheduled     Oct 17, 2018  7:15 AM CDT   (Arrive by 7:00 AM)   Light Treatment Visit with UC DERM LIGHT TREATMENT   [facility] Dermatology ([facility])    [address] [place] [id number] [phone]            Oct 23, 2018  8:45 AM CDT   (Arrive by 8:30 AM)   Return Visit with Petar Corbett MD   [facility] Dermatology ([facility])    [address] [place] [id number] [phone]            Oct 24, 2018  7:15 AM CDT   (Arrive by 7:00 AM)   Light Treatment Visit with UC DERM LIGHT TREATMENT   [facility] Dermatology ([facility])    [address] [place] [id number] [phone]            Oct 31, 2018  7:15 AM CDT   (Arrive by 7:00 AM)   Light Treatment Visit with UC DERM LIGHT TREATMENT   [facility] Dermatology ([facility])    [address] [place] [id number] [phone]              Who to contact     Please call your clinic at [phone] to:    Ask questions about your health    Make or cancel appointments    Discuss your medicines    Learn about your test results    Speak to your doctor            Additional Information About Your Visit        MyChart Information     [employer] gives you secure access to your electronic health record. If you see a primary care provider, you can also send messages to your care team and make appointments. If you have questions, please call your primary care clinic.  If you do not have a  primary care provider, please call [phone] and they will assist you.      [employer] is an electronic gateway that provides easy, online access to your medical records. With [employer], you can request a clinic appointment, read your test results, renew a prescription or communicate with your care team.     To access your existing account, please contact your [facility] or call [phone] for assistance.        Care EveryWhere ID     This is your Care EveryWhere ID. This could be used by other organizations to access your [place] medical records  [id number]         Blood Pressure from Last 3 Encounters:   05/22/18 118/79   05/07/18 130/75   05/05/17 121/87    Weight from Last 3 Encounters:   05/22/18 77.6 kg (171 lb)   05/07/18 77.6 kg (171 lb)   05/05/17 76.4 kg (168 lb 8 oz)              We Performed the Following     CHARGE - PHOTOTHERAPY NBUVB (UV LIGHT)     PROCEDURE - PHOTOTHERAPY NBUVB        Primary Care Provider Office Phone # Fax #    William Morton [phone]       [place] [address]        Equal Access to Services     TORI EDWARDS : Hadii aad ku hadasho Soblaineali, waaxda luqadaha, qaybta kaalmada adeciara, renzo monroe. So [facility] [phone].    ATENCIÓN: Si habla español, tiene a garrett disposición servicios gratuitos de asistencia lingüística. Ze al [phone].    We comply with applicable federal civil rights laws and [place] laws. We do not discriminate on the basis of race, color, national origin, age, disability, sex, sexual orientation, or gender identity.            Thank you!     Thank you for choosing [facility] DERMATOLOGY  for your care. Our goal is always to provide you with excellent care. Hearing back from our patients is one way we can continue to improve our services. Please take a few minutes to complete the written survey that you may receive in the mail  after your visit with us. Thank you!             Your Updated Medication List - Protect others around you: Learn how to safely use, store and throw away your medicines at www.disposemymeds.org.          This list is accurate as of 10/10/18  8:48 AM.  Always use your most recent med list.                   Brand Name Dispense Instructions for use Diagnosis    BIOTIN PO      Take  by mouth daily.        calcipotriene-betameth diprop 0.005-0.064 % Oint    TACLONEX    60 g    Externally apply  topically 2 times daily as needed.    Psoriasis       citalopram 10 MG tablet    celeXA     Take 10 mg by mouth daily        CONCERTA 18 MG CR tablet   Generic drug:  methylphenidate ER      Take 18 mg by mouth every morning.        DAILY MULTIVITAMIN PO      Take  by mouth daily.        fish oil-omega-3 fatty acids 1000 MG capsule      Take 2 g by mouth daily        fluocinonide 0.05 % solution    LIDEX    60 mL    Apply  topically 2 times daily for itchy scalp or scalp psoriasis    Psoriasis       olopatadine HCl 0.2 % Soln    PATADAY    2.5 mL    PLACE 1 DROP INTO BOTH EYES DAILY    Allergic conjunctivitis of both eyes       pimecrolimus 1 % cream    ELIDEL    60 g    Apply topically 2 times daily as needed For psoriasis of face or genitals    Psoriasis       REFRESH P.M. Oint     1 Tube    Apply 1 inch to eye nightly as needed    Tear film insufficiency, unspecified       tacrolimus 0.1 % ointment    PROTOPIC    60 g    Apply topically 2 times daily Apply to areas around eyes as needed    Psoriasis       VITAMIN D (CHOLECALCIFEROL) PO      Take by mouth daily

## 2018-10-10 NOTE — NURSING NOTE
Tony Mcnair comes into clinic today at the request of Dr. Mckinney Ordering Provider for NBuvb.        This service provided today was under the supervising provider of the day Dr. Mckinney, who was available if needed.    MAYRA Sultana

## 2018-10-10 NOTE — PROGRESS NOTES
[facility] Dermatology Phototherapy Record  1. Tony Mcnair is a 63 year old male is here today for phototherapy (UVB) treatment for Plaque psoriasis.        Changes or new medications since last treatment:   NO    New medical conditions or problems since last treatment:   NO    Any problems with last phototherapy treatment?    NO    2. The patient tolerated phototherapy without complication    Patient will return on for next UVB treatment, per protocol.     Patient to see provider every 4-12 weeks for follow-up during treatment.      All questions and concerns discussed with patient in clinic today.      MAYRA Sultana

## 2018-10-17 ENCOUNTER — ALLIED HEALTH/NURSE VISIT (OUTPATIENT)
Dept: DERMATOLOGY | Facility: CLINIC | Age: 63
End: 2018-10-17
Payer: COMMERCIAL

## 2018-10-17 DIAGNOSIS — L40.0 PLAQUE PSORIASIS: ICD-10-CM

## 2018-10-17 NOTE — NURSING NOTE
Tony Mcnair comes into clinic today at the request of Dr. Mckinney Ordering Provider for NBUVB    This service provided today was under the supervising provider of the day Dr. Roberts, who was available if needed.    Lorrie Graham

## 2018-10-17 NOTE — MR AVS SNAPSHOT
After Visit Summary   10/17/2018    Tony Mcnair    MRN: [id number]           Patient Information     Date Of Birth          1955        Visit Information        Provider Department      10/17/2018 7:15 AM UC DERM LIGHT TREATMENT [facility] Dermatology        Today's Diagnoses     Plaque psoriasis           Follow-ups after your visit        Your next 10 appointments already scheduled     Oct 23, 2018  8:45 AM CDT   (Arrive by 8:30 AM)   Return Visit with Petar Corbett MD   [facility])    [address] [place] [address]   [phone]            Oct 24, 2018  7:15 AM CDT   (Arrive by 7:00 AM)   Light Treatment Visit with UC DERM LIGHT TREATMENT   [facility] ([facility])    [address] [place] [address]   [phone]            Oct 31, 2018  7:15 AM CDT   (Arrive by 7:00 AM)   Light Treatment Visit with UC DERM LIGHT TREATMENT   [facility] Dermatology ([facility])    [address] [place] [address]   [phone]            Nov 07, 2018  7:00 AM CST   (Arrive by 6:45 AM)   Light Treatment Visit with UC DERM LIGHT TREATMENT   [facility] Dermatology ([facility])    [address] [place] [address]   [phone]            Nov 14, 2018  7:00 AM CST   (Arrive by 6:45 AM)   Light Treatment Visit with UC DERM LIGHT TREATMENT   [facility] ([facility])    [address] [place] [address]   [phone]            Nov 21, 2018  7:00 AM CST   (Arrive by 6:45 AM)   Light Treatment Visit with UC DERM LIGHT TREATMENT   [facility] ([facility])    [address] [place] [address]   [phone]            Nov 28, 2018  7:00 AM CST   (Arrive by 6:45 AM)    Light Treatment Visit with UC DERM LIGHT TREATMENT   [facility] ([facility])    [address] [address]   [phone]            Dec 05, 2018  7:15 AM CST   (Arrive by 7:00 AM)   Light Treatment Visit with UC DERM LIGHT TREATMENT   [facility] ([facility])    [address] [address]   [phone]            Dec 19, 2018  7:15 AM CST   (Arrive by 7:00 AM)   Light Treatment Visit with UC DERM LIGHT TREATMENT   [facility] ([facility])    [address] [place] [address]   [phone]            Dec 26, 2018  7:15 AM CST   (Arrive by 7:00 AM)   Light Treatment Visit with UC DERM LIGHT TREATMENT   [facility] ([facility])    [address] [place] [address] [phone]              Who to contact     Please call your clinic at [phone] to:    Ask questions about your health    Make or cancel appointments    Discuss your medicines    Learn about your test results    Speak to your doctor            Additional Information About Your Visit        [employer] Information     [employer] gives you secure access to your electronic health record. If you see a primary care provider, you can also send messages to your care team and make appointments. If you have questions, please call your primary care clinic.  If you do not have a primary care provider, please call [phone] and they will assist you.      [employer] is an electronic gateway that provides easy, online access to your medical records. With [employer], you can request a clinic appointment, read your test results, renew a prescription or communicate with your care team.     To access your existing account, please contact your [facility] or call [phone] for assistance.         Care EveryWhere ID     This is your Care EveryWhere ID. This could be used by other organizations to access your [place] medical records  [id number]         Blood Pressure from Last 3 Encounters:   05/22/18 118/79   05/07/18 130/75   05/05/17 121/87    Weight from Last 3 Encounters:   05/22/18 77.6 kg (171 lb)   05/07/18 77.6 kg (171 lb)   05/05/17 76.4 kg (168 lb 8 oz)              We Performed the Following     CHARGE - PHOTOTHERAPY NBUVB (UV LIGHT)     PROCEDURE - PHOTOTHERAPY NBUVB        Primary Care Provider Office Phone # Fax #    William Morton [phone]       [place] [address]        Equal Access to Services     TORI EDWARDS : Stan Rodrigez, waaxkoby luqadaha, qaybta kaalmada saira, renzo monroe. So [facility] [phone].    ATENCIÓN: Si habla español, tiene a garrett disposición servicios gratuitos de asistencia lingüística. Ze al [phone].    We comply with applicable federal civil rights laws and [place] laws. We do not discriminate on the basis of race, color, national origin, age, disability, sex, sexual orientation, or gender identity.            Thank you!     Thank you for choosing [facility] DERMATOLOGY  for your care. Our goal is always to provide you with excellent care. Hearing back from our patients is one way we can continue to improve our services. Please take a few minutes to complete the written survey that you may receive in the mail after your visit with us. Thank you!             Your Updated Medication List - Protect others around you: Learn how to safely use, store and throw away your medicines at www.disposemymeds.org.          This list is accurate as of 10/17/18  7:40 AM.  Always use your most recent med list.                   Brand Name Dispense Instructions for use Diagnosis    BIOTIN PO      Take  by mouth daily.        calcipotriene-betameth diprop  0.005-0.064 % Oint    TACLONEX    60 g    Externally apply  topically 2 times daily as needed.    Psoriasis       citalopram 10 MG tablet    celeXA     Take 10 mg by mouth daily        CONCERTA 18 MG CR tablet   Generic drug:  methylphenidate ER      Take 18 mg by mouth every morning.        DAILY MULTIVITAMIN PO      Take  by mouth daily.        fish oil-omega-3 fatty acids 1000 MG capsule      Take 2 g by mouth daily        fluocinonide 0.05 % solution    LIDEX    60 mL    Apply  topically 2 times daily for itchy scalp or scalp psoriasis    Psoriasis       olopatadine HCl 0.2 % Soln    PATADAY    2.5 mL    PLACE 1 DROP INTO BOTH EYES DAILY    Allergic conjunctivitis of both eyes       pimecrolimus 1 % cream    ELIDEL    60 g    Apply topically 2 times daily as needed For psoriasis of face or genitals    Psoriasis       REFRESH P.M. Oint     1 Tube    Apply 1 inch to eye nightly as needed    Tear film insufficiency, unspecified       tacrolimus 0.1 % ointment    PROTOPIC    60 g    Apply topically 2 times daily Apply to areas around eyes as needed    Psoriasis       VITAMIN D (CHOLECALCIFEROL) PO      Take by mouth daily

## 2018-10-17 NOTE — PROGRESS NOTES
[facility] Dermatology Phototherapy Record  1. Tony Mcnair is a 63 year old male is here today for phototherapy (UVB) treatment forPlaque psoriasis  .        Changes or new medications since last treatment:   NO    New medical conditions or problems since last treatment:   NO    Any problems with last phototherapy treatment?    NO    2. The patient tolerated phototherapy without complication    Patient will return for next UVB treatment, per protocol.     Patient to see provider every 4-12 weeks for follow-up during treatment.      All questions and concerns discussed with patient in clinic today.      Lorrie Graham

## 2018-10-23 ENCOUNTER — ALLIED HEALTH/NURSE VISIT (OUTPATIENT)
Dept: DERMATOLOGY | Facility: CLINIC | Age: 63
End: 2018-10-23
Attending: DERMATOLOGY
Payer: COMMERCIAL

## 2018-10-23 ENCOUNTER — OFFICE VISIT (OUTPATIENT)
Dept: DERMATOLOGY | Facility: CLINIC | Age: 63
End: 2018-10-23
Payer: COMMERCIAL

## 2018-10-23 DIAGNOSIS — L40.0 PLAQUE PSORIASIS: ICD-10-CM

## 2018-10-23 DIAGNOSIS — L40.9 PSORIASIS: Primary | ICD-10-CM

## 2018-10-23 ASSESSMENT — PAIN SCALES - GENERAL: PAINLEVEL: NO PAIN (0)

## 2018-10-23 NOTE — NURSING NOTE
Tony Mcnair comes into clinic today at the request of Dr Corbett Ordering Provider for NBUVB.    This service provided today was under the supervising provider of the day Dr Gallo, who was available if needed.    Georgina Wills RN

## 2018-10-23 NOTE — NURSING NOTE
"Chief Complaint   Patient presents with     Derm Problem     Tony is here today for psoriasis. He states \" I had a flare up last night but over all my psoriasis is doing good\"     Evy Crowder, RMA  "

## 2018-10-23 NOTE — MR AVS SNAPSHOT
After Visit Summary   10/23/2018    Tony Mcnair    MRN: [id number]           Patient Information     Date Of Birth          1955        Visit Information        Provider Department      10/23/2018 9:15 AM UC DERM LIGHT TREATMENT [facility] Dermatology        Today's Diagnoses     Plaque psoriasis           Follow-ups after your visit        Your next 10 appointments already scheduled     Oct 31, 2018  7:15 AM CDT   (Arrive by 7:00 AM)   Light Treatment Visit with UC DERM LIGHT TREATMENT   [facility] ([facility])    [address] [place] [address]   [phone]            Nov 07, 2018  7:00 AM CST   (Arrive by 6:45 AM)   Light Treatment Visit with UC DERM LIGHT TREATMENT   [facility] ([facility])    [address] [place] [address]   [phone]            Nov 14, 2018  7:00 AM CST   (Arrive by 6:45 AM)   Light Treatment Visit with UC DERM LIGHT TREATMENT   [facility])    [address] [place] [address]   [phone]            Nov 21, 2018  7:00 AM CST   (Arrive by 6:45 AM)   Light Treatment Visit with UC DERM LIGHT TREATMENT   [facility] ([facility])    [address] [place] [address]   [phone]            Nov 28, 2018  7:00 AM CST   (Arrive by 6:45 AM)   Light Treatment Visit with UC DERM LIGHT TREATMENT   [facility])    [address] [place] [address]   [phone]            Dec 05, 2018  7:15 AM CST   (Arrive by 7:00 AM)   Light Treatment Visit with UC DERM LIGHT TREATMENT   [facility] ([facility])    [address] [place] [address]   [phone]            Dec 19, 2018  7:15 AM CST   (Arrive by  7:00 AM)   Light Treatment Visit with UC DERM LIGHT TREATMENT   [facility] ([facility])    [address] [place] [id number] [phone]            Dec 26, 2018  7:15 AM CST   (Arrive by 7:00 AM)   Light Treatment Visit with UC DERM LIGHT TREATMENT   [facility] ([facility])    [address] [place] [id number] [phone]              Who to contact     Please call your clinic at [phone] to:    Ask questions about your health    Make or cancel appointments    Discuss your medicines    Learn about your test results    Speak to your doctor            Additional Information About Your Visit        [employer] Information     [employer] gives you secure access to your electronic health record. If you see a primary care provider, you can also send messages to your care team and make appointments. If you have questions, please call your primary care clinic.  If you do not have a primary care provider, please call [phone] and they will assist you.      [employer] is an electronic gateway that provides easy, online access to your medical records. With [employer], you can request a clinic appointment, read your test results, renew a prescription or communicate with your care team.     To access your existing account, please contact your [facility] or call [phone] for assistance.        Care EveryWhere ID     This is your Care EveryWhere ID. This could be used by other organizations to access your [facility] medical records  [id number]         Blood Pressure from Last 3 Encounters:   05/22/18 118/79   05/07/18 130/75   05/05/17 121/87    Weight from Last 3 Encounters:   05/22/18 77.6 kg (171 lb)   05/07/18 77.6 kg (171 lb)   05/05/17 76.4 kg (168 lb 8 oz)              We Performed the Following     CHARGE - PHOTOTHERAPY NBUVB (UV LIGHT)     PROCEDURE - PHOTOTHERAPY  NBUVB          Today's Medication Changes          These changes are accurate as of 10/23/18  9:57 AM.  If you have any questions, ask your nurse or doctor.               Stop taking these medicines if you haven't already. Please contact your care team if you have questions.     fish oil-omega-3 fatty acids 1000 MG capsule   Stopped by:  Petar Corbett MD                    Primary Care Provider Office Phone # Fax #    William Morton [phone]       [place] [address]        Equal Access to Services     [facility]: Hadii aad ku hadasho Soomaali, waaxda luqadaha, qaybta kaalmada adeegyakoby, waxnancy haider . So [facility] [phone].    ATENCIÓN: Si habla español, tiene a garrett disposición servicios gratuitos de asistencia lingüística. [facility] [phone].    We comply with applicable federal civil rights laws and [place] laws. We do not discriminate on the basis of race, color, national origin, age, disability, sex, sexual orientation, or gender identity.            Thank you!     Thank you for choosing [facility] DERMATOLOGY  for your care. Our goal is always to provide you with excellent care. Hearing back from our patients is one way we can continue to improve our services. Please take a few minutes to complete the written survey that you may receive in the mail after your visit with us. Thank you!             Your Updated Medication List - Protect others around you: Learn how to safely use, store and throw away your medicines at www.disposemymeds.org.          This list is accurate as of 10/23/18  9:57 AM.  Always use your most recent med list.                   Brand Name Dispense Instructions for use Diagnosis    BIOTIN PO      Take  by mouth daily.        calcipotriene-betameth diprop 0.005-0.064 % Oint    TACLONEX    60 g    Externally apply  topically 2 times daily as needed.    Psoriasis       citalopram 10 MG  tablet    celeXA     Take 10 mg by mouth daily        CONCERTA 18 MG CR tablet   Generic drug:  methylphenidate ER      Take 18 mg by mouth every morning.        DAILY MULTIVITAMIN PO      Take  by mouth daily.        fluocinonide 0.05 % solution    LIDEX    60 mL    Apply  topically 2 times daily for itchy scalp or scalp psoriasis    Psoriasis       olopatadine HCl 0.2 % Soln    PATADAY    2.5 mL    PLACE 1 DROP INTO BOTH EYES DAILY    Allergic conjunctivitis of both eyes       pimecrolimus 1 % cream    ELIDEL    60 g    Apply topically 2 times daily as needed For psoriasis of face or genitals    Psoriasis       REFRESH P.M. Oint     1 Tube    Apply 1 inch to eye nightly as needed    Tear film insufficiency, unspecified       tacrolimus 0.1 % ointment    PROTOPIC    60 g    Apply topically 2 times daily Apply to areas around eyes as needed    Psoriasis       VITAMIN D (CHOLECALCIFEROL) PO      Take by mouth daily

## 2018-10-23 NOTE — PROGRESS NOTES
[facility] Dermatology Phototherapy Record  1. Tony Mcnair is a 63 year old male is here today for phototherapy (UVB) treatment for Psoriasis.        Changes or new medications since last treatment:   NO    New medical conditions or problems since last treatment:   NO    Any problems with last phototherapy treatment?    NO    2. The patient tolerated phototherapy without complication    Patient will return for next UVB treatment, per protocol.     Patient to see provider every 4-12 weeks for follow-up during treatment.      All questions and concerns discussed with patient in clinic today.      Georgina Wills RN

## 2018-10-23 NOTE — LETTER
"10/23/2018       RE: Tony Mcnair  [address] [place] [address]     Dear Colleague,    Thank you for referring your patient, Tony Mcnair, to the [facility] DERMATOLOGY at [facility]. Please see a copy of my visit note below.    [facility] Dermatology Note      Dermatology Problem List:  1. Limited plaque type psoriasis  Current tx:   -NBUVB phototherapy 1/week  -Lidex 0.05% solution for scalp BID   -Protopic 0.1% around eyes BID PRN  -taclonex ointment to affected areas BID PRN    Encounter Date: Oct 23, 2018    CC:  Chief Complaint   Patient presents with     Derm Problem     Tony is here today for psoriasis. He states \" I had a flare up last night but over all my psoriasis is doing good\"         History of Present Illness:  Mr. Tony Mcnair is a 63 year old male who presents as a follow-up for plaque psoriasis affecting less than 20% of his total body surface. The patient was last seen 4/10/2018 and and it was recommended that he continue NBUVB once a week, Taclonex PRN for flares with bedtime Saran wrap occlusion, and protopic for affected eyes and Lidex solution for scalp. Today, patient states he has been doing well. He comes to clinic every week for NBUVB and states he has continued all his creams (Taclonex, protopic, and Lidex). States the psoriasis on his legs has been very stable although it is a bit exacerbated today as he is fighting a cold and had a flu shot last week. Regarding hand swelling at last visit, reports it happened again 3-4 weeks ago but went away on it's own after 7-10 days. He feels some hand swelling at baseline but this episode was a little more and may have happened after being outside. No changing moles or skin lesions.      Past Medical History:   Patient Active Problem List   Diagnosis     Psoriasis     Pain in both feet     Iliotibial band syndrome, unspecified laterality     Adhesive capsulitis of left " shoulder     Left shoulder pain     Past Medical History:   Diagnosis Date     Arthritis      [place] 1988    LEFT EYE     Past Surgical History:   Procedure Laterality Date     IRIS NEVUS Right      LESION RIGHT UPPER LID Right 1999    EXCISION       Social History:   reports that he has never smoked. He has never used smokeless tobacco.    Family History:  Family History   Problem Relation Age of Onset     Cancer No family hx of      skin cancer     Glaucoma No family hx of      Macular Degeneration No family hx of      Retinal detachment No family hx of      Skin Cancer No family hx of      Melanoma No family hx of        Medications:  Current Outpatient Prescriptions   Medication Sig Dispense Refill     Artificial Tear Ointment (REFRESH P.M.) OINT Apply 1 inch to eye nightly as needed 1 Tube 6     BIOTIN PO Take  by mouth daily.       calcipotriene-betameth diprop (TACLONEX) 0.005-0.064 % OINT Externally apply  topically 2 times daily as needed. 60 g 2     citalopram (CELEXA) 10 MG tablet Take 10 mg by mouth daily       fluocinonide (LIDEX) 0.05 % solution Apply  topically 2 times daily for itchy scalp or scalp psoriasis 60 mL 0     Multiple Vitamin (DAILY MULTIVITAMIN PO) Take  by mouth daily.       olopatadine HCl (PATADAY) 0.2 % SOLN PLACE 1 DROP INTO BOTH EYES DAILY 2.5 mL 2     pimecrolimus (ELIDEL) 1 % cream Apply topically 2 times daily as needed For psoriasis of face or genitals 60 g 2     VITAMIN D, CHOLECALCIFEROL, PO Take by mouth daily       methylphenidate (CONCERTA) 18 MG CR tablet Take 18 mg by mouth every morning.       tacrolimus (PROTOPIC) 0.1 % ointment Apply topically 2 times daily Apply to areas around eyes as needed (Patient not taking: Reported on 10/23/2018) 60 g 2     Allergies   Allergen Reactions     Nkda [No Known Drug Allergies]          Review of Systems:  -As per HPI  -Constitutional: The patient denies fatigue, fevers, chills, or unintended weight loss.  -Skin: As above in HPI. No  additional skin concerns.  No new joint pain    Physical exam:  Vitals: There were no vitals taken for this visit.  GEN: This is a well developed, well-nourished male in no acute distress, in a pleasant mood.    SKIN: Focused examination of the head, neck, bilateral UE, bilateral lower legs was performed.  -On bilateral lower shins and lateral ankles are pink well-circumscribed plaques with micaceous silver scales and mild excoriations present.   -No other lesions of concern on areas examined.     Impression/Plan:  1. Plaque-type psoriasis    Continue NBUVB light therapy once a week. Counseled patient that exacerbation on his legs may be due to his URI, that phototherapy does not work as quickly on legs because of distribution of lights, or as blood pools in legs and inflammatory cells then collect there. Discussed using a home 2-plated light box and then increasing to 1-2x a week treatments at home.    Continue Taclonex with bedtime occlusion with Saran wrap for flares    Continue protopic for affected skin around eyes and Lidex solution for his scalp, 1-2x/daily.    Follow-up in 6 months, earlier for new or changing lesions.     Staff Involved:  I, Deana Siddiqi MS4, saw and examined the patient in the presence of Dr. Corbett.    The medical student acted as a scribe with respect to this patient.  I have performed all the key elements of the history and physical.      Again, thank you for allowing me to participate in the care of your patient.      Sincerely,    Petar Corbett MD

## 2018-10-23 NOTE — PATIENT INSTRUCTIONS
Preventive Care:    Colorectal Cancer Screening: During our visit today, we discussed that it is recommended you receive colorectal cancer screening. Please call or make an appointment with your primary care provider to discuss this. You may also call the [facility] scheduling line ([phone]) to set up a colonoscopy appointment.

## 2018-10-23 NOTE — MR AVS SNAPSHOT
After Visit Summary   10/23/2018    Tony Mcnair    MRN: [id number]           Patient Information     Date Of Birth          1955        Visit Information        Provider Department      10/23/2018 8:45 AM Petar Corbett MD [facility] Dermatology        Care Instructions    Preventive Care:    Colorectal Cancer Screening: During our visit today, we discussed that it is recommended you receive colorectal cancer screening. Please call or make an appointment with your primary care provider to discuss this. You may also call the [facility] scheduling line ([phone]) to set up a colonoscopy appointment.            Follow-ups after your visit        Your next 10 appointments already scheduled     Oct 31, 2018  7:15 AM CDT   (Arrive by 7:00 AM)   Light Treatment Visit with UC DERM LIGHT TREATMENT   [facility])    [address] [place] [address]   [phone]            Nov 07, 2018  7:00 AM CST   (Arrive by 6:45 AM)   Light Treatment Visit with UC DERM LIGHT TREATMENT   [facility])    [address] [place] [address]   [phone]            Nov 14, 2018  7:00 AM CST   (Arrive by 6:45 AM)   Light Treatment Visit with UC DERM LIGHT TREATMENT   [facility])    [address] [place] [address]   [phone]            Nov 21, 2018  7:00 AM CST   (Arrive by 6:45 AM)   Light Treatment Visit with UC DERM LIGHT TREATMENT   [facility])    [address] [place] [address]   [phone]            Nov 28, 2018  7:00 AM CST   (Arrive by 6:45 AM)   Light Treatment Visit with UC DERM LIGHT TREATMENT   [facility])    [address] [place] [address]    [phone]            Dec 05, 2018  7:15 AM CST   (Arrive by 7:00 AM)   Light Treatment Visit with UC DERM LIGHT TREATMENT   [facility] ([facility])    [address] [phone] [phone]            Dec 19, 2018  7:15 AM CST   (Arrive by 7:00 AM)   Light Treatment Visit with UC DERM LIGHT TREATMENT   [facility] ([facility])    [address] [place] [id number] [phone]            Dec 26, 2018  7:15 AM CST   (Arrive by 7:00 AM)   Light Treatment Visit with UC DERM LIGHT TREATMENT   [facility] ([facility])    [address] [place] [phone] [phone]              Who to contact     Please call your clinic at [phone] to:    Ask questions about your health    Make or cancel appointments    Discuss your medicines    Learn about your test results    Speak to your doctor            Additional Information About Your Visit        [employer] Information     [employer] gives you secure access to your electronic health record. If you see a primary care provider, you can also send messages to your care team and make appointments. If you have questions, please call your primary care clinic.  If you do not have a primary care provider, please call [phone] and they will assist you.      [employer] is an electronic gateway that provides easy, online access to your medical records. With [employer], you can request a clinic appointment, read your test results, renew a prescription or communicate with your care team.     To access your existing account, please contact your [facility] or call [phone] for assistance.        Care EveryWhere ID     This is your Care EveryWhere ID. This could be used by other organizations to access your [facility] medical records  [id number]         Blood Pressure  from Last 3 Encounters:   05/22/18 118/79   05/07/18 130/75   05/05/17 121/87    Weight from Last 3 Encounters:   05/22/18 77.6 kg (171 lb)   05/07/18 77.6 kg (171 lb)   05/05/17 76.4 kg (168 lb 8 oz)              Today, you had the following     No orders found for display         Today's Medication Changes          These changes are accurate as of 10/23/18  9:30 AM.  If you have any questions, ask your nurse or doctor.               Stop taking these medicines if you haven't already. Please contact your care team if you have questions.     fish oil-omega-3 fatty acids 1000 MG capsule   Stopped by:  Petar Corbett MD                    Primary Care Provider Office Phone # Fax #    William Morton [phone]       [place] [address]        Equal Access to Services     TORI EDWARDS : Hadii zina shoemakero Soanna, waaxda luqadaha, qaybta kaalmada saira, renzo haider . So [facility] [phone].    ATENCIÓN: Si habla español, tiene a garrett disposición servicios gratuitos de asistencia lingüística. Ze al [phone].    We comply with applicable federal civil rights laws and [place] laws. We do not discriminate on the basis of race, color, national origin, age, disability, sex, sexual orientation, or gender identity.            Thank you!     Thank you for choosing [facility] DERMATOLOGY  for your care. Our goal is always to provide you with excellent care. Hearing back from our patients is one way we can continue to improve our services. Please take a few minutes to complete the written survey that you may receive in the mail after your visit with us. Thank you!             Your Updated Medication List - Protect others around you: Learn how to safely use, store and throw away your medicines at www.disposemymeds.org.          This list is accurate as of 10/23/18  9:30 AM.  Always use your most recent med list.                    Brand Name Dispense Instructions for use Diagnosis    BIOTIN PO      Take  by mouth daily.        calcipotriene-betameth diprop 0.005-0.064 % Oint    TACLONEX    60 g    Externally apply  topically 2 times daily as needed.    Psoriasis       citalopram 10 MG tablet    celeXA     Take 10 mg by mouth daily        CONCERTA 18 MG CR tablet   Generic drug:  methylphenidate ER      Take 18 mg by mouth every morning.        DAILY MULTIVITAMIN PO      Take  by mouth daily.        fluocinonide 0.05 % solution    LIDEX    60 mL    Apply  topically 2 times daily for itchy scalp or scalp psoriasis    Psoriasis       olopatadine HCl 0.2 % Soln    PATADAY    2.5 mL    PLACE 1 DROP INTO BOTH EYES DAILY    Allergic conjunctivitis of both eyes       pimecrolimus 1 % cream    ELIDEL    60 g    Apply topically 2 times daily as needed For psoriasis of face or genitals    Psoriasis       REFRESH P.M. Oint     1 Tube    Apply 1 inch to eye nightly as needed    Tear film insufficiency, unspecified       tacrolimus 0.1 % ointment    PROTOPIC    60 g    Apply topically 2 times daily Apply to areas around eyes as needed    Psoriasis       VITAMIN D (CHOLECALCIFEROL) PO      Take by mouth daily

## 2018-10-23 NOTE — PROGRESS NOTES
"[facility] Dermatology Note      Dermatology Problem List:  1. Limited plaque type psoriasis  Current tx:   -NBUVB phototherapy 1/week  -Lidex 0.05% solution for scalp BID   -Protopic 0.1% around eyes BID PRN  -taclonex ointment to affected areas BID PRN    Encounter Date: Oct 23, 2018    CC:  Chief Complaint   Patient presents with     Derm Problem     Tony is here today for psoriasis. He states \" I had a flare up last night but over all my psoriasis is doing good\"         History of Present Illness:  Mr. Tony Mcnair is a 63 year old male who presents as a follow-up for plaque psoriasis affecting less than 20% of his total body surface. The patient was last seen 4/10/2018 and and it was recommended that he continue NBUVB once a week, Taclonex PRN for flares with bedtime Saran wrap occlusion, and protopic for affected eyes and Lidex solution for scalp. Today, patient states he has been doing well. He comes to clinic every week for NBUVB and states he has continued all his creams (Taclonex, protopic, and Lidex). States the psoriasis on his legs has been very stable although it is a bit exacerbated today as he is fighting a cold and had a flu shot last week. Regarding hand swelling at last visit, reports it happened again 3-4 weeks ago but went away on it's own after 7-10 days. He feels some hand swelling at baseline but this episode was a little more and may have happened after being outside. No changing moles or skin lesions.      Past Medical History:   Patient Active Problem List   Diagnosis     Psoriasis     Pain in both feet     Iliotibial band syndrome, unspecified laterality     Adhesive capsulitis of left shoulder     Left shoulder pain     Past Medical History:   Diagnosis Date     Arthritis      [place] 1988    LEFT EYE     Past Surgical History:   Procedure Laterality Date     IRIS NEVUS Right      LESION RIGHT UPPER LID Right 1999    EXCISION       Social History:   reports that he " has never smoked. He has never used smokeless tobacco.    Family History:  Family History   Problem Relation Age of Onset     Cancer No family hx of      skin cancer     Glaucoma No family hx of      Macular Degeneration No family hx of      Retinal detachment No family hx of      Skin Cancer No family hx of      Melanoma No family hx of        Medications:  Current Outpatient Prescriptions   Medication Sig Dispense Refill     Artificial Tear Ointment (REFRESH P.M.) OINT Apply 1 inch to eye nightly as needed 1 Tube 6     BIOTIN PO Take  by mouth daily.       calcipotriene-betameth diprop (TACLONEX) 0.005-0.064 % OINT Externally apply  topically 2 times daily as needed. 60 g 2     citalopram (CELEXA) 10 MG tablet Take 10 mg by mouth daily       fluocinonide (LIDEX) 0.05 % solution Apply  topically 2 times daily for itchy scalp or scalp psoriasis 60 mL 0     Multiple Vitamin (DAILY MULTIVITAMIN PO) Take  by mouth daily.       olopatadine HCl (PATADAY) 0.2 % SOLN PLACE 1 DROP INTO BOTH EYES DAILY 2.5 mL 2     pimecrolimus (ELIDEL) 1 % cream Apply topically 2 times daily as needed For psoriasis of face or genitals 60 g 2     VITAMIN D, CHOLECALCIFEROL, PO Take by mouth daily       methylphenidate (CONCERTA) 18 MG CR tablet Take 18 mg by mouth every morning.       tacrolimus (PROTOPIC) 0.1 % ointment Apply topically 2 times daily Apply to areas around eyes as needed (Patient not taking: Reported on 10/23/2018) 60 g 2     Allergies   Allergen Reactions     Nkda [No Known Drug Allergies]          Review of Systems:  -As per HPI  -Constitutional: The patient denies fatigue, fevers, chills, or unintended weight loss.  -Skin: As above in HPI. No additional skin concerns.  No new joint pain    Physical exam:  Vitals: There were no vitals taken for this visit.  GEN: This is a well developed, well-nourished male in no acute distress, in a pleasant mood.    SKIN: Focused examination of the head, neck, bilateral UE, bilateral  lower legs was performed.  -On bilateral lower shins and lateral ankles are pink well-circumscribed plaques with micaceous silver scales and mild excoriations present.   -No other lesions of concern on areas examined.     Impression/Plan:  1. Plaque-type psoriasis    Continue NBUVB light therapy once a week. Counseled patient that exacerbation on his legs may be due to his URI, that phototherapy does not work as quickly on legs because of distribution of lights, or as blood pools in legs and inflammatory cells then collect there. Discussed using a home 2-plated light box and then increasing to 1-2x a week treatments at home.    Continue Taclonex with bedtime occlusion with Saran wrap for flares    Continue protopic for affected skin around eyes and Lidex solution for his scalp, 1-2x/daily.    Follow-up in 6 months, earlier for new or changing lesions.     Staff Involved:  I, Deana Siddiqi MS4, saw and examined the patient in the presence of Dr. Corbett.    The medical student acted as a scribe with respect to this patient.  I have performed all the key elements of the history and physical.    Petar Corbett MD  Dermatology Attending

## 2018-10-31 ENCOUNTER — ALLIED HEALTH/NURSE VISIT (OUTPATIENT)
Dept: DERMATOLOGY | Facility: CLINIC | Age: 63
End: 2018-10-31
Attending: DERMATOLOGY
Payer: COMMERCIAL

## 2018-10-31 DIAGNOSIS — L40.0 PLAQUE PSORIASIS: ICD-10-CM

## 2018-10-31 NOTE — MR AVS SNAPSHOT
After Visit Summary   10/31/2018    Tony Mcnair    MRN: [id number]           Patient Information     Date Of Birth          1955        Visit Information        Provider Department      10/31/2018 7:15 AM [facility] DERM LIGHT TREATMENT [facility] Dermatology        Today's Diagnoses     Plaque psoriasis           Follow-ups after your visit        Your next 10 appointments already scheduled     Nov 07, 2018  7:00 AM CST   (Arrive by 6:45 AM)   Light Treatment Visit with UC DERM LIGHT TREATMENT   [facility]    [address] [place] [address]   [phone]            Nov 14, 2018  7:00 AM CST   (Arrive by 6:45 AM)   Light Treatment Visit with UC DERM LIGHT TREATMENT   [facility]    [address] [place] [address]   [phone]            Nov 21, 2018  7:00 AM CST   (Arrive by 6:45 AM)   Light Treatment Visit with UC DERM LIGHT TREATMENT   [facility] Dermatology [facility])    [address] [place] [address]   [phone]            Nov 28, 2018  7:00 AM CST   (Arrive by 6:45 AM)   Light Treatment Visit with UC DERM LIGHT TREATMENT   [facility]    [address] [place] [address]   [phone]            Dec 05, 2018  7:15 AM CST   (Arrive by 7:00 AM)   Light Treatment Visit with UC DERM LIGHT TREATMENT   [facility])    [address] [place] [address]   [phone]            Dec 19, 2018  7:15 AM CST   (Arrive by 7:00 AM)   Light Treatment Visit with UC DERM LIGHT TREATMENT   [facility]    [address] [place] [address]   [phone]            Dec 26, 2018  7:15 AM CST   (Arrive by  7:00 AM)   Light Treatment Visit with  DERM LIGHT TREATMENT   [facility] Dermatology ([facility])    [address]  [address] [facility] [address] [phone]              Who to contact     Please call your clinic at [phone] to:    Ask questions about your health    Make or cancel appointments    Discuss your medicines    Learn about your test results    Speak to your doctor            Additional Information About Your Visit        [employer] Information     [employer] gives you secure access to your electronic health record. If you see a primary care provider, you can also send messages to your care team and make appointments. If you have questions, please call your primary care clinic.  If you do not have a primary care provider, please call [phone] and they will assist you.      [employer] is an electronic gateway that provides easy, online access to your medical records. With [employer], you can request a clinic appointment, read your test results, renew a prescription or communicate with your care team.     To access your existing account, please contact your [facility] or call [phone] for assistance.        Care EveryWhere ID     This is your Care EveryWhere ID. This could be used by other organizations to access your [place] medical records  [id number]         Blood Pressure from Last 3 Encounters:   05/22/18 118/79   05/07/18 130/75   05/05/17 121/87    Weight from Last 3 Encounters:   05/22/18 77.6 kg (171 lb)   05/07/18 77.6 kg (171 lb)   05/05/17 76.4 kg (168 lb 8 oz)              We Performed the Following     CHARGE - PHOTOTHERAPY NBUVB (UV LIGHT)     PROCEDURE - PHOTOTHERAPY NBUVB        Primary Care Provider Office Phone # Fax #    William Morton [phone]       [address] [address]        Equal Access to Services     TORI EDWARDS : Stan mcdonald  Soblaineali, waphoenixda luqadaha, qaybta kaalmada saira, renzo romanoindu fer. So [facility] [phone].    ATENCIÓN: Si rose mary varghese, tiene a garrett disposición servicios gratuitos de asistencia lingüística. Ze al [phone].    We comply with applicable federal civil rights laws and [place] laws. We do not discriminate on the basis of race, color, national origin, age, disability, sex, sexual orientation, or gender identity.            Thank you!     Thank you for choosing [facility] DERMATOLOGY  for your care. Our goal is always to provide you with excellent care. Hearing back from our patients is one way we can continue to improve our services. Please take a few minutes to complete the written survey that you may receive in the mail after your visit with us. Thank you!             Your Updated Medication List - Protect others around you: Learn how to safely use, store and throw away your medicines at www.disposemymeds.org.          This list is accurate as of 10/31/18  7:34 AM.  Always use your most recent med list.                   Brand Name Dispense Instructions for use Diagnosis    BIOTIN PO      Take  by mouth daily.        calcipotriene-betameth diprop 0.005-0.064 % Oint    TACLONEX    60 g    Externally apply  topically 2 times daily as needed.    Psoriasis       citalopram 10 MG tablet    celeXA     Take 10 mg by mouth daily        CONCERTA 18 MG CR tablet   Generic drug:  methylphenidate ER      Take 18 mg by mouth every morning.        DAILY MULTIVITAMIN PO      Take  by mouth daily.        fluocinonide 0.05 % solution    LIDEX    60 mL    Apply  topically 2 times daily for itchy scalp or scalp psoriasis    Psoriasis       olopatadine HCl 0.2 % Soln    PATADAY    2.5 mL    PLACE 1 DROP INTO BOTH EYES DAILY    Allergic conjunctivitis of both eyes       pimecrolimus 1 % cream    ELIDEL    60 g    Apply topically 2 times daily as needed For psoriasis of face or genitals    Psoriasis        REFRESH P.M. Oint     1 Tube    Apply 1 inch to eye nightly as needed    Tear film insufficiency, unspecified       tacrolimus 0.1 % ointment    PROTOPIC    60 g    Apply topically 2 times daily Apply to areas around eyes as needed    Psoriasis       VITAMIN D (CHOLECALCIFEROL) PO      Take by mouth daily

## 2018-10-31 NOTE — NURSING NOTE
Tony Mcnair comes into clinic today at the request of Dr. Corbett Ordering Provider for NBUVB    This service provided today was under the supervising provider of the day Dr. Blanco, who was available if needed.    Lorrie Graham

## 2018-10-31 NOTE — PROGRESS NOTES
[facility] Dermatology Phototherapy Record  1. Tony Mcnair is a 63 year old male is here today for phototherapy (UVB) treatment for .Plaque psoriasis        Changes or new medications since last treatment:   NO    New medical conditions or problems since last treatment:   NO    Any problems with last phototherapy treatment?    NO    2. The patient tolerated phototherapy without complication    Patient will return for next UVB treatment, per protocol.     Patient to see provider every 4-12 weeks for follow-up during treatment.      All questions and concerns discussed with patient in clinic today.      Lorrie Graham

## 2018-11-07 ENCOUNTER — ALLIED HEALTH/NURSE VISIT (OUTPATIENT)
Dept: DERMATOLOGY | Facility: CLINIC | Age: 63
End: 2018-11-07
Payer: COMMERCIAL

## 2018-11-07 DIAGNOSIS — L40.0 PLAQUE PSORIASIS: ICD-10-CM

## 2018-11-07 NOTE — MR AVS SNAPSHOT
After Visit Summary   11/7/2018    Tony Mcnair    MRN: [id number]           Patient Information     Date Of Birth          1955        Visit Information        Provider Department      11/7/2018 7:00 AM UC DERM LIGHT TREATMENT [facility] Dermatology        Today's Diagnoses     Plaque psoriasis           Follow-ups after your visit        Your next 10 appointments already scheduled     Nov 14, 2018  7:00 AM CST   (Arrive by 6:45 AM)   Light Treatment Visit with UC DERM LIGHT TREATMENT   [facility] ([facility])    [address] [place] [address]   [phone]            Nov 21, 2018  7:00 AM CST   (Arrive by 6:45 AM)   Light Treatment Visit with UC DERM LIGHT TREATMENT   [facility] ([facility])    [address] [place] [address]   [phone]            Nov 28, 2018  7:00 AM CST   (Arrive by 6:45 AM)   Light Treatment Visit with UC DERM LIGHT TREATMENT   [facility] Dermatology [facility])    [address] [place] [address]   [phone]            Dec 05, 2018  7:15 AM CST   (Arrive by 7:00 AM)   Light Treatment Visit with UC DERM LIGHT TREATMENT   [facility] ([facility])    [address] [place] [address]   [phone]            Dec 19, 2018  7:15 AM CST   (Arrive by 7:00 AM)   Light Treatment Visit with UC DERM LIGHT TREATMENT   [facility])    [address] [place] [address]   [phone]            Dec 26, 2018  7:15 AM CST   (Arrive by 7:00 AM)   Light Treatment Visit with UC DERM LIGHT TREATMENT   [facility] ([facility])    [address] [place] [address]   [phone]              Who to contact     Please call your  clinic at [phone] to:    Ask questions about your health    Make or cancel appointments    Discuss your medicines    Learn about your test results    Speak to your doctor            Additional Information About Your Visit        [email or web address] Information     [employer] gives you secure access to your electronic health record. If you see a primary care provider, you can also send messages to your care team and make appointments. If you have questions, please call your primary care clinic.  If you do not have a primary care provider, please call [phone] and they will assist you.      [employer] is an electronic gateway that provides easy, online access to your medical records. With [employer], you can request a clinic appointment, read your test results, renew a prescription or communicate with your care team.     To access your existing account, please contact your [facility] or call [phone] for assistance.        Care EveryWhere ID     This is your Care EveryWhere ID. This could be used by other organizations to access your [place] medical records  [id number]         Blood Pressure from Last 3 Encounters:   05/22/18 118/79   05/07/18 130/75   05/05/17 121/87    Weight from Last 3 Encounters:   05/22/18 77.6 kg (171 lb)   05/07/18 77.6 kg (171 lb)   05/05/17 76.4 kg (168 lb 8 oz)              We Performed the Following     CHARGE - PHOTOTHERAPY NBUVB (UV LIGHT)     PROCEDURE - PHOTOTHERAPY NBUVB        Primary Care Provider Office Phone # Fax #    William Morton [phone]       [address] [address]        Equal Access to Services     TORI EDWARDS : Hadii zina shoemakero Soanna, waaxda luqadaha, qaybta kaalmada renzo chávez. So [facility] [phone].    ATENCIÓN: Si habla español, tiene a garrett disposición servicios gratuitos de asistencia lingüística. Llame al [phone].    We  comply with applicable federal civil rights laws and [place] laws. We do not discriminate on the basis of race, color, national origin, age, disability, sex, sexual orientation, or gender identity.            Thank you!     Thank you for choosing [facility] DERMATOLOGY  for your care. Our goal is always to provide you with excellent care. Hearing back from our patients is one way we can continue to improve our services. Please take a few minutes to complete the written survey that you may receive in the mail after your visit with us. Thank you!             Your Updated Medication List - Protect others around you: Learn how to safely use, store and throw away your medicines at www.disposemymeds.org.          This list is accurate as of 11/7/18  7:54 AM.  Always use your most recent med list.                   Brand Name Dispense Instructions for use Diagnosis    BIOTIN PO      Take  by mouth daily.        calcipotriene-betameth diprop 0.005-0.064 % Oint    TACLONEX    60 g    Externally apply  topically 2 times daily as needed.    Psoriasis       citalopram 10 MG tablet    celeXA     Take 10 mg by mouth daily        CONCERTA 18 MG CR tablet   Generic drug:  methylphenidate ER      Take 18 mg by mouth every morning.        DAILY MULTIVITAMIN PO      Take  by mouth daily.        fluocinonide 0.05 % solution    LIDEX    60 mL    Apply  topically 2 times daily for itchy scalp or scalp psoriasis    Psoriasis       olopatadine HCl 0.2 % Soln    PATADAY    2.5 mL    PLACE 1 DROP INTO BOTH EYES DAILY    Allergic conjunctivitis of both eyes       pimecrolimus 1 % cream    ELIDEL    60 g    Apply topically 2 times daily as needed For psoriasis of face or genitals    Psoriasis       REFRESH P.M. Oint     1 Tube    Apply 1 inch to eye nightly as needed    Tear film insufficiency, unspecified       tacrolimus 0.1 % ointment    PROTOPIC    60 g    Apply topically 2 times daily Apply to areas around eyes as needed    Psoriasis        VITAMIN D (CHOLECALCIFEROL) PO      Take by mouth daily

## 2018-11-07 NOTE — PROGRESS NOTES
[facility] Dermatology Phototherapy Record  1. Tony Mcnair is a 63 year old male is here today for phototherapy (UVB) treatment for Plaque psoriasis.        Changes or new medications since last treatment:   NO    New medical conditions or problems since last treatment:   NO    Any problems with last phototherapy treatment?    NO    2. The patient tolerated phototherapy without complication    Patient will return for next UVB treatment, per protocol.     Patient to see provider every 4-12 weeks for follow-up during treatment.      All questions and concerns discussed with patient in clinic today.      Lorrie Graham

## 2018-11-14 ENCOUNTER — ALLIED HEALTH/NURSE VISIT (OUTPATIENT)
Dept: DERMATOLOGY | Facility: CLINIC | Age: 63
End: 2018-11-14
Payer: COMMERCIAL

## 2018-11-14 DIAGNOSIS — L40.0 PLAQUE PSORIASIS: ICD-10-CM

## 2018-11-14 NOTE — NURSING NOTE
Tony Mcnair comes into clinic today at the request of Dr. Corbett Ordering Provider for NBUVB    This service provided today was under the supervising provider of the day Dr. Blanco, who was available if needed.    Lorrie Grhaam

## 2018-11-14 NOTE — PROGRESS NOTES
[facility] Dermatology Phototherapy Record  1. Tony Mcnair is a 63 year old male is here today for phototherapy (UVB) treatment for Plaque psoriasis.        Changes or new medications since last treatment:   NO    New medical conditions or problems since last treatment:   NO    Any problems with last phototherapy treatment?    NO    2. The patient tolerated phototherapy without complication    Patient will return for next UVB treatment, per protocol.     Patient to see provider every 4-12 weeks for follow-up during treatment.      All questions and concerns discussed with patient in clinic today.      Lorrie Graham

## 2018-11-14 NOTE — MR AVS SNAPSHOT
After Visit Summary   11/14/2018    Tony Mcnair    MRN: [id number]           Patient Information     Date Of Birth          1955        Visit Information        Provider Department      11/14/2018 7:00 AM UC DERM LIGHT TREATMENT [facility] Dermatology        Today's Diagnoses     Plaque psoriasis           Follow-ups after your visit        Your next 10 appointments already scheduled     Nov 21, 2018  7:00 AM CST   (Arrive by 6:45 AM)   Light Treatment Visit with UC DERM LIGHT TREATMENT   [facility] ([facility])    [address] [place] [address]   [phone]            Nov 28, 2018  7:00 AM CST   (Arrive by 6:45 AM)   Light Treatment Visit with UC DERM LIGHT TREATMENT   [facility] ([facility])    [address] [place] [address] [phone]            Dec 05, 2018  7:15 AM CST   (Arrive by 7:00 AM)   Light Treatment Visit with UC DERM LIGHT TREATMENT   [facility] ([facility])    [address] [place] [address] [phone]            Dec 19, 2018  7:15 AM CST   (Arrive by 7:00 AM)   Light Treatment Visit with UC DERM LIGHT TREATMENT   [facility] ([facility])    [address] [place] [address] [phone]            Dec 26, 2018  7:15 AM CST   (Arrive by 7:00 AM)   Light Treatment Visit with UC DERM LIGHT TREATMENT   [facility] ([facility])    [address] [place] [address] [phone]              Who to contact     Please call your clinic at [phone] to:    Ask questions about your health    Make or cancel appointments    Discuss your medicines    Learn about your test results    Speak to your doctor            Additional Information About Your Visit        Efrain  Information     [employer] gives you secure access to your electronic health record. If you see a primary care provider, you can also send messages to your care team and make appointments. If you have questions, please call your primary care clinic.  If you do not have a primary care provider, please call [phone] and they will assist you.      [employer] is an electronic gateway that provides easy, online access to your medical records. With [employer], you can request a clinic appointment, read your test results, renew a prescription or communicate with your care team.     To access your existing account, please contact your [facility] or call [phone] for assistance.        Care EveryWhere ID     This is your Care EveryWhere ID. This could be used by other organizations to access your [place] medical records  [id number]         Blood Pressure from Last 3 Encounters:   05/22/18 118/79   05/07/18 130/75   05/05/17 121/87    Weight from Last 3 Encounters:   05/22/18 77.6 kg (171 lb)   05/07/18 77.6 kg (171 lb)   05/05/17 76.4 kg (168 lb 8 oz)              We Performed the Following     CHARGE - PHOTOTHERAPY NBUVB (UV LIGHT)     PROCEDURE - PHOTOTHERAPY NBUVB        Primary Care Provider Office Phone # Fax #    William Morton [phone]       [address] [address]        Equal Access to Services     TORI EDWARDS : Stan Rodrigez, deepak hedrick, qaybta renzo gutierrez . So [facility] [phone].    ATENCIÓN: Si habla español, tiene a garrett disposición servicios gratuitos de asistencia lingüística. Ze al [phone].    We comply with applicable federal civil rights laws and [place] laws. We do not discriminate on the basis of race, color, national origin, age, disability, sex, sexual orientation, or gender identity.            Thank you!     Thank you for  choosing [facility] DERMATOLOGY  for your care. Our goal is always to provide you with excellent care. Hearing back from our patients is one way we can continue to improve our services. Please take a few minutes to complete the written survey that you may receive in the mail after your visit with us. Thank you!             Your Updated Medication List - Protect others around you: Learn how to safely use, store and throw away your medicines at www.disposemymeds.org.          This list is accurate as of 11/14/18  7:34 AM.  Always use your most recent med list.                   Brand Name Dispense Instructions for use Diagnosis    BIOTIN PO      Take  by mouth daily.        calcipotriene-betameth diprop 0.005-0.064 % Oint    TACLONEX    60 g    Externally apply  topically 2 times daily as needed.    Psoriasis       citalopram 10 MG tablet    celeXA     Take 10 mg by mouth daily        CONCERTA 18 MG CR tablet   Generic drug:  methylphenidate ER      Take 18 mg by mouth every morning.        DAILY MULTIVITAMIN PO      Take  by mouth daily.        fluocinonide 0.05 % solution    LIDEX    60 mL    Apply  topically 2 times daily for itchy scalp or scalp psoriasis    Psoriasis       olopatadine HCl 0.2 % Soln    PATADAY    2.5 mL    PLACE 1 DROP INTO BOTH EYES DAILY    Allergic conjunctivitis of both eyes       pimecrolimus 1 % cream    ELIDEL    60 g    Apply topically 2 times daily as needed For psoriasis of face or genitals    Psoriasis       REFRESH P.M. Oint     1 Tube    Apply 1 inch to eye nightly as needed    Tear film insufficiency, unspecified       tacrolimus 0.1 % ointment    PROTOPIC    60 g    Apply topically 2 times daily Apply to areas around eyes as needed    Psoriasis       VITAMIN D (CHOLECALCIFEROL) PO      Take by mouth daily

## 2018-11-20 ENCOUNTER — DOCUMENTATION ONLY (OUTPATIENT)
Dept: DERMATOLOGY | Facility: CLINIC | Age: 63
End: 2018-11-20

## 2018-11-20 ENCOUNTER — MEDICAL CORRESPONDENCE (OUTPATIENT)
Dept: HEALTH INFORMATION MANAGEMENT | Facility: CLINIC | Age: 63
End: 2018-11-20

## 2018-11-20 NOTE — PROGRESS NOTES
Faxed [facility] ([place], [place]) home phototherapy order form, insurance cards and letter of medical necessity to [facility] ([place], [place]) at [phone]

## 2018-11-21 ENCOUNTER — ALLIED HEALTH/NURSE VISIT (OUTPATIENT)
Dept: DERMATOLOGY | Facility: CLINIC | Age: 63
End: 2018-11-21
Payer: COMMERCIAL

## 2018-11-21 DIAGNOSIS — L40.0 PLAQUE PSORIASIS: ICD-10-CM

## 2018-11-21 NOTE — PROGRESS NOTES
[facility] Dermatology Phototherapy Record  1. Tony Mcnair is a 63 year old male is here today for phototherapy (UVB) treatment for Plaque psoriasis.        Changes or new medications since last treatment:   NO    New medical conditions or problems since last treatment:   NO    Any problems with last phototherapy treatment?    NO    2. The patient tolerated phototherapy without complication    Patient will return for next UVB treatment, per protocol.     Patient to see provider every 4-12 weeks for follow-up during treatment.      All questions and concerns discussed with patient in clinic today.      Lorrie Graham

## 2018-11-21 NOTE — MR AVS SNAPSHOT
After Visit Summary   11/21/2018    Tony Mcnair    MRN: [id number]           Patient Information     Date Of Birth          1955        Visit Information        Provider Department      11/21/2018 7:00 AM UC DERM LIGHT TREATMENT [facility] Dermatology        Today's Diagnoses     Plaque psoriasis           Follow-ups after your visit        Your next 10 appointments already scheduled     Nov 28, 2018  7:00 AM CST   (Arrive by 6:45 AM)   Light Treatment Visit with UC DERM LIGHT TREATMENT   [facility]    [address] [place] [address] [phone]            Dec 05, 2018  7:15 AM CST   (Arrive by 7:00 AM)   Light Treatment Visit with UC DERM LIGHT TREATMENT   [facility]    [address] [place] [address] [phone]            Dec 19, 2018  7:15 AM CST   (Arrive by 7:00 AM)   Light Treatment Visit with UC DERM LIGHT TREATMENT   [facility])    [address] [place] [address] [phone]            Dec 26, 2018  7:15 AM CST   (Arrive by 7:00 AM)   Light Treatment Visit with UC DERM LIGHT TREATMENT   [facility])    [address] [place] [address] [phone]              Who to contact     Please call your clinic at [phone] to:    Ask questions about your health    Make or cancel appointments    Discuss your medicines    Learn about your test results    Speak to your doctor            Additional Information About Your Visit        MyChart Information     [employer] gives you secure access to your electronic health record. If you see a primary care provider, you can also send messages to your care team and make appointments. If you have questions, please call your primary care clinic.  If you do  not have a primary care provider, please call [phone] and they will assist you.      [employer] is an electronic gateway that provides easy, online access to your medical records. With [employer], you can request a clinic appointment, read your test results, renew a prescription or communicate with your care team.     To access your existing account, please contact your [facility] or call [phone] for assistance.        Care EveryWhere ID     This is your Care EveryWhere ID. This could be used by other organizations to access your [place] medical records  [id number]         Blood Pressure from Last 3 Encounters:   05/22/18 118/79   05/07/18 130/75   05/05/17 121/87    Weight from Last 3 Encounters:   05/22/18 77.6 kg (171 lb)   05/07/18 77.6 kg (171 lb)   05/05/17 76.4 kg (168 lb 8 oz)              We Performed the Following     CHARGE - PHOTOTHERAPY NBUVB (UV LIGHT)     PROCEDURE - PHOTOTHERAPY NBUVB        Primary Care Provider Office Phone # Fax #    William Morton [phone]       [place] [address]        Equal Access to Services     TORI EDWARDS : Hadii zina Rodrigez, waaxda ryley, qaybta kaalmada saira, renzo monroe. So [facility] [phone].    ATENCIÓN: Si habla español, tiene a garrett disposición servicios gratuitos de asistencia lingüística. Ze al [phone].    We comply with applicable federal civil rights laws and [place] laws. We do not discriminate on the basis of race, color, national origin, age, disability, sex, sexual orientation, or gender identity.            Thank you!     Thank you for choosing [facility] DERMATOLOGY  for your care. Our goal is always to provide you with excellent care. Hearing back from our patients is one way we can continue to improve our services. Please take a few minutes to complete the written survey that you may receive  in the mail after your visit with us. Thank you!             Your Updated Medication List - Protect others around you: Learn how to safely use, store and throw away your medicines at www.disposemymeds.org.          This list is accurate as of 11/21/18  8:07 AM.  Always use your most recent med list.                   Brand Name Dispense Instructions for use Diagnosis    BIOTIN PO      Take  by mouth daily.        calcipotriene-betameth diprop 0.005-0.064 % ointment    TACLONEX    60 g    Externally apply  topically 2 times daily as needed.    Psoriasis       citalopram 10 MG tablet    celeXA     Take 10 mg by mouth daily        CONCERTA 18 MG CR tablet   Generic drug:  methylphenidate      Take 18 mg by mouth every morning.        DAILY MULTIVITAMIN PO      Take  by mouth daily.        fluocinonide 0.05 % solution    LIDEX    60 mL    Apply  topically 2 times daily for itchy scalp or scalp psoriasis    Psoriasis       olopatadine HCl 0.2 % Soln    PATADAY    2.5 mL    PLACE 1 DROP INTO BOTH EYES DAILY    Allergic conjunctivitis of both eyes       pimecrolimus 1 % cream    ELIDEL    60 g    Apply topically 2 times daily as needed For psoriasis of face or genitals    Psoriasis       REFRESH P.M. Oint     1 Tube    Apply 1 inch to eye nightly as needed    Tear film insufficiency, unspecified       tacrolimus 0.1 % ointment    PROTOPIC    60 g    Apply topically 2 times daily Apply to areas around eyes as needed    Psoriasis       VITAMIN D (CHOLECALCIFEROL) PO      Take by mouth daily

## 2018-11-28 ENCOUNTER — ALLIED HEALTH/NURSE VISIT (OUTPATIENT)
Dept: DERMATOLOGY | Facility: CLINIC | Age: 63
End: 2018-11-28
Payer: COMMERCIAL

## 2018-11-28 DIAGNOSIS — L40.0 PLAQUE PSORIASIS: ICD-10-CM

## 2018-11-28 NOTE — NURSING NOTE
Tony Mcnair comes into clinic today at the request of Dr. Corbett Ordering Provider for NBUVB    This service provided today was under the supervising provider of the day Dr. Kent, who was available if needed.    Lorrie Graham

## 2018-11-28 NOTE — MR AVS SNAPSHOT
After Visit Summary   11/28/2018    Tony Mcnair    MRN: [id number]           Patient Information     Date Of Birth          1955        Visit Information        Provider Department      11/28/2018 7:00 AM UC DERM LIGHT TREATMENT [facility] Dermatology        Today's Diagnoses     Plaque psoriasis           Follow-ups after your visit        Your next 10 appointments already scheduled     Dec 05, 2018  7:15 AM CST   (Arrive by 7:00 AM)   Light Treatment Visit with UC DERM LIGHT TREATMENT   [facility] ([facility])    [address] [place] [id number] [phone]            Dec 19, 2018  7:15 AM CST   (Arrive by 7:00 AM)   Light Treatment Visit with UC DERM LIGHT TREATMENT   [facility] ([facility])    [address] [place] [address] [phone]            Dec 26, 2018  7:15 AM CST   (Arrive by 7:00 AM)   Light Treatment Visit with UC DERM LIGHT TREATMENT   [facility])    [address] [place] [address] [phone]              Who to contact     Please call your clinic at [phone] to:    Ask questions about your health    Make or cancel appointments    Discuss your medicines    Learn about your test results    Speak to your doctor            Additional Information About Your Visit        [employer] Information     [employer] gives you secure access to your electronic health record. If you see a primary care provider, you can also send messages to your care team and make appointments. If you have questions, please call your primary care clinic.  If you do not have a primary care provider, please call [phone] and they will assist you.      [employer] is an electronic gateway that provides easy, online access to your medical records. With [employer], you can request a clinic appointment, read your test  results, renew a prescription or communicate with your care team.     To access your existing account, please contact your [facility] or call [phone] for assistance.        Care EveryWhere ID     This is your Care EveryWhere ID. This could be used by other organizations to access your [place] medical records  [id number]         Blood Pressure from Last 3 Encounters:   05/22/18 118/79   05/07/18 130/75   05/05/17 121/87    Weight from Last 3 Encounters:   05/22/18 77.6 kg (171 lb)   05/07/18 77.6 kg (171 lb)   05/05/17 76.4 kg (168 lb 8 oz)              We Performed the Following     CHARGE - PHOTOTHERAPY NBUVB (UV LIGHT)     PROCEDURE - PHOTOTHERAPY NBUVB        Primary Care Provider Office Phone # Fax #    William Morton [phone]       [place] [address]        Equal Access to Services     TORI EDWARDS : Hadii zina rios hadasho Soblaineali, waaxda luqadaha, qaybta kaalmada adeegyada, renzo haider . So [facility] [phone].    ATENCIÓN: Si juanchola español, tiene a garrett disposición servicios gratuitos de asistencia lingüística. Llame al [phone].    We comply with applicable federal civil rights laws and [place] laws. We do not discriminate on the basis of race, color, national origin, age, disability, sex, sexual orientation, or gender identity.            Thank you!     Thank you for choosing [facility] DERMATOLOGY  for your care. Our goal is always to provide you with excellent care. Hearing back from our patients is one way we can continue to improve our services. Please take a few minutes to complete the written survey that you may receive in the mail after your visit with us. Thank you!             Your Updated Medication List - Protect others around you: Learn how to safely use, store and throw away your medicines at www.disposemymeds.org.          This list is accurate as of  11/28/18  7:51 AM.  Always use your most recent med list.                   Brand Name Dispense Instructions for use Diagnosis    BIOTIN PO      Take  by mouth daily.        calcipotriene-betameth diprop 0.005-0.064 % external ointment    TACLONEX    60 g    Externally apply  topically 2 times daily as needed.    Psoriasis       citalopram 10 MG tablet    celeXA     Take 10 mg by mouth daily        CONCERTA 18 MG CR tablet   Generic drug:  methylphenidate      Take 18 mg by mouth every morning.        DAILY MULTIVITAMIN PO      Take  by mouth daily.        fluocinonide 0.05 % external solution    LIDEX    60 mL    Apply  topically 2 times daily for itchy scalp or scalp psoriasis    Psoriasis       olopatadine 0.2 % ophthalmic solution    PATADAY    2.5 mL    PLACE 1 DROP INTO BOTH EYES DAILY    Allergic conjunctivitis of both eyes       pimecrolimus 1 % external cream    ELIDEL    60 g    Apply topically 2 times daily as needed For psoriasis of face or genitals    Psoriasis       REFRESH P.M. Oint     1 Tube    Apply 1 inch to eye nightly as needed    Tear film insufficiency, unspecified       tacrolimus 0.1 % external ointment    PROTOPIC    60 g    Apply topically 2 times daily Apply to areas around eyes as needed    Psoriasis       VITAMIN D (CHOLECALCIFEROL) PO      Take by mouth daily

## 2018-11-29 DIAGNOSIS — L40.9 PSORIASIS: ICD-10-CM

## 2018-11-30 ENCOUNTER — MYC REFILL (OUTPATIENT)
Dept: DERMATOLOGY | Facility: CLINIC | Age: 63
End: 2018-11-30

## 2018-11-30 ENCOUNTER — MYC REFILL (OUTPATIENT)
Dept: OPHTHALMOLOGY | Facility: CLINIC | Age: 63
End: 2018-11-30

## 2018-11-30 DIAGNOSIS — H10.13 ALLERGIC CONJUNCTIVITIS OF BOTH EYES: ICD-10-CM

## 2018-11-30 DIAGNOSIS — L40.9 PSORIASIS: ICD-10-CM

## 2018-11-30 RX ORDER — CALCIPOTRIENE, BETAMETHASONE DIPROPIONATE 50; .643 UG/G; MG/G
OINTMENT TOPICAL
Qty: 60 G | Refills: 0 | Status: SHIPPED | OUTPATIENT
Start: 2018-11-30 | End: 2019-04-18

## 2018-11-30 RX ORDER — FLUOCINONIDE TOPICAL SOLUTION USP, 0.05% 0.5 MG/ML
SOLUTION TOPICAL
Qty: 60 ML | Refills: 0 | Status: SHIPPED | OUTPATIENT
Start: 2018-11-30 | End: 2019-05-14

## 2018-11-30 RX ORDER — OLOPATADINE HYDROCHLORIDE 2 MG/ML
SOLUTION/ DROPS OPHTHALMIC
Qty: 2.5 ML | Refills: 0 | Status: SHIPPED | OUTPATIENT
Start: 2018-11-30 | End: 2019-12-27

## 2018-11-30 NOTE — TELEPHONE ENCOUNTER
"    olopatadine HCl (PATADAY) 0.2 % SOLN  Last Written Prescription Date:  3/21/18  Last Fill Quantity: 2,5ml,   # refills: 2  Last Office Visit : 8/28/17  Future Office visit: none      8/17/17  [facility]  \"F/u: 12 months  :.     Past due for appt.          "

## 2018-11-30 NOTE — TELEPHONE ENCOUNTER
Message from [facility]:  Pia Garner RN Fri Nov 30, 2018 12:07 PM        ----- Message -----   From: Tony Mcnair   Sent: 11/30/2018 12:04 PM   To: [facility] Dermatology Adult [facility]  Subject: Medication Renewal Request     Original authorizing provider: MD Tony Dash would like a refill of the following medications:  fluocinonide (LIDEX) 0.05 % solution [Petar Corbett MD]    Preferred pharmacy: [id number] - [place], [place] - [address]    Comment:

## 2018-11-30 NOTE — TELEPHONE ENCOUNTER
Message from [facility]:  Tony Napier RN Fri Nov 30, 2018 12:10 PM        ----- Message -----   From: Tony Mcnair   Sent: 11/30/2018 12:05 PM   To: [facility] Adult [facility]  Subject: Medication Renewal Request     Original authorizing provider: MD Tony Madden would like a refill of the following medications:  olopatadine HCl (PATADAY) 0.2 % SOLN [Reji Allison MD]    Preferred pharmacy: [id number] [place] - [address] [address] | [address]    Comment:

## 2018-11-30 NOTE — TELEPHONE ENCOUNTER
fluocinonide (LIDEX) 0.05 % solution  Last Written Prescription Date:  3/23/18  Last Fill Quantity: 60ml,   # refills: 0  Last Office Visit : 10/23/18  Future Office visit:  Light tx visits.   NBUVB phototherapy 1/week, not MD visit.    10/23/18   Lidex solution for his scalp, 1-2x/daily.    Process 2

## 2018-12-05 ENCOUNTER — ALLIED HEALTH/NURSE VISIT (OUTPATIENT)
Dept: DERMATOLOGY | Facility: CLINIC | Age: 63
End: 2018-12-05
Payer: COMMERCIAL

## 2018-12-05 DIAGNOSIS — L40.0 PLAQUE PSORIASIS: ICD-10-CM

## 2018-12-05 NOTE — PROGRESS NOTES
[facility] Dermatology Phototherapy Record  1. Tony Mcnair is a 63 year old male is here today for phototherapy (UVB) treatment for Psoriasis.        Changes or new medications since last treatment:   NO    New medical conditions or problems since last treatment:   NO    Any problems with last phototherapy treatment?    NO    2. The patient tolerated phototherapy without complication    Patient will return for next UVB treatment, per protocol.     Patient to see provider every 4-12 weeks for follow-up during treatment.      All questions and concerns discussed with patient in clinic today.      Georgina Wills RN

## 2018-12-05 NOTE — MR AVS SNAPSHOT
After Visit Summary   12/5/2018    Tony Mcnair    MRN: [id number]           Patient Information     Date Of Birth          1955        Visit Information        Provider Department      12/5/2018 7:15 AM [facility] DERM LIGHT TREATMENT [facility]        Today's Diagnoses     Plaque psoriasis           Follow-ups after your visit        Your next 10 appointments already scheduled     Dec 19, 2018  7:15 AM CST   (Arrive by 7:00 AM)   Light Treatment Visit with [facility] DERM LIGHT TREATMENT   [facility] ([facility])    [address] [place] [id number] [phone]            Dec 26, 2018  7:15 AM CST   (Arrive by 7:00 AM)   Light Treatment Visit with UC DERM LIGHT TREATMENT   [facility] ([facility])    [address] [place] [id number] [phone]              Who to contact     Please call your clinic at [phone] to:    Ask questions about your health    Make or cancel appointments    Discuss your medicines    Learn about your test results    Speak to your doctor            Additional Information About Your Visit        [employer] Information     [employer] gives you secure access to your electronic health record. If you see a primary care provider, you can also send messages to your care team and make appointments. If you have questions, please call your primary care clinic.  If you do not have a primary care provider, please call [phone] and they will assist you.      [employer] is an electronic gateway that provides easy, online access to your medical records. With [employer], you can request a clinic appointment, read your test results, renew a prescription or communicate with your care team.     To access your existing account, please contact your [facility] or call [phone] for assistance.        Care EveryWhere ID     This is your Care  EveryWhere ID. This could be used by other organizations to access your [place] medical records  [id number]         Blood Pressure from Last 3 Encounters:   05/22/18 118/79   05/07/18 130/75   05/05/17 121/87    Weight from Last 3 Encounters:   05/22/18 77.6 kg (171 lb)   05/07/18 77.6 kg (171 lb)   05/05/17 76.4 kg (168 lb 8 oz)              We Performed the Following     CHARGE - PHOTOTHERAPY NBUVB (UV LIGHT)     PROCEDURE - PHOTOTHERAPY NBUVB        Primary Care Provider Office Phone # Fax #    William Morton [phone]       [facility] [address] [place] [id number]        Equal Access to Services     TORI EDWARDS : Stan shoemakero Elia, waaxda luqadaha, qaybta kaalmada adeegyakoby, renzo monroe. So [facility] [phone].    ATENCIÓN: Si habla español, tiene a garrett disposición servicios gratuitos de asistencia lingüística. [facility] [phone].    We comply with applicable federal civil rights laws and [place] laws. We do not discriminate on the basis of race, color, national origin, age, disability, sex, sexual orientation, or gender identity.            Thank you!     Thank you for choosing [facility]  for your care. Our goal is always to provide you with excellent care. Hearing back from our patients is one way we can continue to improve our services. Please take a few minutes to complete the written survey that you may receive in the mail after your visit with us. Thank you!             Your Updated Medication List - Protect others around you: Learn how to safely use, store and throw away your medicines at www.disposemymeds.org.          This list is accurate as of 12/5/18  3:15 PM.  Always use your most recent med list.                   Brand Name Dispense Instructions for use Diagnosis    BIOTIN PO      Take  by mouth daily.        calcipotriene-betameth diprop 0.005-0.064 % external ointment    TACLONEX    60 g     Externally apply  topically 2 times daily as needed.    Psoriasis       citalopram 10 MG tablet    celeXA     Take 10 mg by mouth daily        CONCERTA 18 MG CR tablet   Generic drug:  methylphenidate      Take 18 mg by mouth every morning.        DAILY MULTIVITAMIN PO      Take  by mouth daily.        fluocinonide 0.05 % external solution    LIDEX    60 mL    Apply  topically 2 times daily for itchy scalp or scalp psoriasis    Psoriasis       olopatadine 0.2 % ophthalmic solution    PATADAY    2.5 mL    PLACE 1 DROP INTO BOTH EYES DAILY    Allergic conjunctivitis of both eyes       pimecrolimus 1 % external cream    ELIDEL    60 g    Apply topically 2 times daily as needed For psoriasis of face or genitals    Psoriasis       REFRESH P.M. Oint     1 Tube    Apply 1 inch to eye nightly as needed    Tear film insufficiency, unspecified       tacrolimus 0.1 % external ointment    PROTOPIC    60 g    Apply topically 2 times daily Apply to areas around eyes as needed    Psoriasis       VITAMIN D (CHOLECALCIFEROL) PO      Take by mouth daily

## 2018-12-12 ENCOUNTER — ALLIED HEALTH/NURSE VISIT (OUTPATIENT)
Dept: DERMATOLOGY | Facility: CLINIC | Age: 63
End: 2018-12-12
Payer: COMMERCIAL

## 2018-12-12 DIAGNOSIS — L40.0 PLAQUE PSORIASIS: ICD-10-CM

## 2018-12-12 NOTE — PROGRESS NOTES
[facility] Phototherapy Record  1. Tony Mcnair is a 63 year old male is here today for phototherapy (UVB) treatment for Plaque psoriasis.        Changes or new medications since last treatment:   NO    New medical conditions or problems since last treatment:   NO    Any problems with last phototherapy treatment?    NO    2. The patient tolerated phototherapy without complication    Patient will return for next UVB treatment, per protocol.     Patient to see provider every 4-12 weeks for follow-up during treatment.      All questions and concerns discussed with patient in clinic today.      Michelle Heck LPN

## 2018-12-12 NOTE — NURSING NOTE
Tony Mcnair comes into clinic today at the request of Dr. Corbett Ordering Provider for NBUVB.        This service provided today was under the supervising provider of the day Dr. Betancourt, who was available if needed.    Michelle Heck LPN

## 2018-12-19 ENCOUNTER — ALLIED HEALTH/NURSE VISIT (OUTPATIENT)
Dept: DERMATOLOGY | Facility: CLINIC | Age: 63
End: 2018-12-19
Payer: COMMERCIAL

## 2018-12-19 DIAGNOSIS — L40.0 PLAQUE PSORIASIS: ICD-10-CM

## 2018-12-19 NOTE — PROGRESS NOTES
[facility] Phototherapy Record  1. Tony Mcnair is a 63 year old male is here today for phototherapy (UVB) treatment for Psoriasis.        Changes or new medications since last treatment:   NO    New medical conditions or problems since last treatment:   NO    Any problems with last phototherapy treatment?    NO    2. The patient tolerated phototherapy without complication    Patient will return for next UVB treatment, per protocol.     Patient to see provider every 4-12 weeks for follow-up during treatment.      All questions and concerns discussed with patient in clinic today.       Georgina Wills RN

## 2018-12-26 ENCOUNTER — ALLIED HEALTH/NURSE VISIT (OUTPATIENT)
Dept: DERMATOLOGY | Facility: CLINIC | Age: 63
End: 2018-12-26
Payer: COMMERCIAL

## 2018-12-26 DIAGNOSIS — L40.0 PLAQUE PSORIASIS: ICD-10-CM

## 2018-12-26 NOTE — PROGRESS NOTES
[facility]at[facility] Phototherapy Record  1. Tony Mcnair is a 63 year old male is here today for phototherapy (UVB) treatment for Plaque psoriasis.        Changes or new medications since last treatment:   NO    New medical conditions or problems since last treatment:   NO    Any problems with last phototherapy treatment?    NO    2. The patient tolerated phototherapy without complication    Patient will return on for next UVB treatment, per protocol.     Patient to see provider every 4-12 weeks for follow-up during treatment.      All questions and concerns discussed with patient in clinic today.      MAYRA Sultana

## 2018-12-26 NOTE — NURSING NOTE
Tony Mcnair comes into clinic today at the request of Dr. Corbett Ordering Provider for Plaque psoriasis.      This service provided today was under the supervising provider of the day Dr. Mckinney, who was available if needed.    MAYRA Sultana

## 2019-01-02 ENCOUNTER — ALLIED HEALTH/NURSE VISIT (OUTPATIENT)
Dept: DERMATOLOGY | Facility: CLINIC | Age: 64
End: 2019-01-02
Payer: COMMERCIAL

## 2019-01-02 DIAGNOSIS — L40.0 PLAQUE PSORIASIS: ICD-10-CM

## 2019-01-02 NOTE — NURSING NOTE
Tony Mcnair comes into clinic today at the request of Dr Mckinney Ordering Provider for NBUVB.    This service provided today was under the supervising provider of the day Dr Blanco , who was available if needed.    Georgina Wills RN

## 2019-01-02 NOTE — PROGRESS NOTES
[facility] Phototherapy Record  1. Tony Mcnair is a 63 year old male is here today for phototherapy (UVB) treatment for Plaque psoriasis.        Changes or new medications since last treatment:   NO    New medical conditions or problems since last treatment:   NO    Any problems with last phototherapy treatment?    NO    2. The patient tolerated phototherapy without complication    Patient will return for next UVB treatment, per protocol.     Patient to see provider every 4-12 weeks for follow-up during treatment.      All questions and concerns discussed with patient in clinic today.      Georgina Wills RN

## 2019-01-09 ENCOUNTER — ALLIED HEALTH/NURSE VISIT (OUTPATIENT)
Dept: DERMATOLOGY | Facility: CLINIC | Age: 64
End: 2019-01-09
Payer: COMMERCIAL

## 2019-01-09 DIAGNOSIS — L40.0 PLAQUE PSORIASIS: ICD-10-CM

## 2019-01-09 NOTE — PROGRESS NOTES
[facility]atology Phototherapy Record  1. Tony Mcnair is a 63 year old male is here today for phototherapy (UVB) treatment for Psoriasis.        Changes or new medications since last treatment:   NO    New medical conditions or problems since last treatment:   NO    Any problems with last phototherapy treatment?    NO    2. The patient tolerated phototherapy without complication    Patient will return for next UVB treatment, per protocol.     Patient to see provider every 4-12 weeks for follow-up during treatment.      All questions and concerns discussed with patient in clinic today.      Georgina Wills RN

## 2019-01-09 NOTE — NURSING NOTE
Tony Mcnair comes into clinic today at the request of Dr Mckinney Ordering Provider for Psoriasis.    This service provided today was under the supervising provider of the day Dr Blanco, who was available if needed.    Gerogina Wills RN

## 2019-01-11 ENCOUNTER — TELEPHONE (OUTPATIENT)
Dept: DERMATOLOGY | Facility: CLINIC | Age: 64
End: 2019-01-11

## 2019-01-11 NOTE — TELEPHONE ENCOUNTER
[facility] Call Center    Phone Message    May a detailed message be left on voicemail: yes    Reason for Call: Other: Pt was prescribed a LIGHT system and insurance has approved but the  cannot get in touch with the Pt.  They are asking if staff can call the Pt and inform them the RX was approved by Insurance and if the Pt still wants it he shoudl call Adelina at [phone] ext [phone] to place order.  Device cost before ins was $5000 after insurance is approximately $1400 out of pocket.     Action Taken: Message routed to:  [facility] dermatology

## 2019-01-16 ENCOUNTER — ALLIED HEALTH/NURSE VISIT (OUTPATIENT)
Dept: DERMATOLOGY | Facility: CLINIC | Age: 64
End: 2019-01-16
Payer: COMMERCIAL

## 2019-01-16 DIAGNOSIS — L40.0 PLAQUE PSORIASIS: ICD-10-CM

## 2019-01-16 NOTE — PROGRESS NOTES
[facility] Phototherapy Record  1. Tony Mcnair is a 63 year old male is here today for phototherapy (UVB) treatment for Plaque psoriasis .        Changes or new medications since last treatment:   NO    New medical conditions or problems since last treatment:   NO    Any problems with last phototherapy treatment?    NO    2. The patient tolerated phototherapy without complication    Patient will return for next UVB treatment, per protocol.     Patient to see provider every 4-12 weeks for follow-up during treatment.      All questions and concerns discussed with patient in clinic today.      Georgina Wills RN

## 2019-01-23 ENCOUNTER — ALLIED HEALTH/NURSE VISIT (OUTPATIENT)
Dept: DERMATOLOGY | Facility: CLINIC | Age: 64
End: 2019-01-23
Payer: COMMERCIAL

## 2019-01-23 DIAGNOSIS — L40.0 PLAQUE PSORIASIS: ICD-10-CM

## 2019-01-23 NOTE — PROGRESS NOTES
[facility] Phototherapy Record  1. Tony Mcnair is a 63 year old male is here today for phototherapy (UVB) treatment for Psoriasis.        Changes or new medications since last treatment:   NO    New medical conditions or problems since last treatment:   NO    Any problems with last phototherapy treatment?    NO    2. The patient tolerated phototherapy without complication    Patient will return  for next UVB treatment, per protocol.     Patient to see provider every 4-12 weeks for follow-up during treatment.      All questions and concerns discussed with patient in clinic today.      Georgina Wills RN

## 2019-01-24 DIAGNOSIS — H10.13 ALLERGIC CONJUNCTIVITIS OF BOTH EYES: ICD-10-CM

## 2019-01-25 RX ORDER — OLOPATADINE HYDROCHLORIDE 2 MG/ML
SOLUTION/ DROPS OPHTHALMIC
Qty: 2.5 ML | Refills: 0 | OUTPATIENT
Start: 2019-01-25

## 2019-02-06 ENCOUNTER — ALLIED HEALTH/NURSE VISIT (OUTPATIENT)
Dept: DERMATOLOGY | Facility: CLINIC | Age: 64
End: 2019-02-06
Payer: COMMERCIAL

## 2019-02-06 DIAGNOSIS — L40.0 PLAQUE PSORIASIS: ICD-10-CM

## 2019-02-06 NOTE — PROGRESS NOTES
Tony Mcnair comes into clinic today at the request of Dr. Mckinney Ordering Provider for NBUVB    This service provided today was under the supervising provider of the day Dr. Blanco, who was available if needed.    Lorrie Graham  [facility] Phototherapy Record  1. Tony Mcnair is a 63 year old male is here today for phototherapy (UVB) treatment for Plaque psoriasis.        Changes or new medications since last treatment:   NO    New medical conditions or problems since last treatment:   NO    Any problems with last phototherapy treatment?    NO    2. The patient tolerated phototherapy without complication    Patient will return for next UVB treatment, per protocol.     Patient to see provider every 4-12 weeks for follow-up during treatment.      All questions and concerns discussed with patient in clinic today.      Lorrie Graham

## 2019-02-13 ENCOUNTER — ALLIED HEALTH/NURSE VISIT (OUTPATIENT)
Dept: DERMATOLOGY | Facility: CLINIC | Age: 64
End: 2019-02-13
Payer: COMMERCIAL

## 2019-02-13 DIAGNOSIS — L40.0 PLAQUE PSORIASIS: ICD-10-CM

## 2019-02-13 NOTE — PROGRESS NOTES
[facility] Dermatology Phototherapy Record  1. Tony Mcnair is a 63 year old male is here today for phototherapy (UVB) treatment for Plaque psoriasis.        Changes or new medications since last treatment:   NO    New medical conditions or problems since last treatment:   NO    Any problems with last phototherapy treatment?    NO    2. The patient tolerated phototherapy without complication    Patient will return for next UVB treatment, per protocol.     Patient to see provider every 4-12 weeks for follow-up during treatment.      All questions and concerns discussed with patient in clinic today.      Georgina Wills RN

## 2019-02-27 ENCOUNTER — ALLIED HEALTH/NURSE VISIT (OUTPATIENT)
Dept: DERMATOLOGY | Facility: CLINIC | Age: 64
End: 2019-02-27
Payer: COMMERCIAL

## 2019-02-27 DIAGNOSIS — L40.0 PLAQUE PSORIASIS: ICD-10-CM

## 2019-02-27 NOTE — NURSING NOTE
Tony Mcnair comes into clinic today at the request of Dr. Mckinney Ordering Provider for NBUVB.        This service provided today was under the supervising provider of the day Dr. Kent, who was available if needed.    Michelle Heck LPN

## 2019-02-27 NOTE — PROGRESS NOTES
[facility] Dermatology Phototherapy Record  1. Tony Mcnair is a 63 year old male is here today for phototherapy (UVB) treatment for Plaque psoriasis.        Changes or new medications since last treatment:   NO    New medical conditions or problems since last treatment:   NO    Any problems with last phototherapy treatment?    NO    2. The patient tolerated phototherapy without complication    Patient will return for next UVB treatment, per protocol.     Patient to see provider every 4-12 weeks for follow-up during treatment.      All questions and concerns discussed with patient in clinic today.      Michelle Heck LPN

## 2019-03-06 ENCOUNTER — ALLIED HEALTH/NURSE VISIT (OUTPATIENT)
Dept: DERMATOLOGY | Facility: CLINIC | Age: 64
End: 2019-03-06
Payer: COMMERCIAL

## 2019-03-06 DIAGNOSIS — L40.0 PLAQUE PSORIASIS: ICD-10-CM

## 2019-03-06 NOTE — PROGRESS NOTES
Tony Mcnair comes into clinic today at the request of Dr. Corbett Ordering Provider for NBUVB    This service provided today was under the supervising provider of the day Dr. Blanco, who was available if needed.    Lorrie Graham  [facility] Dermatology Phototherapy Record  1. Tony Mcnair is a 63 year old male is here today for phototherapy (UVB) treatment for Plaque psoriasis.        Changes or new medications since last treatment:   NO    New medical conditions or problems since last treatment:   NO    Any problems with last phototherapy treatment?    NO    2. The patient tolerated phototherapy without complication    Patient will return for next UVB treatment, per protocol.     Patient to see provider every 4-12 weeks for follow-up during treatment.      All questions and concerns discussed with patient in clinic today.      Lorrie Graham

## 2019-03-13 ENCOUNTER — ALLIED HEALTH/NURSE VISIT (OUTPATIENT)
Dept: DERMATOLOGY | Facility: CLINIC | Age: 64
End: 2019-03-13
Payer: COMMERCIAL

## 2019-03-13 ENCOUNTER — TELEPHONE (OUTPATIENT)
Dept: DERMATOLOGY | Facility: CLINIC | Age: 64
End: 2019-03-13

## 2019-03-13 DIAGNOSIS — L40.0 PLAQUE PSORIASIS: ICD-10-CM

## 2019-03-13 NOTE — PROGRESS NOTES
[facility] Dermatology Phototherapy Record  1. Tony Mcnair is a 63 year old adult is here today for phototherapy (UVB) treatment for Plaque psoriasis .        Changes or new medications since last treatment:   NO    New medical conditions or problems since last treatment:   NO    Any problems with last phototherapy treatment?    NO    2. The patient tolerated phototherapy without complication    Patient will return for next UVB treatment, per protocol.     Patient to see provider every 4-12 weeks for follow-up during treatment.      All questions and concerns discussed with patient in clinic today.      Georgina Wills RN

## 2019-03-13 NOTE — TELEPHONE ENCOUNTER
[facility] Call Center    Phone Message    May a detailed message be left on voicemail: yes    Reason for Call: Other: Pt  states that Dr Corbett recommended two providers (internalist/gp) for pt and stated that he's going to put it in the notes. Pt can't find it in the notes via [employer]. Please call pt back to assist with the names of these providers.     Action Taken: Message routed to:  [facility] DERM

## 2019-03-20 ENCOUNTER — ALLIED HEALTH/NURSE VISIT (OUTPATIENT)
Dept: DERMATOLOGY | Facility: CLINIC | Age: 64
End: 2019-03-20
Payer: COMMERCIAL

## 2019-03-20 DIAGNOSIS — L40.0 PLAQUE PSORIASIS: ICD-10-CM

## 2019-03-20 NOTE — NURSING NOTE
Tony Mcnair comes into clinic today at the request of Dr Mckinney Ordering Provider for NBUVB.    This service provided today was under the supervising provider of the day Dr Kent , who was available if needed.    Georgina Wills RN

## 2019-03-20 NOTE — PROGRESS NOTES
[facility] Dermatology Phototherapy Record  1. Tony Mcnair is a 63 year old adult is here today for phototherapy (UVB) treatment for Psoriasis.        Changes or new medications since last treatment:   NO    New medical conditions or problems since last treatment:   NO    Any problems with last phototherapy treatment?    NO    2. The patient tolerated phototherapy without complication    Patient will return for next UVB treatment, per protocol.     Patient to see provider every 4-12 weeks for follow-up during treatment.      All questions and concerns discussed with patient in clinic today.      Georgina Wills RN

## 2019-03-27 ENCOUNTER — ALLIED HEALTH/NURSE VISIT (OUTPATIENT)
Dept: DERMATOLOGY | Facility: CLINIC | Age: 64
End: 2019-03-27
Payer: COMMERCIAL

## 2019-03-27 DIAGNOSIS — L40.0 PLAQUE PSORIASIS: ICD-10-CM

## 2019-03-27 NOTE — NURSING NOTE
Tony Mcnair comes into clinic today at the request of Dr. Corbett Ordering Provider for NBUVB.        This service provided today was under the supervising provider of the day Dr. Kent, who was available if needed.    Michelle Heck LPN

## 2019-03-27 NOTE — PROGRESS NOTES
[facility] Dermatology Phototherapy Record  1. Tony Mcnair is a 63 year old adult is here today for phototherapy (UVB) treatment for Plaque psoriasis .        Changes or new medications since last treatment:   NO    New medical conditions or problems since last treatment:   NO    Any problems with last phototherapy treatment?    NO    2. The patient tolerated phototherapy without complication    Patient will return for next UVB treatment, per protocol.     Patient to see provider every 4-12 weeks for follow-up during treatment.      All questions and concerns discussed with patient in clinic today.      Michelle Heck LPN

## 2019-03-28 ENCOUNTER — OFFICE VISIT (OUTPATIENT)
Dept: FAMILY MEDICINE | Facility: CLINIC | Age: 64
End: 2019-03-28
Payer: COMMERCIAL

## 2019-03-28 VITALS
SYSTOLIC BLOOD PRESSURE: 115 MMHG | OXYGEN SATURATION: 96 % | TEMPERATURE: 96.7 F | WEIGHT: 169.6 LBS | DIASTOLIC BLOOD PRESSURE: 76 MMHG | HEART RATE: 78 BPM | BODY MASS INDEX: 24.28 KG/M2 | HEIGHT: 70 IN

## 2019-03-28 DIAGNOSIS — Z11.4 SCREENING FOR HIV (HUMAN IMMUNODEFICIENCY VIRUS): ICD-10-CM

## 2019-03-28 DIAGNOSIS — M79.674 GREAT TOE PAIN, RIGHT: ICD-10-CM

## 2019-03-28 DIAGNOSIS — Z12.5 SCREENING FOR PROSTATE CANCER: ICD-10-CM

## 2019-03-28 DIAGNOSIS — M94.0 COSTOCHONDRITIS: ICD-10-CM

## 2019-03-28 DIAGNOSIS — Z11.59 NEED FOR HEPATITIS C SCREENING TEST: ICD-10-CM

## 2019-03-28 DIAGNOSIS — Z00.00 ROUTINE HISTORY AND PHYSICAL EXAMINATION OF ADULT: Primary | ICD-10-CM

## 2019-03-28 DIAGNOSIS — Z12.11 SCREEN FOR COLON CANCER: ICD-10-CM

## 2019-03-28 DIAGNOSIS — S76.112A STRAIN OF LEFT QUADRICEPS, INITIAL ENCOUNTER: ICD-10-CM

## 2019-03-28 DIAGNOSIS — Z13.220 LIPID SCREENING: ICD-10-CM

## 2019-03-28 PROCEDURE — 99386 PREV VISIT NEW AGE 40-64: CPT | Performed by: INTERNAL MEDICINE

## 2019-03-28 ASSESSMENT — PATIENT HEALTH QUESTIONNAIRE - PHQ9
10. IF YOU CHECKED OFF ANY PROBLEMS, HOW DIFFICULT HAVE THESE PROBLEMS MADE IT FOR YOU TO DO YOUR WORK, TAKE CARE OF THINGS AT HOME, OR GET ALONG WITH OTHER PEOPLE: SOMEWHAT DIFFICULT
SUM OF ALL RESPONSES TO PHQ QUESTIONS 1-9: 10
SUM OF ALL RESPONSES TO PHQ QUESTIONS 1-9: 10

## 2019-03-28 ASSESSMENT — MIFFLIN-ST. JEOR: SCORE: 1580.55

## 2019-03-28 NOTE — PROGRESS NOTES
SUBJECTIVE:   CC: Tony Mcnair is an 63 year old male who presents for preventative health visit.       Patient complains of intermittent sharp focal chest pain at the costochondral junction areas.  Denies chest trauma or strenuous activities.    Also complains of pain at the superior aspect of the left knee.  No redness/swelling of the knee.    Experiences right great toe pain precipitated by dysmorphic toenail.      Healthy Habits:     Getting at least 3 servings of Calcium per day:  Yes    Bi-annual eye exam:  Yes    Dental care twice a year:  Yes    Sleep apnea or symptoms of sleep apnea:  Daytime drowsiness    Diet:  Gluten-free/reduced    Frequency of exercise:  2-3 days/week    Duration of exercise:  30-45 minutes    Taking medications regularly:  Yes    Medication side effects:  None    PHQ-2 Total Score: 3    Additional concerns today:  No      Today's PHQ-2 Score:   PHQ-2 ( 1999 [employer]) 3/28/2019   Q1: Little interest or pleasure in doing things 2   Q2: Feeling down, depressed or hopeless 1   PHQ-2 Score 3   Q1: Little interest or pleasure in doing things More than half the days   Q2: Feeling down, depressed or hopeless Several days   PHQ-2 Score 3       Abuse: Current or Past(Physical, Sexual or Emotional)- No  Do you feel safe in your environment? No    Social History     Tobacco Use     Smoking status: Never Smoker     Smokeless tobacco: Never Used   Substance Use Topics     Alcohol use: Yes     Alcohol Use 3/28/2019   Prescreen: >3 drinks/day or >7 drinks/week? No   Prescreen: >3 drinks/day or >7 drinks/week? -   No flowsheet data found.    Last PSA: No results found for: PSA    Reviewed orders with patient. Reviewed health maintenance and updated orders accordingly - Yes      Reviewed and updated as needed this visit by clinical staff  Tobacco  Allergies  Meds  Problems  Med Hx  Surg Hx  Fam Hx       Reviewed and updated as needed right great toe pain visit by Provider  Allergies  Meds   "Problems  Med Hx  Surg Hx        Past Medical History:   Diagnosis Date     Arthritis      Depressive disorder Junie     [place] 1988    LEFT EYE       Past Surgical History:   Procedure Laterality Date     COLONOSCOPY  11 years ago     HERNIA REPAIR  10 years ago     IRIS NEVUS Right      LESION RIGHT UPPER LID Right 1999    EXCISION       Family History   Problem Relation Age of Onset     Coronary Artery Disease Father      Hyperlipidemia Father      Coronary Artery Disease Brother      Hyperlipidemia Brother      Hypertension Mother      Breast Cancer Mother      Substance Abuse Mother      Cancer No family hx of         skin cancer     Glaucoma No family hx of      Macular Degeneration No family hx of      Retinal detachment No family hx of      Skin Cancer No family hx of      Melanoma No family hx of        Social History     Tobacco Use     Smoking status: Never Smoker     Smokeless tobacco: Never Used   Substance Use Topics     Alcohol use: Yes       Review of Systems   Constitutional: Negative for chills, fatigue and fever.   HENT: Negative for congestion, ear pain, hearing loss, sore throat and trouble swallowing.    Eyes: Negative for pain and visual disturbance.   Respiratory: Negative for cough and shortness of breath.    Cardiovascular: Negative for chest pain and palpitations.   Gastrointestinal: Negative for abdominal pain, blood in stool, constipation, diarrhea, nausea and vomiting.   Genitourinary: Negative for difficulty urinating.   Musculoskeletal: Negative for arthralgias, back pain, myalgias and neck pain.   Skin: Negative for rash.   Neurological: Negative for dizziness, light-headedness, numbness and headaches.   Psychiatric/Behavioral: Negative for sleep disturbance. The patient is not nervous/anxious.        OBJECTIVE:   /76 (BP Location: Right arm, Patient Position: Sitting, Cuff Size: Adult Regular)   Pulse 78   Temp 96.7  F (35.9  C) (Oral)   Ht 1.778 m (5' 10\")   Wt 76.9 kg " (169 lb 9.6 oz)   SpO2 96%   BMI 24.34 kg/m        Physical Exam   Constitutional: Tony Mcnair is oriented to person, place, and time. No distress.   HENT:   Right Ear: External ear normal.   Left Ear: External ear normal.   Mouth/Throat: Oropharynx is clear and moist.   Eyes: Pupils are equal, round, and reactive to light. Conjunctivae are normal.   Neck: Normal range of motion. Neck supple. No thyromegaly present.   Cardiovascular: Normal rate, regular rhythm and normal heart sounds.   Pulmonary/Chest: Effort normal and breath sounds normal. No respiratory distress. Tony Mcnair exhibits no tenderness.   Abdominal: Soft. Bowel sounds are normal. There is no tenderness.   Musculoskeletal: Normal range of motion. Tony Mcnair exhibits tenderness (At insertion site of left quadriceps muscle superior to the knee). Tony Mcnair exhibits no edema.   Lymphadenopathy:     Tony Mcnair has no cervical adenopathy.   Neurological: Tony Mcnair is alert and oriented to person, place, and time. Tony Mcnair has normal reflexes. Coordination normal.   Skin: No rash noted.   Thickened toenail   Psychiatric: Tony Mcnair has a normal mood and affect.   Vitals reviewed.      ASSESSMENT/PLAN:       ICD-10-CM    1. Routine history and physical examination of adult Z00.00 CBC with platelets differential     Basic metabolic panel   2. Costochondritis M94.0 Information and guidelines for care printed along with AVS   3. Strain of left quadriceps, initial encounter S76.112[facility] PT[facility] HAND, AND CHIROPRACTIC REFERRAL     naproxen (NAPROSYN) 500 MG tablet   4. Great toe pain, right M79.674 PODIATRY/FOOT & ANKLE SURGERY REFERRAL   5. Screen for colon cancer Z12.11 GASTROENTEROLOGY ADULT REF PROCEDURE ONLY   6. Screening for prostate cancer Z12.5 Prostate spec antigen screen   7. Lipid screening Z13.220 Lipid panel reflex to direct LDL Fasting   8. Need for hepatitis C screening test Z11.59 Hepatitis C Screen Reflex to HCV RNA Quant  and Genotype   9. Screening for HIV (human immunodeficiency virus) Z11.4 HIV Screening       Patient Instructions     Schedule a laboratory-only visit for your blood tests (please come in fasting).    Proceed with your colonoscopy.      Consult with podiatry regarding your toenail concern.    Avoid any squatting, running, jumping, prolonged walking/standing or any activity that would strain your left quadricep attachment to the knee until the pain resolves.  Take the prescribed anti-inflammatories as directed and call doctor if you develop any side effects.  Consider physical therapy if the pain persists/worsens.    Maintain low fat/calorie diet and regular exercise.    Follow up yearly or as needed.      Preventive Health Recommendations  Male Ages 50 - 64    Yearly exam:             See your health care provider every year in order to  o   Review health changes.   o   Discuss preventive care.    o   Review your medicines if your doctor has prescribed any.     Have a cholesterol test every 5 years, or more frequently if you are at risk for high cholesterol/heart disease.     Have a diabetes test (fasting glucose) every three years. If you are at risk for diabetes, you should have this test more often.     Have a colonoscopy at age 50, or have a yearly FIT test (stool test). These exams will check for colon cancer.      Talk with your health care provider about whether or not a prostate cancer screening test (PSA) is right for you.    You should be tested each year for STDs (sexually transmitted diseases), if you re at risk.     Shots: Get a flu shot each year. Get a tetanus shot every 10 years.     Nutrition:    Eat at least 5 servings of fruits and vegetables daily.     Eat whole-grain bread, whole-wheat pasta and brown rice instead of white grains and rice.     Get adequate Calcium and Vitamin D.     Lifestyle    Exercise for at least 150 minutes a week (30 minutes a day, 5 days a week). This will help you  control your weight and prevent disease.     Limit alcohol to one drink per day.     No smoking.     Wear sunscreen to prevent skin cancer.     See your dentist every six months for an exam and cleaning.     See your eye doctor every 1 to 2 years.    Patient Education     Costochondritis    Costochondritis is inflammation of a rib or the cartilage that connects a rib to your breastbone (sternum). It causes tenderness, and sometimes chest pain may be sharp or aching, or it may feel like pressure. Pain may get worse with deep breathing, movement, or exercise. In some cases, the pain is mistaken for a heart attack. Despite this, the condition is not serious. Read on to learn more about the condition and how it can be treated.  What causes costochondritis?  The cause of costochondritis is not completely clear, but it may happen after a chest injury, chest infection, or coughing episode. Some physical activities can sometimes lead to costochondritis. Large-breasted women may be more likely to have the condition. Often, the reason for the inflammation is unknown.  Diagnosing costochondritis  There is no test for costochondritis. The condition is diagnosed by the symptoms you have. Your healthcare provider will perform a physical exam. He or she will ask you about your symptoms and examine your chest for tenderness. In some cases, tests are done to rule out more serious problems. These tests may include imaging tests such as chest X-ray, CT scan, or an ECG.  Treating costochondritis  If an underlying cause is found, treatment for that will likely relieve the problem. Costochondritis often goes away on its own. The course of the condition varies from person to person. It usually lasts from weeks to months. In some cases, mild symptoms continue for months to years. To ease symptoms:    Take medicine as directed. These relieve pain and swelling. Ibuprofen or other NSAIDs are often recommended. In some cases, you may be given  "prescription medicine, such as muscle relaxants.    Avoid activities that put stress on the chest or spine.    Apply a heating pad (set to warm, not too high, heat) to the breastbone several times a day.    Perform stretching exercises as directed.  Call the healthcare provider right away if you have any of the following:    Pain that is not relieved by medicine    Shortness of breath    Lightheadedness, dizziness, or fainting    Feeling of irregular heartbeat or fast pulse  Anyone with chest pain should see a healthcare provider, especially those who are older and may be at risk for heart disease.   Date Last Reviewed: 10/1/2016    [id number] [employer]. [address] [place] [address]. All rights reserved. This information is not intended as a substitute for professional medical care. Always follow your healthcare professional's instructions.           COUNSELING:   Reviewed preventive health counseling, as reflected in patient instructions    BP Readings from Last 1 Encounters:   03/28/19 115/76     Estimated body mass index is 24.34 kg/m  as calculated from the following:    Height as of this encounter: 1.778 m (5' 10\").    Weight as of this encounter: 76.9 kg (169 lb 9.6 oz).       reports that Tony COLTEN Mcnair has never smoked. Tony Mcnair has never used smokeless tobacco.      Counseling Resources:  ATP IV Guidelines  Pooled Cohorts Equation Calculator  FRAX Risk Assessment  ICSI Preventive Guidelines  Dietary Guidelines for Americans, 2010  USDA's MyPlate  ASA Prophylaxis  Lung CA Screening    Souleymane De Leon MD  [facility]  "

## 2019-03-28 NOTE — PATIENT INSTRUCTIONS
Schedule a laboratory-only visit for your blood tests (please come in fasting).    Proceed with your colonoscopy.      Consult with podiatry regarding your toenail concern.    Avoid any squatting, running, jumping, prolonged walking/standing or any activity that would strain your left quadricep attachment to the knee until the pain resolves.  Take the prescribed anti-inflammatories as directed and call doctor if you develop any side effects.  Consider physical therapy if the pain persists/worsens.    Maintain low fat/calorie diet and regular exercise.    Follow up yearly or as needed.      Preventive Health Recommendations  Male Ages 50 - 64    Yearly exam:             See your health care provider every year in order to  o   Review health changes.   o   Discuss preventive care.    o   Review your medicines if your doctor has prescribed any.     Have a cholesterol test every 5 years, or more frequently if you are at risk for high cholesterol/heart disease.     Have a diabetes test (fasting glucose) every three years. If you are at risk for diabetes, you should have this test more often.     Have a colonoscopy at age 50, or have a yearly FIT test (stool test). These exams will check for colon cancer.      Talk with your health care provider about whether or not a prostate cancer screening test (PSA) is right for you.    You should be tested each year for STDs (sexually transmitted diseases), if you re at risk.     Shots: Get a flu shot each year. Get a tetanus shot every 10 years.     Nutrition:    Eat at least 5 servings of fruits and vegetables daily.     Eat whole-grain bread, whole-wheat pasta and brown rice instead of white grains and rice.     Get adequate Calcium and Vitamin D.     Lifestyle    Exercise for at least 150 minutes a week (30 minutes a day, 5 days a week). This will help you control your weight and prevent disease.     Limit alcohol to one drink per day.     No smoking.     Wear sunscreen to  prevent skin cancer.     See your dentist every six months for an exam and cleaning.     See your eye doctor every 1 to 2 years.    Patient Education     Costochondritis    Costochondritis is inflammation of a rib or the cartilage that connects a rib to your breastbone (sternum). It causes tenderness, and sometimes chest pain may be sharp or aching, or it may feel like pressure. Pain may get worse with deep breathing, movement, or exercise. In some cases, the pain is mistaken for a heart attack. Despite this, the condition is not serious. Read on to learn more about the condition and how it can be treated.  What causes costochondritis?  The cause of costochondritis is not completely clear, but it may happen after a chest injury, chest infection, or coughing episode. Some physical activities can sometimes lead to costochondritis. Large-breasted women may be more likely to have the condition. Often, the reason for the inflammation is unknown.  Diagnosing costochondritis  There is no test for costochondritis. The condition is diagnosed by the symptoms you have. Your healthcare provider will perform a physical exam. He or she will ask you about your symptoms and examine your chest for tenderness. In some cases, tests are done to rule out more serious problems. These tests may include imaging tests such as chest X-ray, CT scan, or an ECG.  Treating costochondritis  If an underlying cause is found, treatment for that will likely relieve the problem. Costochondritis often goes away on its own. The course of the condition varies from person to person. It usually lasts from weeks to months. In some cases, mild symptoms continue for months to years. To ease symptoms:    Take medicine as directed. These relieve pain and swelling. Ibuprofen or other NSAIDs are often recommended. In some cases, you may be given prescription medicine, such as muscle relaxants.    Avoid activities that put stress on the chest or spine.    Apply a  heating pad (set to warm, not too high, heat) to the breastbone several times a day.    Perform stretching exercises as directed.  Call the healthcare provider right away if you have any of the following:    Pain that is not relieved by medicine    Shortness of breath    Lightheadedness, dizziness, or fainting    Feeling of irregular heartbeat or fast pulse  Anyone with chest pain should see a healthcare provider, especially those who are older and may be at risk for heart disease.   Date Last Reviewed: 10/1/2016    [id number] [employer]. [address] [place] [address]. All rights reserved. This information is not intended as a substitute for professional medical care. Always follow your healthcare professional's instructions.

## 2019-04-03 ENCOUNTER — DOCUMENTATION ONLY (OUTPATIENT)
Dept: CARE COORDINATION | Facility: CLINIC | Age: 64
End: 2019-04-03

## 2019-04-03 ENCOUNTER — ALLIED HEALTH/NURSE VISIT (OUTPATIENT)
Dept: DERMATOLOGY | Facility: CLINIC | Age: 64
End: 2019-04-03
Payer: COMMERCIAL

## 2019-04-03 DIAGNOSIS — L40.0 PLAQUE PSORIASIS: ICD-10-CM

## 2019-04-03 NOTE — PROGRESS NOTES
Tony Mcnair comes into clinic today at the request of  Ordering Provider for [facility].      This service provided today was under the supervising provider of the day , who was available if needed.    Pia Garner RN

## 2019-04-03 NOTE — PROGRESS NOTES
[facility] Dermatology Phototherapy Record  1. Tony Mcnair is a 63 year old adult is here today for phototherapy (UVB) treatment for plaque psoriasis.        Changes or new medications since last treatment:   NO    New medical conditions or problems since last treatment:   NO    Any problems with last phototherapy treatment?    NO    2. The patient tolerated phototherapy without complication    Patient will return for next UVB treatment, per protocol.     Patient to see provider every 4-12 weeks for follow-up during treatment.      All questions and concerns discussed with patient in clinic today.      Pia Garner RN

## 2019-04-08 ENCOUNTER — TELEPHONE (OUTPATIENT)
Dept: FAMILY MEDICINE | Facility: CLINIC | Age: 64
End: 2019-04-08

## 2019-04-08 ENCOUNTER — THERAPY VISIT (OUTPATIENT)
Dept: PHYSICAL THERAPY | Facility: CLINIC | Age: 64
End: 2019-04-08
Payer: COMMERCIAL

## 2019-04-08 DIAGNOSIS — S76.112A STRAIN OF LEFT QUADRICEPS, INITIAL ENCOUNTER: ICD-10-CM

## 2019-04-08 DIAGNOSIS — M25.562 LEFT KNEE PAIN: ICD-10-CM

## 2019-04-08 PROCEDURE — 97161 PT EVAL LOW COMPLEX 20 MIN: CPT | Mod: GP | Performed by: PHYSICAL THERAPIST

## 2019-04-08 PROCEDURE — 97110 THERAPEUTIC EXERCISES: CPT | Mod: GP | Performed by: PHYSICAL THERAPIST

## 2019-04-08 RX ORDER — NAPROXEN 500 MG/1
500 TABLET ORAL 2 TIMES DAILY WITH MEALS
Qty: 30 TABLET | Refills: 1 | Status: ON HOLD | OUTPATIENT
Start: 2019-04-08 | End: 2019-05-17

## 2019-04-08 ASSESSMENT — ACTIVITIES OF DAILY LIVING (ADL)
KNEE_ACTIVITY_OF_DAILY_LIVING_SCORE: 94.29
WEAKNESS: I DO NOT HAVE THE SYMPTOM
PAIN: THE SYMPTOM AFFECTS MY ACTIVITY SLIGHTLY
RISE FROM A CHAIR: ACTIVITY IS NOT DIFFICULT
LIMPING: I HAVE THE SYMPTOM BUT IT DOES NOT AFFECT MY ACTIVITY
HOW_WOULD_YOU_RATE_THE_OVERALL_FUNCTION_OF_YOUR_KNEE_DURING_YOUR_USUAL_DAILY_ACTIVITIES?: NEARLY NORMAL
SQUAT: ACTIVITY IS NOT DIFFICULT
WALK: ACTIVITY IS MINIMALLY DIFFICULT
GIVING WAY, BUCKLING OR SHIFTING OF KNEE: I DO NOT HAVE THE SYMPTOM
GO UP STAIRS: ACTIVITY IS NOT DIFFICULT
STIFFNESS: I DO NOT HAVE THE SYMPTOM
KNEE_ACTIVITY_OF_DAILY_LIVING_SUM: 66
STAND: ACTIVITY IS NOT DIFFICULT
AS_A_RESULT_OF_YOUR_KNEE_INJURY,_HOW_WOULD_YOU_RATE_YOUR_CURRENT_LEVEL_OF_DAILY_ACTIVITY?: NEARLY NORMAL
SIT WITH YOUR KNEE BENT: ACTIVITY IS NOT DIFFICULT
SWELLING: I DO NOT HAVE THE SYMPTOM
GO DOWN STAIRS: ACTIVITY IS NOT DIFFICULT
RAW_SCORE: 66
HOW_WOULD_YOU_RATE_THE_CURRENT_FUNCTION_OF_YOUR_KNEE_DURING_YOUR_USUAL_DAILY_ACTIVITIES_ON_A_SCALE_FROM_0_TO_100_WITH_100_BEING_YOUR_LEVEL_OF_KNEE_FUNCTION_PRIOR_TO_YOUR_INJURY_AND_0_BEING_THE_INABILITY_TO_PERFORM_ANY_OF_YOUR_USUAL_DAILY_ACTIVITIES?: 98
KNEEL ON THE FRONT OF YOUR KNEE: ACTIVITY IS NOT DIFFICULT

## 2019-04-08 ASSESSMENT — ENCOUNTER SYMPTOMS
NERVOUS/ANXIOUS: 0
CONSTIPATION: 0
VOMITING: 0
SLEEP DISTURBANCE: 0
TROUBLE SWALLOWING: 0
ABDOMINAL PAIN: 0
DIARRHEA: 0
BLOOD IN STOOL: 0
COUGH: 0
DIFFICULTY URINATING: 0
PALPITATIONS: 0
ARTHRALGIAS: 0
SORE THROAT: 0
CHILLS: 0
SHORTNESS OF BREATH: 0
FATIGUE: 0
FEVER: 0
NUMBNESS: 0
MYALGIAS: 0
NECK PAIN: 0
NAUSEA: 0
HEADACHES: 0
LIGHT-HEADEDNESS: 0
BACK PAIN: 0
EYE PAIN: 0
DIZZINESS: 0

## 2019-04-08 NOTE — TELEPHONE ENCOUNTER
Please call and remind patient to schedule a laboratory only visit for his blood tests and to come in fasting.

## 2019-04-08 NOTE — TELEPHONE ENCOUNTER
Left message for patient to call back to clinic to schedule fasting lab appointment.    Anthony Madera CMA on 4/8/2019 at 9:54 AM

## 2019-04-09 PROBLEM — M25.562 LEFT KNEE PAIN: Status: ACTIVE | Noted: 2019-04-09

## 2019-04-09 NOTE — PROGRESS NOTES
[facility] Initial Evaluation  Subjective:  Patient is referred with 2-3 month hx of left superior lateral anterior knee pain. He was performing squats at the gym and fel a sharp pain. Since that time, he notes a constant dull ache which is worse at night and disturbs his sleep. He has no pain with stairs, squatting or biking. No clicking, crepitus or locking sensations reported.    The history is provided by the patient.   Tony Mcnair is a 63 year old adult with a left knee condition.  Condition occurred with:  Other reason (squatting at the gym).  Condition occurred: in the community and during recreation/sport.  This is a new condition     Patient reports pain:  Anterior, lateral and sub patellar.    Pain is described as aching and is constant and reported as 3/10.   Pain is worse during the night.  Symptoms are exacerbated by nothing   Since onset symptoms are unchanged.        General health as reported by patient is good.  Pertinent medical history includes:  Depression.          Patient is working in normal job without restrictions.  Primary job tasks include:  Prolonged sitting.    Barriers include:  None as reported by the patient.    Red flags:  None as reported by the patient.                        Objective:  System                                                Knee Evaluation:  ROM:  AROM: normal  PROM: normal  Strength:  Normal            Ligament Testing:  Normal                Special Tests:   Left knee positive for the following special tests:  Patellar Compression    Palpation:    Left knee tenderness present at:  Patellar Lateral and Patellar Superior    Edema:  Normal    Mobility Testing:  Normal            Functional Testing:  normal                  General     ROS    Assessment/Plan:    Patient is a 63 year old adult with left side knee complaints.    Patient has the following significant findings with corresponding treatment plan.                Diagnosis 1:  Left  knee pain  Pain -  splint/taping/bracing/orthotics, self management, education and home program    Therapy Evaluation Codes:   1) History comprised of:   Personal factors that impact the plan of care:      Time since onset of symptoms.    Comorbidity factors that impact the plan of care are:      None.     Medications impacting care: None.  2) Examination of Body Systems comprised of:   Body structures and functions that impact the plan of care:      Knee.   Activity limitations that impact the plan of care are:      Sleeping.  3) Clinical presentation characteristics are:   Stable/Uncomplicated.  4) Decision-Making    Low complexity using standardized patient assessment instrument and/or measureable assessment of functional outcome.  Cumulative Therapy Evaluation is: Low complexity.    Previous and current functional limitations:  (See Goal Flow Sheet for this information)    Short term and Long term goals: (See Goal Flow Sheet for this information)     Communication ability:  Patient appears to be able to clearly communicate and understand verbal and written communication and follow directions correctly.  Treatment Explanation - The following has been discussed with the patient:   RX ordered/plan of care  Anticipated outcomes  Possible risks and side effects  This patient would benefit from PT intervention to resume normal activities.   Rehab potential is good.    Frequency:  1 X week, once daily  Duration:  for 4 weeks  Discharge Plan:  Achieve all LTG.  Independent in home treatment program.  Reach maximal therapeutic benefit.    Please refer to the daily flowsheet for treatment today, total treatment time and time spent performing 1:1 timed codes.

## 2019-04-11 ENCOUNTER — OFFICE VISIT (OUTPATIENT)
Dept: PODIATRY | Facility: CLINIC | Age: 64
End: 2019-04-11
Payer: COMMERCIAL

## 2019-04-11 VITALS
SYSTOLIC BLOOD PRESSURE: 130 MMHG | BODY MASS INDEX: 24.48 KG/M2 | WEIGHT: 171 LBS | DIASTOLIC BLOOD PRESSURE: 82 MMHG | HEART RATE: 82 BPM | HEIGHT: 70 IN

## 2019-04-11 DIAGNOSIS — L60.0 ONYCHOCRYPTOSIS: Primary | ICD-10-CM

## 2019-04-11 PROCEDURE — 99243 OFF/OP CNSLTJ NEW/EST LOW 30: CPT | Performed by: PODIATRIST

## 2019-04-11 ASSESSMENT — MIFFLIN-ST. JEOR: SCORE: 1586.9

## 2019-04-11 NOTE — PROGRESS NOTES
"Foot & Ankle Surgery  April 11, 2019    CC: \"painful nail\"    I was asked to see Tony Mcnair regarding the chief complaint by:  Dr. SUBHASH De Leon    HPI:  Pt is a 63 year old adult who presents with above complaint.  R great toe pain x 50 years, states he struck his toe on a aure in high school and \"it never healed right\".  No pain today but can be 8/10 when bumping his toe or in tight shoes.  No treatment.    ROS:   Pos for CC.  The patient denies current nausea, vomiting, chills, fevers, belly pain, calf pain, chest pain or SOB.  Complete remainder of ROS is otherwise neg.    VITALS:  There were no vitals filed for this visit.    PMH:    Past Medical History:   Diagnosis Date     Arthritis      Depressive disorder Junie     [place] 1988    LEFT EYE       SXHX:    Past Surgical History:   Procedure Laterality Date     COLONOSCOPY  11 years ago     HERNIA REPAIR  10 years ago     IRIS NEVUS Right      LESION RIGHT UPPER LID Right 1999    EXCISION        MEDS:    Current Outpatient Medications   Medication     Artificial Tear Ointment (REFRESH P.M.) OINT     BIOTIN PO     calcipotriene-betameth diprop (TACLONEX) 0.005-0.064 % external ointment     fluocinonide (LIDEX) 0.05 % external solution     Multiple Vitamin (DAILY MULTIVITAMIN PO)     naproxen (NAPROSYN) 500 MG tablet     olopatadine (PATADAY) 0.2 % ophthalmic solution     pimecrolimus (ELIDEL) 1 % cream     tacrolimus (PROTOPIC) 0.1 % ointment     VITAMIN D, CHOLECALCIFEROL, PO     No current facility-administered medications for this visit.        ALL:   No Known Allergies    FMH:    Family History   Problem Relation Age of Onset     Coronary Artery Disease Father      Hyperlipidemia Father      Coronary Artery Disease Brother      Hyperlipidemia Brother      Hypertension Mother      Breast Cancer Mother      Substance Abuse Mother      Cancer No family hx of         skin cancer     Glaucoma No family hx of      Macular Degeneration No family hx of      Retinal " detachment No family hx of      Skin Cancer No family hx of      Melanoma No family hx of        SocHx:    Social History     Socioeconomic History     Marital status:      Spouse name: Not on file     Number of children: Not on file     Years of education: Not on file     Highest education level: Not on file   Occupational History     Not on file   Social Needs     Financial resource strain: Not on file     Food insecurity:     Worry: Not on file     Inability: Not on file     Transportation needs:     Medical: Not on file     Non-medical: Not on file   Tobacco Use     Smoking status: Never Smoker     Smokeless tobacco: Never Used   Substance and Sexual Activity     Alcohol use: Yes     Drug use: No     Sexual activity: Not Currently     Partners: Female     Birth control/protection: None   Lifestyle     Physical activity:     Days per week: Not on file     Minutes per session: Not on file     Stress: Not on file   Relationships     Social connections:     Talks on phone: Not on file     Gets together: Not on file     Attends Gnosticist service: Not on file     Active member of club or organization: Not on file     Attends meetings of clubs or organizations: Not on file     Relationship status: Not on file     Intimate partner violence:     Fear of current or ex partner: Not on file     Emotionally abused: Not on file     Physically abused: Not on file     Forced sexual activity: Not on file   Other Topics Concern     Parent/sibling w/ CABG, MI or angioplasty before 65F 55M? Yes     Comment: Father, 50   Social History Narrative     Not on file           EXAMINATION:  Gen:   No apparent distress  Neuro:   A&Ox3, no deficits  Psych:    Answering questions appropriately for age and situation with normal affect  Head:    NCAT  Eye:    Visual scanning without deficit  Ear:    Response to auditory stimuli wnl  Lung:    Non-labored breathing on RA noted  Abd:    NTND per patient report  Lymph:    Neg for  pitting/non-pitting edema BLE  Vasc:    Pulses palpable, CFT minimally delayed  Neuro:    Light touch sensation intact to all sensory nerve distributions without paresthesias  Derm:    R hallux nail is quite incurvated bilateral border, nearly cylindrical. Some tenderness today, but no wounds/paronychia or SOI  MSK:    ROM, strength wnl without limitation, no pain on palpation noted.  Calf:    Neg for redness, swelling or tenderness    Assessment:  63 year old adult with onychocryptosis bilateral border R hallux      Plan:  Discussed etiologies, anatomy and options  1.  Onychocryptosis bilateral border R hallux  -slantbacks performed, leading edge  and removed with nail clipper; no charge  -discussed long-term options.  Recommend total permanent avulsion if routine nail trimmings/slantbacks do not provide sufficient relief  -discussed post-procedure instructions for P&A for patient information/planning    Follow up:  Prn for 30m appt for P&A or sooner with acute issues      Patient's medical history was reviewed today    There is no height or weight on file to calculate BMI.  Weight management plan: Patient was referred to their PCP to discuss a diet and exercise plan.        Anthony Jalloh DPM FACFAS FACFAOM  Podiatric Foot & Ankle Surgeon  [facility]  [phone]

## 2019-04-11 NOTE — LETTER
"    4/11/2019         RE: Tony Arroyojodie  [facility] [facility] [address]        Dear Colleague,    Thank you for referring your patient, Tony Mcnair, to the [facility]. Please see a copy of my visit note below.    Foot & Ankle Surgery  April 11, 2019    CC: \"painful nail\"    I was asked to see Tony PRAITK Xu regarding the chief complaint by:  Dr. SUBHASH De Leon    HPI:  Pt is a 63 year old adult who presents with above complaint.  R great toe pain x 50 years, states he struck his toe on a aure in high school and \"it never healed right\".  No pain today but can be 8/10 when bumping his toe or in tight shoes.  No treatment.    ROS:   Pos for CC.  The patient denies current nausea, vomiting, chills, fevers, belly pain, calf pain, chest pain or SOB.  Complete remainder of ROS is otherwise neg.    VITALS:  There were no vitals filed for this visit.    PMH:    Past Medical History:   Diagnosis Date     Arthritis      Depressive disorder Junie     [place] 1988    LEFT EYE       SXHX:    Past Surgical History:   Procedure Laterality Date     COLONOSCOPY  11 years ago     HERNIA REPAIR  10 years ago     IRIS NEVUS Right      LESION RIGHT UPPER LID Right 1999    EXCISION        MEDS:    Current Outpatient Medications   Medication     Artificial Tear Ointment (REFRESH P.M.) OINT     BIOTIN PO     calcipotriene-betameth diprop (TACLONEX) 0.005-0.064 % external ointment     fluocinonide (LIDEX) 0.05 % external solution     Multiple Vitamin (DAILY MULTIVITAMIN PO)     naproxen (NAPROSYN) 500 MG tablet     olopatadine (PATADAY) 0.2 % ophthalmic solution     pimecrolimus (ELIDEL) 1 % cream     tacrolimus (PROTOPIC) 0.1 % ointment     VITAMIN D, CHOLECALCIFEROL, PO     No current facility-administered medications for this visit.        ALL:   No Known Allergies    FMH:    Family History   Problem Relation Age of Onset     Coronary Artery Disease Father      Hyperlipidemia Father      Coronary Artery Disease Brother "      Hyperlipidemia Brother      Hypertension Mother      Breast Cancer Mother      Substance Abuse Mother      Cancer No family hx of         skin cancer     Glaucoma No family hx of      Macular Degeneration No family hx of      Retinal detachment No family hx of      Skin Cancer No family hx of      Melanoma No family hx of        SocHx:    Social History     Socioeconomic History     Marital status:      Spouse name: Not on file     Number of children: Not on file     Years of education: Not on file     Highest education level: Not on file   Occupational History     Not on file   Social Needs     Financial resource strain: Not on file     Food insecurity:     Worry: Not on file     Inability: Not on file     Transportation needs:     Medical: Not on file     Non-medical: Not on file   Tobacco Use     Smoking status: Never Smoker     Smokeless tobacco: Never Used   Substance and Sexual Activity     Alcohol use: Yes     Drug use: No     Sexual activity: Not Currently     Partners: Female     Birth control/protection: None   Lifestyle     Physical activity:     Days per week: Not on file     Minutes per session: Not on file     Stress: Not on file   Relationships     Social connections:     Talks on phone: Not on file     Gets together: Not on file     Attends Sikh service: Not on file     Active member of club or organization: Not on file     Attends meetings of clubs or organizations: Not on file     Relationship status: Not on file     Intimate partner violence:     Fear of current or ex partner: Not on file     Emotionally abused: Not on file     Physically abused: Not on file     Forced sexual activity: Not on file   Other Topics Concern     Parent/sibling w/ CABG, MI or angioplasty before 65F 55M? Yes     Comment: Father, 50   Social History Narrative     Not on file           EXAMINATION:  Gen:   No apparent distress  Neuro:   A&Ox3, no deficits  Psych:    Answering questions appropriately for  age and situation with normal affect  Head:    NCAT  Eye:    Visual scanning without deficit  Ear:    Response to auditory stimuli wnl  Lung:    Non-labored breathing on RA noted  Abd:    NTND per patient report  Lymph:    Neg for pitting/non-pitting edema BLE  Vasc:    Pulses palpable, CFT minimally delayed  Neuro:    Light touch sensation intact to all sensory nerve distributions without paresthesias  Derm:    R hallux nail is quite incurvated bilateral border, nearly cylindrical. Some tenderness today, but no wounds/paronychia or SOI  MSK:    ROM, strength wnl without limitation, no pain on palpation noted.  Calf:    Neg for redness, swelling or tenderness    Assessment:  63 year old adult with onychocryptosis bilateral border R hallux      Plan:  Discussed etiologies, anatomy and options  1.  Onychocryptosis bilateral border R hallux  -slantbacks performed, leading edge  and removed with nail clipper; no charge  -discussed long-term options.  Recommend total permanent avulsion if routine nail trimmings/slantbacks do not provide sufficient relief  -discussed post-procedure instructions for P&A for patient information/planning    Follow up:  Prn for 30m appt for P&A or sooner with acute issues      Patient's medical history was reviewed today    There is no height or weight on file to calculate BMI.  Weight management plan: Patient was referred to their PCP to discuss a diet and exercise plan.        Anthony Jalloh DPM FACFAS FACFAOM  Podiatric Foot & Ankle Surgeon  [facility]  [phone]        Again, thank you for allowing me to participate in the care of your patient.        Sincerely,        Anthony Jalloh DPM, RICHAR

## 2019-04-17 ENCOUNTER — ALLIED HEALTH/NURSE VISIT (OUTPATIENT)
Dept: DERMATOLOGY | Facility: CLINIC | Age: 64
End: 2019-04-17
Payer: COMMERCIAL

## 2019-04-17 DIAGNOSIS — L40.0 PLAQUE PSORIASIS: ICD-10-CM

## 2019-04-17 NOTE — PROGRESS NOTES
[facility] Dermatology Phototherapy Record  1. Tony Mcnair is a 63 year old adult is here today for phototherapy (UVB) treatment for plaque psoriasis.        Changes or new medications since last treatment:   YES    New medical conditions or problems since last treatment:   NO    Any problems with last phototherapy treatment?    NO    2. The patient tolerated phototherapy without complication    Patient will return for next UVB treatment, per protocol.     Patient to see provider every 4-12 weeks for follow-up during treatment.      All questions and concerns discussed with patient in clinic today.      Pia Garner RN

## 2019-04-18 DIAGNOSIS — L40.9 PSORIASIS: ICD-10-CM

## 2019-04-19 DIAGNOSIS — Z92.29 HISTORY OF MEASLES VACCINATION: ICD-10-CM

## 2019-04-19 DIAGNOSIS — Z11.59 NEED FOR HEPATITIS C SCREENING TEST: ICD-10-CM

## 2019-04-19 DIAGNOSIS — Z11.4 SCREENING FOR HIV (HUMAN IMMUNODEFICIENCY VIRUS): ICD-10-CM

## 2019-04-19 DIAGNOSIS — Z13.220 LIPID SCREENING: ICD-10-CM

## 2019-04-19 DIAGNOSIS — Z12.5 SCREENING FOR PROSTATE CANCER: ICD-10-CM

## 2019-04-19 DIAGNOSIS — Z00.00 ROUTINE HISTORY AND PHYSICAL EXAMINATION OF ADULT: ICD-10-CM

## 2019-04-19 LAB
ANION GAP SERPL CALCULATED.3IONS-SCNC: 6 MMOL/L (ref 3–14)
BASOPHILS # BLD AUTO: 0.1 10E9/L (ref 0–0.2)
BASOPHILS NFR BLD AUTO: 1 %
BUN SERPL-MCNC: 15 MG/DL (ref 7–30)
CALCIUM SERPL-MCNC: 9.2 MG/DL (ref 8.5–10.1)
CHLORIDE SERPL-SCNC: 107 MMOL/L (ref 94–109)
CHOLEST SERPL-MCNC: 219 MG/DL
CO2 SERPL-SCNC: 29 MMOL/L (ref 20–32)
CREAT SERPL-MCNC: 1.04 MG/DL (ref 0.66–1.25)
DIFFERENTIAL METHOD BLD: ABNORMAL
EOSINOPHIL # BLD AUTO: 0.6 10E9/L (ref 0–0.7)
EOSINOPHIL NFR BLD AUTO: 10.1 %
ERYTHROCYTE [DISTWIDTH] IN BLOOD BY AUTOMATED COUNT: 12 % (ref 10–15)
GFR SERPL CREATININE-BSD FRML MDRD: 77 ML/MIN/{1.73_M2}
GLUCOSE SERPL-MCNC: 95 MG/DL (ref 70–99)
HCT VFR BLD AUTO: 41.9 % (ref 40–53)
HDLC SERPL-MCNC: 64 MG/DL
HGB BLD-MCNC: 14.7 G/DL (ref 13.3–17.7)
LDLC SERPL CALC-MCNC: 131 MG/DL
LYMPHOCYTES # BLD AUTO: 1.5 10E9/L (ref 0.8–5.3)
LYMPHOCYTES NFR BLD AUTO: 26.1 %
MCH RBC QN AUTO: 32.9 PG (ref 26.5–33)
MCHC RBC AUTO-ENTMCNC: 35.1 G/DL (ref 31.5–36.5)
MCV RBC AUTO: 94 FL (ref 78–100)
MONOCYTES # BLD AUTO: 0.6 10E9/L (ref 0–1.3)
MONOCYTES NFR BLD AUTO: 9.6 %
NEUTROPHILS # BLD AUTO: 3.1 10E9/L (ref 1.6–8.3)
NEUTROPHILS NFR BLD AUTO: 53.2 %
NONHDLC SERPL-MCNC: 155 MG/DL
PLATELET # BLD AUTO: 135 10E9/L (ref 150–450)
POTASSIUM SERPL-SCNC: 4.4 MMOL/L (ref 3.4–5.3)
PSA SERPL-ACNC: 0.75 UG/L (ref 0–4)
RBC # BLD AUTO: 4.47 10E12/L (ref 4.4–5.9)
SODIUM SERPL-SCNC: 142 MMOL/L (ref 133–144)
TRIGL SERPL-MCNC: 121 MG/DL
WBC # BLD AUTO: 5.8 10E9/L (ref 4–11)

## 2019-04-19 PROCEDURE — G0103 PSA SCREENING: HCPCS | Performed by: INTERNAL MEDICINE

## 2019-04-19 PROCEDURE — 87389 HIV-1 AG W/HIV-1&-2 AB AG IA: CPT | Performed by: INTERNAL MEDICINE

## 2019-04-19 PROCEDURE — 86765 RUBEOLA ANTIBODY: CPT | Performed by: INTERNAL MEDICINE

## 2019-04-19 PROCEDURE — 80048 BASIC METABOLIC PNL TOTAL CA: CPT | Performed by: INTERNAL MEDICINE

## 2019-04-19 PROCEDURE — 86803 HEPATITIS C AB TEST: CPT | Performed by: INTERNAL MEDICINE

## 2019-04-19 PROCEDURE — 85025 COMPLETE CBC W/AUTO DIFF WBC: CPT | Performed by: INTERNAL MEDICINE

## 2019-04-19 PROCEDURE — 36415 COLL VENOUS BLD VENIPUNCTURE: CPT | Performed by: INTERNAL MEDICINE

## 2019-04-19 PROCEDURE — 80061 LIPID PANEL: CPT | Performed by: INTERNAL MEDICINE

## 2019-04-22 ENCOUNTER — THERAPY VISIT (OUTPATIENT)
Dept: PHYSICAL THERAPY | Facility: CLINIC | Age: 64
End: 2019-04-22
Payer: COMMERCIAL

## 2019-04-22 DIAGNOSIS — M25.562 LEFT KNEE PAIN: ICD-10-CM

## 2019-04-22 LAB
HCV AB SERPL QL IA: NONREACTIVE
HIV 1+2 AB+HIV1 P24 AG SERPL QL IA: NONREACTIVE

## 2019-04-22 PROCEDURE — 97140 MANUAL THERAPY 1/> REGIONS: CPT | Mod: GP | Performed by: PHYSICAL THERAPIST

## 2019-04-22 PROCEDURE — 97110 THERAPEUTIC EXERCISES: CPT | Mod: GP | Performed by: PHYSICAL THERAPIST

## 2019-04-22 PROCEDURE — 97035 APP MDLTY 1+ULTRASOUND EA 15: CPT | Mod: GP | Performed by: PHYSICAL THERAPIST

## 2019-04-22 RX ORDER — CALCIPOTRIENE, BETAMETHASONE DIPROPIONATE 50; .643 UG/G; MG/G
OINTMENT TOPICAL
Qty: 60 G | Refills: 0 | Status: SHIPPED | OUTPATIENT
Start: 2019-04-22 | End: 2019-08-16

## 2019-04-24 ENCOUNTER — ALLIED HEALTH/NURSE VISIT (OUTPATIENT)
Dept: DERMATOLOGY | Facility: CLINIC | Age: 64
End: 2019-04-24
Payer: COMMERCIAL

## 2019-04-24 DIAGNOSIS — L40.0 PLAQUE PSORIASIS: ICD-10-CM

## 2019-04-24 NOTE — PROGRESS NOTES
[facility] Dermatology Phototherapy Record  1. Tony Mcnair is a 63 year old adult is here today for phototherapy (UVB) treatment for Plaque psoriasis.        Changes or new medications since last treatment:   NO    New medical conditions or problems since last treatment:   NO    Any problems with last phototherapy treatment?    NO    2. The patient tolerated phototherapy without complication    Patient will return on for next UVB treatment, per protocol.     Patient to see provider every 4-12 weeks for follow-up during treatment.      All questions and concerns discussed with patient in clinic today.      MAYRA Sultana

## 2019-04-24 NOTE — NURSING NOTE
Tony Mcnair comes into clinic today at the request of Dr. Corbett Ordering Provider for NBuvb.      This service provided today was under the supervising provider of the day Dr. Blanco, who was available if needed.    MAYRA Sultana

## 2019-04-26 LAB — MEV IGG SER QL IA: >8 AI (ref 0–0.8)

## 2019-05-01 ENCOUNTER — ALLIED HEALTH/NURSE VISIT (OUTPATIENT)
Dept: DERMATOLOGY | Facility: CLINIC | Age: 64
End: 2019-05-01
Payer: COMMERCIAL

## 2019-05-01 DIAGNOSIS — L40.0 PLAQUE PSORIASIS: ICD-10-CM

## 2019-05-01 NOTE — PROGRESS NOTES
Tony Mcnair comes into clinic today at the request of Dr. Corbett  Ordering Provider for UVB.      This service provided today was under the supervising provider of the day Dr. Kent, who was available if needed.    Emely Cox  [facility] Dermatology Phototherapy Record  1. Tony Mcnair is a 63 year old adult is here today for phototherapy (UVB) treatment for Plaque psoriasis.        Changes or new medications since last treatment:   NO    New medical conditions or problems since last treatment:   NO    Any problems with last phototherapy treatment?    NO    2. The patient tolerated phototherapy without complication    Patient will return for next UVB treatment, per protocol.     Patient to see provider every 4-12 weeks for follow-up during treatment.      All questions and concerns discussed with patient in clinic today.      Emely Cox

## 2019-05-06 ENCOUNTER — MYC MEDICAL ADVICE (OUTPATIENT)
Dept: FAMILY MEDICINE | Facility: CLINIC | Age: 64
End: 2019-05-06

## 2019-05-06 DIAGNOSIS — E78.5 HYPERLIPIDEMIA, UNSPECIFIED HYPERLIPIDEMIA TYPE: Primary | ICD-10-CM

## 2019-05-06 NOTE — TELEPHONE ENCOUNTER
Dr De Leon,   [facility] and advise.  Thanks,  Kayli NASH RN

## 2019-05-08 ENCOUNTER — ALLIED HEALTH/NURSE VISIT (OUTPATIENT)
Dept: DERMATOLOGY | Facility: CLINIC | Age: 64
End: 2019-05-08
Payer: COMMERCIAL

## 2019-05-08 DIAGNOSIS — L40.0 PLAQUE PSORIASIS: ICD-10-CM

## 2019-05-08 NOTE — PROGRESS NOTES
[facility] Dermatology Phototherapy Record  1. Tony Mcnair is a 63 year old adult is here today for phototherapy (UVB) treatment for Psoriasis.        Changes or new medications since last treatment:   NO    New medical conditions or problems since last treatment:   NO    Any problems with last phototherapy treatment?    NO    2. The patient tolerated phototherapy without complication    Patient will return for next UVB treatment, per protocol.     Patient to see provider every 4-12 weeks for follow-up during treatment.      All questions and concerns discussed with patient in clinic today.      Georgina Wills RN

## 2019-05-10 RX ORDER — ROSUVASTATIN CALCIUM 10 MG/1
10 TABLET, COATED ORAL DAILY
Qty: 30 TABLET | Refills: 3 | Status: SHIPPED | OUTPATIENT
Start: 2019-05-10 | End: 2019-08-16

## 2019-05-14 ENCOUNTER — OFFICE VISIT (OUTPATIENT)
Dept: DERMATOLOGY | Facility: CLINIC | Age: 64
End: 2019-05-14
Payer: COMMERCIAL

## 2019-05-14 DIAGNOSIS — L40.9 PSORIASIS: ICD-10-CM

## 2019-05-14 RX ORDER — FLUOCINONIDE TOPICAL SOLUTION USP, 0.05% 0.5 MG/ML
SOLUTION TOPICAL
Qty: 60 ML | Refills: 3 | Status: SHIPPED | OUTPATIENT
Start: 2019-05-14 | End: 2019-08-16

## 2019-05-14 ASSESSMENT — PAIN SCALES - GENERAL: PAINLEVEL: NO PAIN (0)

## 2019-05-14 NOTE — PROGRESS NOTES
[facility] Dermatology Note      Dermatology Problem List:  1. Limited plaque type psoriasis  Current tx:   -NBUVB phototherapy 1/week. Has home light box. Doing home light box 2 days before and 2 days after clinic phototherapy until reaches clinic dose. Clinic dosage is 1377 mJ  -Lidex 0.05% solution for scalp BID   -Protopic 0.1% around eyes BID PRN  -taclonex ointment to affected areas BID PRN, bedtime occlusion with Saran wrap for flares      Encounter Date: May 14, 2019    CC:  Chief Complaint   Patient presents with     Psoriasis     Tony is here for a recheck and says it has been stable.         History of Present Illness:   Tony Mcnair is a 63 year old adult who presents as a follow-up for plaque type psoriasis. The patient was last seen 10/23/18.    He has noticed 2 new lesions. One is small on top of the left foot with a scale and the other was on the back of right calf. States they are both under control and do not cause any itching or pain. Has been placing taclonex on them. His right shin lesion will flare up occasionally but able to use taclonex to control it. Otherwise satisfied with overall control. Gets occasional swelling in his left index finger causing pain but has identified dietary triggers such as sugar to avoid flare ups. No new joint pains. Is being seen for hip pain but states it is due to tendons.    NBUVB therapy once a week (been on for 10+ years), lidex use on scalp, protopic for around eyes and taclonex to affected areas and flare ups. He purchased a light box. It is set up but needs to start using it. Wondering how to transfer data and what settings to put it at.    Past Medical History:   Patient Active Problem List   Diagnosis     Psoriasis     Pain in both feet     Iliotibial band syndrome, unspecified laterality     Adhesive capsulitis of left shoulder     Left shoulder pain     Left knee pain     Past Medical History:   Diagnosis Date     Arthritis       Depressive disorder Junie     [place] 1988    LEFT EYE     Past Surgical History:   Procedure Laterality Date     COLONOSCOPY  11 years ago     HERNIA REPAIR  10 years ago     IRIS NEVUS Right      LESION RIGHT UPPER LID Right 1999    EXCISION       Social History:  Patient reports that he has never smoked, has never used smokeless tobacco, drinks alcohol, does not use drugs.    Family History:  Family History   Problem Relation Age of Onset     Coronary Artery Disease Father      Hyperlipidemia Father      Coronary Artery Disease Brother      Hyperlipidemia Brother      Hypertension Mother      Breast Cancer Mother      Substance Abuse Mother      Cancer No family hx of         skin cancer     Glaucoma No family hx of      Macular Degeneration No family hx of      Retinal detachment No family hx of      Skin Cancer No family hx of      Melanoma No family hx of        Medications:  Current Outpatient Medications   Medication Sig Dispense Refill     Artificial Tear Ointment (REFRESH P.M.) OINT Apply 1 inch to eye nightly as needed 1 Tube 6     BIOTIN PO Take  by mouth daily.       calcipotriene-betameth diprop (TACLONEX) 0.005-0.064 % external ointment APPLY TO THE AFFECTED AREA 2 TIMES DAILY AS NEEDED * 5/14/19 APPT FOR FURTHER REFILLS 60 g 0     fluocinonide (LIDEX) 0.05 % external solution Apply  topically 2 times daily for itchy scalp or scalp psoriasis 60 mL 0     Multiple Vitamin (DAILY MULTIVITAMIN PO) Take  by mouth daily.       naproxen (NAPROSYN) 500 MG tablet Take 1 tablet (500 mg) by mouth 2 times daily (with meals) 30 tablet 1     olopatadine (PATADAY) 0.2 % ophthalmic solution PLACE 1 DROP INTO BOTH EYES DAILY 2.5 mL 0     pimecrolimus (ELIDEL) 1 % cream Apply topically 2 times daily as needed For psoriasis of face or genitals 60 g 2     rosuvastatin (CRESTOR) 10 MG tablet Take 1 tablet (10 mg) by mouth daily Schedule a laboratory-only visit in 3 months to recheck your cholesterol (please come in  fasting). 30 tablet 3     tacrolimus (PROTOPIC) 0.1 % ointment Apply topically 2 times daily Apply to areas around eyes as needed 60 g 2     VITAMIN D, CHOLECALCIFEROL, PO Take by mouth daily       No Known Allergies      Review of Systems:  -The patient denies any new rash, pruritus, or lesions that are symptomatic, changing or bleeding, except as per HPI.  -Constitutional: Otherwise feeling well today, in usual state of health.  -HEENT: Patient denies nonhealing oral sores.  -Skin: As above in HPI. No additional skin concerns.  Rheum: see HPI    Physical exam:  Vitals: There were no vitals taken for this visit.  GEN: This is a well developed, well-nourished male in no acute distress, in a pleasant mood.    SKIN: Focused examination of the scalp, neck, upper and lower extremities was performed.  -bilateral upper extremities; 2 x 1 cm micaceous, pink scaly plaques on elbows  -Left leg; 2 x 1 cm micaceous, pink scaly plaque over knee. On left dorsal foot <1cm scaly, pink papule  -Right leg 3 x 2 cm micaceous, pink scaly plaque over knee; right lower lateral leg extending proximal to inferior knee presence of 10 x 6 cm micaceous, pink scaly plaque  -No other lesions of concern on areas examined.     Impression/Plan:  1. Plaque type psoriasis    NBUVB phototherapy 1/week here in clinic while gradually ramping up home dosage to avoid flare ups of psoriasis. Do home light box 2 days before and 2 days after clinic phototherapy. Clinic dosage is 1377 mJ    Lidex 0.05% solution for scalp BID     Protopic 0.1% around eyes BID PRN    Taclonex ointment to affected areas BID PRN, bedtime occlusion with Saran wrap for flares    Continue to see primary doctor once a year due to increased risks of hypertension and lipid abnormalities from inflammation with psoriasis    CC Referred Self, MD  No address on file on close of this encounter.  Follow-up in 6 months, earlier for new or changing lesions.     Staff Involved:  Skip VILLA,  saw and examined the patient in the presence of Dr. Corbett.    The medical student acted as a scribe with respect to this patient.  I have performed all the key elements of the history and physical, as well as any pertinent procedures.    Ptear Corbett MD  Dermatology Attending

## 2019-05-14 NOTE — LETTER
5/14/2019       RE: Tony Mcnair  [address] [facility] [id number]     Dear Colleague,    Thank you for referring your patient, Tony Mcnair, to the [facility] DERMATOLOGY at [facility]. Please see a copy of my visit note below.    [facility] Dermatology Note      Dermatology Problem List:  1. Limited plaque type psoriasis  Current tx:   -NBUVB phototherapy 1/week. Has home light box. Doing home light box 2 days before and 2 days after clinic phototherapy until reaches clinic dose. Clinic dosage is 1377 mJ  -Lidex 0.05% solution for scalp BID   -Protopic 0.1% around eyes BID PRN  -taclonex ointment to affected areas BID PRN, bedtime occlusion with Saran wrap for flares      Encounter Date: May 14, 2019    CC:  Chief Complaint   Patient presents with     Psoriasis     Tony is here for a recheck and says it has been stable.         History of Present Illness:   Tony Mcnair is a 63 year old adult who presents as a follow-up for plaque type psoriasis. The patient was last seen 10/23/18.    He has noticed 2 new lesions. One is small on top of the left foot with a scale and the other was on the back of right calf. States they are both under control and do not cause any itching or pain. Has been placing taclonex on them. His right shin lesion will flare up occasionally but able to use taclonex to control it. Otherwise satisfied with overall control. Gets occasional swelling in his left index finger causing pain but has identified dietary triggers such as sugar to avoid flare ups. No new joint pains. Is being seen for hip pain but states it is due to tendons.    NBUVB therapy once a week (been on for 10+ years), lidex use on scalp, protopic for around eyes and taclonex to affected areas and flare ups. He purchased a light box. It is set up but needs to start using it. Wondering how to transfer data and what settings to put it at.    Past Medical History:    Patient Active Problem List   Diagnosis     Psoriasis     Pain in both feet     Iliotibial band syndrome, unspecified laterality     Adhesive capsulitis of left shoulder     Left shoulder pain     Left knee pain     Past Medical History:   Diagnosis Date     Arthritis      Depressive disorder Juine     [place] 1988    LEFT EYE     Past Surgical History:   Procedure Laterality Date     COLONOSCOPY  11 years ago     HERNIA REPAIR  10 years ago     IRIS NEVUS Right      LESION RIGHT UPPER LID Right 1999    EXCISION       Social History:  Patient reports that he has never smoked, has never used smokeless tobacco, drinks alcohol, does not use drugs.    Family History:  Family History   Problem Relation Age of Onset     Coronary Artery Disease Father      Hyperlipidemia Father      Coronary Artery Disease Brother      Hyperlipidemia Brother      Hypertension Mother      Breast Cancer Mother      Substance Abuse Mother      Cancer No family hx of         skin cancer     Glaucoma No family hx of      Macular Degeneration No family hx of      Retinal detachment No family hx of      Skin Cancer No family hx of      Melanoma No family hx of        Medications:  Current Outpatient Medications   Medication Sig Dispense Refill     Artificial Tear Ointment (REFRESH P.M.) OINT Apply 1 inch to eye nightly as needed 1 Tube 6     BIOTIN PO Take  by mouth daily.       calcipotriene-betameth diprop (TACLONEX) 0.005-0.064 % external ointment APPLY TO THE AFFECTED AREA 2 TIMES DAILY AS NEEDED * 5/14/19 APPT FOR FURTHER REFILLS 60 g 0     fluocinonide (LIDEX) 0.05 % external solution Apply  topically 2 times daily for itchy scalp or scalp psoriasis 60 mL 0     Multiple Vitamin (DAILY MULTIVITAMIN PO) Take  by mouth daily.       naproxen (NAPROSYN) 500 MG tablet Take 1 tablet (500 mg) by mouth 2 times daily (with meals) 30 tablet 1     olopatadine (PATADAY) 0.2 % ophthalmic solution PLACE 1 DROP INTO BOTH EYES DAILY 2.5 mL 0      pimecrolimus (ELIDEL) 1 % cream Apply topically 2 times daily as needed For psoriasis of face or genitals 60 g 2     rosuvastatin (CRESTOR) 10 MG tablet Take 1 tablet (10 mg) by mouth daily Schedule a laboratory-only visit in 3 months to recheck your cholesterol (please come in fasting). 30 tablet 3     tacrolimus (PROTOPIC) 0.1 % ointment Apply topically 2 times daily Apply to areas around eyes as needed 60 g 2     VITAMIN D, CHOLECALCIFEROL, PO Take by mouth daily       No Known Allergies      Review of Systems:  -The patient denies any new rash, pruritus, or lesions that are symptomatic, changing or bleeding, except as per HPI.  -Constitutional: Otherwise feeling well today, in usual state of health.  -HEENT: Patient denies nonhealing oral sores.  -Skin: As above in HPI. No additional skin concerns.  Rheum: see HPI    Physical exam:  Vitals: There were no vitals taken for this visit.  GEN: This is a well developed, well-nourished male in no acute distress, in a pleasant mood.    SKIN: Focused examination of the scalp, neck, upper and lower extremities was performed.  -bilateral upper extremities; 2 x 1 cm micaceous, pink scaly plaques on elbows  -Left leg; 2 x 1 cm micaceous, pink scaly plaque over knee. On left dorsal foot <1cm scaly, pink papule  -Right leg 3 x 2 cm micaceous, pink scaly plaque over knee; right lower lateral leg extending proximal to inferior knee presence of 10 x 6 cm micaceous, pink scaly plaque  -No other lesions of concern on areas examined.     Impression/Plan:  1. Plaque type psoriasis    NBUVB phototherapy 1/week here in clinic while gradually ramping up home dosage to avoid flare ups of psoriasis. Do home light box 2 days before and 2 days after clinic phototherapy. Clinic dosage is 1377 mJ    Lidex 0.05% solution for scalp BID     Protopic 0.1% around eyes BID PRN    Taclonex ointment to affected areas BID PRN, bedtime occlusion with Saran wrap for flares    Continue to see primary  doctor once a year due to increased risks of hypertension and lipid abnormalities from inflammation with psoriasis    CC Referred Self, MD  No address on file on close of this encounter.  Follow-up in 6 months, earlier for new or changing lesions.     Staff Involved:  I, Skip Wheeler, saw and examined the patient in the presence of Dr. Corbett.    The medical student acted as a scribe with respect to this patient.  I have performed all the key elements of the history and physical, as well as any pertinent procedures.    Petar Corbett MD  Dermatology Attending

## 2019-05-14 NOTE — NURSING NOTE
Dermatology Rooming Note    Tony Mcnair's goals for this visit include:   Chief Complaint   Patient presents with     Psoriasis     Tony is here for a recheck and says it has been stable.     Antonette Purcell, [facility]

## 2019-05-14 NOTE — PATIENT INSTRUCTIONS
1377 mJ of phototherapy for treatments in the clinic. Continue to attend therapy sessions at the clinic until you have ramped up the home dose close to clinic dose.

## 2019-05-15 ENCOUNTER — ALLIED HEALTH/NURSE VISIT (OUTPATIENT)
Dept: DERMATOLOGY | Facility: CLINIC | Age: 64
End: 2019-05-15
Payer: COMMERCIAL

## 2019-05-15 DIAGNOSIS — L40.0 PLAQUE PSORIASIS: ICD-10-CM

## 2019-05-15 NOTE — PROGRESS NOTES
[facility] Dermatology Phototherapy Record  1. Tony Mcnair is a 63 year old adult is here today for phototherapy (UVB treatment for Plaque psoriasis.        Changes or new medications since last treatment:   NO    New medical conditions or problems since last treatment:   NO    Any problems with last phototherapy treatment?    NO    2. The patient tolerated phototherapy without complication    Patient will return for next UVB treatment, per protocol.     Patient to see provider every 4-12 weeks for follow-up during treatment.      All questions and concerns discussed with patient in clinic today.      Pia Garner RN

## 2019-05-16 ENCOUNTER — TELEPHONE (OUTPATIENT)
Dept: FAMILY MEDICINE | Facility: CLINIC | Age: 64
End: 2019-05-16

## 2019-05-16 NOTE — TELEPHONE ENCOUNTER
Reason for Call:  Tele apt    Detailed comments: PT is requesting a tele visit, prior to Dr. De Leon's first available which is 5/28  ( pt is currently scheduled for that day)      Phone Number Patient can be reached at: Home number on file [phone] (home)    Best Time: any    Can we leave a detailed message on this number? YES    Call taken on 5/16/2019 at 1:12 PM by Kathy Rose

## 2019-05-17 ENCOUNTER — HOSPITAL ENCOUNTER (OUTPATIENT)
Facility: CLINIC | Age: 64
Discharge: HOME | End: 2019-05-17
Attending: SURGERY | Admitting: SURGERY
Payer: COMMERCIAL

## 2019-05-17 VITALS
OXYGEN SATURATION: 93 % | SYSTOLIC BLOOD PRESSURE: 104 MMHG | WEIGHT: 162 LBS | HEIGHT: 70 IN | DIASTOLIC BLOOD PRESSURE: 76 MMHG | HEART RATE: 68 BPM | BODY MASS INDEX: 23.19 KG/M2

## 2019-05-17 LAB
COLONOSCOPY: NORMAL
GI HISTORY AND PHYSICALL: NORMAL

## 2019-05-17 PROCEDURE — 45378 DIAGNOSTIC COLONOSCOPY: CPT | Performed by: SURGERY

## 2019-05-17 PROCEDURE — 25000128 H RX IP 250 OP 636: Performed by: SURGERY

## 2019-05-17 PROCEDURE — G0121 COLON CA SCRN NOT HI RSK IND: HCPCS | Performed by: SURGERY

## 2019-05-17 PROCEDURE — 40000104 ZZH STATISTIC MODERATE SEDATION < 10 MIN: Performed by: SURGERY

## 2019-05-17 PROCEDURE — G0500 MOD SEDAT ENDO SERVICE >5YRS: HCPCS | Performed by: SURGERY

## 2019-05-17 RX ORDER — ONDANSETRON 4 MG/1
4 TABLET, ORALLY DISINTEGRATING ORAL EVERY 6 HOURS PRN
Status: DISCONTINUED | OUTPATIENT
Start: 2019-05-17 | End: 2019-05-17 | Stop reason: HOSPADM

## 2019-05-17 RX ORDER — NALOXONE HYDROCHLORIDE 0.4 MG/ML
.1-.4 INJECTION, SOLUTION INTRAMUSCULAR; INTRAVENOUS; SUBCUTANEOUS
Status: DISCONTINUED | OUTPATIENT
Start: 2019-05-17 | End: 2019-05-17 | Stop reason: HOSPADM

## 2019-05-17 RX ORDER — FLUMAZENIL 0.1 MG/ML
0.2 INJECTION, SOLUTION INTRAVENOUS
Status: DISCONTINUED | OUTPATIENT
Start: 2019-05-17 | End: 2019-05-17 | Stop reason: HOSPADM

## 2019-05-17 RX ORDER — LIDOCAINE 40 MG/G
CREAM TOPICAL
Status: DISCONTINUED | OUTPATIENT
Start: 2019-05-17 | End: 2019-05-17 | Stop reason: HOSPADM

## 2019-05-17 RX ORDER — ONDANSETRON 2 MG/ML
4 INJECTION INTRAMUSCULAR; INTRAVENOUS EVERY 6 HOURS PRN
Status: DISCONTINUED | OUTPATIENT
Start: 2019-05-17 | End: 2019-05-17 | Stop reason: HOSPADM

## 2019-05-17 RX ORDER — FENTANYL CITRATE 50 UG/ML
INJECTION, SOLUTION INTRAMUSCULAR; INTRAVENOUS PRN
Status: DISCONTINUED | OUTPATIENT
Start: 2019-05-17 | End: 2019-05-17 | Stop reason: HOSPADM

## 2019-05-17 RX ORDER — ONDANSETRON 2 MG/ML
4 INJECTION INTRAMUSCULAR; INTRAVENOUS
Status: DISCONTINUED | OUTPATIENT
Start: 2019-05-17 | End: 2019-05-17 | Stop reason: HOSPADM

## 2019-05-17 ASSESSMENT — MIFFLIN-ST. JEOR: SCORE: 1546.08

## 2019-05-21 ENCOUNTER — VIRTUAL VISIT (OUTPATIENT)
Dept: FAMILY MEDICINE | Facility: CLINIC | Age: 64
End: 2019-05-21
Payer: COMMERCIAL

## 2019-05-21 DIAGNOSIS — F33.1 MODERATE EPISODE OF RECURRENT MAJOR DEPRESSIVE DISORDER (H): Primary | ICD-10-CM

## 2019-05-21 DIAGNOSIS — F98.8 ATTENTION DEFICIT DISORDER, UNSPECIFIED HYPERACTIVITY PRESENCE: ICD-10-CM

## 2019-05-21 PROCEDURE — 99441 ZZC PHYSICIAN TELEPHONE EVALUATION 5-10 MIN: CPT | Performed by: INTERNAL MEDICINE

## 2019-05-21 RX ORDER — CITALOPRAM HYDROBROMIDE 10 MG/1
10 TABLET ORAL DAILY
Qty: 90 TABLET | Refills: 1 | Status: SHIPPED | OUTPATIENT
Start: 2019-05-21 | End: 2019-11-13

## 2019-05-21 RX ORDER — METHYLPHENIDATE HYDROCHLORIDE 18 MG/1
18 TABLET ORAL EVERY MORNING
Qty: 30 TABLET | Refills: 0 | Status: SHIPPED | OUTPATIENT
Start: 2019-05-21 | End: 2019-08-12

## 2019-05-21 RX ORDER — METHYLPHENIDATE HYDROCHLORIDE 18 MG/1
18 TABLET ORAL EVERY MORNING
Qty: 30 TABLET | Refills: 0 | Status: SHIPPED | OUTPATIENT
Start: 2019-06-21 | End: 2019-12-27 | Stop reason: DRUGHIGH

## 2019-05-21 ASSESSMENT — PATIENT HEALTH QUESTIONNAIRE - PHQ9: SUM OF ALL RESPONSES TO PHQ QUESTIONS 1-9: 13

## 2019-05-21 NOTE — PROGRESS NOTES
"Tony Mcnair is a 63 year old adult who is being evaluated via a telephone visit.      The patient has been notified of following (by JOYCE Peraza CMA on 5/21/2019 at 4:13 PM      \"We have found that certain health care needs can be provided without the need for a physical exam.  This service lets us provide the care you need with a short phone conversation.  If a prescription is necessary we can send it directly to your pharmacy.  If lab work is needed we can place an order for that and you can then stop by our lab to have the test done at a later time.    This telephone visit will be conducted via 3 way call with the you (the patient) , the physician/provider, and a me all on the line at the same time.  This allows your physician/provider to have the phone conversation with you while I will be taking notes for your medical record.  We will have full access to your [place] medical record during this entire phone call.    Since this is like an office visit,  will bill your insurance company for this service.  Please check with your medical insurance if this type of telephone/virtual is covered . You may be responsible for the cost of this service if insurance coverage is denied.  The typical cost is $30 (10min), $59(11-20min) and $85 (21-30min)     If during the course of the call the physician/provider feels a telephone visit is not appropriate, you will not be charged for this service\"    Consent has been obtained for this service by care team member: yes.  See the scanned image in the medical record.      I have reviewed the note as documented above.  This accurately captures the substance of my conversation with the patient,      Additional provider notes:    Experiencing depression.  Denies suicidal ideation.  Was on Celexa    Needs Rx for ADD med.      Assessment/Plan:      ICD-10-CM    1. Moderate episode of recurrent major depressive disorder (H) F33.1 citalopram (CELEXA) 10 MG tablet   2. " Attention deficit disorder, unspecified hyperactivity presence F98.8 methylphenidate (CONCERTA) 18 MG CR tablet     methylphenidate (CONCERTA) 18 MG CR tablet       Total time of call between patient and provider was 8 minutes

## 2019-05-22 ENCOUNTER — ALLIED HEALTH/NURSE VISIT (OUTPATIENT)
Dept: DERMATOLOGY | Facility: CLINIC | Age: 64
End: 2019-05-22
Payer: COMMERCIAL

## 2019-05-22 DIAGNOSIS — L40.0 PLAQUE PSORIASIS: ICD-10-CM

## 2019-05-22 NOTE — PROGRESS NOTES
[facility] Dermatology Phototherapy Record  1. oTny Mcnair is a 63 year old adult is here today for phototherapy (UVB) treatment for Plaque psoriasis.        Changes or new medications since last treatment:   NO    New medical conditions or problems since last treatment:   NO    Any problems with last phototherapy treatment?    NO    2. The patient tolerated phototherapy without complication    Patient will return for next UVB treatment, per protocol.     Patient to see provider every 4-12 weeks for follow-up during treatment.      All questions and concerns discussed with patient in clinic today.      Pia Garner RN              
Tony Mcnair comes into clinic today at the request of  Ordering Provider for NBUVB.      This service provided today was under the supervising provider of the day , who was available if needed.    Pia Garner RN  
nica. 2. Slowly let your heels down over the edge of the step as you relax your calf muscles. You should feel a gentle stretch across the bottom of your foot and up the back of your leg to your knee. 3. Hold the stretch about 15 to 30 seconds, and then tighten your calf muscle a little to bring your heel back up to the level of the step. Repeat 2 to 4 times. Towel curls    Make this exercise more challenging by placing a weighted object, such as a soup can, on the other end of the towel. 1. While sitting, place your foot on a towel on the floor and scrunch the towel toward you with your toes. 2. Then, also using your toes, push the towel away from you. [place] pickups    1. Put marbles on the floor next to a cup.  2. Using your toes, try to lift the marbles up from the floor and put them in the cup. Follow-up care is a key part of your treatment and safety. Be sure to make and go to all appointments, and call your doctor if you are having problems. It's also a good idea to know your test results and keep a list of the medicines you take. Where can you learn more? Go to [email or web address] and sign in to your [employer] account. Enter P168 in the [employer] box to learn more about \"Plantar Fasciitis: Exercises. \"     If you do not have an account, please click on the \"Sign Up Now\" link. Current as of: November 29, 2017  Content Version: 11.6  © 8300-3661 [employer]. Care instructions adapted under license by [facility] ([facility]). If you have questions about a medical condition or this instruction, always ask your healthcare professional. [place] [address] any warranty or liability for your use of this information. Patient Education        Plantar Fasciitis: Care Instructions  Your Care Instructions    Plantar fasciitis is pain and inflammation of the plantar fascia, the tissue at the bottom of your foot that connects the heel bone to the toes.

## 2019-05-29 ENCOUNTER — ALLIED HEALTH/NURSE VISIT (OUTPATIENT)
Dept: DERMATOLOGY | Facility: CLINIC | Age: 64
End: 2019-05-29
Payer: COMMERCIAL

## 2019-05-29 DIAGNOSIS — L40.0 PLAQUE PSORIASIS: ICD-10-CM

## 2019-05-29 NOTE — PROGRESS NOTES
[facility] Dermatology Phototherapy Record  1. Tony Mcnair is a 63 year old adult is here today for phototherapy (UVB) treatment for Plaque psoriasis.        Changes or new medications since last treatment:   NO    New medical conditions or problems since last treatment:   NO    Any problems with last phototherapy treatment?    NO    2. The patient tolerated phototherapy without complication    Patient will return for next UVB treatment, per protocol.     Patient to see provider every 4-12 weeks for follow-up during treatment.      All questions and concerns discussed with patient in clinic today.      Pia Garner RN

## 2019-06-05 ENCOUNTER — ALLIED HEALTH/NURSE VISIT (OUTPATIENT)
Dept: DERMATOLOGY | Facility: CLINIC | Age: 64
End: 2019-06-05
Payer: COMMERCIAL

## 2019-06-05 DIAGNOSIS — L40.0 PLAQUE PSORIASIS: ICD-10-CM

## 2019-06-05 NOTE — PROGRESS NOTES
Tony Mcnair comes into clinic today at the request of Dr. Corbett Ordering Provider for NBUVB    This service provided today was under the supervising provider of the day Dr. Mckinney, who was available if needed.    Lorrie Graham  [facility] Dermatology Phototherapy Record  1. Tony Mcnair is a 63 year old adult is here today for phototherapy (UVB) treatment for Plaque psoriasis.        Changes or new medications since last treatment:   NO    New medical conditions or problems since last treatment:   NO    Any problems with last phototherapy treatment?    NO    2. The patient tolerated phototherapy without complication    Patient will return for next UVB treatment, per protocol.     Patient to see provider every 4-12 weeks for follow-up during treatment.      All questions and concerns discussed with patient in clinic today.      Lorrie Graham

## 2019-06-12 ENCOUNTER — ALLIED HEALTH/NURSE VISIT (OUTPATIENT)
Dept: DERMATOLOGY | Facility: CLINIC | Age: 64
End: 2019-06-12
Payer: COMMERCIAL

## 2019-06-12 DIAGNOSIS — L40.0 PLAQUE PSORIASIS: ICD-10-CM

## 2019-06-12 NOTE — PROGRESS NOTES
[facility] Dermatology Phototherapy Record  1. Tony Mcnair is a 63 year old adult is here today for phototherapy (UVB) treatment for plaque psoriasis.        Changes or new medications since last treatment:   NO    New medical conditions or problems since last treatment:   NO    Any problems with last phototherapy treatment?    NO    2. The patient tolerated phototherapy without complication    Patient will return for next UVB treatment, per protocol.     Patient to see provider every 4-12 weeks for follow-up during treatment.      All questions and concerns discussed with patient in clinic today.      Pia Garner RN            Tony Mcnair comes into clinic today at the request of  Ordering Provider for NBUVB.      This service provided today was under the supervising provider of the day Tricia Davies PA-C, who was available if needed.    Pia Garner RN

## 2019-06-19 ENCOUNTER — ALLIED HEALTH/NURSE VISIT (OUTPATIENT)
Dept: DERMATOLOGY | Facility: CLINIC | Age: 64
End: 2019-06-19
Payer: COMMERCIAL

## 2019-06-19 DIAGNOSIS — L40.0 PLAQUE PSORIASIS: ICD-10-CM

## 2019-06-19 NOTE — PROGRESS NOTES
Tony Mcnair comes into clinic today at the request of Dr. Corbett Ordering Provider for NBUVB.    This service provided today was under the supervising provider of the day Dr. Kent, who was available if needed.    Lorrie Graham  [facility] Dermatology Phototherapy Record  1. Tony Mcnair is a 63 year old adult is here today for phototherapy (UVB) treatment forPlaque psoriasis .        Changes or new medications since last treatment:   NO    New medical conditions or problems since last treatment:   NO    Any problems with last phototherapy treatment?    NO    2. The patient tolerated phototherapy without complication    Patient will return for next UVB treatment, per protocol.     Patient to see provider every 4-12 weeks for follow-up during treatment.      All questions and concerns discussed with patient in clinic today.      Lorrie Graham

## 2019-06-26 ENCOUNTER — ALLIED HEALTH/NURSE VISIT (OUTPATIENT)
Dept: DERMATOLOGY | Facility: CLINIC | Age: 64
End: 2019-06-26
Payer: COMMERCIAL

## 2019-06-26 DIAGNOSIS — L40.0 PLAQUE PSORIASIS: ICD-10-CM

## 2019-06-26 NOTE — PROGRESS NOTES
[facility] Dermatology Phototherapy Record  1. Tony Mcnair is a 63 year old adult is here today for phototherapy (UVB) treatment for plaque psoriasis.        Changes or new medications since last treatment:   NO    New medical conditions or problems since last treatment:   NO    Any problems with last phototherapy treatment?    NO    2. The patient tolerated phototherapy without complication    Patient will return for next UVB treatment, per protocol.     Patient to see provider every 4-12 weeks for follow-up during treatment.      All questions and concerns discussed with patient in clinic today.      Pia Garner RN            Tony Mcnair comes into clinic today at the request of  Ordering Provider for NBUVB.      This service provided today was under the supervising provider of the day , who was available if needed.    Pia Garner RN

## 2019-07-10 ENCOUNTER — ALLIED HEALTH/NURSE VISIT (OUTPATIENT)
Dept: DERMATOLOGY | Facility: CLINIC | Age: 64
End: 2019-07-10
Payer: COMMERCIAL

## 2019-07-10 DIAGNOSIS — L40.0 PLAQUE PSORIASIS: ICD-10-CM

## 2019-07-10 NOTE — PROGRESS NOTES
[facility] Dermatology Phototherapy Record  1. Tony Mcnair is a 63 year old adult is here today for phototherapy (UVB) treatment for plaque psoriasis.        Changes or new medications since last treatment:   NO    New medical conditions or problems since last treatment:   NO    Any problems with last phototherapy treatment?    NO    2. The patient tolerated phototherapy without complication    Patient will return for next UVB treatment, per protocol.     Patient to see provider every 4-12 weeks for follow-up during treatment.      All questions and concerns discussed with patient in clinic today.      Pia Garner RN            Tony Mcnair comes into clinic today at the request of  Ordering Provider for NBUVB.      This service provided today was under the supervising provider of the day , who was available if needed.    Pia Garner RN

## 2019-07-17 ENCOUNTER — ALLIED HEALTH/NURSE VISIT (OUTPATIENT)
Dept: DERMATOLOGY | Facility: CLINIC | Age: 64
End: 2019-07-17
Payer: COMMERCIAL

## 2019-07-17 DIAGNOSIS — L40.0 PLAQUE PSORIASIS: ICD-10-CM

## 2019-07-17 NOTE — PROGRESS NOTES
Tony Mcnair comes into clinic today at the request of Dr. Corbett Ordering Provider for NBUVB.    This service provided today was under the supervising provider of the day Dr. Kent, who was available if needed.    Lorrie Graham  [facility] Dermatology Phototherapy Record  1. Tony Mcnair is a 63 year old adult is here today for phototherapy (UVB) treatment for .Plaque psoriasis        Changes or new medications since last treatment:   NO    New medical conditions or problems since last treatment:   NO    Any problems with last phototherapy treatment?    NO    2. The patient tolerated phototherapy without complication    Patient will return for next UVB treatment, per protocol.     Patient to see provider every 4-12 weeks for follow-up during treatment.      All questions and concerns discussed with patient in clinic today.      Lorrie Graham

## 2019-07-24 ENCOUNTER — ALLIED HEALTH/NURSE VISIT (OUTPATIENT)
Dept: DERMATOLOGY | Facility: CLINIC | Age: 64
End: 2019-07-24
Payer: COMMERCIAL

## 2019-07-24 DIAGNOSIS — L40.0 PLAQUE PSORIASIS: ICD-10-CM

## 2019-07-24 NOTE — NURSING NOTE
Tony Mcnair comes into clinic today at the request of Dr. Corbett Ordering Provider for NBUVB.        This service provided today was under the supervising provider of the day Dr. Blanco, who was available if needed.    Michelle Heck LPN

## 2019-07-24 NOTE — PROGRESS NOTES
[facility] Dermatology Phototherapy Record  1. Tony Mcnair is a 63 year old adult is here today for phototherapy (UVB) treatment for Plaque psoriasis .        Changes or new medications since last treatment:   NO    New medical conditions or problems since last treatment:   NO    Any problems with last phototherapy treatment?    NO    2. The patient tolerated phototherapy without complication    Patient will return for next UVB treatment, per protocol.     Patient to see provider every 4-12 weeks for follow-up during treatment.      All questions and concerns discussed with patient in clinic today.      Michelle Heck LPN

## 2019-07-31 ENCOUNTER — ALLIED HEALTH/NURSE VISIT (OUTPATIENT)
Dept: DERMATOLOGY | Facility: CLINIC | Age: 64
End: 2019-07-31
Payer: COMMERCIAL

## 2019-07-31 DIAGNOSIS — L40.0 PLAQUE PSORIASIS: ICD-10-CM

## 2019-07-31 NOTE — PROGRESS NOTES
[facility] Dermatology Phototherapy Record  1. Tony Mcnair is a 64 year old adult is here today for phototherapy (UVB) treatment for plaque psoriasis.        Changes or new medications since last treatment:   NO    New medical conditions or problems since last treatment:   NO    Any problems with last phototherapy treatment?    NO    2. The patient tolerated phototherapy without complication    Patient will return for next UVB treatment, per protocol.     Patient to see provider every 4-12 weeks for follow-up during treatment.      All questions and concerns discussed with patient in clinic today.      Pia Garner RN            Tony Mcnair comes into clinic today at the request of  Ordering Provider for NBUVB.      This service provided today was under the supervising provider of the day , who was available if needed.    Pia Garner RN

## 2019-08-07 ENCOUNTER — ALLIED HEALTH/NURSE VISIT (OUTPATIENT)
Dept: DERMATOLOGY | Facility: CLINIC | Age: 64
End: 2019-08-07
Payer: COMMERCIAL

## 2019-08-07 DIAGNOSIS — L40.0 PLAQUE PSORIASIS: Primary | ICD-10-CM

## 2019-08-07 ASSESSMENT — PAIN SCALES - GENERAL: PAINLEVEL: NO PAIN (0)

## 2019-08-07 NOTE — NURSING NOTE
Tony Mcnair comes into clinic today at the request of Dr. Mckinney Ordering Provider for phototherapy.      This service provided today was under the supervising provider of the day Dr. Kent, who was available if needed.    MAYRA Sultana

## 2019-08-07 NOTE — PROGRESS NOTES
[facility] Dermatology Phototherapy Record  1. Tony Mcnair is a 64 year old adult is here today for phototherapy (UVB) treatment for Plaque psoriasis.        Changes or new medications since last treatment:   NO    New medical conditions or problems since last treatment:   NO    Any problems with last phototherapy treatment?    NO    2. The patient tolerated phototherapy without complication    Patient will return on for next UVB treatment, per protocol.     Patient to see provider every 4-12 weeks for follow-up during treatment.      All questions and concerns discussed with patient in clinic today.      MAYRA Sultana

## 2019-08-12 ENCOUNTER — MYC REFILL (OUTPATIENT)
Dept: FAMILY MEDICINE | Facility: CLINIC | Age: 64
End: 2019-08-12

## 2019-08-12 DIAGNOSIS — F98.8 ATTENTION DEFICIT DISORDER, UNSPECIFIED HYPERACTIVITY PRESENCE: ICD-10-CM

## 2019-08-14 ENCOUNTER — ALLIED HEALTH/NURSE VISIT (OUTPATIENT)
Dept: DERMATOLOGY | Facility: CLINIC | Age: 64
End: 2019-08-14
Payer: COMMERCIAL

## 2019-08-14 DIAGNOSIS — L40.0 PLAQUE PSORIASIS: ICD-10-CM

## 2019-08-14 RX ORDER — METHYLPHENIDATE HYDROCHLORIDE 18 MG/1
18 TABLET ORAL EVERY MORNING
Qty: 30 TABLET | Refills: 0 | Status: SHIPPED | OUTPATIENT
Start: 2019-08-14 | End: 2019-09-08

## 2019-08-14 NOTE — TELEPHONE ENCOUNTER
Controlled Substance Refill Request for methylphenidate (CONCERTA) 18 MG CR tablet  Problem List Complete:  No     PROVIDER TO CONSIDER COMPLETION OF PROBLEM LIST AND OVERVIEW/CONTROLLED SUBSTANCE AGREEMENT    Last Written Prescription Date:  6/21/19  Last Fill Quantity: 30,   # refills: 0    THE MOST RECENT OFFICE VISIT MUST BE WITHIN THE PAST 3 MONTHS. AT LEAST ONE FACE TO FACE VISIT MUST OCCUR EVERY 6 MONTHS. ADDITIONAL VISITS CAN BE VIRTUAL.  (THIS STATEMENT SHOULD BE DELETED.)    Last Office Visit with [facility] primary care provider: 3/28/19    Future Office visit:     Controlled substance agreement:   Encounter-Level CSA:    There are no encounter-level csa.     Patient-Level CSA:    There are no patient-level csa.         Last Urine Drug Screen: No results found for: CDAUT, No results found for: COMDAT, No results found for: THC13, PCP13, COC13, MAMP13, OPI13, AMP13, BZO13, TCA13, MTD13, BAR13, OXY13, PPX13, BUP13     Processing:  Patient will  in clinic     [email or web address]       checked in past 3 months?  Yes 8/14/19

## 2019-08-14 NOTE — PROGRESS NOTES
Tony Mcnair comes into clinic today at the request of Dr. Corbett Ordering Provider for [facility]    This service provided today was under the supervising provider of the day Dr. Blanco, who was available if needed.    Lorrie Graham  [facility] Dermatology Phototherapy Record  1. Tony Mcnair is a 64 year old adult is here today for phototherapy (UVB) treatment for Plaque psoriasis.        Changes or new medications since last treatment:   NO    New medical conditions or problems since last treatment:   NO    Any problems with last phototherapy treatment?    NO    2. The patient tolerated phototherapy without complication    Patient will return for next UVB treatment, per protocol.     Patient to see provider every 4-12 weeks for follow-up during treatment.      All questions and concerns discussed with patient in clinic today.      Lorrie Graham

## 2019-08-15 NOTE — TELEPHONE ENCOUNTER
Rx Ready for - patient informed-Placed at [facility] Unit Coordinator

## 2019-08-16 ENCOUNTER — MYC REFILL (OUTPATIENT)
Dept: FAMILY MEDICINE | Facility: CLINIC | Age: 64
End: 2019-08-16

## 2019-08-16 ENCOUNTER — MYC REFILL (OUTPATIENT)
Dept: DERMATOLOGY | Facility: CLINIC | Age: 64
End: 2019-08-16

## 2019-08-16 DIAGNOSIS — L40.9 PSORIASIS: ICD-10-CM

## 2019-08-16 DIAGNOSIS — E78.5 HYPERLIPIDEMIA, UNSPECIFIED HYPERLIPIDEMIA TYPE: ICD-10-CM

## 2019-08-16 RX ORDER — FLUOCINONIDE TOPICAL SOLUTION USP, 0.05% 0.5 MG/ML
SOLUTION TOPICAL
Qty: 60 ML | Refills: 6 | Status: SHIPPED | OUTPATIENT
Start: 2019-08-16 | End: 2020-09-28

## 2019-08-16 RX ORDER — CALCIPOTRIENE, BETAMETHASONE DIPROPIONATE 50; .643 UG/G; MG/G
OINTMENT TOPICAL
Qty: 60 G | Refills: 1 | Status: SHIPPED | OUTPATIENT
Start: 2019-08-16 | End: 2020-04-06

## 2019-08-16 RX ORDER — ROSUVASTATIN CALCIUM 10 MG/1
10 TABLET, COATED ORAL DAILY
Qty: 30 TABLET | Refills: 0 | Status: SHIPPED | OUTPATIENT
Start: 2019-08-16 | End: 2019-08-27

## 2019-08-16 NOTE — TELEPHONE ENCOUNTER
"Medication filled 1 time 30 day:     Patient is due for follow up fasting lipid panel (orders in chart) - M asking pt to schedule fasting lab visit    Jeannine BISHOP RN      Requested Prescriptions   Pending Prescriptions Disp Refills     rosuvastatin (CRESTOR) 10 MG tablet 30 tablet 3     Sig: Take 1 tablet (10 mg) by mouth daily Schedule a laboratory-only visit in 3 months to recheck your cholesterol (please come in fasting).       Statins Protocol Passed - 8/16/2019  9:11 AM        Passed - LDL on file in past 12 months     Recent Labs   Lab Test 04/19/19  0813   *             Passed - No abnormal creatine kinase in past 12 months     No lab results found.             Passed - Recent (12 mo) or future (30 days) visit within the authorizing provider's specialty     Patient had office visit in the last 12 months or has a visit in the next 30 days with authorizing provider or within the authorizing provider's specialty.  See \"Patient Info\" tab in inbasket, or \"Choose Columns\" in Meds & Orders section of the refill encounter.              Passed - Medication is active on med list        Passed - Patient is age 18 or older          "

## 2019-08-21 ENCOUNTER — ALLIED HEALTH/NURSE VISIT (OUTPATIENT)
Dept: DERMATOLOGY | Facility: CLINIC | Age: 64
End: 2019-08-21
Payer: COMMERCIAL

## 2019-08-21 DIAGNOSIS — L40.0 PLAQUE PSORIASIS: ICD-10-CM

## 2019-08-21 NOTE — PROGRESS NOTES
Tony Mcnair comes into clinic today at the request of Dr. Corbett Ordering Provider for [facility]    This service provided today was under the supervising provider of the day Dr. Blanco, who was available if needed.    Lorrie Graham CMA  [facility] Dermatology Phototherapy Record  1. Tony Mcnair is a 64 year old adult is here today for phototherapy (UVB) treatment for .Plaque psoriasis        Changes or new medications since last treatment:   NO    New medical conditions or problems since last treatment:   NO    Any problems with last phototherapy treatment?    NO    2. The patient tolerated phototherapy without complication    Patient will return for next UVB treatment, per protocol.     Patient to see provider every 4-12 weeks for follow-up during treatment.      All questions and concerns discussed with patient in clinic today.      Lorrie Graham CMA

## 2019-08-23 DIAGNOSIS — E78.5 HYPERLIPIDEMIA, UNSPECIFIED HYPERLIPIDEMIA TYPE: ICD-10-CM

## 2019-08-23 LAB
CHOLEST SERPL-MCNC: 168 MG/DL
HDLC SERPL-MCNC: 76 MG/DL
LDLC SERPL CALC-MCNC: 75 MG/DL
NONHDLC SERPL-MCNC: 92 MG/DL
TRIGL SERPL-MCNC: 84 MG/DL

## 2019-08-23 PROCEDURE — 36415 COLL VENOUS BLD VENIPUNCTURE: CPT | Performed by: INTERNAL MEDICINE

## 2019-08-23 PROCEDURE — 80061 LIPID PANEL: CPT | Performed by: INTERNAL MEDICINE

## 2019-08-27 DIAGNOSIS — E78.5 HYPERLIPIDEMIA, UNSPECIFIED HYPERLIPIDEMIA TYPE: ICD-10-CM

## 2019-08-27 RX ORDER — ROSUVASTATIN CALCIUM 10 MG/1
10 TABLET, COATED ORAL DAILY
Qty: 90 TABLET | Refills: 1 | Status: SHIPPED | OUTPATIENT
Start: 2019-08-27 | End: 2020-03-11

## 2019-08-28 ENCOUNTER — ALLIED HEALTH/NURSE VISIT (OUTPATIENT)
Dept: DERMATOLOGY | Facility: CLINIC | Age: 64
End: 2019-08-28
Payer: COMMERCIAL

## 2019-08-28 DIAGNOSIS — L40.0 PLAQUE PSORIASIS: ICD-10-CM

## 2019-08-28 PROBLEM — M25.562 LEFT KNEE PAIN: Status: RESOLVED | Noted: 2019-04-09 | Resolved: 2019-08-28

## 2019-08-28 NOTE — PROGRESS NOTES
Discharge Note    Progress reporting period is from last progress note on   to Apr 22, 2019.    Tony failed to follow up and current status is unknown.  Please see information below for last relevant information on current status.  Patient seen for 2 visits.    SUBJECTIVE  Subjective changes noted by patient:  Reported increased L knee pain the past week.  .  Current pain level is  .     Previous pain level was  3/10.   Changes in function:  Yes (See Goal flowsheet attached for changes in current functional level)  Adverse reaction to treatment or activity: None    OBJECTIVE  Changes noted in objective findings: TRial of US followed by Peggy Ng to lateral knee     ASSESSMENT/PLAN  Diagnosis: left knee pain   Updated problem list and treatment plan:   Pain - HEP  STG/LTGs have been met or progress has been made towards goals:  Yes, please see goal flowsheet for most current information  Assessment of Progress: current status is unknown.    Last current status:     Self Management Plans:  HEP  I have re-evaluated this patient and find that the nature, scope, duration and intensity of the therapy is appropriate for the medical condition of the patient.  Tony continues to require the following intervention to meet STG and LTG's:  HEP.    Recommendations:  Discharge with current home program.  Patient to follow up with MD as needed.    Please refer to the daily flowsheet for treatment today, total treatment time and time spent performing 1:1 timed codes.

## 2019-08-28 NOTE — PROGRESS NOTES
[facility] Dermatology Phototherapy Record  1. Tony Mcnair is a 64 year old adult is here today for phototherapy (UVB) treatment for plaque psoriasi.        Changes or new medications since last treatment:   NO    New medical conditions or problems since last treatment:   NO    Any problems with last phototherapy treatment?    NO    2. The patient tolerated phototherapy without complication    Patient will return for next UVB treatment, per protocol.     Patient to see provider every 4-12 weeks for follow-up during treatment.      All questions and concerns discussed with patient in clinic today.      Pia Garner RN            Tony Mcnair comes into clinic today at the request of  Ordering Provider for [facility].      This service provided today was under the supervising provider of the day , who was available if needed.    Pia Garner RN

## 2019-09-04 ENCOUNTER — ALLIED HEALTH/NURSE VISIT (OUTPATIENT)
Dept: DERMATOLOGY | Facility: CLINIC | Age: 64
End: 2019-09-04
Payer: COMMERCIAL

## 2019-09-04 DIAGNOSIS — L40.0 PLAQUE PSORIASIS: ICD-10-CM

## 2019-09-04 NOTE — PROGRESS NOTES
Tony Mcnair comes into clinic today at the request of Dr. Corbett Ordering Provider for [facility]    This service provided today was under the supervising provider of the day Dr. Kent, who was available if needed.    Lorrie Graham CMA  [facility] Dermatology Phototherapy Record  1. Tony Mcnair is a 64 year old adult is here today for phototherapy (UVB) treatment for Plaque psoriasis.        Changes or new medications since last treatment:   NO    New medical conditions or problems since last treatment:   NO    Any problems with last phototherapy treatment?    NO    2. The patient tolerated phototherapy without complication    Patient will return for next UVB treatment, per protocol.     Patient to see provider every 4-12 weeks for follow-up during treatment.      All questions and concerns discussed with patient in clinic today.      Lorrie Graham CMA

## 2019-09-08 ENCOUNTER — MYC REFILL (OUTPATIENT)
Dept: FAMILY MEDICINE | Facility: CLINIC | Age: 64
End: 2019-09-08

## 2019-09-08 DIAGNOSIS — F98.8 ATTENTION DEFICIT DISORDER, UNSPECIFIED HYPERACTIVITY PRESENCE: ICD-10-CM

## 2019-09-09 RX ORDER — METHYLPHENIDATE HYDROCHLORIDE 18 MG/1
18 TABLET ORAL EVERY MORNING
Qty: 30 TABLET | Refills: 0 | Status: SHIPPED | OUTPATIENT
Start: 2019-10-09 | End: 2019-12-27 | Stop reason: DRUGHIGH

## 2019-09-09 RX ORDER — METHYLPHENIDATE HYDROCHLORIDE 18 MG/1
18 TABLET ORAL EVERY MORNING
Qty: 30 TABLET | Refills: 0 | Status: SHIPPED | OUTPATIENT
Start: 2019-11-09 | End: 2019-12-27 | Stop reason: DRUGHIGH

## 2019-09-09 RX ORDER — METHYLPHENIDATE HYDROCHLORIDE 18 MG/1
18 TABLET ORAL EVERY MORNING
Qty: 30 TABLET | Refills: 0 | Status: SHIPPED | OUTPATIENT
Start: 2019-09-09 | End: 2019-12-13

## 2019-09-09 NOTE — TELEPHONE ENCOUNTER
Controlled Substance Refill Request for Pending Prescriptions:                       Disp   Refills    methylphenidate HCl ER (CONCERTA) 18 MG C*30 tab*0            Sig: Take 1 tablet (18 mg) by mouth every morning      Problem List Complete:  No     PROVIDER TO CONSIDER COMPLETION OF PROBLEM LIST AND OVERVIEW/CONTROLLED SUBSTANCE AGREEMENT    Last Written Prescription Date:  06/21/2019  Last Fill Quantity: 30,   # refills: 0    THE MOST RECENT OFFICE VISIT MUST BE WITHIN THE PAST 3 MONTHS. AT LEAST ONE FACE TO FACE VISIT MUST OCCUR EVERY 6 MONTHS. ADDITIONAL VISITS CAN BE VIRTUAL.  (THIS STATEMENT SHOULD BE DELETED.)    Last Office Visit with [facility] primary care provider: 3/28/2019    Future Office visit:     Controlled substance agreement:   Encounter-Level CSA:    There are no encounter-level csa.     Patient-Level CSA:    There are no patient-level csa.         Last Urine Drug Screen: No results found for: CDAUT, No results found for: COMDAT, No results found for: THC13, PCP13, COC13, MAMP13, OPI13, AMP13, BZO13, TCA13, MTD13, BAR13, OXY13, PPX13, BUP13     Processing:  Fax Rx to [facility]     [email or web address]       checked in past 3 months?  9-9-19  SN Shanice SAUNDERS RN,BSN

## 2019-09-11 ENCOUNTER — ALLIED HEALTH/NURSE VISIT (OUTPATIENT)
Dept: DERMATOLOGY | Facility: CLINIC | Age: 64
End: 2019-09-11
Payer: COMMERCIAL

## 2019-09-11 DIAGNOSIS — L40.0 PLAQUE PSORIASIS: ICD-10-CM

## 2019-09-11 NOTE — PROGRESS NOTES
Tony Mcnair comes into clinic today at the request of Dr. Corbett Ordering Provider for [facility]    This service provided today was under the supervising provider of the day Dr. Blanco, who was available if needed.    Lorrie Graham CMA  [facility] Dermatology Phototherapy Record  1. Tony Mcnair is a 64 year old adult is here today for phototherapy (UVB) treatment for Plaque psoriasis.        Changes or new medications since last treatment:   NO    New medical conditions or problems since last treatment:   NO    Any problems with last phototherapy treatment?    NO    2. The patient tolerated phototherapy without complication    Patient will return for next UVB treatment, per protocol.     Patient to see provider every 4-12 weeks for follow-up during treatment.      All questions and concerns discussed with patient in clinic today.      Lorrie Graham CMA

## 2019-09-18 ENCOUNTER — ALLIED HEALTH/NURSE VISIT (OUTPATIENT)
Dept: DERMATOLOGY | Facility: CLINIC | Age: 64
End: 2019-09-18
Payer: COMMERCIAL

## 2019-09-18 DIAGNOSIS — L40.0 PLAQUE PSORIASIS: ICD-10-CM

## 2019-09-18 NOTE — PROGRESS NOTES
[facility] Dermatology Phototherapy Record  1. Tony Mcnair is a 64 year old adult is here today for phototherapy (UVB) treatment for Plaque psoriasis.        Changes or new medications since last treatment:   NO    New medical conditions or problems since last treatment:   NO    Any problems with last phototherapy treatment?    NO    2. The patient tolerated phototherapy without complication    Patient will return for next UVB treatment, per protocol.     Patient to see provider every 4-12 weeks for follow-up during treatment.      All questions and concerns discussed with patient in clinic today.      Georgina Wills RN

## 2019-09-25 ENCOUNTER — ALLIED HEALTH/NURSE VISIT (OUTPATIENT)
Dept: DERMATOLOGY | Facility: CLINIC | Age: 64
End: 2019-09-25
Payer: COMMERCIAL

## 2019-09-25 DIAGNOSIS — L40.0 PLAQUE PSORIASIS: ICD-10-CM

## 2019-10-01 ENCOUNTER — HEALTH MAINTENANCE LETTER (OUTPATIENT)
Age: 64
End: 2019-10-01

## 2019-10-04 ENCOUNTER — OFFICE VISIT (OUTPATIENT)
Dept: FAMILY MEDICINE | Facility: CLINIC | Age: 64
End: 2019-10-04
Payer: COMMERCIAL

## 2019-10-04 VITALS
DIASTOLIC BLOOD PRESSURE: 88 MMHG | OXYGEN SATURATION: 98 % | TEMPERATURE: 98 F | BODY MASS INDEX: 24.05 KG/M2 | SYSTOLIC BLOOD PRESSURE: 131 MMHG | HEART RATE: 62 BPM | HEIGHT: 70 IN | WEIGHT: 168 LBS

## 2019-10-04 DIAGNOSIS — M72.0 DUPUYTREN'S DISEASE OF PALM OF BOTH HANDS: Primary | ICD-10-CM

## 2019-10-04 PROCEDURE — 99213 OFFICE O/P EST LOW 20 MIN: CPT | Performed by: NURSE PRACTITIONER

## 2019-10-04 ASSESSMENT — MIFFLIN-ST. JEOR: SCORE: 1568.29

## 2019-10-04 NOTE — PROGRESS NOTES
Subjective     Tony Mcnair is a 64 year old adult who presents to clinic today for the following health issues:    HPI   Left hand pain      Duration: 7 weeks ago had episode of extreme left palmar pain after working heavy vibrating equipment and subsequently . Often wake up with some pain left > right , denies wrist pain     Description (location/character/radiation): Tendon in left hand    Intensity:  severe    Accompanying signs and symptoms: numb hand pain    History (similar episodes/previous evaluation): off and on discomfort over past few years.  His brother also has a problem-     Precipitating or alleviating factors: None    Therapies tried and outcome: None     Patient Active Problem List   Diagnosis     Psoriasis     Pain in both feet     Iliotibial band syndrome, unspecified laterality     Adhesive capsulitis of left shoulder     Left shoulder pain     ADD (attention deficit disorder)     Past Surgical History:   Procedure Laterality Date     COLONOSCOPY  11 years ago     COLONOSCOPY N/A 5/17/2019    Procedure: COLONOSCOPY;  Surgeon: Jaycob Mirza MD;  Location: [facility]     HERNIA REPAIR  10 years ago     IRIS NEVUS Right      LESION RIGHT UPPER LID Right 1999    EXCISION       Social History     Tobacco Use     Smoking status: Never Smoker     Smokeless tobacco: Never Used   Substance Use Topics     Alcohol use: Yes     Comment: 4 beers a week     Family History   Problem Relation Age of Onset     Coronary Artery Disease Father      Hyperlipidemia Father      Coronary Artery Disease Brother      Hyperlipidemia Brother      Hypertension Mother      Breast Cancer Mother      Substance Abuse Mother      Cancer No family hx of         skin cancer     Glaucoma No family hx of      Macular Degeneration No family hx of      Retinal detachment No family hx of      Skin Cancer No family hx of      Melanoma No family hx of          Current Outpatient Medications   Medication Sig Dispense Refill      "Artificial Tear Ointment (REFRESH P.M.) OINT Apply 1 inch to eye nightly as needed 1 Tube 6     BIOTIN PO Take  by mouth daily.       calcipotriene-betameth diprop (TACLONEX) 0.005-0.064 % external ointment APPLY TO THE AFFECTED AREA 2 TIMES DAILY AS NEEDED 60 g 1     citalopram (CELEXA) 10 MG tablet Take 1 tablet (10 mg) by mouth daily 90 tablet 1     fluocinonide (LIDEX) 0.05 % external solution Apply  topically 2 times daily for itchy scalp or scalp psoriasis 60 mL 6     methylphenidate (CONCERTA) 18 MG CR tablet Take 1 tablet (18 mg) by mouth every morning 30 tablet 0     methylphenidate HCl ER (CONCERTA) 18 MG CR tablet Take 1 tablet (18 mg) by mouth every morning 30 tablet 0     [START ON 10/9/2019] methylphenidate HCl ER (CONCERTA) 18 MG CR tablet Take 1 tablet (18 mg) by mouth every morning 30 tablet 0     [START ON 11/9/2019] methylphenidate HCl ER (CONCERTA) 18 MG CR tablet Take 1 tablet (18 mg) by mouth every morning 30 tablet 0     Multiple Vitamin (DAILY MULTIVITAMIN PO) Take  by mouth daily.       olopatadine (PATADAY) 0.2 % ophthalmic solution PLACE 1 DROP INTO BOTH EYES DAILY 2.5 mL 0     pimecrolimus (ELIDEL) 1 % cream Apply topically 2 times daily as needed For psoriasis of face or genitals 60 g 2     rosuvastatin (CRESTOR) 10 MG tablet Take 1 tablet (10 mg) by mouth daily 90 tablet 1     tacrolimus (PROTOPIC) 0.1 % ointment Apply topically 2 times daily Apply to areas around eyes as needed 60 g 2     VITAMIN D, CHOLECALCIFEROL, PO Take by mouth daily       No Known Allergies      Reviewed and updated as needed this visit by Provider         Review of Systems   ROS COMP: Constitutional, HEENT, cardiovascular, pulmonary, gi and gu systems are negative, except as otherwise noted.      Objective      BP (!) 135/90 (BP Location: Right arm, Patient Position: Chair, Cuff Size: Adult Regular)   Pulse 62   Temp 98  F (36.7  C) (Oral)   Ht 1.778 m (5' 10\")   Wt 76.2 kg (168 lb)   SpO2 98%   BMI 24.11 " kg/m    Body mass index is 24.11 kg/m .     Physical Exam   GENERAL: healthy, alert and no distress  MS: neg tinels and Phalens bilaterally, has tender tight tendon sheath more prominent of 3rd finger left hand , no triggering  SKIN: no suspicious lesions or rashes  PSYCH: mentation appears normal, affect normal/bright    Diagnostic Test Results:  Labs reviewed in Epic        Assessment & Plan       ICD-10-CM    1. Dupuytren's disease of palm of both hands M72.0 ORTHOPEDICS ADULT REFERRAL     He may have dupuytren's contractures of both hands  Referred to hand surgeon  incouraged to take NSAID like aleve for pain when present    CHAZ Martin [facility]

## 2019-10-09 ENCOUNTER — ALLIED HEALTH/NURSE VISIT (OUTPATIENT)
Dept: DERMATOLOGY | Facility: CLINIC | Age: 64
End: 2019-10-09
Payer: COMMERCIAL

## 2019-10-09 DIAGNOSIS — L40.0 PLAQUE PSORIASIS: ICD-10-CM

## 2019-10-09 NOTE — PROGRESS NOTES
[facility] Dermatology Phototherapy Record  1. Tony Mcnair is a 64 year old adult is here today for phototherapy (UVB) treatment for Plaque psoriasis.        Changes or new medications since last treatment:   NO    New medical conditions or problems since last treatment:   NO    Any problems with last phototherapy treatment?    NO    2. The patient tolerated phototherapy without complication    Patient will return on  for next UVB treatment, per protocol.     Patient to see provider every 4-12 weeks for follow-up during treatment.      All questions and concerns discussed with patient in clinic today.      Cherie Zafar CMA    Tony Mcnair comes into clinic today at the request of Dr. Mckinney  Ordering Provider for [facility].        This service provided today was under the supervising provider of the day Dr. Blanco, who was available if needed.    Cherie Zafar CMA

## 2019-10-15 ENCOUNTER — TRANSFERRED RECORDS (OUTPATIENT)
Dept: HEALTH INFORMATION MANAGEMENT | Facility: CLINIC | Age: 64
End: 2019-10-15

## 2019-10-16 ENCOUNTER — ALLIED HEALTH/NURSE VISIT (OUTPATIENT)
Dept: DERMATOLOGY | Facility: CLINIC | Age: 64
End: 2019-10-16
Payer: COMMERCIAL

## 2019-10-16 DIAGNOSIS — L40.0 PLAQUE PSORIASIS: ICD-10-CM

## 2019-10-16 NOTE — PROGRESS NOTES
[facility] Dermatology Phototherapy Record  1. Tony Mcnair is a 64 year old adult is here today for phototherapy (UVB) treatment for Plaque psoriasis.        Changes or new medications since last treatment:   NO    New medical conditions or problems since last treatment:   NO    Any problems with last phototherapy treatment?    NO    2. The patient tolerated phototherapy without complication    Patient will return for next UVB treatment, per protocol.     Patient to see provider every 4-12 weeks for follow-up during treatment.      All questions and concerns discussed with patient in clinic today.      Georgina Wills RN

## 2019-10-18 ENCOUNTER — TELEPHONE (OUTPATIENT)
Dept: DERMATOLOGY | Facility: CLINIC | Age: 64
End: 2019-10-18

## 2019-10-18 NOTE — TELEPHONE ENCOUNTER
MAURISIOM letting Tony know we canceled his appointment on 10/23 for lights due to maintenance on the machine.    LUKE SultanaA

## 2019-10-30 ENCOUNTER — ALLIED HEALTH/NURSE VISIT (OUTPATIENT)
Dept: DERMATOLOGY | Facility: CLINIC | Age: 64
End: 2019-10-30
Payer: COMMERCIAL

## 2019-10-30 DIAGNOSIS — L40.0 PLAQUE PSORIASIS: Primary | ICD-10-CM

## 2019-10-30 NOTE — PROGRESS NOTES
[facility] Dermatology Phototherapy Record  1. Tony Mcnair is a 64 year old adult is here today for phototherapy (UVB) treatment for Plaque psoriasis.        Changes or new medications since last treatment:   NO    New medical conditions or problems since last treatment:   NO    Any problems with last phototherapy treatment?    NO    2. The patient tolerated phototherapy without complication    Patient will return on for next UVB treatment, per protocol.     Patient to see provider every 4-12 weeks for follow-up during treatment.      All questions and concerns discussed with patient in clinic today.      Patient received phototherapy letter. No questions or concerns.    MAYRA Sultana

## 2019-11-06 ENCOUNTER — ALLIED HEALTH/NURSE VISIT (OUTPATIENT)
Dept: DERMATOLOGY | Facility: CLINIC | Age: 64
End: 2019-11-06
Payer: COMMERCIAL

## 2019-11-06 DIAGNOSIS — L40.0 PLAQUE PSORIASIS: ICD-10-CM

## 2019-11-06 NOTE — PROGRESS NOTES
[facility] Dermatology Phototherapy Record  1. Tony Mcnair is a 64 year old adult is here today for phototherapy (UVB) treatment for Plaque psoriasis.        Changes or new medications since last treatment:   NO    New medical conditions or problems since last treatment:   NO    Any problems with last phototherapy treatment?    NO    2. The patient tolerated phototherapy without complication    Patient will return for next UVB treatment, per protocol.     Patient to see provider every 4-12 weeks for follow-up during treatment.      All questions and concerns discussed with patient in clinic today.      Michelle Heck LPN

## 2019-11-07 DIAGNOSIS — F33.1 MODERATE EPISODE OF RECURRENT MAJOR DEPRESSIVE DISORDER (H): ICD-10-CM

## 2019-11-07 NOTE — TELEPHONE ENCOUNTER
"Last Written Prescription Date:  5/21/19  Last Fill Quantity: 90 tablet,  # refills: 1   Last office visit: 3/28/2019 with prescribing provider:  Moises   Future Office Visit:      [facility] Follow-up to PHQ 3/28/2019 5/21/2019   PHQ-9 9. Suicide Ideation past 2 weeks Not at all Several days   Thoughts of suicide or self harm in past 2 weeks - No   PHQ-9 Safety concerns? - No     No flowsheet data found.      Requested Prescriptions   Pending Prescriptions Disp Refills     citalopram (CELEXA) 10 MG tablet 90 tablet 1     Sig: Take 1 tablet (10 mg) by mouth daily       SSRIs Protocol Failed - 11/7/2019  8:29 AM        Failed - PHQ-9 score less than 5 in past 6 months     Please review last PHQ-9 score.           Passed - Medication is active on med list        Passed - Patient is age 18 or older        Passed - Recent (6 mo) or future (30 days) visit within the authorizing provider's specialty     Patient had office visit in the last 6 months or has a visit in the next 30 days with authorizing provider or within the authorizing provider's specialty.  See \"Patient Info\" tab in inbasket, or \"Choose Columns\" in Meds & Orders section of the refill encounter.              "

## 2019-11-08 NOTE — TELEPHONE ENCOUNTER
Routing refill request to provider for review/approval because:   Labs not current:  phq9 Per OV note from 5/21/19: Return in about 1 month (around 6/21/2019) for PRN for Re-evaluation.       SHARLENE LiconaN, RN  [employer] [facility]

## 2019-11-11 NOTE — TELEPHONE ENCOUNTER
Left message asking patient to call back OR review and respond to MyChart message     Jeannine BISHOP RN

## 2019-11-13 ENCOUNTER — MYC MEDICAL ADVICE (OUTPATIENT)
Dept: FAMILY MEDICINE | Facility: CLINIC | Age: 64
End: 2019-11-13

## 2019-11-13 ENCOUNTER — ALLIED HEALTH/NURSE VISIT (OUTPATIENT)
Dept: DERMATOLOGY | Facility: CLINIC | Age: 64
End: 2019-11-13
Payer: COMMERCIAL

## 2019-11-13 DIAGNOSIS — L40.0 PLAQUE PSORIASIS: ICD-10-CM

## 2019-11-13 RX ORDER — CITALOPRAM HYDROBROMIDE 10 MG/1
10 TABLET ORAL DAILY
Qty: 30 TABLET | Refills: 0 | Status: SHIPPED | OUTPATIENT
Start: 2019-11-13 | End: 2019-12-27 | Stop reason: DRUGHIGH

## 2019-11-13 ASSESSMENT — PATIENT HEALTH QUESTIONNAIRE - PHQ9: SUM OF ALL RESPONSES TO PHQ QUESTIONS 1-9: 12

## 2019-11-13 NOTE — TELEPHONE ENCOUNTER
PHQ-9 SCORE 3/28/2019 5/21/2019 11/13/2019   PHQ-9 Total Score MyChart 10 (Moderate depression) - -   PHQ-9 Total Score 10 13 12     TO PCP:     Pt called back   PHQ-9 score of 12 today         Jeannine BISHOP RN

## 2019-11-13 NOTE — PROGRESS NOTES
[facility] Dermatology Phototherapy Record  1. Tony Mcnair is a 64 year old adult is here today for phototherapy (UVB) treatment for Plaque psoriasis.        Changes or new medications since last treatment:   NO    New medical conditions or problems since last treatment:   NO    Any problems with last phototherapy treatment?    NO    2. The patient tolerated phototherapy without complication    Patient will return for next UVB treatment, per protocol.     Patient to see provider every 4-12 weeks for follow-up during treatment.      All questions and concerns discussed with patient in clinic today.      Georgina Wills RN

## 2019-11-13 NOTE — TELEPHONE ENCOUNTER
Dr De Leon,  Left  #2 for patient  See below messages  No response from patient to our outreach  Please advise on further refill  Thanks,  Kayli NASH RN

## 2019-11-20 ENCOUNTER — ALLIED HEALTH/NURSE VISIT (OUTPATIENT)
Dept: DERMATOLOGY | Facility: CLINIC | Age: 64
End: 2019-11-20
Payer: COMMERCIAL

## 2019-11-20 DIAGNOSIS — L40.0 PLAQUE PSORIASIS: ICD-10-CM

## 2019-11-20 NOTE — NURSING NOTE
Tony Mcnair comes into clinic today at the request of Dr. Mckinney Ordering Provider for UVB.      This service provided today was under the supervising provider of the day Dr. Kent, who was available if needed.    [facility] Dermatology Phototherapy Record  1. Tony Mcnair is a 64 year old adult is here today for phototherapy (UVB) treatment for Plaque psoriasis.        Changes or new medications since last treatment:   NO    New medical conditions or problems since last treatment:   NO    Any problems with last phototherapy treatment?    NO    2. The patient tolerated phototherapy without complication    Patient will return for next UVB treatment, per protocol.     Patient to see provider every 4-12 weeks for follow-up during treatment.      All questions and concerns discussed with patient in clinic today.      Emely Cox LPN

## 2019-11-21 NOTE — TELEPHONE ENCOUNTER
ALYI PCP - see below response to your message   Note - OV is for tomorrow     Next 5 appointments (look out 90 days)    Nov 22, 2019  1:30 PM CST  Telephone Visit with Souleymane De Leon MD  [facility] ([facility] [address] [facility] [address]  [phone]        Jeannine BISHOP RN

## 2019-11-22 ENCOUNTER — VIRTUAL VISIT (OUTPATIENT)
Dept: FAMILY MEDICINE | Facility: CLINIC | Age: 64
End: 2019-11-22
Payer: COMMERCIAL

## 2019-11-22 DIAGNOSIS — Z91.018 FOOD ALLERGY: Primary | ICD-10-CM

## 2019-11-22 DIAGNOSIS — Z82.49 FAMILY HISTORY OF CARDIOVASCULAR DISEASE: Primary | ICD-10-CM

## 2019-11-22 PROCEDURE — 99207 ZZC NO BILLABLE SERVICE THIS VISIT: CPT | Performed by: INTERNAL MEDICINE

## 2019-11-22 RX ORDER — EPINEPHRINE 0.3 MG/.3ML
0.3 INJECTION SUBCUTANEOUS PRN
Qty: 2 EACH | Refills: 1 | Status: SHIPPED | OUTPATIENT
Start: 2019-11-22 | End: 2021-12-10

## 2019-11-22 NOTE — PROGRESS NOTES
"Tony Mcnair is a 64 year old adult who is being evaluated via a telephone visit.      The patient has been notified of following (by JOYCE Mckenzie      \"We have found that certain health care needs can be provided without the need for a physical exam.  This service lets us provide the care you need with a short phone conversation.  If a prescription is necessary we can send it directly to your pharmacy.  If lab work is needed we can place an order for that and you can then stop by our lab to have the test done at a later time.    This telephone visit will be conducted via 3 way call with the you (the patient) , the physician/provider, and a me all on the line at the same time.  This allows your physician/provider to have the phone conversation with you while I will be taking notes for your medical record.  We will have full access to your [facility] medical record during this entire phone call.    Since this is like an office visit,  will bill your insurance company for this service.  Please check with your medical insurance if this type of telephone/virtual is covered . You may be responsible for the cost of this service if insurance coverage is denied.  The typical cost is $30 (10min), $59(11-20min) and $85 (21-30min)     If during the course of the call the physician/provider feels a telephone visit is not appropriate, you will not be charged for this service\"    Consent has been obtained for this service by care team member: yes.  See the scanned image in the medical record.    S: The history as provided by the patient to the provider during this 3 way call include Myself & Dr. De Leon, & pt     Additional provider notes:    Assessment/Plan:  No diagnosis found.            "

## 2019-12-04 ENCOUNTER — ALLIED HEALTH/NURSE VISIT (OUTPATIENT)
Dept: DERMATOLOGY | Facility: CLINIC | Age: 64
End: 2019-12-04
Payer: COMMERCIAL

## 2019-12-04 DIAGNOSIS — L40.0 PLAQUE PSORIASIS: ICD-10-CM

## 2019-12-04 NOTE — NURSING NOTE
Tony Mcnair comes into clinic today at the request of Dr Corbett Ordering Provider for [facility].      This service provided today was under the supervising provider of the day Dr Naranjo, who was available if needed.    Georgina Wills RN

## 2019-12-04 NOTE — PROGRESS NOTES
[facility] Dermatology Phototherapy Record  1. Tony Mcnair is a 64 year old adult is here today for phototherapy (UVB) treatment for Plaque psoriasis.        Changes or new medications since last treatment:   NO    New medical conditions or problems since last treatment:   NO    Any problems with last phototherapy treatment?    NO    2. The patient tolerated phototherapy without complication    Patient will return for next UVB treatment, per protocol.     Patient to see provider every 4-12 weeks for follow-up during treatment.      All questions and concerns discussed with patient in clinic today.      Georgina Wills RN

## 2019-12-11 ENCOUNTER — ALLIED HEALTH/NURSE VISIT (OUTPATIENT)
Dept: DERMATOLOGY | Facility: CLINIC | Age: 64
End: 2019-12-11
Payer: COMMERCIAL

## 2019-12-11 DIAGNOSIS — L40.0 PLAQUE PSORIASIS: ICD-10-CM

## 2019-12-11 NOTE — PROGRESS NOTES
[facility] Dermatology Phototherapy Record  1. Tony Mcnair is a 64 year old adult is here today for phototherapy (UVB) treatment for Plaque psoriasis.        Changes or new medications since last treatment:   NO    New medical conditions or problems since last treatment:   NO    Any problems with last phototherapy treatment?    NO    2. The patient tolerated phototherapy without complication    Patient will return on for next UVB treatment, per protocol.     Patient to see provider every 4-12 weeks for follow-up during treatment.      All questions and concerns discussed with patient in clinic today.      Dorothy Ling RN    Tony Mcnair comes into clinic today at the request of Dr. Corbett Ordering Provider for [facility]BU[facility].    This service provided today was under the supervising provider of the day Dr. Naranjo, who was available if needed.    Dorothy Ling RN

## 2019-12-12 ENCOUNTER — MYC REFILL (OUTPATIENT)
Dept: FAMILY MEDICINE | Facility: CLINIC | Age: 64
End: 2019-12-12

## 2019-12-12 DIAGNOSIS — F98.8 ATTENTION DEFICIT DISORDER, UNSPECIFIED HYPERACTIVITY PRESENCE: ICD-10-CM

## 2019-12-13 ENCOUNTER — MYC REFILL (OUTPATIENT)
Dept: FAMILY MEDICINE | Facility: CLINIC | Age: 64
End: 2019-12-13

## 2019-12-13 DIAGNOSIS — F98.8 ATTENTION DEFICIT DISORDER, UNSPECIFIED HYPERACTIVITY PRESENCE: ICD-10-CM

## 2019-12-13 RX ORDER — METHYLPHENIDATE HYDROCHLORIDE 18 MG/1
18 TABLET ORAL EVERY MORNING
Start: 2019-12-13

## 2019-12-13 RX ORDER — METHYLPHENIDATE HYDROCHLORIDE 18 MG/1
18 TABLET ORAL EVERY MORNING
Qty: 30 TABLET | Refills: 0 | Status: SHIPPED | OUTPATIENT
Start: 2019-12-13 | End: 2019-12-27 | Stop reason: DRUGHIGH

## 2019-12-13 NOTE — TELEPHONE ENCOUNTER
There is another MY CHART request also  for [facility]  12/13/2019 that ws sent to the

## 2019-12-13 NOTE — TELEPHONE ENCOUNTER
Controlled Substance Refill Request for     methylphenidate HCl ER (CONCERTA) 18 MG CR tablet 30 tablet 0 11/9/2019  No   Sig - Route: Take 1 tablet (18 mg) by mouth every morning      Last Written Prescription Date:  11/09/2019  Last Fill Quantity: 30,  # refills: 0   Last office visit: 11/22/2019 with prescribing provider:     Future Office Visit:      Problem List Complete:  Yes    (attention deficit disorder)   Problem Detail     Noted:  9/9/2019   Priority:  Medium   Overview Signed 9/9/2019  3:55 PM by Shanice Arauz RN   Patient is followed by [facility] for ongoing prescription of stimulants.  All refills should be approved by this provider, or covering partner.     Medication(s): Concerta.   Maximum quantity per month: 30  Clinic visit frequency required: Q 3 months      Controlled substance agreement on file: No  Neuropsych evaluation for ADD completed:       Last [facility] website verification:  done on 9-9-19  [place]      [email or web address]           checked in past 3 months?  No, route to RN

## 2019-12-14 NOTE — TELEPHONE ENCOUNTER
Please inform the patient that as per [facility]'s policy, he will need to have a follow-up visit for this controlled substance prescription.

## 2019-12-16 NOTE — TELEPHONE ENCOUNTER
Senseg message sent to notify pt/asking him to schedule    Also - Routing to [facility] to contact patient to inform due for appointment. Thank you.     Jeannine BISHOP RN

## 2019-12-18 ENCOUNTER — ALLIED HEALTH/NURSE VISIT (OUTPATIENT)
Dept: DERMATOLOGY | Facility: CLINIC | Age: 64
End: 2019-12-18
Payer: COMMERCIAL

## 2019-12-18 DIAGNOSIS — L40.0 PLAQUE PSORIASIS: ICD-10-CM

## 2019-12-18 NOTE — NURSING NOTE
Tony Mcnair comes into clinic today at the request of Dr Mckinney Ordering Provider for [facility].    This service provided today was under the supervising provider of the day Dr Naranjo, who was available if needed.    Georgina Wills RN

## 2019-12-18 NOTE — PROGRESS NOTES
[facility] Dermatology Phototherapy Record  1. Tony Mcnair is a 64 year old adult is here today for phototherapy (UVB) treatment for Plaque psoriasis.        Changes or new medications since last treatment:   NO    New medical conditions or problems since last treatment:   NO    Any problems with last phototherapy treatment?    NO    2. The patient tolerated phototherapy without complication    Patient will return for next UVB treatment, per protocol.     Patient to see provider every 4-12 weeks for follow-up during treatment.      All questions and concerns discussed with patient in clinic today.      Georgina Wills RN

## 2019-12-27 ENCOUNTER — ALLIED HEALTH/NURSE VISIT (OUTPATIENT)
Dept: DERMATOLOGY | Facility: CLINIC | Age: 64
End: 2019-12-27
Payer: COMMERCIAL

## 2019-12-27 ENCOUNTER — OFFICE VISIT (OUTPATIENT)
Dept: FAMILY MEDICINE | Facility: CLINIC | Age: 64
End: 2019-12-27
Payer: COMMERCIAL

## 2019-12-27 VITALS
OXYGEN SATURATION: 95 % | SYSTOLIC BLOOD PRESSURE: 119 MMHG | BODY MASS INDEX: 23.66 KG/M2 | HEART RATE: 80 BPM | HEIGHT: 70 IN | WEIGHT: 165.3 LBS | TEMPERATURE: 97.7 F | DIASTOLIC BLOOD PRESSURE: 81 MMHG

## 2019-12-27 DIAGNOSIS — H10.13 ALLERGIC CONJUNCTIVITIS OF BOTH EYES: ICD-10-CM

## 2019-12-27 DIAGNOSIS — L40.0 PLAQUE PSORIASIS: ICD-10-CM

## 2019-12-27 DIAGNOSIS — F98.8 ATTENTION DEFICIT DISORDER, UNSPECIFIED HYPERACTIVITY PRESENCE: ICD-10-CM

## 2019-12-27 DIAGNOSIS — R13.10 DYSPHAGIA, UNSPECIFIED TYPE: ICD-10-CM

## 2019-12-27 DIAGNOSIS — F33.1 MODERATE EPISODE OF RECURRENT MAJOR DEPRESSIVE DISORDER (H): Primary | ICD-10-CM

## 2019-12-27 PROCEDURE — 99214 OFFICE O/P EST MOD 30 MIN: CPT | Performed by: INTERNAL MEDICINE

## 2019-12-27 RX ORDER — OLOPATADINE HYDROCHLORIDE 2 MG/ML
1 SOLUTION/ DROPS OPHTHALMIC DAILY
Qty: 2.5 ML | Refills: 11 | Status: SHIPPED | OUTPATIENT
Start: 2019-12-27 | End: 2020-07-19

## 2019-12-27 RX ORDER — METHYLPHENIDATE HYDROCHLORIDE 27 MG/1
27 TABLET ORAL EVERY MORNING
Qty: 30 TABLET | Refills: 0 | Status: SHIPPED | OUTPATIENT
Start: 2019-12-27 | End: 2020-02-13

## 2019-12-27 RX ORDER — CITALOPRAM HYDROBROMIDE 20 MG/1
20 TABLET ORAL DAILY
Qty: 30 TABLET | Refills: 1 | Status: SHIPPED | OUTPATIENT
Start: 2019-12-27 | End: 2020-05-01

## 2019-12-27 ASSESSMENT — MIFFLIN-ST. JEOR: SCORE: 1556.05

## 2019-12-27 NOTE — PROGRESS NOTES
[facility] Dermatology Phototherapy Record  1. Tony Mcnair is a 64 year old adult is here today for phototherapy (UVB) treatment for Plaque psoriasis.        Changes or new medications since last treatment:   NO    New medical conditions or problems since last treatment:   NO    Any problems with last phototherapy treatment?    NO    2. The patient tolerated phototherapy without complication    Patient will return on  for next UVB treatment, per protocol.     Patient to see provider every 4-12 weeks for follow-up during treatment.      All questions and concerns discussed with patient in clinic today.      Cherie Zafar CMA    Tony Mcnair comes into clinic today at the request of Dr. Yang Ordering Provider for UVA1.        This service provided today was under the supervising provider of the day Dr. Roberts, who was available if needed.    Cherie Zafar CMA

## 2019-12-27 NOTE — PATIENT INSTRUCTIONS
Inform doctor if the higher dose of Concerta and Celexa are effective or if you develop side effects.    Call doctor if your attention deficit and depression persist/worsens, or if you develop new symptoms or side effects from the medication/s..    Follow up in 6 months for your full physical exam (please come in fasting).

## 2019-12-27 NOTE — PROGRESS NOTES
"Subjective     Tony Mcnair is a 64 year old adult who presents to clinic today for the following health issues:    HPI   Medication Followup    Taking Medication as prescribed: yes    Side Effects:  None       Patient is on Concerta 18 mg once a day for attention deficit disorder.  He feels that the medication is not working as well anymore.    He is also on Celexa 10 mg daily for depression.  Since the onset of winter, he has noticed that his depression has progressed.  Recent PHQ-9 score was 12.    He has noticed difficulty with swallowing.  Denies aspiration or choking.    Needs a refill for his olopatadine for his allergic conjunctivitis.      Past Medical History:   Diagnosis Date     Arthritis      Depressive disorder Junie     [place] 1988    LEFT EYE       Review of Systems   Constitutional: Negative for fatigue.   Eyes: Negative for pain, redness, itching and visual disturbance.   Respiratory: Negative for shortness of breath.    Cardiovascular: Negative for chest pain and palpitations.   Gastrointestinal: Negative for abdominal pain, constipation, diarrhea, nausea and vomiting.   Musculoskeletal: Negative for myalgias.   Neurological: Negative for dizziness, light-headedness and headaches.   Psychiatric/Behavioral: Positive for dysphoric mood. Negative for hallucinations, self-injury and suicidal ideas. The patient is not nervous/anxious.        /81 (BP Location: Right arm, Patient Position: Sitting, Cuff Size: Adult Regular)   Pulse 80   Temp 97.7  F (36.5  C) (Oral)   Ht 1.778 m (5' 10\")   Wt 75 kg (165 lb 4.8 oz)   SpO2 95%   BMI 23.72 kg/m      Physical Exam  Vitals signs reviewed.   Constitutional:       General: Tony Mcnair is not in acute distress.  Eyes:      Conjunctiva/sclera: Conjunctivae normal.   Neck:      Thyroid: No thyromegaly.   Cardiovascular:      Rate and Rhythm: Normal rate and regular rhythm.      Heart sounds: Normal heart sounds.   Pulmonary:      Effort: Pulmonary " effort is normal. No respiratory distress.      Breath sounds: Normal breath sounds.   Abdominal:      Palpations: Abdomen is soft.      Tenderness: There is no abdominal tenderness.   Neurological:      Mental Status: Tony Mcnair is alert and oriented to person, place, and time.      Coordination: Coordination normal.      Comments: No tremors   Psychiatric:         Mood and Affect: Mood and affect normal.           ICD-10-CM    1. Moderate episode of recurrent major depressive disorder (H) F33.1 citalopram (CELEXA) 20 MG tablet   2. Attention deficit disorder, unspecified hyperactivity presence F98.8 methylphenidate (CONCERTA) 27 MG CR tablet   3. Allergic conjunctivitis of both eyes - Both Eyes H10.13 olopatadine (PATADAY) 0.2 % ophthalmic solution   4. Dysphagia, unspecified type R13.10 GASTROENTEROLOGY ADULT REF CONSULT ONLY       Patient Instructions   Inform doctor if the higher dose of Concerta and Celexa are effective or if you develop side effects.    Call doctor if your attention deficit and depression persist/worsens, or if you develop new symptoms or side effects from the medication/s..    Follow up in 6 months for your full physical exam (please come in fasting).

## 2020-01-02 ENCOUNTER — ALLIED HEALTH/NURSE VISIT (OUTPATIENT)
Dept: DERMATOLOGY | Facility: CLINIC | Age: 65
End: 2020-01-02
Payer: COMMERCIAL

## 2020-01-02 DIAGNOSIS — L40.0 PLAQUE PSORIASIS: ICD-10-CM

## 2020-01-02 NOTE — NURSING NOTE
Tony Mcnair comes into clinic today at the request of Dr. Corbett Ordering Provider for Plaque psoriasis   This service provided today was under the supervising provider of the day Dr. Kent, who was available if needed.    Di Zaldivar, EMT

## 2020-01-02 NOTE — PROGRESS NOTES
[facility] Dermatology Phototherapy Record  1. Tony Mcnair is a 64 year old adult is here today for phototherapy (UVB) treatment for Plaque psoriasis .        Changes or new medications since last treatment:   NO    New medical conditions or problems since last treatment:   NO    Any problems with last phototherapy treatment?    NO    2. The patient tolerated phototherapy without complication    Patient will return for next UVB treatment, per protocol.     Patient to see provider every 4-12 weeks for follow-up during treatment.      All questions and concerns discussed with patient in clinic today.      Di Zaldivar, EMT

## 2020-01-08 ENCOUNTER — ALLIED HEALTH/NURSE VISIT (OUTPATIENT)
Dept: DERMATOLOGY | Facility: CLINIC | Age: 65
End: 2020-01-08
Payer: COMMERCIAL

## 2020-01-08 DIAGNOSIS — L40.0 PLAQUE PSORIASIS: ICD-10-CM

## 2020-01-08 NOTE — PROGRESS NOTES
[facility] Dermatology Phototherapy Record  1. Tony Mcnair is a 64 year old adult is here today for phototherapy (UVB) treatment for Plaque psoriasis.        Changes or new medications since last treatment:   NO    New medical conditions or problems since last treatment:   NO    Any problems with last phototherapy treatment?    NO    2. The patient tolerated phototherapy without complication    Patient will return on for next UVB treatment, per protocol.     Patient to see provider every 4-12 weeks for follow-up during treatment.      All questions and concerns discussed with patient in clinic today.      Dorothy Ling RN    Tony Mcnair comes into clinic today at the request of Dr. Corbett Ordering Provider for [facility].    This service provided today was under the supervising provider of the day Dr. Naranjo, who was available if needed.    Dorothy Ling RN

## 2020-01-09 ASSESSMENT — ENCOUNTER SYMPTOMS
EYE REDNESS: 0
VOMITING: 0
ABDOMINAL PAIN: 0
NERVOUS/ANXIOUS: 0
SHORTNESS OF BREATH: 0
HEADACHES: 0
NAUSEA: 0
EYE PAIN: 0
HALLUCINATIONS: 0
CONSTIPATION: 0
LIGHT-HEADEDNESS: 0
DYSPHORIC MOOD: 1
EYE ITCHING: 0
DIZZINESS: 0
DIARRHEA: 0
PALPITATIONS: 0
FATIGUE: 0
MYALGIAS: 0

## 2020-01-15 ENCOUNTER — ALLIED HEALTH/NURSE VISIT (OUTPATIENT)
Dept: DERMATOLOGY | Facility: CLINIC | Age: 65
End: 2020-01-15
Payer: COMMERCIAL

## 2020-01-15 DIAGNOSIS — L40.0 PLAQUE PSORIASIS: ICD-10-CM

## 2020-01-15 NOTE — PROGRESS NOTES
[facility] Dermatology Phototherapy Record  1. Tony Mcnair is a 64 year old adult is here today for phototherapy (UVB) treatment for Plaque psoriasis.        Changes or new medications since last treatment:   NO    New medical conditions or problems since last treatment:   NO    Any problems with last phototherapy treatment?    NO    2. The patient tolerated phototherapy without complication    Patient will return on for next UVB treatment, per protocol.     Patient to see provider every 4-12 weeks for follow-up during treatment.      All questions and concerns discussed with patient in clinic today.      Dorothy Ling RN    Tony Mcnair comes into clinic today at the request of Dr. Corbett Ordering Provider for [facility].    This service provided today was under the supervising provider of the day Dr. Naranjo, who was available if needed.    Dorothy Ling RN

## 2020-01-22 ENCOUNTER — ALLIED HEALTH/NURSE VISIT (OUTPATIENT)
Dept: DERMATOLOGY | Facility: CLINIC | Age: 65
End: 2020-01-22
Payer: COMMERCIAL

## 2020-01-22 DIAGNOSIS — F33.1 MODERATE EPISODE OF RECURRENT MAJOR DEPRESSIVE DISORDER (H): ICD-10-CM

## 2020-01-22 DIAGNOSIS — L40.0 PLAQUE PSORIASIS: ICD-10-CM

## 2020-01-22 NOTE — PROGRESS NOTES
[facility] Dermatology Phototherapy Record  1. Tony Mcnair is a 64 year old adult is here today for phototherapy (UVB) treatment for Plaque psoriasis.        Changes or new medications since last treatment:   NO    New medical conditions or problems since last treatment:   NO    Any problems with last phototherapy treatment?    NO    2. The patient tolerated phototherapy without complication    Patient will return on for next UVB treatment, per protocol.     Patient to see provider every 4-12 weeks for follow-up during treatment.      All questions and concerns discussed with patient in clinic today.      Dorothy Ling RN    Tony Mcnair comes into clinic today at the request of Dr. Corbett Ordering Provider for [facility]BUVB.    This service provided today was under the supervising provider of the day Dr. Naranjo, who was available if needed.    Dorothy Ling RN

## 2020-01-23 RX ORDER — CITALOPRAM HYDROBROMIDE 20 MG/1
20 TABLET ORAL DAILY
Qty: 30 TABLET | Refills: 1 | OUTPATIENT
Start: 2020-01-23

## 2020-01-23 NOTE — TELEPHONE ENCOUNTER
"Pending Prescriptions:                       Disp   Refills    citalopram (CELEXA) 20 MG tablet          30 tab*1            Sig: Take 1 tablet (20 mg) by mouth daily    Last Written Prescription Date:  12/27/2019  Last Fill Quantity: 30,  # refills: 1   Last office visit: 12/27/2019 with prescribing provider:     Future Office Visit:    Requested Prescriptions   Pending Prescriptions Disp Refills     citalopram (CELEXA) 20 MG tablet 30 tablet 1     Sig: Take 1 tablet (20 mg) by mouth daily       SSRIs Protocol Failed - 1/22/2020  6:44 PM        Failed - PHQ-9 score less than 5 in past 6 months     Please review last PHQ-9 score.           Passed - Medication is active on med list        Passed - Patient is age 18 or older        Passed - Recent (6 mo) or future (30 days) visit within the authorizing provider's specialty     Patient had office visit in the last 6 months or has a visit in the next 30 days with authorizing provider or within the authorizing provider's specialty.  See \"Patient Info\" tab in inbasket, or \"Choose Columns\" in Meds & Orders section of the refill encounter.              "

## 2020-01-29 ENCOUNTER — ALLIED HEALTH/NURSE VISIT (OUTPATIENT)
Dept: DERMATOLOGY | Facility: CLINIC | Age: 65
End: 2020-01-29
Payer: COMMERCIAL

## 2020-01-29 DIAGNOSIS — L40.0 PLAQUE PSORIASIS: ICD-10-CM

## 2020-01-29 NOTE — PROGRESS NOTES
[facility] Dermatology Phototherapy Record  1. Tony Mcnair is a 64 year old adult is here today for phototherapy (UVB) treatment for Plaque psoriasis.        Changes or new medications since last treatment:   NO    New medical conditions or problems since last treatment:   NO    Any problems with last phototherapy treatment?    NO    2. The patient tolerated phototherapy without complication    Patient will return on for next UVB treatment, per protocol.     Patient to see provider every 4-12 weeks for follow-up during treatment.      All questions and concerns discussed with patient in clinic today.      Dorothy Ling RN    Tony Mcnair comes into clinic today at the request of Dr. Corbett Ordering Provider for [facility].    This service provided today was under the supervising provider of the day Dr. Naranjo, who was available if needed.    Dorothy Ling RN

## 2020-02-05 ENCOUNTER — ALLIED HEALTH/NURSE VISIT (OUTPATIENT)
Dept: DERMATOLOGY | Facility: CLINIC | Age: 65
End: 2020-02-05
Payer: COMMERCIAL

## 2020-02-05 DIAGNOSIS — L40.0 PLAQUE PSORIASIS: ICD-10-CM

## 2020-02-05 NOTE — PROGRESS NOTES
[facility] Dermatology Phototherapy Record  1. Tony Mcnair is a 64 year old adult is here today for phototherapy (UVB) treatment for Plaque psoriasis.        Changes or new medications since last treatment:   NO    New medical conditions or problems since last treatment:   NO    Any problems with last phototherapy treatment?    NO    2. The patient tolerated phototherapy without complication    Patient will return on for next UVB treatment, per protocol.     Patient to see provider every 4-12 weeks for follow-up during treatment.      All questions and concerns discussed with patient in clinic today.      Dorothy Ling RN    Tony Mcnair comes into clinic today at the request of Dr. Corbett Ordering Provider for [facility].    This service provided today was under the supervising provider of the day Dr. Naranjo, who was available if needed.    Dorothy Ling RN

## 2020-02-08 ENCOUNTER — MYC REFILL (OUTPATIENT)
Dept: FAMILY MEDICINE | Facility: CLINIC | Age: 65
End: 2020-02-08

## 2020-02-08 DIAGNOSIS — H10.13 ALLERGIC CONJUNCTIVITIS OF BOTH EYES: ICD-10-CM

## 2020-02-08 DIAGNOSIS — F98.8 ATTENTION DEFICIT DISORDER, UNSPECIFIED HYPERACTIVITY PRESENCE: ICD-10-CM

## 2020-02-08 RX ORDER — OLOPATADINE HYDROCHLORIDE 2 MG/ML
1 SOLUTION/ DROPS OPHTHALMIC DAILY
Qty: 2.5 ML | Refills: 11 | Status: CANCELLED | OUTPATIENT
Start: 2020-02-08

## 2020-02-10 NOTE — TELEPHONE ENCOUNTER
"olopatadine (PATADAY) 0.2 % ophthalmic solution 2.5 mL 11 12/27/2019     Last Written Prescription Date:  12/27/2019  Last Fill Quantity: 2.5 mL,  # refills: 11   Last office visit: 12/27/2019 with prescribing provider:  SENIA De Leon    Future Office Visit: Unknown    Requested Prescriptions   Pending Prescriptions Disp Refills     methylphenidate (CONCERTA) 27 MG CR tablet 30 tablet 0     Sig: Take 1 tablet (27 mg) by mouth every morning       There is no refill protocol information for this order        olopatadine (PATADAY) 0.2 % ophthalmic solution 2.5 mL 11     Sig: Place 1 drop into both eyes daily       Miscellaneous Opthalmic Allergy Drops Protocol Passed - 2/8/2020 12:12 PM        Passed - Patient is age 4 or older        Passed - Recent (12 mo) or future (30 days) visit within the authorizing provider's specialty     Patient has had an office visit with the authorizing provider or a provider within the authorizing providers department within the previous 12 mos or has a future within next 30 days. See \"Patient Info\" tab in inbasket, or \"Choose Columns\" in Meds & Orders section of the refill encounter.              Passed - Medication is active on med list        Controlled Substance Refill Request for   methylphenidate (CONCERTA) 27 MG CR tablet 30 tablet 0 12/27/2019     Problem List Complete:  Yes    Last Written Prescription Date:  12/27/2019  Last Fill Quantity: 30,   # refills: 0    THE MOST RECENT OFFICE VISIT MUST BE WITHIN THE PAST 3 MONTHS. AT LEAST ONE FACE TO FACE VISIT MUST OCCUR EVERY 6 MONTHS. ADDITIONAL VISITS CAN BE VIRTUAL.  (THIS STATEMENT SHOULD BE DELETED.)    [place] [facility] [place] provider: 12/27/2019    Future Office visit: Unknown     Controlled substance agreement:   Encounter-Level CSA:    There are no encounter-level csa.     Patient-Level CSA:    There are no patient-level csa.         Last Urine Drug Screen: No results found for: JOLANTA, No results found for: " COMDAT, No results found for: THC13, PCP13, COC13, MAMP13, OPI13, AMP13, BZO13, TCA13, MTD13, BAR13, OXY13, PPX13, BUP13     Processing:  Rx to be electronically transmitted to pharmacy by provider     [email or web address]   checked in past 3 months?  No, route to RN (9/9/2019)

## 2020-02-10 NOTE — TELEPHONE ENCOUNTER
Routing refill request to provider for review/approval because:  Drug not on the [facility] refill protocol   Last [facility] website verification:  done on 9-9-19  [place]      [email or web address]    Patient has refills remaining at pharmacy-olopatadine, sent patient my chart message to contact pharmacy.    SHARLENE HaasN, RN  [employer]

## 2020-02-12 ENCOUNTER — TRANSFERRED RECORDS (OUTPATIENT)
Dept: HEALTH INFORMATION MANAGEMENT | Facility: CLINIC | Age: 65
End: 2020-02-12

## 2020-02-12 ENCOUNTER — ALLIED HEALTH/NURSE VISIT (OUTPATIENT)
Dept: DERMATOLOGY | Facility: CLINIC | Age: 65
End: 2020-02-12
Payer: COMMERCIAL

## 2020-02-12 DIAGNOSIS — L40.0 PLAQUE PSORIASIS: ICD-10-CM

## 2020-02-12 NOTE — TELEPHONE ENCOUNTER
Pt came by the clinic checking the status of his refill of methylphenidate (CONCERTA) 27 MG CR tablet. Told him we are processing it

## 2020-02-12 NOTE — PROGRESS NOTES
[facility] Dermatology Phototherapy Record  1. Tony Mcnair is a 64 year old adult is here today for phototherapy (UVB) treatment for Plaque psoriasis.        Changes or new medications since last treatment:   NO    New medical conditions or problems since last treatment:   NO    Any problems with last phototherapy treatment?    NO    2. The patient tolerated phototherapy without complication    Patient will return on for next UVB treatment, per protocol.     Patient to see provider every 4-12 weeks for follow-up during treatment.      All questions and concerns discussed with patient in clinic today.      Dorothy Ling RN    Tony Mcnair comes into clinic today at the request of Dr. Corbett Ordering Provider for [facility].    This service provided today was under the supervising provider of the day Dr. Naranjo, who was available if needed.    Dorothy Ling RN

## 2020-02-13 ENCOUNTER — MYC REFILL (OUTPATIENT)
Dept: FAMILY MEDICINE | Facility: CLINIC | Age: 65
End: 2020-02-13

## 2020-02-13 DIAGNOSIS — F98.8 ATTENTION DEFICIT DISORDER, UNSPECIFIED HYPERACTIVITY PRESENCE: ICD-10-CM

## 2020-02-14 PROBLEM — F98.8 ADD (ATTENTION DEFICIT DISORDER): Status: ACTIVE | Noted: 2019-09-09

## 2020-02-14 RX ORDER — METHYLPHENIDATE HYDROCHLORIDE 27 MG/1
27 TABLET ORAL EVERY MORNING
Qty: 30 TABLET | Refills: 0 | Status: SHIPPED | OUTPATIENT
Start: 2020-04-14 | End: 2020-06-03

## 2020-02-14 RX ORDER — METHYLPHENIDATE HYDROCHLORIDE 27 MG/1
27 TABLET ORAL EVERY MORNING
Qty: 30 TABLET | Refills: 0 | Status: SHIPPED | OUTPATIENT
Start: 2020-02-14 | End: 2020-05-28

## 2020-02-14 RX ORDER — METHYLPHENIDATE HYDROCHLORIDE 27 MG/1
27 TABLET ORAL EVERY MORNING
Qty: 30 TABLET | Refills: 0 | Status: SHIPPED | OUTPATIENT
Start: 2020-03-14 | End: 2020-05-28

## 2020-02-14 RX ORDER — METHYLPHENIDATE HYDROCHLORIDE 27 MG/1
27 TABLET ORAL EVERY MORNING
Qty: 30 TABLET | Refills: 0 | OUTPATIENT
Start: 2020-02-14

## 2020-02-14 NOTE — TELEPHONE ENCOUNTER
Methylphenidate 27mg CR      Last Written Prescription Date:  12/27/19  Last Fill Quantity: 30,   # refills: 0  Last Office Visit: 12/27/19  Future Office visit:       Routing refill request to provider for review/approval because:  Drug not on the [facility] refill protocol or controlled substance    Last [facility] website verification:  2/14/2020 [place]   [email or web address]  Jeannine BISHOP RN

## 2020-02-19 ENCOUNTER — ALLIED HEALTH/NURSE VISIT (OUTPATIENT)
Dept: DERMATOLOGY | Facility: CLINIC | Age: 65
End: 2020-02-19
Payer: COMMERCIAL

## 2020-02-19 DIAGNOSIS — L40.0 PLAQUE PSORIASIS: ICD-10-CM

## 2020-02-19 NOTE — PROGRESS NOTES
[facility] Dermatology Phototherapy Record  1. Tony Mcnair is a 64 year old adult is here today for phototherapy (UVB) treatment for Plaque psoriasis.        Changes or new medications since last treatment:   NO    New medical conditions or problems since last treatment:   NO    Any problems with last phototherapy treatment?    NO    2. The patient tolerated phototherapy without complication    Patient will return on for next UVB treatment, per protocol.     Patient to see provider every 4-12 weeks for follow-up during treatment.      All questions and concerns discussed with patient in clinic today.      Dorothy Ling RN    Tony Mcnair comes into clinic today at the request of Dr. Corbett Ordering Provider for [facility]BU[facility].    This service provided today was under the supervising provider of the day Dr. Blanco, who was available if needed.    Dorothy Ling RN

## 2020-03-05 ENCOUNTER — ALLIED HEALTH/NURSE VISIT (OUTPATIENT)
Dept: DERMATOLOGY | Facility: CLINIC | Age: 65
End: 2020-03-05
Payer: COMMERCIAL

## 2020-03-05 DIAGNOSIS — L40.0 PLAQUE PSORIASIS: ICD-10-CM

## 2020-03-05 NOTE — PROGRESS NOTES
[facility] Dermatology Phototherapy Record  1. Tony Mcnair is a 64 year old adult is here today for phototherapy (UVB) treatment for Plaque psoriasis.        Changes or new medications since last treatment:   NO    New medical conditions or problems since last treatment:   NO    Any problems with last phototherapy treatment?    NO    2. The patient tolerated phototherapy without complication    Patient will return on for next UVB treatment, per protocol.     Patient to see provider every 4-12 weeks for follow-up during treatment.      All questions and concerns discussed with patient in clinic today.      Dorothy Ling RN    Tony Mcnair comes into clinic today at the request of Dr. Corbett Ordering Provider for [facility]BU[facility].    This service provided today was under the supervising provider of the day Dr. Kent, who was available if needed.    Dorothy Ling RN

## 2020-03-10 DIAGNOSIS — E78.5 HYPERLIPIDEMIA, UNSPECIFIED HYPERLIPIDEMIA TYPE: ICD-10-CM

## 2020-03-10 NOTE — TELEPHONE ENCOUNTER
"  rosuvastatin (CRESTOR) 10 MG tablet  90 tablet  1  8/27/2019          Last Written Prescription Date:  08/27/2019  Last Fill Quantity: 90,  # refills: 1   Last office visit: 12/27/2019 with prescribing provider:     Future Office Visit:  Unknown    Requested Prescriptions   Pending Prescriptions Disp Refills     rosuvastatin (CRESTOR) 10 MG tablet 90 tablet 1     Sig: Take 1 tablet (10 mg) by mouth daily       Statins Protocol Passed - 3/10/2020 10:09 AM        Passed - LDL on file in past 12 months     Recent Labs   Lab Test 08/23/19  0712   LDL 75             Passed - No abnormal creatine kinase in past 12 months     No lab results found.             Passed - Recent (12 mo) or future (30 days) visit within the authorizing provider's specialty     Patient has had an office visit with the authorizing provider or a provider within the authorizing providers department within the previous 12 mos or has a future within next 30 days. See \"Patient Info\" tab in inbasket, or \"Choose Columns\" in Meds & Orders section of the refill encounter.              Passed - Medication is active on med list        Passed - Patient is age 18 or older             "

## 2020-03-11 ENCOUNTER — ALLIED HEALTH/NURSE VISIT (OUTPATIENT)
Dept: DERMATOLOGY | Facility: CLINIC | Age: 65
End: 2020-03-11
Payer: COMMERCIAL

## 2020-03-11 DIAGNOSIS — L40.0 PLAQUE PSORIASIS: Primary | ICD-10-CM

## 2020-03-11 RX ORDER — ROSUVASTATIN CALCIUM 10 MG/1
10 TABLET, COATED ORAL DAILY
Qty: 90 TABLET | Refills: 1 | Status: SHIPPED | OUTPATIENT
Start: 2020-03-11 | End: 2020-07-06

## 2020-03-11 NOTE — NURSING NOTE
Tony Mcnair comes into clinic today at the request of Dr. Corbett Ordering Provider for UVB.    This service provided today was under the supervising provider of the day Dr. Naranjo, who was available if needed.    MAYRA Sultana

## 2020-03-11 NOTE — PROGRESS NOTES
[facility] Dermatology Phototherapy Record  1. Tony Mcnair is a 64 year old adult is here today for phototherapy (UVB) treatment for Plaque psoriasis.        Changes or new medications since last treatment:   NO    New medical conditions or problems since last treatment:   NO    Any problems with last phototherapy treatment?    NO    2. The patient tolerated phototherapy without complication    Patient will return on for next UVB treatment, per protocol.     Patient to see provider every 4-12 weeks for follow-up during treatment.      All questions and concerns discussed with patient in clinic today.      MAYRA Sultana

## 2020-03-14 ENCOUNTER — MYC REFILL (OUTPATIENT)
Dept: FAMILY MEDICINE | Facility: CLINIC | Age: 65
End: 2020-03-14

## 2020-03-14 DIAGNOSIS — F98.8 ATTENTION DEFICIT DISORDER, UNSPECIFIED HYPERACTIVITY PRESENCE: ICD-10-CM

## 2020-03-16 ENCOUNTER — ALLIED HEALTH/NURSE VISIT (OUTPATIENT)
Dept: DERMATOLOGY | Facility: CLINIC | Age: 65
End: 2020-03-16
Payer: COMMERCIAL

## 2020-03-16 DIAGNOSIS — L40.0 PLAQUE PSORIASIS: ICD-10-CM

## 2020-03-16 RX ORDER — METHYLPHENIDATE HYDROCHLORIDE 27 MG/1
27 TABLET ORAL EVERY MORNING
Qty: 30 TABLET | Refills: 0 | OUTPATIENT
Start: 2020-03-16

## 2020-03-16 NOTE — TELEPHONE ENCOUNTER
methylphenidate (CONCERTA) 27 MG CR tablet       Last Written Prescription Date:  3/14/2020  Last Fill Quantity: 30,   # refills: 0  Last Office Visit: Moises  Future Office visit:       Routing refill request to provider for review/approval because:  Drug not on the [facility], [facility] or [facility] refill protocol or controlled substance

## 2020-03-16 NOTE — PROGRESS NOTES
[facility] Dermatology Phototherapy Record  1. Tony Mcnair is a 64 year old adult is here today for phototherapy (UVB) treatment for Plaque psoriasis.        Changes or new medications since last treatment:   NO    New medical conditions or problems since last treatment:   NO    Any problems with last phototherapy treatment?    NO    2. The patient tolerated phototherapy without complication    Patient will return for next UVB treatment, per protocol.     Patient to see provider every 4-12 weeks for follow-up during treatment.      All questions and concerns discussed with patient in clinic today.      Emely Cox LPN

## 2020-03-16 NOTE — NURSING NOTE
Tony Mcnair comes into clinic today at the request of Dr. Mckinney Ordering Provider for UVB.      This service provided today was under the supervising provider of the day Dr. Kent, who was available if needed.    Emely Cox LPN

## 2020-03-23 ENCOUNTER — ALLIED HEALTH/NURSE VISIT (OUTPATIENT)
Dept: DERMATOLOGY | Facility: CLINIC | Age: 65
End: 2020-03-23
Payer: COMMERCIAL

## 2020-03-23 DIAGNOSIS — L40.0 PLAQUE PSORIASIS: ICD-10-CM

## 2020-03-23 NOTE — NURSING NOTE
Tony Mcnair comes into clinic today at the request of Dr. Corbett Ordering Provider for NBUVB        This service provided today was under the supervising provider of the day Dr. Kent, who was available if needed.    Michelle Heck LPN

## 2020-03-23 NOTE — PROGRESS NOTES
[facility] Dermatology Phototherapy Record  1. Tony Mcnair is a 64 year old adult is here today for phototherapy (UVB) treatment for Plaque psoriasis.        Changes or new medications since last treatment:   NO    New medical conditions or problems since last treatment:   NO    Any problems with last phototherapy treatment?    NO    2. The patient tolerated phototherapy without complication    Patient will return for next UVB treatment, per protocol.     Patient to see provider every 4-12 weeks for follow-up during treatment.      All questions and concerns discussed with patient in clinic today.      Michelle Heck LPN

## 2020-03-24 DIAGNOSIS — F98.8 ATTENTION DEFICIT DISORDER, UNSPECIFIED HYPERACTIVITY PRESENCE: ICD-10-CM

## 2020-03-24 RX ORDER — METHYLPHENIDATE HYDROCHLORIDE 27 MG/1
27 TABLET ORAL EVERY MORNING
Start: 2020-03-24

## 2020-03-24 NOTE — TELEPHONE ENCOUNTER
Controlled Substance Refill Request for   methylphenidate (CONCERTA) 27 MG CR tablet  30 tablet  0  3/14/2020  4/12/2020      Problem List Complete:  Yes    Last Written Prescription Date:  3/14/2020  Last Fill Quantity: 30,   # refills: 0    THE MOST RECENT OFFICE VISIT MUST BE WITHIN THE PAST 3 MONTHS. AT LEAST ONE FACE TO FACE VISIT MUST OCCUR EVERY 6 MONTHS. ADDITIONAL VISITS CAN BE VIRTUAL.  (THIS STATEMENT SHOULD BE DELETED.)    Last Office Visit with [facility] primary care provider: 12/27/2019    Future Office visit: Unknown     Controlled substance agreement:   Encounter-Level CSA:    There are no encounter-level csa.     Patient-Level CSA:    There are no patient-level csa.         Last Urine Drug Screen: No results found for: CDAUT, No results found for: COMDAT, No results found for: THC13, PCP13, COC13, MAMP13, OPI13, AMP13, BZO13, TCA13, MTD13, BAR13, OXY13, PPX13, BUP13     Processing:  Rx to be electronically transmitted to pharmacy by provider     [email or web address]   checked in past 3 months?  Yes - 2/14/2020

## 2020-03-24 NOTE — TELEPHONE ENCOUNTER
Reason for Call:  Medication or medication refill:    Do you use a [facility]?  Name of the pharmacy and phone number for the current request:     [id number] [place] [address] ([id number] [place] [address])        Name of the medication requested: methylphenidate (CONCERTA) 27 MG CR tablet    Other request: None    Can we leave a detailed message on this number? YES    Phone number patient can be reached at: Home number on file [phone] (home)    Best Time: any      Call taken on 3/24/2020 at 12:57 PM by Radha Martínez

## 2020-03-24 NOTE — TELEPHONE ENCOUNTER
Spoke with Pharmacy-     There are refills available for March and April at the [facility] in [place]    Spoke to Pt: He states that the [facility] is [place] reported to him that they are OUT of this medication, expected to be in stock on Thursday (but no guarantee)- Writer discussed that the staff member I spoke with did not disclose that they were out. Maybe they received a shipment, or just did not mention they are out.     Pt states he will call them back to check on that, and/or wait until they receive their supply on Thursday.     Asked Pt to call back if he has any further need from clinic     Verbalized agreement    Carol FLOOD RN

## 2020-04-02 ENCOUNTER — ALLIED HEALTH/NURSE VISIT (OUTPATIENT)
Dept: DERMATOLOGY | Facility: CLINIC | Age: 65
End: 2020-04-02
Payer: COMMERCIAL

## 2020-04-02 DIAGNOSIS — L40.0 PLAQUE PSORIASIS: ICD-10-CM

## 2020-04-02 NOTE — NURSING NOTE
Tony Mcnair comes into clinic today at the request of Dr Corbett Ordering Provider for NBUVB.    This service provided today was under the supervising provider of the day Dr Kent, who was available if needed.    Georgina Wills RN

## 2020-04-02 NOTE — PROGRESS NOTES
[facility] Dermatology Phototherapy Record  1. Tony Mcnair is a 64 year old adult is here today for phototherapy (UVB) treatment for Psoriasis.        Changes or new medications since last treatment:   NO    New medical conditions or problems since last treatment:   NO    Any problems with last phototherapy treatment?    NO    2. The patient tolerated phototherapy without complication    Patient will return for next UVB treatment, per protocol.     Patient to see provider every 4-12 weeks for follow-up during treatment.      All questions and concerns discussed with patient in clinic today.      Georgina Wills RN

## 2020-04-06 ENCOUNTER — MYC REFILL (OUTPATIENT)
Dept: DERMATOLOGY | Facility: CLINIC | Age: 65
End: 2020-04-06

## 2020-04-06 ENCOUNTER — ALLIED HEALTH/NURSE VISIT (OUTPATIENT)
Dept: DERMATOLOGY | Facility: CLINIC | Age: 65
End: 2020-04-06
Payer: COMMERCIAL

## 2020-04-06 DIAGNOSIS — L40.9 PSORIASIS: ICD-10-CM

## 2020-04-06 DIAGNOSIS — L40.0 PLAQUE PSORIASIS: ICD-10-CM

## 2020-04-06 NOTE — PROGRESS NOTES
[facility] Dermatology Phototherapy Record  1. Tony Mcnair is a 64 year old adult is here today for phototherapy (UVB) treatment for Plaque psoriasis.        Changes or new medications since last treatment:   NO    New medical conditions or problems since last treatment:   NO    Any problems with last phototherapy treatment?    NO    2. The patient tolerated phototherapy without complication    Patient will return on for next UVB treatment, per protocol.     Patient to see provider every 4-12 weeks for follow-up during treatment.      All questions and concerns discussed with patient in clinic today.      Dorothy Ling RN    Tony Mcnair comes into clinic today at the request of Dr. Corbett Ordering Provider for NBUVB.    This service provided today was under the supervising provider of the day Dr. Blanco, who was available if needed.    Dorothy Ling RN

## 2020-04-07 RX ORDER — CALCIPOTRIENE, BETAMETHASONE DIPROPIONATE 50; .643 UG/G; MG/G
OINTMENT TOPICAL
Qty: 60 G | Refills: 0 | Status: SHIPPED | OUTPATIENT
Start: 2020-04-07 | End: 2020-07-19

## 2020-04-13 ENCOUNTER — ALLIED HEALTH/NURSE VISIT (OUTPATIENT)
Dept: DERMATOLOGY | Facility: CLINIC | Age: 65
End: 2020-04-13
Payer: COMMERCIAL

## 2020-04-13 DIAGNOSIS — L40.0 PLAQUE PSORIASIS: ICD-10-CM

## 2020-04-13 NOTE — PROGRESS NOTES
[facility] Dermatology Phototherapy Record  1. Tony Mcnair is a 64 year old adult is here today for phototherapy (UVB) treatment for Plaque psoriasis.        Changes or new medications since last treatment:   NO    New medical conditions or problems since last treatment:   NO    Any problems with last phototherapy treatment?    NO    2. The patient tolerated phototherapy without complication    Patient will return on  for next UVB treatment, per protocol.     Patient to see provider every 4-12 weeks for follow-up during treatment.      All questions and concerns discussed with patient in clinic today.      Tony Mcnair comes into clinic today at the request of Dr. Mckinney Ordering Provider for NBUVB.        This service provided today was under the supervising provider of the day Dr. Blanco, who was available if needed.    Cherie Zafar, CMA

## 2020-04-20 ENCOUNTER — ALLIED HEALTH/NURSE VISIT (OUTPATIENT)
Dept: DERMATOLOGY | Facility: CLINIC | Age: 65
End: 2020-04-20
Payer: COMMERCIAL

## 2020-04-20 DIAGNOSIS — L40.0 PLAQUE PSORIASIS: ICD-10-CM

## 2020-04-20 NOTE — PROGRESS NOTES
Tony Mcnair comes into clinic today at the request of Dr Corbett Ordering Provider for NBUVB.    This service provided today was under the supervising provider of the day Dr Blanco, who was available if needed.    [facility] Dermatology Phototherapy Record  1. Tony Mcnair is a 64 year old adult is here today for phototherapy (UVB) treatment for Plaque psoriasis.        Changes or new medications since last treatment:   NO    New medical conditions or problems since last treatment:   NO    Any problems with last phototherapy treatment?    NO    2. The patient tolerated phototherapy without complication    Patient will return for next UVB treatment, per protocol.     Patient to see provider every 4-12 weeks for follow-up during treatment.      All questions and concerns discussed with patient in clinic today.      Georgina Wills RN

## 2020-04-27 ENCOUNTER — ALLIED HEALTH/NURSE VISIT (OUTPATIENT)
Dept: DERMATOLOGY | Facility: CLINIC | Age: 65
End: 2020-04-27
Payer: COMMERCIAL

## 2020-04-27 DIAGNOSIS — L40.0 PLAQUE PSORIASIS: ICD-10-CM

## 2020-04-27 NOTE — PROGRESS NOTES
[facility] Dermatology Phototherapy Record  1. Tony Mcnair is a 64 year old adult is here today for phototherapy (UVB) treatment for Plaque psoriasis.        Changes or new medications since last treatment:   NO    New medical conditions or problems since last treatment:   NO    Any problems with last phototherapy treatment?    NO    2. The patient tolerated phototherapy without complication    Patient will return on for next UVB treatment, per protocol.     Patient to see provider every 4-12 weeks for follow-up during treatment.      All questions and concerns discussed with patient in clinic today.      Dorothy Ling RN    Tony Mcnair comes into clinic today at the request of Dr. Corbett Ordering Provider for NBUVB.    This service provided today was under the supervising provider of the day Dr. Blanco, who was available if needed.    Dorothy Ling RN

## 2020-05-01 DIAGNOSIS — F33.1 MODERATE EPISODE OF RECURRENT MAJOR DEPRESSIVE DISORDER (H): ICD-10-CM

## 2020-05-01 RX ORDER — CITALOPRAM HYDROBROMIDE 20 MG/1
20 TABLET ORAL DAILY
Qty: 30 TABLET | Refills: 0 | Status: SHIPPED | OUTPATIENT
Start: 2020-05-01 | End: 2020-06-03

## 2020-05-01 NOTE — TELEPHONE ENCOUNTER
"Prescription approved per [facility] Refill Protocol.  Kayli NASH RN    Last Written Prescription Date:  12/27/2019  Last Fill Quantity: 30,  # refills: 1   Last office visit: 12/27/2019 with prescribing provider:     Future Office Visit:    Requested Prescriptions   Pending Prescriptions Disp Refills     citalopram (CELEXA) 20 MG tablet 30 tablet 1     Sig: Take 1 tablet (20 mg) by mouth daily       SSRIs Protocol Failed - 5/1/2020  1:45 PM        Failed - PHQ-9 score less than 5 in past 6 months     Please review last PHQ-9 score.           Passed - Medication is active on med list        Passed - Patient is age 18 or older        Passed - Recent (6 mo) or future (30 days) visit within the authorizing provider's specialty     Patient had office visit in the last 6 months or has a visit in the next 30 days with authorizing provider or within the authorizing provider's specialty.  See \"Patient Info\" tab in inbasket, or \"Choose Columns\" in Meds & Orders section of the refill encounter.               "

## 2020-05-11 ENCOUNTER — ALLIED HEALTH/NURSE VISIT (OUTPATIENT)
Dept: DERMATOLOGY | Facility: CLINIC | Age: 65
End: 2020-05-11
Payer: COMMERCIAL

## 2020-05-11 DIAGNOSIS — L40.0 PLAQUE PSORIASIS: ICD-10-CM

## 2020-05-11 NOTE — PROGRESS NOTES
[facility] Dermatology Phototherapy Record  1. Tony Mcnair is a 64 year old adult is here today for phototherapy (UVB) treatment for Plaque psoriasis .        Changes or new medications since last treatment:   NO    New medical conditions or problems since last treatment:   NO    Any problems with last phototherapy treatment?    NO    2. The patient tolerated phototherapy without complication    Patient will return on  for next UVB treatment, per protocol.     Patient to see provider every 4-12 weeks for follow-up during treatment.      All questions and concerns discussed with patient in clinic today.      Cherie Zafar CMA    Tony Mcnair comes into clinic today at the request of Dr. Corbett  Ordering Provider for NBUVB.        This service provided today was under the supervising provider of the day , who was available if needed.    Cherie Zafar CMA

## 2020-05-18 ENCOUNTER — ALLIED HEALTH/NURSE VISIT (OUTPATIENT)
Dept: DERMATOLOGY | Facility: CLINIC | Age: 65
End: 2020-05-18
Payer: COMMERCIAL

## 2020-05-18 DIAGNOSIS — L40.0 PLAQUE PSORIASIS: ICD-10-CM

## 2020-05-18 NOTE — PROGRESS NOTES
[facility] Dermatology Phototherapy Record  1. Tony Mcnair is a 64 year old adult is here today for phototherapy (UVB) treatment for Plaque psoriasis .        Changes or new medications since last treatment:   NO    New medical conditions or problems since last treatment:   NO    Any problems with last phototherapy treatment?    NO    2. The patient tolerated phototherapy without complication    Patient will return on  for next UVB treatment, per protocol.     Patient to see provider every 4-12 weeks for follow-up during treatment.      All questions and concerns discussed with patient in clinic today.      Cherie Zafar CMA    Tony Mcnair comes into clinic today at the request of Dr. Corbett Ordering Provider for NBUVB.        This service provided today was under the supervising provider of the day Dr. Blanco, who was available if needed.    Cherie Zafar CMA

## 2020-05-26 ENCOUNTER — ALLIED HEALTH/NURSE VISIT (OUTPATIENT)
Dept: DERMATOLOGY | Facility: CLINIC | Age: 65
End: 2020-05-26
Payer: COMMERCIAL

## 2020-05-26 DIAGNOSIS — L40.0 PLAQUE PSORIASIS: ICD-10-CM

## 2020-05-26 NOTE — PROGRESS NOTES
[facility] Dermatology Phototherapy Record  1. Tony Mcnair is a 64 year old adult is here today for phototherapy (UVB) treatment for Psoriasis.        Changes or new medications since last treatment:   NO    New medical conditions or problems since last treatment:   NO    Any problems with last phototherapy treatment?    NO    2. The patient tolerated phototherapy without complication    Patient will return for next UVB treatment, per protocol.     Patient to see provider every 4-12 weeks for follow-up during treatment.      All questions and concerns discussed with patient in clinic today.        Tony Mcnair comes into clinic today at the request of Dr Corbett Ordering Provider for NBUVB.    This service provided today was under the supervising provider of the day Dr Blanco, who was available if needed.    Georgina Wills RN

## 2020-05-29 DIAGNOSIS — F33.1 MODERATE EPISODE OF RECURRENT MAJOR DEPRESSIVE DISORDER (H): ICD-10-CM

## 2020-06-01 ENCOUNTER — TELEPHONE (OUTPATIENT)
Dept: DERMATOLOGY | Facility: CLINIC | Age: 65
End: 2020-06-01

## 2020-06-01 ENCOUNTER — ALLIED HEALTH/NURSE VISIT (OUTPATIENT)
Dept: DERMATOLOGY | Facility: CLINIC | Age: 65
End: 2020-06-01
Payer: COMMERCIAL

## 2020-06-01 DIAGNOSIS — L40.0 PLAQUE PSORIASIS: ICD-10-CM

## 2020-06-01 NOTE — TELEPHONE ENCOUNTER
-Pt has VV scheduled for tomorrow- made appt note, due for PHQ-9     Please review and discuss at upcoming appt    Thank you,   Carol FLOOD RN

## 2020-06-01 NOTE — PROGRESS NOTES
[facility] Dermatology Phototherapy Record  1. Tony Mcnair is a 64 year old adult is here today for phototherapy (UVB) treatment for Plaque psoriasis .        Changes or new medications since last treatment:   NO    New medical conditions or problems since last treatment:   NO    Any problems with last phototherapy treatment?    NO    2. The patient tolerated phototherapy without complication    Patient will return on  for next UVB treatment, per protocol.     Patient to see provider every 4-12 weeks for follow-up during treatment.      All questions and concerns discussed with patient in clinic today.      Tony Mcnair comes into clinic today at the request of Corbett Ordering Provider for NBUVB.        This service provided today was under the supervising provider of the day Dr. Blanco, who was available if needed.

## 2020-06-02 ENCOUNTER — TELEPHONE (OUTPATIENT)
Dept: DERMATOLOGY | Facility: CLINIC | Age: 65
End: 2020-06-02

## 2020-06-02 ENCOUNTER — VIRTUAL VISIT (OUTPATIENT)
Dept: FAMILY MEDICINE | Facility: CLINIC | Age: 65
End: 2020-06-02
Payer: COMMERCIAL

## 2020-06-02 ENCOUNTER — VIRTUAL VISIT (OUTPATIENT)
Dept: DERMATOLOGY | Facility: CLINIC | Age: 65
End: 2020-06-02
Payer: COMMERCIAL

## 2020-06-02 DIAGNOSIS — L40.0 PLAQUE PSORIASIS: Primary | ICD-10-CM

## 2020-06-02 DIAGNOSIS — R43.2 LOSS OF TASTE: ICD-10-CM

## 2020-06-02 DIAGNOSIS — F98.8 ATTENTION DEFICIT DISORDER, UNSPECIFIED HYPERACTIVITY PRESENCE: Primary | ICD-10-CM

## 2020-06-02 PROCEDURE — 99214 OFFICE O/P EST MOD 30 MIN: CPT | Mod: 95 | Performed by: INTERNAL MEDICINE

## 2020-06-02 ASSESSMENT — PATIENT HEALTH QUESTIONNAIRE - PHQ9: SUM OF ALL RESPONSES TO PHQ QUESTIONS 1-9: 6

## 2020-06-02 ASSESSMENT — PAIN SCALES - GENERAL: PAINLEVEL: NO PAIN (0)

## 2020-06-02 NOTE — TELEPHONE ENCOUNTER
MARIANO for Tony asking him to call back to schedule his follow up appointment with Dr. Corbett in 12/2020. Clinic number provided.    MAYRA Sultana

## 2020-06-02 NOTE — PROGRESS NOTES
"Tony Mcnair is a 64 year old adult who is being evaluated via a billable video visit.      The patient has been notified of following:     \"This video visit will be conducted via a call between you and your physician/provider. We have found that certain health care needs can be provided without the need for an in-person physical exam.  This service lets us provide the care you need with a video conversation.  If a prescription is necessary we can send it directly to your pharmacy.  If lab work is needed we can place an order for that and you can then stop by our lab to have the test done at a later time.    Video visits are billed at different rates depending on your insurance coverage.  Please reach out to your insurance provider with any questions.    If during the course of the call the physician/provider feels a video visit is not appropriate, you will not be charged for this service.\"    Patient has given verbal consent for video visit? Yes        Video-Visit Details    Video Start Time: 12:02 pm  Video End Time: 12: 27 pm    Originating Location (pt. Location): home    Distant Location (provider location):  [facility]     Platform used for Video Visit: [employer] (Am[employer]Well)      [facility]     Over the past 6 months, patient has noticed gradual loss of his sense of smell and taste.  Denies nasal congestion, mouth/throat pain, or problems with swallowing.    Also needs a refill for his Concerta for his attention deficit disorder which is doing well on the medication.      Review of Systems   Constitutional: Negative for unexpected weight change.   HENT: Negative for congestion, postnasal drip, rhinorrhea, sore throat and trouble swallowing.          ICD-10-CM    1. Attention deficit disorder, unspecified hyperactivity presence  F98.8 methylphenidate (CONCERTA) 27 MG CR tablet   2. Loss of taste  R43.2 OTOLARYNGOLOGY REFERRAL     CANCELED: OTOLARYNGOLOGY REFERRAL       Dr. Souleymane De Leon    "

## 2020-06-02 NOTE — NURSING NOTE
"Chief Complaint   Patient presents with     Derm Problem     Tony is being seen today via virtual visit for psoriasis. He states \" my skin is doing pretty good\"     Evy Crowder, MAYRA  "

## 2020-06-02 NOTE — LETTER
"6/2/2020       RE: Tony CHRISTIE Noam  [address]  [place] [address]     Dear Colleague,    Thank you for referring your patient, Tony Mcnair, to the [facility] DERMATOLOGY at [facility]. Please see a copy of my visit note below.    [facility]  Store and Forward and Video ( Invitation sent by:  Nabeel and send to e-mail at: [email or web address] )      Impression and Recommendations (Patient Counseled on the Following):  1. Plaque type psoriasis, <10%TBSA, no PsA - stable  -Continue nbUVB (home 2x/week, clinic 1x/week)  -Lidex 0.05% solution for scalp BID PRN  -Protopic 0.1% around eyes BID PRN  -Taclonex ointment to affected areas BID PRN    Follow-up:   Follow-up with dermatology in approximately 6 mo. Earlier for new or changing lesions or rash.      Staff and resident:    Brendan Cornell MD  Dermatology Resident, PGY4    I participated in the entirety of the interview, reviewed all submitted photographs, and agree with the assessment and plan as documented in the resident's note.    Petar Corbett MD  Dermatology Attending    _____________________________________________________________________________    Dermatology Problem List:  1. Limited plaque type psoriasis  Current tx:   -NBUVB phototherapy 1/week. Has home light box. Doing home light box 2 days before and 2 days after clinic phototherapy until reaches clinic dose. Clinic dosage is 1377 mJ  -Lidex 0.05% solution for scalp BID   -Protopic 0.1% around eyes BID PRN  -Taclonex ointment to affected areas BID PRN, bedtime occlusion with Saran wrap for flares    Encounter Date: Jun 2, 2020    CC:   Chief Complaint   Patient presents with     Derm Problem     Tony is being seen today via virtual visit for psoriasis. He states \" my skin is doing pretty good\"       History of Present Illness:  I have reviewed the teledermatology information and the nursing intake corresponding to this issue. Tony Mcnair is a " 64 year old adult who presents via teledermatology for follow up psoriasis, last seen 5/14/2019. Has been doing nbUVB for the last year. Comes into clinic once weekly and does home unit two times per week. Is up to 46 seconds two times (has a 3 sided machine) - has been able to increase times because he's been more diligent with the treatments. Legs are the primary area of involvement. Is interested in reducing clinic appointments with time. Particularly over the summer being that his psoriasis is usually improved. Has plans to see his primary physician today -- is checking in regularly regarding his general health. Has arthritis in his left foot -- from prior injury. No morning stiffness, joint swelling or redness. No other skin concerns today.    ROS: Patient is generally feeling well today.  See HPI.  No recent fevers.  No cough or SOB.    Physical Examination:  General: Well-appearing, appropriately-developed individual.  Skin: Focused examination within the teledermatology photograph(s) including arms, legs, knees, elbows was performed.   - Lichenified pik plaques on the anterolateral lower legs  - Pink plaques with ill defined borders and white scale on the elbows and knees                    Labs:  n/a    Past Medical History:   Patient Active Problem List   Diagnosis     Psoriasis     Pain in both feet     Iliotibial band syndrome, unspecified laterality     Adhesive capsulitis of left shoulder     Left shoulder pain     ADD (attention deficit disorder)     Past Medical History:   Diagnosis Date     Arthritis      Depressive disorder Junie     [place] 1988    LEFT EYE     Past Surgical History:   Procedure Laterality Date     COLONOSCOPY  11 years ago     COLONOSCOPY N/A 5/17/2019    Procedure: COLONOSCOPY;  Surgeon: Jaycob Mirza MD;  Location:  GI     HERNIA REPAIR  10 years ago     IRIS NEVUS Right      LESION RIGHT UPPER LID Right 1999    EXCISION       Social History:  Patient reports that Tony  PRATIKRex Cruzjodie has never smoked. Tony PRATIKRex Xu has never used smokeless tobacco. Tony Mcnair reports current alcohol use. Tony Mcnair reports that Tony ABEL Walterxavier does not use drugs.    Family History:  Family History   Problem Relation Age of Onset     Coronary Artery Disease Father      Hyperlipidemia Father      Coronary Artery Disease Brother      Hyperlipidemia Brother      Hypertension Mother      Breast Cancer Mother      Substance Abuse Mother      Cancer No family hx of         skin cancer     Glaucoma No family hx of      Macular Degeneration No family hx of      Retinal detachment No family hx of      Skin Cancer No family hx of      Melanoma No family hx of        Medications:  Current Outpatient Medications   Medication     Artificial Tear Ointment (REFRESH P.M.) OINT     BIOTIN PO     calcipotriene-betameth diprop (TACLONEX) 0.005-0.064 % external ointment     citalopram (CELEXA) 20 MG tablet     EPINEPHrine (EPIPEN/ADRENACLICK/OR ANY BX GENERIC EQUIV) 0.3 MG/0.3ML injection 2-pack     fluocinonide (LIDEX) 0.05 % external solution     Multiple Vitamin (DAILY MULTIVITAMIN PO)     olopatadine (PATADAY) 0.2 % ophthalmic solution     pimecrolimus (ELIDEL) 1 % external cream     rosuvastatin (CRESTOR) 10 MG tablet     tacrolimus (PROTOPIC) 0.1 % ointment     VITAMIN D, CHOLECALCIFEROL, PO     No current facility-administered medications for this visit.           Allergies   Allergen Reactions     Lobster [Crustaceans] Angioedema     Shrimp Angioedema         _____________________________________________________________________________    Teledermatology information:  - Location of patient in [place]: Home  - Patient presented as: return  - Location of teledermatologist:  ([facility] DERMATOLOGY )  - Reason teledermatology is appropriate:  of National Emergency Regarding Coronavirus disease (COVID 19) Outbreak  - Image quality and interpretability: acceptable  - Physician has received verbal consent for a  Video/Photos Visit from the patient? Yes  - In-person dermatology visit recommendation: no  - Date of images: 6/2/2020  - Service start time: 9:45am  - Service end time: 10:01am  - Date of report: 6/2/2020       Again, thank you for allowing me to participate in the care of your patient.      Sincerely,    Petar Corbett MD

## 2020-06-02 NOTE — PATIENT INSTRUCTIONS
[facility] Teledermatology Visit    Thank you for allowing us to participate in your care. Your findings, instructions and follow-up plan are as follows:      -- Plan to continue with light treatments (home 2x/week, clinic 1x/week)  -- Continue with topical medications, let us know if you need refills  -- Follow up in 6 months, sooner as needed     When should I call my doctor?    If you are worsening or not improving, please, contact us or seek urgent care as noted below.     Who should I call with questions (adults)?    [facility] (adult and pediatric): [phone]     [facility] (adult): [phone]    For urgent needs outside of business hours call the [facility] at [phone] and ask for the dermatology resident on call    If this is a medical emergency and you are unable to reach an ER, Call 911      Who should I call with questions (pediatric)?  [facility]- Pediatric Dermatology  Dr. Linda Hayden, Dr. Juan A Benavidez, Dr. Roseanne Sharp, Tricia Davies, PA  Dr. Cassie Puente, Dr. Evy Manrique & Dr. Michael Castaneda  Non Urgent  Nurse Triage Line; [phone]- Yin and Marisa [facility] Care Coordinators   Fani (/Complex ) [phone]    If you need a prescription refill, please contact your pharmacy. Refills are approved or denied by our Physicians during normal business hours, Monday through Fridays  Per office policy, refills will not be granted if you have not been seen within the past year (or sooner depending on your child's condition)    Scheduling Information:  Pediatric Appointment Scheduling and Call Center [phone]  Radiology Scheduling- [phone]  Sedation Unit Scheduling- [phone]  [facility] Scheduling- General [phone]; Pediatric Dermatology [phone]  Main  Services: [phone]  Czech: [phone]  [place]:  [phone]  [place]/Tuvaluan: [phone]  Preadmission Nursing Department Fax Number: [phone] (Fax all pre-operative paperwork to this number)    For urgent matters arising during evenings, weekends, or holidays that cannot wait for normal business hours please call [phone] and ask for the Dermatology Resident On-Call to be paged.

## 2020-06-03 RX ORDER — METHYLPHENIDATE HYDROCHLORIDE 27 MG/1
27 TABLET ORAL EVERY MORNING
Qty: 30 TABLET | Refills: 0 | Status: SHIPPED | OUTPATIENT
Start: 2020-06-03 | End: 2020-07-19

## 2020-06-03 RX ORDER — CITALOPRAM HYDROBROMIDE 20 MG/1
20 TABLET ORAL DAILY
Qty: 90 TABLET | Refills: 1 | Status: SHIPPED | OUTPATIENT
Start: 2020-06-03 | End: 2020-10-01

## 2020-06-04 ASSESSMENT — ENCOUNTER SYMPTOMS
TROUBLE SWALLOWING: 0
UNEXPECTED WEIGHT CHANGE: 0
RHINORRHEA: 0
SORE THROAT: 0

## 2020-06-08 ENCOUNTER — ALLIED HEALTH/NURSE VISIT (OUTPATIENT)
Dept: DERMATOLOGY | Facility: CLINIC | Age: 65
End: 2020-06-08
Payer: COMMERCIAL

## 2020-06-08 DIAGNOSIS — L40.0 PLAQUE PSORIASIS: ICD-10-CM

## 2020-06-08 NOTE — PROGRESS NOTES
[facility] Dermatology Phototherapy Record  1. Tony Mcnair is a 64 year old adult is here today for phototherapy (UVB) treatment for Plaque psoriasis.        Changes or new medications since last treatment:   NO    New medical conditions or problems since last treatment:   NO    Any problems with last phototherapy treatment?    NO    2. The patient tolerated phototherapy without complication    Patient will return on  for next UVB treatment, per protocol.     Patient to see provider every 4-12 weeks for follow-up during treatment.      All questions and concerns discussed with patient in clinic today.      Cherie Zafar CMA    Tony Mcnair comes into clinic today at the request of Dr. Corbett Ordering Provider for [facility]BUVB.        This service provided today was under the supervising provider of the day Dr. Blanco , who was available if needed.    Cherie Zafar CMA

## 2020-06-15 ENCOUNTER — ALLIED HEALTH/NURSE VISIT (OUTPATIENT)
Dept: DERMATOLOGY | Facility: CLINIC | Age: 65
End: 2020-06-15
Payer: COMMERCIAL

## 2020-06-15 DIAGNOSIS — L40.0 PLAQUE PSORIASIS: ICD-10-CM

## 2020-06-15 NOTE — PROGRESS NOTES
[facility] Dermatology Phototherapy Record  1. Tony Mcnair is a 64 year old adult is here today for phototherapy (UVB) treatment for Plaque psoriasis.        Changes or new medications since last treatment:   NO    New medical conditions or problems since last treatment:   NO    Any problems with last phototherapy treatment?    NO    2. The patient tolerated phototherapy without complication    Patient will return on  for next UVB treatment, per protocol.     Patient to see provider every 4-12 weeks for follow-up during treatment.      All questions and concerns discussed with patient in clinic today.      Cherie Zafar CMA    Tony Mcnair comes into clinic today at the request of Dr. Corbett Ordering Provider for NBUVB.        This service provided today was under the supervising provider of the day Dr. Blanco, who was available if needed.    Cherie Zafar CMA

## 2020-06-16 ENCOUNTER — TRANSFERRED RECORDS (OUTPATIENT)
Dept: HEALTH INFORMATION MANAGEMENT | Facility: CLINIC | Age: 65
End: 2020-06-16

## 2020-06-22 ENCOUNTER — ALLIED HEALTH/NURSE VISIT (OUTPATIENT)
Dept: DERMATOLOGY | Facility: CLINIC | Age: 65
End: 2020-06-22
Payer: COMMERCIAL

## 2020-06-22 DIAGNOSIS — L40.0 PLAQUE PSORIASIS: ICD-10-CM

## 2020-06-22 NOTE — PROGRESS NOTES
[facility] Dermatology Phototherapy Record  1. Tony Mcnair is a 64 year old adult is here today for phototherapy (UVB) treatment for Plaque psoriasis .        Changes or new medications since last treatment:   NO    New medical conditions or problems since last treatment:   NO    Any problems with last phototherapy treatment?    NO    2. The patient tolerated phototherapy without complication    Patient will return on  for next UVB treatment, per protocol.     Patient to see provider every 4-12 weeks for follow-up during treatment.      All questions and concerns discussed with patient in clinic today.      Tony Mcnair comes into clinic today at the request of Dr. Mckinney Ordering Provider for NBUVB.        This service provided today was under the supervising provider of the day Dr. Blanco, who was available if needed.    Cherie Zafar, CMA

## 2020-06-26 ENCOUNTER — TELEPHONE (OUTPATIENT)
Dept: FAMILY MEDICINE | Facility: CLINIC | Age: 65
End: 2020-06-26

## 2020-06-26 DIAGNOSIS — E78.5 HYPERLIPIDEMIA, UNSPECIFIED HYPERLIPIDEMIA TYPE: Primary | ICD-10-CM

## 2020-06-26 NOTE — TELEPHONE ENCOUNTER
Reason for Call: Request for an order or referral:    Order or referral being requested: lipids    Date needed: as soon as possible    Has the patient been seen by the PCP for this problem? YES    Additional comments: Please place orders and call Pt to schedule for labs    Phone number Patient can be reached at:  Cell number on file:    Telephone Information:   Mobile [phone]     Best Time:  any    Can we leave a detailed message on this number?  YES    Call taken on 6/26/2020 at 2:17 PM by Cecille Ballard

## 2020-06-29 ENCOUNTER — ALLIED HEALTH/NURSE VISIT (OUTPATIENT)
Dept: DERMATOLOGY | Facility: CLINIC | Age: 65
End: 2020-06-29
Payer: COMMERCIAL

## 2020-06-29 DIAGNOSIS — L40.0 PLAQUE PSORIASIS: Primary | ICD-10-CM

## 2020-06-29 NOTE — NURSING NOTE
Tony Mcnair comes into clinic today at the request of Dr. Corbett Ordering Provider for UVB.    This service provided today was under the supervising provider of the day Dr. Blanco, who was available if needed.    Cherie Zafar, [facility]

## 2020-06-29 NOTE — PROGRESS NOTES
[facility] Dermatology Phototherapy Record  1. Tony Mcnair is a 64 year old adult is here today for phototherapy (UVB) treatment for Psoriasis.        Changes or new medications since last treatment:   NO    New medical conditions or problems since last treatment:   NO    Any problems with last phototherapy treatment?    NO    2. The patient tolerated phototherapy without complication    Patient will return on for next UVB treatment, per protocol.     Patient to see provider every 4-12 weeks for follow-up during treatment.      All questions and concerns discussed with patient in clinic today.      Cherie Zafar, CMA

## 2020-07-02 ENCOUNTER — TRANSFERRED RECORDS (OUTPATIENT)
Dept: HEALTH INFORMATION MANAGEMENT | Facility: CLINIC | Age: 65
End: 2020-07-02

## 2020-07-06 ENCOUNTER — ALLIED HEALTH/NURSE VISIT (OUTPATIENT)
Dept: DERMATOLOGY | Facility: CLINIC | Age: 65
End: 2020-07-06
Payer: COMMERCIAL

## 2020-07-06 DIAGNOSIS — L40.0 PLAQUE PSORIASIS: Primary | ICD-10-CM

## 2020-07-06 NOTE — PROGRESS NOTES
[facility] Dermatology Phototherapy Record  1. Tony Mcnair is a 64 year old adult is here today for phototherapy (UVB) treatment for Psoriasis .        Changes or new medications since last treatment:   NO    New medical conditions or problems since last treatment:   NO    Any problems with last phototherapy treatment?    NO    2. The patient tolerated phototherapy without complication    Patient will return on  for next UVB treatment, per protocol.     Patient to see provider every 4-12 weeks for follow-up during treatment.      All questions and concerns discussed with patient in clinic today.      Cherie Zafar CMA    Tony Mcnair comes into clinic today at the request of Dr. Corbett Ordering Provider for NBUBN.        This service provided today was under the supervising provider of the day Dr. Blanco, who was available if needed.    Cherie Zafar CMA

## 2020-07-13 ENCOUNTER — ALLIED HEALTH/NURSE VISIT (OUTPATIENT)
Dept: DERMATOLOGY | Facility: CLINIC | Age: 65
End: 2020-07-13
Payer: COMMERCIAL

## 2020-07-13 DIAGNOSIS — L40.0 PLAQUE PSORIASIS: Primary | ICD-10-CM

## 2020-07-13 ASSESSMENT — PAIN SCALES - GENERAL: PAINLEVEL: NO PAIN (0)

## 2020-07-13 NOTE — NURSING NOTE
Tony Mcnair comes into clinic today at the request of  Ordering Provider for phototherapy.      This service provided today was under the supervising provider of the kristie Blanco, who was available if needed.    MAYRA Sultana

## 2020-07-13 NOTE — PROGRESS NOTES
[facility] Dermatology Phototherapy Record  1. Tony Mcnair is a 64 year old adult is here today for phototherapy (UVB) treatment for Plaque psoriasis.        Changes or new medications since last treatment:   NO    New medical conditions or problems since last treatment:   NO    Any problems with last phototherapy treatment?    NO    2. The patient tolerated phototherapy without complication    Patient will return on for next UVB treatment, per protocol.     Patient to see provider every 4-12 weeks for follow-up during treatment.      All questions and concerns discussed with patient in clinic today.      MAYRA Sultana

## 2020-07-19 ENCOUNTER — MYC REFILL (OUTPATIENT)
Dept: FAMILY MEDICINE | Facility: CLINIC | Age: 65
End: 2020-07-19

## 2020-07-19 ENCOUNTER — MYC REFILL (OUTPATIENT)
Dept: DERMATOLOGY | Facility: CLINIC | Age: 65
End: 2020-07-19

## 2020-07-19 DIAGNOSIS — L40.9 PSORIASIS: ICD-10-CM

## 2020-07-19 DIAGNOSIS — F98.8 ATTENTION DEFICIT DISORDER, UNSPECIFIED HYPERACTIVITY PRESENCE: ICD-10-CM

## 2020-07-19 DIAGNOSIS — H10.13 ALLERGIC CONJUNCTIVITIS OF BOTH EYES: ICD-10-CM

## 2020-07-19 RX ORDER — FLUOCINONIDE TOPICAL SOLUTION USP, 0.05% 0.5 MG/ML
SOLUTION TOPICAL
Qty: 60 ML | Refills: 6 | Status: CANCELLED | OUTPATIENT
Start: 2020-07-19

## 2020-07-19 RX ORDER — PIMECROLIMUS 10 MG/G
CREAM TOPICAL 2 TIMES DAILY PRN
Qty: 60 G | Refills: 0 | Status: CANCELLED | OUTPATIENT
Start: 2020-07-19

## 2020-07-20 ENCOUNTER — ALLIED HEALTH/NURSE VISIT (OUTPATIENT)
Dept: DERMATOLOGY | Facility: CLINIC | Age: 65
End: 2020-07-20
Payer: COMMERCIAL

## 2020-07-20 ENCOUNTER — MEDICAL CORRESPONDENCE (OUTPATIENT)
Dept: HEALTH INFORMATION MANAGEMENT | Facility: CLINIC | Age: 65
End: 2020-07-20

## 2020-07-20 DIAGNOSIS — L40.0 PLAQUE PSORIASIS: Primary | ICD-10-CM

## 2020-07-20 RX ORDER — CALCIPOTRIENE, BETAMETHASONE DIPROPIONATE 50; .643 UG/G; MG/G
OINTMENT TOPICAL
Qty: 60 G | Refills: 0 | Status: SHIPPED | OUTPATIENT
Start: 2020-07-20 | End: 2022-02-27

## 2020-07-20 NOTE — PROGRESS NOTES
[facility] Dermatology Phototherapy Record  1. Tony Mcnair is a 64 year old adult is here today for phototherapy (UVB) treatment for Plaque psoriasis.        Changes or new medications since last treatment:   NO    New medical conditions or problems since last treatment:   NO    Any problems with last phototherapy treatment?    NO    2. The patient tolerated phototherapy without complication    Patient will return on for next UVB treatment, per protocol.     Patient to see provider every 4-12 weeks for follow-up during treatment.      All questions and concerns discussed with patient in clinic today.      MAYRA Sultana

## 2020-07-20 NOTE — NURSING NOTE
Tony Mcnair comes into clinic today at the request of Dr. Corbett Ordering Provider for UVB .      This service provided today was under the supervising provider of the day Dr. Blanco, who was available if needed.    MAYRA Sultana

## 2020-07-21 NOTE — TELEPHONE ENCOUNTER
Recent virtual visit with PCP.  Please review and sign if agreed.  Ayleen Booker RN on 7/21/2020 at 9:05 AM

## 2020-07-22 RX ORDER — METHYLPHENIDATE HYDROCHLORIDE 27 MG/1
27 TABLET ORAL EVERY MORNING
Qty: 30 TABLET | Refills: 0 | Status: SHIPPED | OUTPATIENT
Start: 2020-07-22 | End: 2020-08-26

## 2020-07-22 RX ORDER — OLOPATADINE HYDROCHLORIDE 2 MG/ML
1 SOLUTION/ DROPS OPHTHALMIC DAILY
Qty: 2.5 ML | Refills: 11 | Status: SHIPPED | OUTPATIENT
Start: 2020-07-22

## 2020-07-23 ENCOUNTER — OFFICE VISIT (OUTPATIENT)
Dept: OPHTHALMOLOGY | Facility: CLINIC | Age: 65
End: 2020-07-23
Attending: OPHTHALMOLOGY
Payer: COMMERCIAL

## 2020-07-23 DIAGNOSIS — D31.41 IRIS NEVUS, RIGHT: ICD-10-CM

## 2020-07-23 DIAGNOSIS — H04.123 DRY EYE SYNDROME OF BOTH LACRIMAL GLANDS: Primary | ICD-10-CM

## 2020-07-23 DIAGNOSIS — H01.02A SQUAMOUS BLEPHARITIS OF BOTH UPPER AND LOWER EYELID OF RIGHT EYE: ICD-10-CM

## 2020-07-23 PROCEDURE — 92015 DETERMINE REFRACTIVE STATE: CPT | Mod: ZF

## 2020-07-23 PROCEDURE — G0463 HOSPITAL OUTPT CLINIC VISIT: HCPCS | Mod: ZF

## 2020-07-23 RX ORDER — TICAGRELOR 90 MG/1
90 TABLET ORAL 2 TIMES DAILY
COMMUNITY
Start: 2020-07-02 | End: 2021-08-23

## 2020-07-23 RX ORDER — ASPIRIN 81 MG/1
81 TABLET, CHEWABLE ORAL DAILY
Status: ON HOLD | COMMUNITY
Start: 2020-07-03 | End: 2021-08-24

## 2020-07-23 ASSESSMENT — SLIT LAMP EXAM - LIDS
COMMENTS: MGD
COMMENTS: MGD

## 2020-07-23 ASSESSMENT — CONF VISUAL FIELD
METHOD: COUNTING FINGERS
OD_NORMAL: 1
OS_NORMAL: 1

## 2020-07-23 ASSESSMENT — REFRACTION_MANIFEST
OS_ADD: +2.50
OS_SPHERE: -0.50
OD_SPHERE: +1.75
OD_AXIS: 180
OD_AXIS: 180
OS_SPHERE: +2.00
OS_AXIS: 007
OD_SPHERE: -0.75
OD_CYLINDER: +5.00
OS_CYLINDER: +4.25
OD_CYLINDER: +5.00
OS_CYLINDER: +4.25
OS_AXIS: 007
OD_ADD: +2.50

## 2020-07-23 ASSESSMENT — EXTERNAL EXAM - RIGHT EYE: OD_EXAM: NORMAL

## 2020-07-23 ASSESSMENT — REFRACTION_WEARINGRX
OS_CYLINDER: +4.00
OS_SPHERE: -0.25
OD_ADD: +2.25
OS_AXIS: 007
OD_SPHERE: -1.00
OD_AXIS: 175
SPECS_TYPE: PAL
OS_ADD: +2.25
OD_CYLINDER: +4.00

## 2020-07-23 ASSESSMENT — EXTERNAL EXAM - LEFT EYE: OS_EXAM: NORMAL

## 2020-07-23 ASSESSMENT — TONOMETRY
OS_IOP_MMHG: 14
OD_IOP_MMHG: 12
IOP_METHOD: ICARE

## 2020-07-23 ASSESSMENT — VISUAL ACUITY
OD_CC+: -2
METHOD_MR: PT REQUESTS REFRACTION
CORRECTION_TYPE: GLASSES
OS_CC: 20/20
OD_CC: 20/40
OD_PH_CC+: +2
METHOD: SNELLEN - LINEAR
OD_PH_CC: 20/40

## 2020-07-23 NOTE — PROGRESS NOTES
"Mr. Mcnair is a 60 year old CM with h/o Dry eye syndrome.     Interval:  Patient last visit was 8/2017 w/ Dr. Allison.   Over the interval, reports constant irritation / FBS left eye for many months, improves with eye drops for a few minutes, then worsens again.  A bit more sensitivity to light as well.  Denies flashes, diplopia.  Describes \"perfect circles\" in addition to previous floaters that started within last couple of years.    Hx of psoriasis.    Reports worsening of floaters, no flashes.    Ocular meds:  Artificial tears ointment nightly both eyes (uses intermittently)  Preservative free artificial tears: 1-2x/day (Refresh Plus) - not using consistently   Pataday qAM  Fish oil  Warm compresses: not doing     A/P:  1. 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    Continue artificial tears. Increase to 4-6x/day.   Restart artificial tear ointment nightly both eyes.    Restart warm compresses and lid hygiene daily.    Surface looks healthy. Can approach additional methods of treatment if symptoms/signs worsen - plugs, Restasis, BCL, Doxy, etc.      2. Allergic conjunctivitis.   Continue pataday as needed.       3.###  Iris Nevus OD   stable in size compared to prior photos  (2011)   Monitor    4. Refractive Error each eye  - BCVA 20/15 each eye, J1+ w/ mild myopia and moderate-high astigmatism.  Pt did not want our refraction RX. He will get at outside gl;[employer]. - DO NOT Charge for glasses RX.    5. PVD each eye  - no flashes / curtain, stable.       RTC 12 months, sooner prn Jason Goldberg, MD  Cornea, External Disease & Refractive Surgery Fellow  Department of Ophthalmology and Visual Neurosciences    Attending Physician Attestation:  Complete documentation of historical and exam elements from today's encounter can be found in the full encounter summary report (not reduplicated in this progress note).  I " personally obtained the chief complaint(s) and history of present illness.  I confirmed and edited as necessary the review of systems, past medical/surgical history, family history, social history, and examination findings as documented by others; and I examined the patient myself.  I personally reviewed the relevant tests, images, and reports as documented above.  I formulated and edited as necessary the assessment and plan and discussed the findings and management plan with the patient and family. - Sean Schultz MD

## 2020-07-23 NOTE — NURSING NOTE
Chief Complaints and History of Present Illnesses   Patient presents with     Follow Up     Iris Nevus OD     Chief Complaint(s) and History of Present Illness(es)     Follow Up     Laterality: right eye    Course: stable    Associated symptoms: floaters (stable for the past 5 years, both eyes), dryness (both, worse in left eye) and foreign body sensation (left eye, irritates everytime he blinks).  Negative for eye pain    Treatments tried: artificial tears and eye drops    Pain scale: 0/10    Comments: Iris Nevus OD              Comments     He states that his vision has seemed decreased in both eyes, since his last eye exam.      He has had irritation in his left eye for several months.  It feels like there is something sharp in his eye every time he blinks.    Daphnie Ruiz, COT 7:42 AM  July 23, 2020

## 2020-07-23 NOTE — PATIENT INSTRUCTIONS
C/w fish oil.   Continue artificial tears. Increase to 4-6x/day.  If still bothered, transition to systane ultra or refresh optive.   Restart artificial tear ointment nightly both eyes.   Restart warm compresses and lid hygiene daily.

## 2020-07-27 ENCOUNTER — ALLIED HEALTH/NURSE VISIT (OUTPATIENT)
Dept: DERMATOLOGY | Facility: CLINIC | Age: 65
End: 2020-07-27
Payer: COMMERCIAL

## 2020-07-27 DIAGNOSIS — L40.0 PLAQUE PSORIASIS: ICD-10-CM

## 2020-07-27 NOTE — PROGRESS NOTES
[facility] Dermatology Phototherapy Record  1. Tony Mcnair is a 65 year old adult is here today for phototherapy (UVB) treatment for Plaque psoriasis.        Changes or new medications since last treatment:   NO    New medical conditions or problems since last treatment:   NO    Any problems with last phototherapy treatment?    NO    2. The patient tolerated phototherapy without complication    Patient will return on for next UVB treatment, per protocol.     Patient to see provider every 4-12 weeks for follow-up during treatment.      All questions and concerns discussed with patient in clinic today.      MAYRA Sultana

## 2020-07-27 NOTE — NURSING NOTE
Tony Mcnair comes into clinic today at the request of Dr. Corbett Ordering Provider for phototherapy .      This service provided today was under the supervising provider of the day Dr. Blanco, who was available if needed.    MAYRA Sultana

## 2020-08-03 ENCOUNTER — ALLIED HEALTH/NURSE VISIT (OUTPATIENT)
Dept: DERMATOLOGY | Facility: CLINIC | Age: 65
End: 2020-08-03
Payer: COMMERCIAL

## 2020-08-03 DIAGNOSIS — L40.0 PLAQUE PSORIASIS: ICD-10-CM

## 2020-08-03 NOTE — PROGRESS NOTES
[facility] Dermatology Phototherapy Record  1. Tony Mcnair is a 65 year old adult is here today for phototherapy (UVB) treatment for Plaque psoriasis.        Changes or new medications since last treatment:   NO    New medical conditions or problems since last treatment:   NO    Any problems with last phototherapy treatment?    NO    2. The patient tolerated phototherapy without complication    Patient will return on  for next UVB treatment, per protocol.     Patient to see provider every 4-12 weeks for follow-up during treatment.      All questions and concerns discussed with patient in clinic today.      Cherie Zafar CMA    Tony Mcnair comes into clinic today at the request of Dr. Corbett Ordering Provider for [facility]BUVb.        This service provided today was under the supervising provider of the day Dr. Blanco, who was available if needed.    Cherie Zafar CMA

## 2020-08-10 ENCOUNTER — ALLIED HEALTH/NURSE VISIT (OUTPATIENT)
Dept: DERMATOLOGY | Facility: CLINIC | Age: 65
End: 2020-08-10
Payer: COMMERCIAL

## 2020-08-10 DIAGNOSIS — L40.0 PLAQUE PSORIASIS: ICD-10-CM

## 2020-08-10 NOTE — PROGRESS NOTES
[facility] Dermatology Phototherapy Record  1. Tony Mcnair is a 65 year old adult is here today for phototherapy (UVB) treatment for Plaque psoriasis.        Changes or new medications since last treatment:   NO    New medical conditions or problems since last treatment:   NO    Any problems with last phototherapy treatment?    NO    2. The patient tolerated phototherapy without complication    Patient will return on for next UVB treatment, per protocol.     Patient to see provider every 4-12 weeks for follow-up during treatment.      All questions and concerns discussed with patient in clinic today.      Dorothy Ling RN    Tony Mcnair comes into clinic today at the request of Dr. Corbett Ordering Provider for NBUVB.    This service provided today was under the supervising provider of the day Dr. Blanco, who was available if needed.    Dorothy Ling RN

## 2020-08-17 ENCOUNTER — ALLIED HEALTH/NURSE VISIT (OUTPATIENT)
Dept: DERMATOLOGY | Facility: CLINIC | Age: 65
End: 2020-08-17
Payer: COMMERCIAL

## 2020-08-17 DIAGNOSIS — L40.0 PLAQUE PSORIASIS: ICD-10-CM

## 2020-08-17 NOTE — PROGRESS NOTES
[facility] Dermatology Phototherapy Record  1. Tony Mcnari is a 65 year old adult is here today for phototherapy (UVB) treatment for Plaque psoriasis.        Changes or new medications since last treatment:   NO    New medical conditions or problems since last treatment:   NO    Any problems with last phototherapy treatment?    NO    2. The patient tolerated phototherapy without complication    Patient will return on for next UVB treatment, per protocol.     Patient to see provider every 4-12 weeks for follow-up during treatment.      All questions and concerns discussed with patient in clinic today.      Dorothy Ling RN    Tony Mcnair comes into clinic today at the request of Dr. Corbett Ordering Provider for NBUVB.    This service provided today was under the supervising provider of the day Dr. Blanco, who was available if needed.    Dorothy Ling RN

## 2020-08-24 ENCOUNTER — ALLIED HEALTH/NURSE VISIT (OUTPATIENT)
Dept: DERMATOLOGY | Facility: CLINIC | Age: 65
End: 2020-08-24
Payer: COMMERCIAL

## 2020-08-24 DIAGNOSIS — L40.0 PLAQUE PSORIASIS: ICD-10-CM

## 2020-08-24 NOTE — PROGRESS NOTES
[facility] Dermatology Phototherapy Record  1. Tony Mcnair is a 65 year old adult is here today for phototherapy (UVB) treatment for Plaque psoriasis.        Changes or new medications since last treatment:   NO    New medical conditions or problems since last treatment:   NO    Any problems with last phototherapy treatment?    NO    2. The patient tolerated phototherapy without complication    Patient will return on for next UVB treatment, per protocol.     Patient to see provider every 4-12 weeks for follow-up during treatment.      All questions and concerns discussed with patient in clinic today.      Dorothy Ling RN    Tony Mcnair comes into clinic today at the request of Dr. Corbett Ordering Provider for NBUVB.    This service provided today was under the supervising provider of the day Dr. Blanco, who was available if needed.    Dorothy Ling RN

## 2020-08-31 ENCOUNTER — ALLIED HEALTH/NURSE VISIT (OUTPATIENT)
Dept: DERMATOLOGY | Facility: CLINIC | Age: 65
End: 2020-08-31
Payer: COMMERCIAL

## 2020-08-31 DIAGNOSIS — L40.0 PLAQUE PSORIASIS: ICD-10-CM

## 2020-09-28 ENCOUNTER — MYC REFILL (OUTPATIENT)
Dept: DERMATOLOGY | Facility: CLINIC | Age: 65
End: 2020-09-28

## 2020-09-28 DIAGNOSIS — L40.9 PSORIASIS: ICD-10-CM

## 2020-09-29 ENCOUNTER — ALLIED HEALTH/NURSE VISIT (OUTPATIENT)
Dept: DERMATOLOGY | Facility: CLINIC | Age: 65
End: 2020-09-29
Payer: COMMERCIAL

## 2020-09-29 DIAGNOSIS — L40.0 PLAQUE PSORIASIS: ICD-10-CM

## 2020-09-29 NOTE — PROGRESS NOTES
[facility] Dermatology Phototherapy Record  1. Tony Mcnair is a 65 year old adult is here today for phototherapy (UVB) treatment for Plaque psoriasis.        Changes or new medications since last treatment:   NO    New medical conditions or problems since last treatment:   NO    Any problems with last phototherapy treatment?    NO    2. The patient tolerated phototherapy without complication    Patient will return for next UVB treatment, per protocol.     Patient to see provider every 4-12 weeks for follow-up during treatment.      All questions and concerns discussed with patient in clinic today.        Tony Mcnair comes into clinic today at the request of Dr Corbett Ordering Provider for NBUVB.      This service provided today was under the supervising provider of the day Dr Blanco, who was available if needed.    Georgina Wills RN

## 2020-09-30 RX ORDER — FLUOCINONIDE TOPICAL SOLUTION USP, 0.05% 0.5 MG/ML
SOLUTION TOPICAL
Qty: 60 ML | Refills: 2 | Status: SHIPPED | OUTPATIENT
Start: 2020-09-30 | End: 2021-09-14

## 2020-09-30 RX ORDER — TACROLIMUS 1 MG/G
OINTMENT TOPICAL 2 TIMES DAILY
Qty: 60 G | Refills: 1 | Status: SHIPPED | OUTPATIENT
Start: 2020-09-30 | End: 2021-09-14

## 2020-10-01 ENCOUNTER — MYC REFILL (OUTPATIENT)
Dept: FAMILY MEDICINE | Facility: CLINIC | Age: 65
End: 2020-10-01

## 2020-10-01 DIAGNOSIS — F33.1 MODERATE EPISODE OF RECURRENT MAJOR DEPRESSIVE DISORDER (H): ICD-10-CM

## 2020-10-01 DIAGNOSIS — F98.8 ATTENTION DEFICIT DISORDER, UNSPECIFIED HYPERACTIVITY PRESENCE: ICD-10-CM

## 2020-10-01 RX ORDER — METHYLPHENIDATE HYDROCHLORIDE 27 MG/1
27 TABLET ORAL EVERY MORNING
Qty: 30 TABLET | Refills: 0 | Status: SHIPPED | OUTPATIENT
Start: 2020-10-01 | End: 2021-08-23

## 2020-10-01 RX ORDER — PIMECROLIMUS 10 MG/G
CREAM TOPICAL 2 TIMES DAILY PRN
Qty: 30 G | Refills: 0 | Status: SHIPPED | OUTPATIENT
Start: 2020-10-01 | End: 2021-09-14

## 2020-10-01 RX ORDER — CITALOPRAM HYDROBROMIDE 20 MG/1
20 TABLET ORAL DAILY
Qty: 90 TABLET | Refills: 1 | Status: SHIPPED | OUTPATIENT
Start: 2020-10-01 | End: 2021-01-29

## 2020-10-01 NOTE — TELEPHONE ENCOUNTER
"Methylphenidate CR 27mg       Last Written Prescription Date:  8/27/2020  Last Fill Quantity: 30,   # refills: 0  Last Visit: 6/2/2020   Future Office visit:    Next 5 appointments (look out 90 days)    Oct 21, 2020  7:30 AM  PHYSICAL with Tony Maldonado MD  [facility] ([facility]) [address] [facility] [id number] [phone]         Routing refill request to provider for review/approval because:  Drug not on the [facility], P or [facility] refill protocol or controlled substance  Last PHQ-9 score high - [employer] message sent asking pt to complete a PHQ-9    Jeannine BISHOP RN    PHQ 5/21/2019 11/13/2019 6/2/2020   PHQ-9 Total Score 13 12 6   Q9: Thoughts of better off dead/self-harm past 2 weeks Several days Not at all Not at all   F/U: Thoughts of suicide or self-harm No - -   F/U: Safety concerns No - -         Requested Prescriptions   Pending Prescriptions Disp Refills     methylphenidate (CONCERTA) 27 MG CR tablet 30 tablet 0     Sig: Take 1 tablet (27 mg) by mouth every morning Follow-up with Dr. De Leon for your fasting full examination       There is no refill protocol information for this order        citalopram (CELEXA) 20 MG tablet 90 tablet 1     Sig: Take 1 tablet (20 mg) by mouth daily       SSRIs Protocol Failed - 10/1/2020  2:24 PM        Failed - PHQ-9 score less than 5 in past 6 months     Please review last PHQ-9 score.           Failed - Recent (6 mo) or future (30 days) visit within the authorizing provider's specialty     Patient had office visit in the last 6 months or has a visit in the next 30 days with authorizing provider or within the authorizing provider's specialty.  See \"Patient Info\" tab in inbasket, or \"Choose Columns\" in Meds & Orders section of the refill encounter.            Passed - Medication is active on med list        Passed - Patient is age 18 or older               "

## 2020-10-01 NOTE — TELEPHONE ENCOUNTER
Reason for Call:  Medication or medication refill:    Do you use a [facility]?  Name of the pharmacy and phone number for the current request:       [id number] [place] [address] [place] [address]      Name of the medication requested: methylphenidate (CONCERTA) 27 MG CR tablet [24891] (Order [id number])  citalopram (CELEXA) 20 MG tablet [24120] (Order [id number])      Other request: pt is out of concerta, also needs celexa  Is hoping for today  Pt is hoping for these today. Pt does have next available physical with Dr Maldonado scheduled    Can we leave a detailed message on this number? YES    Phone number patient can be reached at: Cell number on file:    Telephone Information:   Mobile [phone]       Best Time: any    Call taken on 10/1/2020 at 2:15 PM by Konstantin Meyers

## 2020-10-05 ENCOUNTER — ALLIED HEALTH/NURSE VISIT (OUTPATIENT)
Dept: DERMATOLOGY | Facility: CLINIC | Age: 65
End: 2020-10-05
Payer: COMMERCIAL

## 2020-10-05 DIAGNOSIS — L40.0 PLAQUE PSORIASIS: ICD-10-CM

## 2020-10-05 PROCEDURE — 96900 ACTINOTHERAPY UV LIGHT: CPT | Performed by: DERMATOLOGY

## 2020-10-05 PROCEDURE — 99207 PR NO CHARGE LOS: CPT | Performed by: DERMATOLOGY

## 2020-10-05 NOTE — PROGRESS NOTES
[facility] Dermatology Phototherapy Record  1. Tony Mcnair is a 65 year old adult is here today for phototherapy (UVB) treatment for Plaque psoriasis.        Changes or new medications since last treatment:   NO    New medical conditions or problems since last treatment:   NO    Any problems with last phototherapy treatment?    NO    2. The patient tolerated phototherapy without complication    Patient will return on for next UVB treatment, per protocol.     Patient to see provider every 4-12 weeks for follow-up during treatment.      All questions and concerns discussed with patient in clinic today.      Dorothy Ling RN    Tony Mcnair comes into clinic today at the request of Dr. Corbett Ordering Provider for NBUVB.    This service provided today was under the supervising provider of the day Dr. Blanco, who was available if needed.    Dorothy Ling RN

## 2020-10-12 ENCOUNTER — ALLIED HEALTH/NURSE VISIT (OUTPATIENT)
Dept: DERMATOLOGY | Facility: CLINIC | Age: 65
End: 2020-10-12
Payer: COMMERCIAL

## 2020-10-12 DIAGNOSIS — L40.0 PLAQUE PSORIASIS: ICD-10-CM

## 2020-10-12 PROCEDURE — 96900 ACTINOTHERAPY UV LIGHT: CPT | Performed by: DERMATOLOGY

## 2020-10-12 NOTE — PROGRESS NOTES
[facility] Dermatology Phototherapy Record  1. Tony Mcnair is a 65 year old adult is here today for phototherapy (UVB) treatment for Plaque psoriasis.        Changes or new medications since last treatment:   NO    New medical conditions or problems since last treatment:   NO    Any problems with last phototherapy treatment?    NO    2. The patient tolerated phototherapy without complication    Patient will return on for next UVB treatment, per protocol.     Patient to see provider every 4-12 weeks for follow-up during treatment.      All questions and concerns discussed with patient in clinic today.      Dorothy Ling RN    Tony Mcnair comes into clinic today at the request of Dr. Corbett Ordering Provider for NBUVB.    This service provided today was under the supervising provider of the day Dr. Mckinney, who was available if needed.    Dorothy Ling RN

## 2020-10-15 ENCOUNTER — OFFICE VISIT (OUTPATIENT)
Dept: FAMILY MEDICINE | Facility: CLINIC | Age: 65
End: 2020-10-15
Payer: COMMERCIAL

## 2020-10-15 ENCOUNTER — ANCILLARY PROCEDURE (OUTPATIENT)
Dept: GENERAL RADIOLOGY | Facility: CLINIC | Age: 65
End: 2020-10-15
Attending: NURSE PRACTITIONER
Payer: COMMERCIAL

## 2020-10-15 VITALS
SYSTOLIC BLOOD PRESSURE: 99 MMHG | OXYGEN SATURATION: 95 % | BODY MASS INDEX: 22.13 KG/M2 | TEMPERATURE: 97.6 F | DIASTOLIC BLOOD PRESSURE: 65 MMHG | WEIGHT: 154.2 LBS | HEART RATE: 84 BPM

## 2020-10-15 DIAGNOSIS — M26.621 ARTHRALGIA OF RIGHT TEMPOROMANDIBULAR JOINT: ICD-10-CM

## 2020-10-15 DIAGNOSIS — M72.2 PLANTAR FASCIITIS: ICD-10-CM

## 2020-10-15 DIAGNOSIS — M72.2 PLANTAR FASCIITIS: Primary | ICD-10-CM

## 2020-10-15 PROCEDURE — 99213 OFFICE O/P EST LOW 20 MIN: CPT | Performed by: NURSE PRACTITIONER

## 2020-10-15 PROCEDURE — 73630 X-RAY EXAM OF FOOT: CPT | Mod: LT | Performed by: INTERNAL MEDICINE

## 2020-10-15 NOTE — PROGRESS NOTES
Subjective     Tony Mcnair is a 65 year old adult who presents to clinic today for the following health issues:    HPI       Left foot pain ongoing for 6 weeks after playing pickle ball;  Right TMJ pain ongoing for 8 weeks- does have a mouth guard and needs a new one so has not been wearing it recently .    Left foot pain is on the top of the foot but also notices some on the bottom of the foot in the morning when he first steps on it is excruciating but able to walk it out.  It resolves throughout the day but has noticed that this has affected his gait.   Injured foot playing with his dog 27 years ago and has had MRI showing some arthritis.  He has had intermittent exacerbations but usually able to ice and it resolves  He can not take NSAIDs , on brillenta   Has no pain at this time because he has been up walking around for awhile   At worst pain is 5/10      Review of Systems   Constitutional, HEENT, cardiovascular, pulmonary, gi and gu systems are negative, except as otherwise noted.      Objective    BP 99/65 (BP Location: Left arm, Patient Position: Sitting, Cuff Size: Adult Regular)   Pulse 84   Temp 97.6  F (36.4  C) (Temporal)   Wt 69.9 kg (154 lb 3.2 oz)   SpO2 95%   BMI 22.13 kg/m      Physical Exam   GENERAL: healthy, alert and no distress  MS: no gross musculoskeletal defects noted, no edema, no foot pain on palpation at this time- pt indicates that most pain at base of toes plantar surface and also dorsal arch, walks with even tandem gait; mild tenderness of right TMJ   SKIN: no suspicious lesions or rashes  NEURO: Normal strength and tone, mentation intact and speech normal  PSYCH: mentation appears normal, affect normal/bright    Xray left foot- no obvious deformity or fractures        Assessment & Plan     Right TMJ- he plans to go to dentist to be fitted for new mouth guard    Left Plantar fasciitis    - XR Foot Left G/E 3 Views  - Orthopedic & Spine  Referral        Patient  Instructions       Patient Education     Understanding Plantar Fasciitis    Plantar fasciitis is a condition that causes foot and heel pain. The plantar fascia is a tough band of tissue that runs across the bottom of the foot from the heel to the toes. This tissue pulls on the heel bone. It supports the arch of the foot as it pushes off the ground. If the tissue becomes irritated or red and swollen (inflamed), it is called plantar fasciitis.  How to say it  PLAN-tuhr fa-see-IY-tis   What causes plantar fasciitis?  Plantar fasciitis most often occurs from overusing the plantar fascia. The tissue may become damaged from activities that put repeated stress on the heel and foot. Or it may wear down over time with age and ankle stiffness. You are more likely to have plantar fasciitis if you:    Do activities that require a lot of running, jumping, or dancing    Have a job that requires being on your feet for long periods    Are overweight or obese    Have certain foot problems, such as a tight Achilles tendon, flat feet, or high arches    Often wear poorly fitting shoes  Symptoms of plantar fasciitis  The condition most often causes pain in the heel and the bottom of the foot. The pain may occur when you take your first steps in the morning. It may get better as you walk throughout the day. But as you continue to put weight on the foot, the pain often returns. Pain may also occur after standing or sitting for long periods.  Treating plantar fasciitis  Treatments for plantar fasciitis include:    Resting the foot. This involves limiting movements that make your foot hurt. You may also need to avoid certain sports and types of work for a time.    Using cold packs. Put an ice pack on the heel and foot to help reduce pain and swelling.    Taking pain medicines. Prescription and over-the-counter pain medicines can help relieve pain and swelling.    Using heel cups or foot inserts (orthotics). These are placed in the shoes to  help support the heel or arch and cushion the heel. You may also be told to buy proper-fitting shoes with good arch support and cushioned soles.    Taping the foot. This supports the arch and limits the movement of the plantar fascia to help relieve symptoms.    Wearing a night splint. This stretches the plantar fascia and leg muscles while you sleep. This may help relieve pain.    Doing exercises and physical therapy. These stretch and strengthen the plantar fascia and the muscles in the leg that support the heel and foot.    Getting shots of medicine into the foot. These may help relieve symptoms for a time.    Having surgery. This may be needed if other treatments fail to relieve symptoms. During surgery, the surgeon may partially cut the plantar fascia to release tension.  Possible complications of plantar fasciitis  Without proper care and treatment, healing may take longer than normal. Also, symptoms may continue or get worse. Over time, the plantar fascia may be damaged. This can make it hard to walk or even stand without pain.  When to call your healthcare provider  Call your healthcare provider right away if you have any of these:    Fever of 100.4 F (38 C) or higher, or as directed    Symptoms that don t get better with treatment, or get worse    New symptoms, such as numbness, tingling, or weakness in the foot   Date Last Reviewed: 3/10/2016    0331-7984 [employer]. [address] [place] [address], [place] [address]. All rights reserved. This information is not intended as a substitute for professional medical care. Always follow your healthcare professional's instructions.           Patient Education     Treating Plantar Fasciitis  First, your healthcare provider tries to find out the cause of your problem. He or she can then suggest ways to ease pain. If your pain is due to poor foot mechanics, occasionally custom-made shoe inserts (orthoses) may help.    Ease symptoms    To relieve mild symptoms,  try aspirin, ibuprofen, or other medicines as directed. Rubbing ice on the area may also help.    To lessen severe pain and swelling, your healthcare provider may give you pills or injections. In some cases, you may need a walking cast. Physical therapy, such as ultrasound or a daily stretching program, may also be recommended. Surgery is rarely needed.    To ease symptoms caused by poor foot mechanics, your foot may be taped. This supports the arch and temporarily controls movement. Night splints may also help by stretching the fascia.    Control movement  If taping helps, your healthcare provider may prescribe orthoses. These are inserts built from plaster casts of your feet. They control the way your foot moves. As a result, your symptoms may go away.  Reduce overuse  Every time your foot strikes the ground, the plantar fascia is stretched. You can lessen the strain on the plantar fascia and the chance of overuse by:    Losing any excess weight    Not running on hard or uneven ground    Using orthoses, if recommended, in your shoes and house slippers  If surgery is needed  Your healthcare provider may consider surgery if other types of treatment don't control your pain. During surgery, the plantar fascia is partially cut to release tension. As you heal, fibrous tissue fills the space between the heel bone and the plantar fascia.  Date Last Reviewed: 1/1/2018 2000-2019 [employer]. [address] [place] [address], [place] [address]. All rights reserved. This information is not intended as a substitute for professional medical care. Always follow your healthcare professional's instructions.               No follow-ups on file.    CHAZ Martin [facility]

## 2020-10-15 NOTE — PATIENT INSTRUCTIONS
Patient Education     Understanding Plantar Fasciitis    Plantar fasciitis is a condition that causes foot and heel pain. The plantar fascia is a tough band of tissue that runs across the bottom of the foot from the heel to the toes. This tissue pulls on the heel bone. It supports the arch of the foot as it pushes off the ground. If the tissue becomes irritated or red and swollen (inflamed), it is called plantar fasciitis.  How to say it  PLAN-tuhr fa-see-IY-tis   What causes plantar fasciitis?  Plantar fasciitis most often occurs from overusing the plantar fascia. The tissue may become damaged from activities that put repeated stress on the heel and foot. Or it may wear down over time with age and ankle stiffness. You are more likely to have plantar fasciitis if you:    Do activities that require a lot of running, jumping, or dancing    Have a job that requires being on your feet for long periods    Are overweight or obese    Have certain foot problems, such as a tight Achilles tendon, flat feet, or high arches    Often wear poorly fitting shoes  Symptoms of plantar fasciitis  The condition most often causes pain in the heel and the bottom of the foot. The pain may occur when you take your first steps in the morning. It may get better as you walk throughout the day. But as you continue to put weight on the foot, the pain often returns. Pain may also occur after standing or sitting for long periods.  Treating plantar fasciitis  Treatments for plantar fasciitis include:    Resting the foot. This involves limiting movements that make your foot hurt. You may also need to avoid certain sports and types of work for a time.    Using cold packs. Put an ice pack on the heel and foot to help reduce pain and swelling.    Taking pain medicines. Prescription and over-the-counter pain medicines can help relieve pain and swelling.    Using heel cups or foot inserts (orthotics). These are placed in the shoes to help support the  heel or arch and cushion the heel. You may also be told to buy proper-fitting shoes with good arch support and cushioned soles.    Taping the foot. This supports the arch and limits the movement of the plantar fascia to help relieve symptoms.    Wearing a night splint. This stretches the plantar fascia and leg muscles while you sleep. This may help relieve pain.    Doing exercises and physical therapy. These stretch and strengthen the plantar fascia and the muscles in the leg that support the heel and foot.    Getting shots of medicine into the foot. These may help relieve symptoms for a time.    Having surgery. This may be needed if other treatments fail to relieve symptoms. During surgery, the surgeon may partially cut the plantar fascia to release tension.  Possible complications of plantar fasciitis  Without proper care and treatment, healing may take longer than normal. Also, symptoms may continue or get worse. Over time, the plantar fascia may be damaged. This can make it hard to walk or even stand without pain.  When to call your healthcare provider  Call your healthcare provider right away if you have any of these:    Fever of 100.4 F (38 C) or higher, or as directed    Symptoms that don t get better with treatment, or get worse    New symptoms, such as numbness, tingling, or weakness in the foot   Date Last Reviewed: 3/10/2016    4134-7393 [employer]. [address] [place] [address], [place] [address]. All rights reserved. This information is not intended as a substitute for professional medical care. Always follow your healthcare professional's instructions.           Patient Education     Treating Plantar Fasciitis  First, your healthcare provider tries to find out the cause of your problem. He or she can then suggest ways to ease pain. If your pain is due to poor foot mechanics, occasionally custom-made shoe inserts (orthoses) may help.    Ease symptoms    To relieve mild symptoms, try aspirin,  ibuprofen, or other medicines as directed. Rubbing ice on the area may also help.    To lessen severe pain and swelling, your healthcare provider may give you pills or injections. In some cases, you may need a walking cast. Physical therapy, such as ultrasound or a daily stretching program, may also be recommended. Surgery is rarely needed.    To ease symptoms caused by poor foot mechanics, your foot may be taped. This supports the arch and temporarily controls movement. Night splints may also help by stretching the fascia.    Control movement  If taping helps, your healthcare provider may prescribe orthoses. These are inserts built from plaster casts of your feet. They control the way your foot moves. As a result, your symptoms may go away.  Reduce overuse  Every time your foot strikes the ground, the plantar fascia is stretched. You can lessen the strain on the plantar fascia and the chance of overuse by:    Losing any excess weight    Not running on hard or uneven ground    Using orthoses, if recommended, in your shoes and house slippers  If surgery is needed  Your healthcare provider may consider surgery if other types of treatment don't control your pain. During surgery, the plantar fascia is partially cut to release tension. As you heal, fibrous tissue fills the space between the heel bone and the plantar fascia.  Date Last Reviewed: 1/1/2018 2000-2019 [employer]. [address] [place] [address], [place] [address]. All rights reserved. This information is not intended as a substitute for professional medical care. Always follow your healthcare professional's instructions.

## 2020-10-19 ENCOUNTER — ALLIED HEALTH/NURSE VISIT (OUTPATIENT)
Dept: DERMATOLOGY | Facility: CLINIC | Age: 65
End: 2020-10-19
Payer: COMMERCIAL

## 2020-10-19 DIAGNOSIS — L40.0 PLAQUE PSORIASIS: ICD-10-CM

## 2020-10-19 PROCEDURE — 96900 ACTINOTHERAPY UV LIGHT: CPT | Performed by: DERMATOLOGY

## 2020-10-19 PROCEDURE — 99207 PR NO CHARGE LOS: CPT | Performed by: DERMATOLOGY

## 2020-10-19 NOTE — PROGRESS NOTES
[facility] Dermatology Phototherapy Record  1. Tony Mcnair is a 65 year old adult is here today for phototherapy (UVB) treatment for Plaque psoriasis.        Changes or new medications since last treatment:   NO    New medical conditions or problems since last treatment:   NO    Any problems with last phototherapy treatment?    NO    2. The patient tolerated phototherapy without complication    Patient will return on for next UVB treatment, per protocol.     Patient to see provider every 4-12 weeks for follow-up during treatment.      All questions and concerns discussed with patient in clinic today.      Dorothy Ling RN    Tony Mcnair comes into clinic today at the request of Dr. Corbett Ordering Provider for [facility].    This service provided today was under the supervising provider of the day Dr. Blanco, who was available if needed.    Dorothy Ling RN

## 2020-10-21 ENCOUNTER — OFFICE VISIT (OUTPATIENT)
Dept: FAMILY MEDICINE | Facility: CLINIC | Age: 65
End: 2020-10-21
Payer: COMMERCIAL

## 2020-10-21 VITALS
HEART RATE: 77 BPM | DIASTOLIC BLOOD PRESSURE: 72 MMHG | WEIGHT: 157 LBS | SYSTOLIC BLOOD PRESSURE: 106 MMHG | BODY MASS INDEX: 22.48 KG/M2 | TEMPERATURE: 96.9 F | OXYGEN SATURATION: 96 % | HEIGHT: 70 IN

## 2020-10-21 DIAGNOSIS — Z23 NEED FOR PROPHYLACTIC VACCINATION AND INOCULATION AGAINST INFLUENZA: Primary | ICD-10-CM

## 2020-10-21 DIAGNOSIS — I25.10 CORONARY ARTERY DISEASE INVOLVING NATIVE CORONARY ARTERY OF NATIVE HEART WITHOUT ANGINA PECTORIS: ICD-10-CM

## 2020-10-21 DIAGNOSIS — Z00.00 ENCOUNTER FOR PREVENTIVE HEALTH EXAMINATION: ICD-10-CM

## 2020-10-21 DIAGNOSIS — Z23 NEED FOR VACCINATION: ICD-10-CM

## 2020-10-21 DIAGNOSIS — G47.10 HYPERSOMNOLENCE: ICD-10-CM

## 2020-10-21 LAB
BASOPHILS # BLD AUTO: 0 10E9/L (ref 0–0.2)
BASOPHILS NFR BLD AUTO: 0.6 %
DIFFERENTIAL METHOD BLD: ABNORMAL
EOSINOPHIL # BLD AUTO: 0.4 10E9/L (ref 0–0.7)
EOSINOPHIL NFR BLD AUTO: 6.6 %
ERYTHROCYTE [DISTWIDTH] IN BLOOD BY AUTOMATED COUNT: 12.1 % (ref 10–15)
HCT VFR BLD AUTO: 44.1 % (ref 40–53)
HGB BLD-MCNC: 15.3 G/DL (ref 13.3–17.7)
LYMPHOCYTES # BLD AUTO: 1.4 10E9/L (ref 0.8–5.3)
LYMPHOCYTES NFR BLD AUTO: 21.8 %
MCH RBC QN AUTO: 33.2 PG (ref 26.5–33)
MCHC RBC AUTO-ENTMCNC: 34.7 G/DL (ref 31.5–36.5)
MCV RBC AUTO: 96 FL (ref 78–100)
MONOCYTES # BLD AUTO: 0.8 10E9/L (ref 0–1.3)
MONOCYTES NFR BLD AUTO: 12.4 %
NEUTROPHILS # BLD AUTO: 3.6 10E9/L (ref 1.6–8.3)
NEUTROPHILS NFR BLD AUTO: 58.6 %
PLATELET # BLD AUTO: 140 10E9/L (ref 150–450)
RBC # BLD AUTO: 4.61 10E12/L (ref 4.4–5.9)
WBC # BLD AUTO: 6.2 10E9/L (ref 4–11)

## 2020-10-21 PROCEDURE — 99396 PREV VISIT EST AGE 40-64: CPT | Mod: 25 | Performed by: INTERNAL MEDICINE

## 2020-10-21 PROCEDURE — 99213 OFFICE O/P EST LOW 20 MIN: CPT | Mod: 25 | Performed by: INTERNAL MEDICINE

## 2020-10-21 PROCEDURE — 80061 LIPID PANEL: CPT | Performed by: INTERNAL MEDICINE

## 2020-10-21 PROCEDURE — 90471 IMMUNIZATION ADMIN: CPT | Performed by: INTERNAL MEDICINE

## 2020-10-21 PROCEDURE — 90472 IMMUNIZATION ADMIN EACH ADD: CPT | Performed by: INTERNAL MEDICINE

## 2020-10-21 PROCEDURE — 36415 COLL VENOUS BLD VENIPUNCTURE: CPT | Performed by: INTERNAL MEDICINE

## 2020-10-21 PROCEDURE — 90732 PPSV23 VACC 2 YRS+ SUBQ/IM: CPT | Performed by: INTERNAL MEDICINE

## 2020-10-21 PROCEDURE — 90682 RIV4 VACC RECOMBINANT DNA IM: CPT | Performed by: INTERNAL MEDICINE

## 2020-10-21 PROCEDURE — G0103 PSA SCREENING: HCPCS | Performed by: INTERNAL MEDICINE

## 2020-10-21 PROCEDURE — 80050 GENERAL HEALTH PANEL: CPT | Performed by: INTERNAL MEDICINE

## 2020-10-21 ASSESSMENT — MIFFLIN-ST. JEOR: SCORE: 1513.4

## 2020-10-21 NOTE — PROGRESS NOTES
SUBJECTIVE:   CC: Tony Mcnair is an 65 year old male who presents for preventative health visit.  This is a patient with coronary artery disease.  He presents clinic today for follow-up.    He recently had to have PTCA to the circumflex.  His proximal circumflex had a tight stenosis and drug-eluting stent was placed.  Doing very well postoperatively.  He states that his symptoms have not really changed as he was for the most part asymptomatic prior to the procedure.    His coronary artery disease was discovered looking at his aorta.  He has 3.9 cm widening of the ascending aorta and a family history of aortic aneurysm.  The angiogram revealed significant vascular disease at the coronary level and hence he underwent the procedure noted    He denies chest pain shortness of breath bowel or urinary symptoms.  He does have occasional knee pain.  Knee pain happened initially while riding his bike.  It was sharp and lateral and associated with a locking sensation.  His pain comes on occasionally when going downstairs.  Again he has recurrence of a locking perception.  The knee has not given way or has not been stable.    The patient does have a history of plantar fasciitis which is improving with NSAIDs.      Patient has been advised of split billing requirements and indicates understanding: Yes  Healthy Habits:     Getting at least 3 servings of Calcium per day:  Yes    Bi-annual eye exam:  Yes    Dental care twice a year:  Yes    Sleep apnea or symptoms of sleep apnea:  Daytime drowsiness    Diet:  Regular (no restrictions)    Frequency of exercise:  4-5 days/week    Duration of exercise:  45-60 minutes    Taking medications regularly:  Yes    Barriers to taking medications:  None    Medication side effects:  None    PHQ-2 Total Score: 0    Additional concerns today:  Yes          PROBLEMS TO ADD ON...    Today's PHQ-2 Score:   PHQ-2 ( 1999 Pfizer) 6/2/2020   Q1: Little interest or pleasure in doing things 2   Q2:  Feeling down, depressed or hopeless 2   PHQ-2 Score 4   Q1: Little interest or pleasure in doing things -   Q2: Feeling down, depressed or hopeless -   PHQ-2 Score -       Abuse: Current or Past(Physical, Sexual or Emotional)- No  Do you feel safe in your environment? Yes    Have you ever done Advance Care Planning? (For example, a Health Directive, POLST, or a discussion with a medical provider or your loved ones about your wishes): Yes, patient states has an Advance Care Planning document and will bring a copy to the clinic.    Social History     Tobacco Use     Smoking status: Never Smoker     Smokeless tobacco: Never Used   Substance Use Topics     Alcohol use: Yes     Comment: 4 beers a week     If you drink alcohol do you typically have >3 drinks per day or >7 drinks per week? No    Alcohol Use 3/28/2019   Prescreen: >3 drinks/day or >7 drinks/week? No   Prescreen: >3 drinks/day or >7 drinks/week? -       Last PSA:   PSA   Date Value Ref Range Status   04/19/2019 0.75 0 - 4 ug/L Final     Comment:     Assay Method:  Chemiluminescence using Siemens Vista analyzer       Reviewed orders with patient. Reviewed health maintenance and updated orders accordingly - Yes  Lab work is in process    Reviewed and updated as needed this visit by clinical staff                 Reviewed and updated as needed this visit by Provider                Past Medical History:   Diagnosis Date     Arthritis      Coronary artery disease involving native coronary artery of native heart without angina pectoris     s/p ptca tamica prox circ     Depressive disorder Junie     [place] 1988    LEFT EYE        Review of Systems  Negative to 10 systems with the exception of knee pain and foot pain    OBJECTIVE:   There were no vitals taken for this visit.    Physical Exam  Alert [place] patient no apparent distress  Extraocular movements intact pupils equally round sclera white oropharynx nonerythematous tympanic membranes visualized and  nonerythematous neck is supple no carotid bruits noted no thyromegaly is noted lung fields are clear without wheezes rales or rhonchi  Heart S1-S2 regular rate and rhythm without murmur rub or gallop no jugular venous distention is noted  Abdomen soft nontender good bowel sounds in all the quadrants  No testicular masses or lesions are noted today there is a small cyst noted at the base of the penis which is not erythematous  Lower extremities no edema he has psoriatic rash involving both knees and the right distal leg  Affect and mood appropriate  Peripheral pulses intact    Diagnostic Test Results:  Labs reviewed in Epic    ASSESSMENT/PLAN:   Tony was seen today for establish care, physical and imm/inj.    Diagnoses and all orders for this visit:    Need for prophylactic vaccination and inoculation against influenza  -     INFLUENZA QUAD, RECOMBINANT, P-FREE (RIV4) (FLUBLOCK) [75310]  -     Vaccine Administration, Initial [98221]    Encounter for preventive health examination  -     Comprehensive metabolic panel (BMP + Alb, Alk Phos, ALT, AST, Total. Bili, TP)  -     CBC with platelets and differential  -     TSH with free T4 reflex  -     Lipid panel reflex to direct LDL Fasting  -     PSA, screen    Coronary artery disease involving native coronary artery of native heart without angina pectoris    Hypersomnolence    Overall patient is doing well.  He was actually taking Concerta for hypersomnolence.  I recommend that he discontinue this medication in light of his coronary artery disease.    Regarding preventive health care the usual labs will be obtained inclusive of a PSA.  He was given the Pneumovax today additionally.  He has had already obtained his influenza vaccine.    Patient is a very active and will continue to be so.  Regarding his left knee this is a lateral meniscal tear very likely.  There is no locking or giving way.  Patient continues to be active with the knee as he is with limited discomfort.   "He will follow this and contact me if things worsen at which juncture an MRI and orthopedic referral will be made.    Patient has been advised of split billing requirements and indicates understanding: Yes  COUNSELING:   Reviewed preventive health counseling, as reflected in patient instructions    Estimated body mass index is 22.13 kg/m  as calculated from the following:    Height as of 12/27/19: 1.778 m (5' 10\").    Weight as of 10/15/20: 69.9 kg (154 lb 3.2 oz).         Tony Mcnair reports that Tony Mcnair has never smoked. Tony Mcnair has never used smokeless tobacco.      Counseling Resources:  ATP IV Guidelines  Pooled Cohorts Equation Calculator  FRAX Risk Assessment  ICSI Preventive Guidelines  Dietary Guidelines for Americans, 2010  USDA's MyPlate  ASA Prophylaxis  Lung CA Screening    Tony Maldonado MD  [facility]  "

## 2020-10-21 NOTE — NURSING NOTE
Prior to immunization administration, verified patients identity using patient s name and date of birth. Please see Immunization Activity for additional information.     Screening Questionnaire for Adult Immunization    Are you sick today?   No   Do you have allergies to medications, food, a vaccine component or latex?   No   Have you ever had a serious reaction after receiving a vaccination?   No   Do you have a long-term health problem with heart, lung, kidney, or metabolic disease (e.g., diabetes), asthma, a blood disorder, no spleen, complement component deficiency, a cochlear implant, or a spinal fluid leak?  Are you on long-term aspirin therapy?   No   Do you have cancer, leukemia, HIV/AIDS, or any other immune system problem?   Yes   Do you have a parent, brother, or sister with an immune system problem?   Yes   In the past 3 months, have you taken medications that affect  your immune system, such as prednisone, other steroids, or anticancer drugs; drugs for the treatment of rheumatoid arthritis, Crohn s disease, or psoriasis; or have you had radiation treatments?   No   Have you had a seizure, or a brain or other nervous system problem?   No   During the past year, have you received a transfusion of blood or blood    products, or been given immune (gamma) globulin or antiviral drug?   No   For women: Are you pregnant or is there a chance you could become       pregnant during the next month?   No   Have you received any vaccinations in the past 4 weeks?   No     Immunization questionnaire was positive for at least one answer.  Notified .        Per orders of Dr. Maldonado, injection of Pneumovax 23 given by Mouna Bartlett CMA. Patient instructed to remain in clinic for 15 minutes afterwards, and to report any adverse reaction to me immediately.       Screening performed by Mouna Bartlett CMA on 10/21/2020 at 9:12 AM.

## 2020-10-21 NOTE — PATIENT INSTRUCTIONS
Overall you appear to be doing quite well.    I would recommend a trial off of the Concerta as Concerta can be detrimental to heart health.    Regarding her left knee I suspect a lateral meniscus tear.  If the knee starts to lock or give way I would recommend MRI and referral to orthopedics    You have plantar fasciitis of the left foot.  Continue the arch supports.  If it worsens we will send you to podiatry.    Labs will be obtained today.  You should return to clinic every 6 months given the fact that you are currently on 2 agents to thin the blood.    Best regards  Tony

## 2020-10-22 ENCOUNTER — MYC MEDICAL ADVICE (OUTPATIENT)
Dept: FAMILY MEDICINE | Facility: CLINIC | Age: 65
End: 2020-10-22

## 2020-10-22 LAB
ALBUMIN SERPL-MCNC: 4 G/DL (ref 3.4–5)
ALP SERPL-CCNC: 59 U/L (ref 40–150)
ALT SERPL W P-5'-P-CCNC: 28 U/L (ref 0–70)
ANION GAP SERPL CALCULATED.3IONS-SCNC: 9 MMOL/L (ref 3–14)
AST SERPL W P-5'-P-CCNC: 22 U/L (ref 0–45)
BILIRUB SERPL-MCNC: 1.2 MG/DL (ref 0.2–1.3)
BUN SERPL-MCNC: 12 MG/DL (ref 7–30)
CALCIUM SERPL-MCNC: 9.4 MG/DL (ref 8.5–10.1)
CHLORIDE SERPL-SCNC: 104 MMOL/L (ref 94–109)
CHOLEST SERPL-MCNC: 180 MG/DL
CO2 SERPL-SCNC: 25 MMOL/L (ref 20–32)
CREAT SERPL-MCNC: 0.94 MG/DL (ref 0.66–1.25)
GFR SERPL CREATININE-BSD FRML MDRD: 86 ML/MIN/{1.73_M2}
GLUCOSE SERPL-MCNC: 91 MG/DL (ref 70–99)
HDLC SERPL-MCNC: 96 MG/DL
LDLC SERPL CALC-MCNC: 65 MG/DL
NONHDLC SERPL-MCNC: 84 MG/DL
POTASSIUM SERPL-SCNC: 4.2 MMOL/L (ref 3.4–5.3)
PROT SERPL-MCNC: 7.2 G/DL (ref 6.8–8.8)
PSA SERPL-ACNC: 0.61 UG/L (ref 0–4)
SODIUM SERPL-SCNC: 138 MMOL/L (ref 133–144)
TRIGL SERPL-MCNC: 94 MG/DL
TSH SERPL DL<=0.005 MIU/L-ACNC: 1.75 MU/L (ref 0.4–4)

## 2020-10-26 ENCOUNTER — ALLIED HEALTH/NURSE VISIT (OUTPATIENT)
Dept: DERMATOLOGY | Facility: CLINIC | Age: 65
End: 2020-10-26
Payer: COMMERCIAL

## 2020-10-26 DIAGNOSIS — L40.0 PLAQUE PSORIASIS: ICD-10-CM

## 2020-10-26 PROCEDURE — 99207 PR NO CHARGE LOS: CPT | Performed by: DERMATOLOGY

## 2020-10-26 PROCEDURE — 96900 ACTINOTHERAPY UV LIGHT: CPT | Performed by: DERMATOLOGY

## 2020-10-26 NOTE — PROGRESS NOTES
[facility] Dermatology Phototherapy Record  1. Tony Mcnair is a 65 year old adult is here today for phototherapy (UVB) treatment for Plaque psoriasis.        Changes or new medications since last treatment:   NO    New medical conditions or problems since last treatment:   NO    Any problems with last phototherapy treatment?    NO    2. The patient tolerated phototherapy without complication    Patient will return on for next UVB treatment, per protocol.     Patient to see provider every 4-12 weeks for follow-up during treatment.      All questions and concerns discussed with patient in clinic today.      Dorothy Ling RN    Tony Mcnair comes into clinic today at the request of Dr. Corbett Ordering Provider for NBU[facility].    This service provided today was under the supervising provider of the day Dr. Blanco, who was available if needed.    Dorothy Ling RN

## 2020-11-02 ENCOUNTER — ALLIED HEALTH/NURSE VISIT (OUTPATIENT)
Dept: DERMATOLOGY | Facility: CLINIC | Age: 65
End: 2020-11-02
Payer: COMMERCIAL

## 2020-11-02 DIAGNOSIS — L40.0 PLAQUE PSORIASIS: ICD-10-CM

## 2020-11-02 PROCEDURE — 99207 PR NO CHARGE LOS: CPT | Performed by: DERMATOLOGY

## 2020-11-02 PROCEDURE — 96900 ACTINOTHERAPY UV LIGHT: CPT | Performed by: DERMATOLOGY

## 2020-11-02 NOTE — PROGRESS NOTES
[facility] Dermatology Phototherapy Record  1. Tony Mcnair is a 65 year old adult is here today for phototherapy (UVB) treatment for Psoriasis.        Changes or new medications since last treatment:   NO    New medical conditions or problems since last treatment:   NO    Any problems with last phototherapy treatment?    NO    2. The patient tolerated phototherapy without complication    Patient will return for next UVB treatment, per protocol.     Patient to see provider every 4-12 weeks for follow-up during treatment.      All questions and concerns discussed with patient in clinic today.        Tony Mcnair comes into clinic today at the request of Dr Corbett  Ordering Provider for [facility].    This service provided today was under the supervising provider of the day Dr Blanco, who was available if needed.    Georgina Wills RN

## 2020-11-09 ENCOUNTER — ALLIED HEALTH/NURSE VISIT (OUTPATIENT)
Dept: DERMATOLOGY | Facility: CLINIC | Age: 65
End: 2020-11-09
Payer: COMMERCIAL

## 2020-11-09 DIAGNOSIS — L40.0 PLAQUE PSORIASIS: ICD-10-CM

## 2020-11-09 PROCEDURE — 96900 ACTINOTHERAPY UV LIGHT: CPT | Performed by: DERMATOLOGY

## 2020-11-09 PROCEDURE — 99207 PR NO CHARGE LOS: CPT | Performed by: DERMATOLOGY

## 2020-11-16 ENCOUNTER — ALLIED HEALTH/NURSE VISIT (OUTPATIENT)
Dept: DERMATOLOGY | Facility: CLINIC | Age: 65
End: 2020-11-16
Payer: COMMERCIAL

## 2020-11-16 DIAGNOSIS — L40.0 PLAQUE PSORIASIS: ICD-10-CM

## 2020-11-16 PROCEDURE — 99207 PR NO CHARGE LOS: CPT | Performed by: DERMATOLOGY

## 2020-11-16 PROCEDURE — 96900 ACTINOTHERAPY UV LIGHT: CPT | Performed by: DERMATOLOGY

## 2020-11-16 NOTE — PROGRESS NOTES
[facility] Dermatology Phototherapy Record  1. Tony Mcnair is a 65 year old adult is here today for phototherapy (UVB) treatment for Plaque psoriasis.        Changes or new medications since last treatment:   NO    New medical conditions or problems since last treatment:   NO    Any problems with last phototherapy treatment?    NO    2. The patient tolerated phototherapy without complication    Patient will return for next UVB treatment, per protocol.     Patient to see provider every 4-12 weeks for follow-up during treatment.      All questions and concerns discussed with patient in clinic today.        Tony Mcnair comes into clinic today at the request of Dr Corbett Ordering Provider for [facility].    This service provided today was under the supervising provider of the day Dr Blanco, who was available if needed.    Georgina Wills RN

## 2020-11-23 ENCOUNTER — ALLIED HEALTH/NURSE VISIT (OUTPATIENT)
Dept: DERMATOLOGY | Facility: CLINIC | Age: 65
End: 2020-11-23
Payer: COMMERCIAL

## 2020-11-23 DIAGNOSIS — L40.0 PLAQUE PSORIASIS: ICD-10-CM

## 2020-11-23 PROCEDURE — 96900 ACTINOTHERAPY UV LIGHT: CPT | Performed by: DERMATOLOGY

## 2020-11-23 PROCEDURE — 99207 PR NO CHARGE LOS: CPT | Performed by: DERMATOLOGY

## 2020-11-23 NOTE — PROGRESS NOTES
[facility] Dermatology Phototherapy Record  1. Tony Mcnair is a 65 year old adult is here today for phototherapy (UVB) treatment for Psoriasis.        Changes or new medications since last treatment:   NO    New medical conditions or problems since last treatment:   NO    Any problems with last phototherapy treatment?    NO    2. The patient tolerated phototherapy without complication    Patient will return for next UVB treatment, per protocol.     Patient to see provider every 4-12 weeks for follow-up during treatment.      All questions and concerns discussed with patient in clinic today.        Tony Mcnair comes into clinic today at the request of Dr Corbett Ordering Provider for [facility].        This service provided today was under the supervising provider of the day Dr Blanco, who was available if needed.    Georgina Wills RN

## 2020-11-29 RX ORDER — TURMERIC ROOT EXTRACT 500 MG
1000 TABLET ORAL 2 TIMES DAILY
Qty: 1 TABLET | Refills: 0 | COMMUNITY
Start: 2020-11-29

## 2020-11-29 RX ORDER — HYDROCORTISONE ACETATE 0.5 %
1500 CREAM (GRAM) TOPICAL 2 TIMES DAILY
Qty: 1 CAPSULE | Refills: 0 | COMMUNITY
Start: 2020-11-29

## 2020-12-01 ENCOUNTER — OFFICE VISIT (OUTPATIENT)
Dept: DERMATOLOGY | Facility: CLINIC | Age: 65
End: 2020-12-01
Payer: COMMERCIAL

## 2020-12-01 DIAGNOSIS — L40.9 PSORIASIS: Primary | ICD-10-CM

## 2020-12-01 PROCEDURE — 99214 OFFICE O/P EST MOD 30 MIN: CPT | Performed by: DERMATOLOGY

## 2020-12-01 RX ORDER — CLOPIDOGREL BISULFATE 75 MG/1
75 TABLET ORAL DAILY
Status: ON HOLD | COMMUNITY
End: 2021-08-24

## 2020-12-01 ASSESSMENT — PAIN SCALES - GENERAL: PAINLEVEL: NO PAIN (0)

## 2020-12-01 NOTE — PROGRESS NOTES
Dermatology Problem List:  1. Limited plaque type psoriasis  Current tx:   -[facility] phototherapy in-office 1/week. Has home light box; also doing home light box 2 days per week  -Lidex 0.05% solution for scalp BID   -Protopic 0.1% around eyes BID PRN  -taclonex ointment to affected areas BID PRN, bedtime occlusion with Saran wrap for flares        Encounter Date: 12/1/20     CC:  followup on plaque-type psoriasis     History of Present Illness:   Tony Mcnair is a 63 year old adult who presents as a follow-up for plaque type psoriasis. The patient was last seen via video visit 6/2/20.  Since then he has continued home UVB phototherapy 2x per week, and in-office nbUVB once weekly.  His overall control has been very good.  He has some areas of mild scaling and redness on his lateral distal calves, but this is mostly mild.  He thinks sugar consumption causes his psoriasis to flare, so he tries to avoid this.    He denies any new joint pain or morning stiffness.   He is considering traveling to [place] this winter and is wondering what he should do when he is away from his home phototherapy unit.        Social History:  Patient reports that he has never smoked, has never used smokeless tobacco, drinks alcohol, does not use drugs.        Review of Systems:  No recent fevers or chills  No cough or SOB  Rheum: see HPI     Physical exam:  Vitals: There were no vitals taken for this visit.  GEN: This is a well developed, well-nourished male in no acute distress, in a pleasant mood.    SKIN: Focused examination of the scalp, neck, upper and lower extremities was performed.  -bilateral upper extremities; pale pink finely scaled plaques on elbows  -Left leg; 2 x 1 cm micaceous, pink scaly plaque over knee. On left dorsal foot <1cm scaly, pink papule  -Right lower lateral leg  10 x 6 cm ill-defined faintly pink finely scaly plaque  -No other lesions of concern on areas examined.      Impression/Plan:  1. Plaque type psoriasis,  generally well-controlled with phototherapy.    NBUVB phototherapy 1/week here in clinic, 2x per week at home    OK to do natural sunlight phototherapy 2-3x per week if traveling to [place] during winter    Lidex 0.05% solution for scalp BID     Protopic 0.1% around eyes BID PRN    Taclonex ointment to affected areas BID PRN, bedtime occlusion with Saran wrap for flares    Continue to see primary doctor once a year due to increased risks of hypertension and lipid abnormalities from inflammation with psoriasis     RTC 6 months     Petar Corbett MD  Dermatology Attending

## 2020-12-01 NOTE — LETTER
12/1/2020       RE: Tony Mcnair  [address] [id number]     Dear Colleague,    Thank you for referring your patient, Tony Mcnair, to the [facility] [place] at [facility]. Please see a copy of my visit note below.    Dermatology Problem List:  1. Limited plaque type psoriasis  Current tx:   -[facility] phototherapy in-office 1/week. Has home light box; also doing home light box 2 days per week  -Lidex 0.05% solution for scalp BID   -Protopic 0.1% around eyes BID PRN  -taclonex ointment to affected areas BID PRN, bedtime occlusion with Saran wrap for flares        Encounter Date: 12/1/20     CC:  followup on plaque-type psoriasis     History of Present Illness:   Tony Mcnair is a 63 year old adult who presents as a follow-up for plaque type psoriasis. The patient was last seen via video visit 6/2/20.  Since then he has continued home UVB phototherapy 2x per week, and in-office nbUVB once weekly.  His overall control has been very good.  He has some areas of mild scaling and redness on his lateral distal calves, but this is mostly mild.  He thinks sugar consumption causes his psoriasis to flare, so he tries to avoid this.    He denies any new joint pain or morning stiffness.   He is considering traveling to [place] this winter and is wondering what he should do when he is away from his home phototherapy unit.        Social History:  Patient reports that he has never smoked, has never used smokeless tobacco, drinks alcohol, does not use drugs.        Review of Systems:  No recent fevers or chills  No cough or SOB  Rheum: see HPI     Physical exam:  Vitals: There were no vitals taken for this visit.  GEN: This is a well developed, well-nourished male in no acute distress, in a pleasant mood.    SKIN: Focused examination of the scalp, neck, upper and lower extremities was performed.  -bilateral upper extremities; pale pink finely scaled  plaques on elbows  -Left leg; 2 x 1 cm micaceous, pink scaly plaque over knee. On left dorsal foot <1cm scaly, pink papule  -Right lower lateral leg  10 x 6 cm ill-defined faintly pink finely scaly plaque  -No other lesions of concern on areas examined.      Impression/Plan:  1. Plaque type psoriasis, generally well-controlled with phototherapy.    [facility] phototherapy 1/week here in clinic, 2x per week at home    OK to do natural sunlight phototherapy 2-3x per week if traveling to [place] during winter    Lidex 0.05% solution for scalp BID     Protopic 0.1% around eyes BID PRN    Taclonex ointment to affected areas BID PRN, bedtime occlusion with Saran wrap for flares    Continue to see primary doctor once a year due to increased risks of hypertension and lipid abnormalities from inflammation with psoriasis     RTC 6 months     Petar Corbett MD  Dermatology Attending

## 2020-12-01 NOTE — NURSING NOTE
"Chief Complaint   Patient presents with     Derm Problem     Tony is here today for psoriasis. He states \" my skin is doing mostly fine\"     Evy Crowder, MAYRA  "

## 2020-12-21 ENCOUNTER — ALLIED HEALTH/NURSE VISIT (OUTPATIENT)
Dept: DERMATOLOGY | Facility: CLINIC | Age: 65
End: 2020-12-21
Payer: COMMERCIAL

## 2020-12-21 DIAGNOSIS — L40.0 PLAQUE PSORIASIS: ICD-10-CM

## 2020-12-21 PROCEDURE — 99207 PR NO CHARGE LOS: CPT | Performed by: DERMATOLOGY

## 2020-12-21 PROCEDURE — 96900 ACTINOTHERAPY UV LIGHT: CPT | Performed by: DERMATOLOGY

## 2020-12-21 NOTE — PROGRESS NOTES
[facility] Dermatology Phototherapy Record  1. Tony Mcnair is a 65 year old adult is here today for phototherapy (UVB) treatment for Plaque psoriasis .        Changes or new medications since last treatment:   NO    New medical conditions or problems since last treatment:   NO    Any problems with last phototherapy treatment?    NO    2. The patient tolerated phototherapy without complication    Patient will returnfor next UVB treatment, per protocol.     Patient to see provider every 4-12 weeks for follow-up during treatment.      All questions and concerns discussed with patient in clinic today.      Susy Kelley RN    Tony Mcnair comes into clinic today at the request of  Ordering Provider       This service provided today was under the supervising provider of the day , who was available if needed.    Susy Kelley RN

## 2020-12-28 ENCOUNTER — ALLIED HEALTH/NURSE VISIT (OUTPATIENT)
Dept: DERMATOLOGY | Facility: CLINIC | Age: 65
End: 2020-12-28
Payer: COMMERCIAL

## 2020-12-28 DIAGNOSIS — L40.0 PLAQUE PSORIASIS: ICD-10-CM

## 2020-12-28 PROCEDURE — 96900 ACTINOTHERAPY UV LIGHT: CPT | Performed by: DERMATOLOGY

## 2020-12-28 PROCEDURE — 99207 PR NO CHARGE LOS: CPT | Performed by: DERMATOLOGY

## 2020-12-28 NOTE — PROGRESS NOTES
[facility] Dermatology Phototherapy Record  1. Tony Mcnair is a 65 year old adult is here today for phototherapy (UVB) treatment for Plaque psoriasis.        Changes or new medications since last treatment:   NO    New medical conditions or problems since last treatment:   NO    Any problems with last phototherapy treatment?    NO    2. The patient tolerated phototherapy without complication    Patient will return for next UVB treatment, per protocol.     Patient to see provider every 4-12 weeks for follow-up during treatment.      All questions and concerns discussed with patient in clinic today.      Susy Kelley RN    Tony Mcnair comes into clinic today at the request of  Ordering Provider for UVB.  This service provided today was under the supervising provider of the day , who was available if needed.    Susy Kelley RN

## 2021-01-04 ENCOUNTER — ALLIED HEALTH/NURSE VISIT (OUTPATIENT)
Dept: DERMATOLOGY | Facility: CLINIC | Age: 66
End: 2021-01-04
Payer: COMMERCIAL

## 2021-01-04 DIAGNOSIS — L40.0 PLAQUE PSORIASIS: ICD-10-CM

## 2021-01-04 PROCEDURE — 96900 ACTINOTHERAPY UV LIGHT: CPT | Performed by: DERMATOLOGY

## 2021-01-04 PROCEDURE — 99207 PR NO CHARGE LOS: CPT | Performed by: DERMATOLOGY

## 2021-01-04 NOTE — PROGRESS NOTES
[facility] Dermatology Phototherapy Record  1. Tony Mcnair is a 65 year old adult is here today for phototherapy (UVB) treatment for Psoriasis.        Changes or new medications since last treatment:   NO    New medical conditions or problems since last treatment:   NO    Any problems with last phototherapy treatment?    NO    2. The patient tolerated phototherapy without complication    Patient will return for next UVB treatment, per protocol.     Patient to see provider every 4-12 weeks for follow-up during treatment.      All questions and concerns discussed with patient in clinic today.        Tony Mcnair comes into clinic today at the request of Dr Corbett Ordering Provider for [facility].      This service provided today was under the supervising provider of the day Dr Blanco, who was available if needed.    Georgina Wills RN

## 2021-01-11 ENCOUNTER — ALLIED HEALTH/NURSE VISIT (OUTPATIENT)
Dept: DERMATOLOGY | Facility: CLINIC | Age: 66
End: 2021-01-11
Payer: COMMERCIAL

## 2021-01-11 DIAGNOSIS — L40.0 PLAQUE PSORIASIS: ICD-10-CM

## 2021-01-11 PROCEDURE — 96900 ACTINOTHERAPY UV LIGHT: CPT | Performed by: DERMATOLOGY

## 2021-01-11 PROCEDURE — 99207 PR NO CHARGE LOS: CPT | Performed by: DERMATOLOGY

## 2021-01-11 NOTE — PROGRESS NOTES
[facility] Dermatology Phototherapy Record  1. Tony Mcnair is a 65 year old adult is here today for phototherapy (UVB) treatment for Psoriasis.        Changes or new medications since last treatment:   NO    New medical conditions or problems since last treatment:   NO    Any problems with last phototherapy treatment?    NO    2. The patient tolerated phototherapy without complication    Patient will return for next UVB treatment, per protocol.     Patient to see provider every 4-12 weeks for follow-up during treatment.      All questions and concerns discussed with patient in clinic today.        Tony Mcnair comes into clinic today at the request of Dr Corbett Ordering Provider for [facility].    This service provided today was under the supervising provider of the day Dr Blanco, who was available if needed.    Georgina Wills RN

## 2021-01-16 DIAGNOSIS — E78.5 HYPERLIPIDEMIA, UNSPECIFIED HYPERLIPIDEMIA TYPE: ICD-10-CM

## 2021-01-17 RX ORDER — ROSUVASTATIN CALCIUM 20 MG/1
20 TABLET, COATED ORAL DAILY
Qty: 90 TABLET | Refills: 3 | Status: SHIPPED | OUTPATIENT
Start: 2021-01-17 | End: 2022-02-15

## 2021-01-18 ENCOUNTER — ALLIED HEALTH/NURSE VISIT (OUTPATIENT)
Dept: DERMATOLOGY | Facility: CLINIC | Age: 66
End: 2021-01-18
Payer: COMMERCIAL

## 2021-01-18 DIAGNOSIS — L40.0 PLAQUE PSORIASIS: ICD-10-CM

## 2021-01-18 PROCEDURE — 96900 ACTINOTHERAPY UV LIGHT: CPT | Performed by: DERMATOLOGY

## 2021-01-18 PROCEDURE — 99207 PR NO CHARGE LOS: CPT | Performed by: DERMATOLOGY

## 2021-01-18 NOTE — PROGRESS NOTES
[facility] Dermatology Phototherapy Record  1. Tony Mcnair is a 65 year old adult is here today for phototherapy (UVB) treatment for Psoriasis.        Changes or new medications since last treatment:   NO    New medical conditions or problems since last treatment:   NO    Any problems with last phototherapy treatment?    NO    2. The patient tolerated phototherapy without complication    Patient will return for next UVB treatment, per protocol.     Patient to see provider every 4-12 weeks for follow-up during treatment.      All questions and concerns discussed with patient in clinic today.        Tony Mcnair comes into clinic today at the request of Dr Mckinney Ordering Provider for [facility].    This service provided today was under the supervising provider of the day Dr Manrique, who was available if needed.    Georgina Wills RN

## 2021-01-25 ENCOUNTER — ALLIED HEALTH/NURSE VISIT (OUTPATIENT)
Dept: DERMATOLOGY | Facility: CLINIC | Age: 66
End: 2021-01-25
Payer: COMMERCIAL

## 2021-01-25 DIAGNOSIS — L40.0 PLAQUE PSORIASIS: ICD-10-CM

## 2021-01-25 PROCEDURE — 99207 PR NO CHARGE LOS: CPT | Performed by: DERMATOLOGY

## 2021-01-25 PROCEDURE — 96900 ACTINOTHERAPY UV LIGHT: CPT | Performed by: DERMATOLOGY

## 2021-01-25 NOTE — PROGRESS NOTES
[facility] Dermatology Phototherapy Record  1. Tony Mcnair is a 65 year old adult is here today for phototherapy (UVB) treatment for Psoriasis.        Changes or new medications since last treatment:   NO    New medical conditions or problems since last treatment:   NO    Any problems with last phototherapy treatment?    NO    2. The patient tolerated phototherapy without complication    Patient will return for next UVB treatment, per protocol.     Patient to see provider every 4-12 weeks for follow-up during treatment.      All questions and concerns discussed with patient in clinic today.      Tony Mcnair comes into clinic today at the request of Dr Corbett Ordering Provider for [facility].    This service provided today was under the supervising provider of the day Dr Blanco, who was available if needed.    Georgina Wills RN

## 2021-01-29 ENCOUNTER — MYC MEDICAL ADVICE (OUTPATIENT)
Dept: FAMILY MEDICINE | Facility: CLINIC | Age: 66
End: 2021-01-29

## 2021-01-29 DIAGNOSIS — F33.1 MODERATE EPISODE OF RECURRENT MAJOR DEPRESSIVE DISORDER (H): ICD-10-CM

## 2021-01-29 RX ORDER — CITALOPRAM HYDROBROMIDE 20 MG/1
20 TABLET ORAL DAILY
Qty: 90 TABLET | Refills: 1 | Status: SHIPPED | OUTPATIENT
Start: 2021-01-29 | End: 2021-10-26

## 2021-02-01 ENCOUNTER — ALLIED HEALTH/NURSE VISIT (OUTPATIENT)
Dept: DERMATOLOGY | Facility: CLINIC | Age: 66
End: 2021-02-01
Payer: COMMERCIAL

## 2021-02-01 DIAGNOSIS — L40.0 PLAQUE PSORIASIS: ICD-10-CM

## 2021-02-01 PROCEDURE — 96900 ACTINOTHERAPY UV LIGHT: CPT | Performed by: DERMATOLOGY

## 2021-02-01 PROCEDURE — 99207 PR NO CHARGE LOS: CPT | Performed by: DERMATOLOGY

## 2021-02-01 NOTE — PROGRESS NOTES
[facility] Dermatology Phototherapy Record  1. Tony Mcnair is a 65 year old adult is here today for phototherapy (UVB) treatment for Psoriasis.        Changes or new medications since last treatment:   NO    New medical conditions or problems since last treatment:   NO    Any problems with last phototherapy treatment?    NO    2. The patient tolerated phototherapy without complication    Patient will return for next UVB treatment, per protocol.     Patient to see provider every 4-12 weeks for follow-up during treatment.      All questions and concerns discussed with patient in clinic today.        Tony Mcnair comes into clinic today at the request of Dr Corbett Ordering Provider for [facility].      This service provided today was under the supervising provider of the day Dr Blanco, who was available if needed.    Georgina Wills RN

## 2021-02-08 ENCOUNTER — ALLIED HEALTH/NURSE VISIT (OUTPATIENT)
Dept: DERMATOLOGY | Facility: CLINIC | Age: 66
End: 2021-02-08
Payer: COMMERCIAL

## 2021-02-08 DIAGNOSIS — L40.0 PLAQUE PSORIASIS: Primary | ICD-10-CM

## 2021-02-08 PROCEDURE — 99207 PR NO CHARGE LOS: CPT | Performed by: DERMATOLOGY

## 2021-02-08 PROCEDURE — 96900 ACTINOTHERAPY UV LIGHT: CPT | Performed by: DERMATOLOGY

## 2021-02-08 NOTE — PROGRESS NOTES
[facility] Dermatology Phototherapy Record  1. Tony Mcnair is a 65 year old adult is here today for phototherapy (UVB) treatment for Plaque psoriasis.        Changes or new medications since last treatment:   NO    New medical conditions or problems since last treatment:   NO    Any problems with last phototherapy treatment?    NO    2. The patient tolerated phototherapy without complication    Patient will return on for next UVB treatment, per protocol.     Patient to see provider every 4-12 weeks for follow-up during treatment.      All questions and concerns discussed with patient in clinic today.      MAYRA Sultana

## 2021-02-08 NOTE — NURSING NOTE
Tony Mcnair comes into clinic today at the request of Dr. Corbett Ordering Provider for Plaque psoriasis .      This service provided today was under the supervising provider of the day Dr. Blanco, who was available if needed.    MAYRA Sultana

## 2021-02-15 ENCOUNTER — ALLIED HEALTH/NURSE VISIT (OUTPATIENT)
Dept: DERMATOLOGY | Facility: CLINIC | Age: 66
End: 2021-02-15
Payer: COMMERCIAL

## 2021-02-15 DIAGNOSIS — L40.0 PLAQUE PSORIASIS: ICD-10-CM

## 2021-02-15 PROCEDURE — 96900 ACTINOTHERAPY UV LIGHT: CPT | Performed by: DERMATOLOGY

## 2021-02-15 PROCEDURE — 99207 PR NO CHARGE LOS: CPT | Performed by: DERMATOLOGY

## 2021-02-15 NOTE — PROGRESS NOTES
[facility] Dermatology Phototherapy Record  1. Tony Mcnair is a 65 year old adult is here today for phototherapy (UVB) treatment for Plaque psoriasis.        Changes or new medications since last treatment:   NO    New medical conditions or problems since last treatment:   NO    Any problems with last phototherapy treatment?    NO    2. The patient tolerated phototherapy without complication    Patient will return for next UVB treatment, per protocol.     Patient to see provider every 4-12 weeks for follow-up during treatment.      All questions and concerns discussed with patient in clinic today.        Tony Mcnair comes into clinic today at the request of Dr Corbett Ordering Provider for [facility].    This service provided today was under the supervising provider of the day Dr Blanco, who was available if needed.    Georgina Wills RN

## 2021-02-22 ENCOUNTER — ALLIED HEALTH/NURSE VISIT (OUTPATIENT)
Dept: DERMATOLOGY | Facility: CLINIC | Age: 66
End: 2021-02-22
Payer: COMMERCIAL

## 2021-02-22 DIAGNOSIS — L40.0 PLAQUE PSORIASIS: ICD-10-CM

## 2021-02-22 PROCEDURE — 99207 PR NO CHARGE LOS: CPT | Performed by: DERMATOLOGY

## 2021-02-22 PROCEDURE — 96900 ACTINOTHERAPY UV LIGHT: CPT | Performed by: DERMATOLOGY

## 2021-02-22 NOTE — PROGRESS NOTES
[facility] Dermatology Phototherapy Record  1. Tony Mcnair is a 65 year old adult is here today for phototherapy (UVB) treatment for Plaque psoriasis.        Changes or new medications since last treatment:   NO    New medical conditions or problems since last treatment:   NO    Any problems with last phototherapy treatment?    NO    2. The patient tolerated phototherapy without complication    Patient will return on  for next UVB treatment, per protocol.     Patient to see provider every 4-12 weeks for follow-up during treatment.      All questions and concerns discussed with patient in clinic today.      Tony Mcnair comes into clinic today at the request of Dr. Mckinney Ordering Provider for [facility].        This service provided today was under the supervising provider of the day Dr. Blanco, who was available if needed.    Cherie Zafar, CMA

## 2021-03-01 ENCOUNTER — ALLIED HEALTH/NURSE VISIT (OUTPATIENT)
Dept: DERMATOLOGY | Facility: CLINIC | Age: 66
End: 2021-03-01
Payer: COMMERCIAL

## 2021-03-01 DIAGNOSIS — L40.0 PLAQUE PSORIASIS: Primary | ICD-10-CM

## 2021-03-01 PROCEDURE — 96900 ACTINOTHERAPY UV LIGHT: CPT | Performed by: DERMATOLOGY

## 2021-03-01 PROCEDURE — 99207 PR NO CHARGE LOS: CPT | Performed by: DERMATOLOGY

## 2021-03-01 NOTE — PROGRESS NOTES
[facility] Dermatology Phototherapy Record  1. Tony Mcnair is a 65 year old adult is here today for phototherapy (UVB) treatment for Plaque psoriasis .        Changes or new medications since last treatment:   NO    New medical conditions or problems since last treatment:   NO    Any problems with last phototherapy treatment?    NO    2. The patient tolerated phototherapy without complication    Patient will return on for next UVB treatment, per protocol.     Patient to see provider every 4-12 weeks for follow-up during treatment.      All questions and concerns discussed with patient in clinic today.      MAYRA Sultana

## 2021-03-01 NOTE — NURSING NOTE
Tony Mcnair comes into clinic today at the request of  Ordering Provider for UVB .      This service provided today was under the supervising provider of the day Dr. Blanco, who was available if needed.    MAYRA Sultana

## 2021-03-08 ENCOUNTER — ALLIED HEALTH/NURSE VISIT (OUTPATIENT)
Dept: DERMATOLOGY | Facility: CLINIC | Age: 66
End: 2021-03-08
Payer: COMMERCIAL

## 2021-03-08 DIAGNOSIS — M72.2 PLANTAR FASCIITIS: ICD-10-CM

## 2021-03-08 DIAGNOSIS — L40.0 PLAQUE PSORIASIS: ICD-10-CM

## 2021-03-08 PROCEDURE — 96900 ACTINOTHERAPY UV LIGHT: CPT | Performed by: DERMATOLOGY

## 2021-03-08 PROCEDURE — 99207 PR NO CHARGE LOS: CPT | Performed by: DERMATOLOGY

## 2021-03-08 NOTE — PROGRESS NOTES
[facility] Dermatology Phototherapy Record  1. Tony Mcnair is a 65 year old adult is here today for phototherapy (UVB) treatment for Plaque psoriasis.        Changes or new medications since last treatment:   NO    New medical conditions or problems since last treatment:   NO    Any problems with last phototherapy treatment?    NO    2. The patient tolerated phototherapy without complication    Patient will return for next UVB treatment, per protocol.     Patient to see provider every 4-12 weeks for follow-up during treatment.      All questions and concerns discussed with patient in clinic today.      Terry Hogue

## 2021-03-08 NOTE — NURSING NOTE
Tony Mcnair comes into clinic today at the request of Dr. Galloway Ordering Provider for Plaque psoriasis.      This service provided today was under the supervising provider of the day Dr. Blanco, who was available if needed.    Terry Hogue

## 2021-03-22 ENCOUNTER — ALLIED HEALTH/NURSE VISIT (OUTPATIENT)
Dept: DERMATOLOGY | Facility: CLINIC | Age: 66
End: 2021-03-22
Payer: COMMERCIAL

## 2021-03-22 DIAGNOSIS — L40.0 PLAQUE PSORIASIS: ICD-10-CM

## 2021-03-22 PROCEDURE — 96900 ACTINOTHERAPY UV LIGHT: CPT | Performed by: DERMATOLOGY

## 2021-03-22 PROCEDURE — 99207 PR NO CHARGE LOS: CPT | Performed by: DERMATOLOGY

## 2021-03-22 NOTE — NURSING NOTE
Tony Mcnair comes into clinic today at the request of Dr. Corbett Ordering Provider for Plaque psoriasis.      This service provided today was under the supervising provider of the day Dr. Munoz, who was available if needed.    Terry Hogue

## 2021-03-22 NOTE — PROGRESS NOTES
[facility] Dermatology Phototherapy Record  1. Tony Mcnair is a 65 year old adult is here today for phototherapy (UVB) treatment for Plaque psoriasis.        Changes or new medications since last treatment:   NO    New medical conditions or problems since last treatment:   NO    Any problems with last phototherapy treatment?    NO    2. The patient tolerated phototherapy without complication    Patient will return for next UVB treatment, per protocol.     Patient to see provider every 4-12 weeks for follow-up during treatment.      All questions and concerns discussed with patient in clinic today.      Terry Hogue

## 2021-03-29 ENCOUNTER — ALLIED HEALTH/NURSE VISIT (OUTPATIENT)
Dept: DERMATOLOGY | Facility: CLINIC | Age: 66
End: 2021-03-29
Payer: COMMERCIAL

## 2021-03-29 DIAGNOSIS — L40.0 PLAQUE PSORIASIS: ICD-10-CM

## 2021-03-29 PROCEDURE — 96900 ACTINOTHERAPY UV LIGHT: CPT | Performed by: DERMATOLOGY

## 2021-03-29 PROCEDURE — 99207 PR NO CHARGE LOS: CPT | Performed by: DERMATOLOGY

## 2021-03-29 NOTE — NURSING NOTE
Tony Mcnair comes into clinic today at the request of Dr. Corbett Ordering Provider for Plaque psoriasis.      This service provided today was under the supervising provider of the day Dr. Mckinney, who was available if needed.    Terry Hogue

## 2021-03-29 NOTE — PROGRESS NOTES
[facility] Dermatology Phototherapy Record  1. Tony Mcnair is a 65 year old adult is here today for phototherapy (UVB) treatment for Plaque psoriasis.        Changes or new medications since last treatment:   NO    New medical conditions or problems since last treatment:   NO    Any problems with last phototherapy treatment?    NO    2. The patient tolerated phototherapy without complication    Patient will return for next UVB treatment, per protocol.     Patient to see provider every 4-12 weeks for follow-up during treatment.      All questions and concerns discussed with patient in clinic today.      Terry Hogue

## 2021-04-05 ENCOUNTER — ALLIED HEALTH/NURSE VISIT (OUTPATIENT)
Dept: DERMATOLOGY | Facility: CLINIC | Age: 66
End: 2021-04-05
Payer: COMMERCIAL

## 2021-04-05 DIAGNOSIS — L40.0 PLAQUE PSORIASIS: ICD-10-CM

## 2021-04-05 PROCEDURE — 99207 PR NO CHARGE LOS: CPT | Performed by: DERMATOLOGY

## 2021-04-05 PROCEDURE — 96900 ACTINOTHERAPY UV LIGHT: CPT | Performed by: DERMATOLOGY

## 2021-04-05 NOTE — PROGRESS NOTES
[facility] Dermatology Phototherapy Record  1. Tony Mcnair is a 65 year old adult is here today for phototherapy (UVB) treatment for Plaque psoriasis.        Changes or new medications since last treatment:   NO    New medical conditions or problems since last treatment:   NO    Any problems with last phototherapy treatment?    NO    2. The patient tolerated phototherapy without complication    Patient will return for next UVB treatment, per protocol.     Patient to see provider every 4-12 weeks for follow-up during treatment.      All questions and concerns discussed with patient in clinic today.      Terry Hogue

## 2021-04-07 ENCOUNTER — MYC MEDICAL ADVICE (OUTPATIENT)
Dept: FAMILY MEDICINE | Facility: CLINIC | Age: 66
End: 2021-04-07

## 2021-04-12 ENCOUNTER — ALLIED HEALTH/NURSE VISIT (OUTPATIENT)
Dept: DERMATOLOGY | Facility: CLINIC | Age: 66
End: 2021-04-12
Payer: COMMERCIAL

## 2021-04-12 DIAGNOSIS — L40.0 PLAQUE PSORIASIS: ICD-10-CM

## 2021-04-12 PROCEDURE — 99207 PR NO CHARGE LOS: CPT | Performed by: DERMATOLOGY

## 2021-04-12 PROCEDURE — 96900 ACTINOTHERAPY UV LIGHT: CPT | Performed by: DERMATOLOGY

## 2021-04-12 NOTE — PROGRESS NOTES
[facility] Dermatology Phototherapy Record  1. Tony Mcnair is a 65 year old adult is here today for phototherapy (UVB) treatment for Plaque psoriasis.        Changes or new medications since last treatment:   NO    New medical conditions or problems since last treatment:   NO    Any problems with last phototherapy treatment?    NO    2. The patient tolerated phototherapy without complication    Patient will return for next UVB treatment, per protocol.     Patient to see provider every 4-12 weeks for follow-up during treatment.      All questions and concerns discussed with patient in clinic today.      Terry Hogue

## 2021-04-19 ENCOUNTER — ALLIED HEALTH/NURSE VISIT (OUTPATIENT)
Dept: DERMATOLOGY | Facility: CLINIC | Age: 66
End: 2021-04-19
Payer: COMMERCIAL

## 2021-04-19 DIAGNOSIS — L40.0 PLAQUE PSORIASIS: ICD-10-CM

## 2021-04-19 PROCEDURE — 96900 ACTINOTHERAPY UV LIGHT: CPT | Performed by: DERMATOLOGY

## 2021-04-19 PROCEDURE — 99207 PR NO CHARGE LOS: CPT | Performed by: DERMATOLOGY

## 2021-04-26 ENCOUNTER — ALLIED HEALTH/NURSE VISIT (OUTPATIENT)
Dept: DERMATOLOGY | Facility: CLINIC | Age: 66
End: 2021-04-26
Payer: COMMERCIAL

## 2021-04-26 DIAGNOSIS — L40.0 PLAQUE PSORIASIS: ICD-10-CM

## 2021-04-26 PROCEDURE — 96900 ACTINOTHERAPY UV LIGHT: CPT | Performed by: DERMATOLOGY

## 2021-05-03 ENCOUNTER — ALLIED HEALTH/NURSE VISIT (OUTPATIENT)
Dept: DERMATOLOGY | Facility: CLINIC | Age: 66
End: 2021-05-03
Payer: COMMERCIAL

## 2021-05-03 DIAGNOSIS — L40.0 PLAQUE PSORIASIS: ICD-10-CM

## 2021-05-03 PROCEDURE — 96900 ACTINOTHERAPY UV LIGHT: CPT | Performed by: DERMATOLOGY

## 2021-05-03 NOTE — PROGRESS NOTES
[facility] Dermatology Phototherapy Record  1. Tony Mcnair is a 65 year old adult is here today for phototherapy (UVB) treatment for Plaque psoriasis .        Changes or new medications since last treatment:   NO    New medical conditions or problems since last treatment:   NO    Any problems with last phototherapy treatment?    NO    2. The patient tolerated phototherapy without complication    Patient will return on  for next UVB treatment, per protocol.     Patient to see provider every 4-12 weeks for follow-up during treatment.      All questions and concerns discussed with patient in clinic today.    Tony Mcnair comes into clinic today at the request of Dr. Corbett Ordering Provider for NBUVB.        This service provided today was under the supervising provider of the day Dr. Blanco, who was available if needed.    Cherie Zafar, CMA

## 2021-05-10 ENCOUNTER — ALLIED HEALTH/NURSE VISIT (OUTPATIENT)
Dept: DERMATOLOGY | Facility: CLINIC | Age: 66
End: 2021-05-10
Payer: COMMERCIAL

## 2021-05-10 DIAGNOSIS — L40.0 PLAQUE PSORIASIS: ICD-10-CM

## 2021-05-10 PROCEDURE — 96900 ACTINOTHERAPY UV LIGHT: CPT | Performed by: DERMATOLOGY

## 2021-05-10 PROCEDURE — 99207 PR NO CHARGE LOS: CPT | Performed by: DERMATOLOGY

## 2021-05-10 NOTE — PROGRESS NOTES
[facility] Dermatology Phototherapy Record  1. Tony Mcnair is a 65 year old adult is here today for phototherapy (UVB) treatment for Plaque psoriasis.        Changes or new medications since last treatment:   NO    New medical conditions or problems since last treatment:   NO    Any problems with last phototherapy treatment?    NO    2. The patient tolerated phototherapy without complication    Patient will return for next UVB treatment, per protocol.     Patient to see provider every 4-12 weeks for follow-up during treatment.      All questions and concerns discussed with patient in clinic today.      Terry Hogue

## 2021-05-10 NOTE — NURSING NOTE
Tony Mcnair comes into clinic today at the request of Dr. CorbettOrdering Provider for Plaque psoriasis.      This service provided today was under the supervising provider of the day Dr. Mckinney, who was available if needed.    Terry Hogue

## 2021-06-01 ENCOUNTER — ALLIED HEALTH/NURSE VISIT (OUTPATIENT)
Dept: DERMATOLOGY | Facility: CLINIC | Age: 66
End: 2021-06-01
Payer: COMMERCIAL

## 2021-06-01 ENCOUNTER — OFFICE VISIT (OUTPATIENT)
Dept: DERMATOLOGY | Facility: CLINIC | Age: 66
End: 2021-06-01
Payer: COMMERCIAL

## 2021-06-01 DIAGNOSIS — L40.0 PLAQUE PSORIASIS: Primary | ICD-10-CM

## 2021-06-01 DIAGNOSIS — L40.0 PLAQUE PSORIASIS: ICD-10-CM

## 2021-06-01 DIAGNOSIS — L82.1 SEBORRHEIC KERATOSIS: ICD-10-CM

## 2021-06-01 PROCEDURE — 96900 ACTINOTHERAPY UV LIGHT: CPT | Performed by: DERMATOLOGY

## 2021-06-01 PROCEDURE — 17110 DESTRUCTION B9 LES UP TO 14: CPT | Mod: GC | Performed by: DERMATOLOGY

## 2021-06-01 PROCEDURE — 99207 PR NO CHARGE LOS: CPT | Performed by: DERMATOLOGY

## 2021-06-01 PROCEDURE — 99213 OFFICE O/P EST LOW 20 MIN: CPT | Mod: 25 | Performed by: DERMATOLOGY

## 2021-06-01 ASSESSMENT — PAIN SCALES - GENERAL: PAINLEVEL: NO PAIN (0)

## 2021-06-01 NOTE — NURSING NOTE
Dermatology Rooming Note    Tony Mcnair's goals for this visit include:   Chief Complaint   Patient presents with     Derm Problem     Here to follow up on psoriasis on legs, elbows and scalp.  Also here to address a mole that has been bleeding on he back left shoulder blade for the last couple of months that has grown.     Parul Ryan RN

## 2021-06-01 NOTE — PATIENT INSTRUCTIONS
Cryotherapy    What is it?    Use of a very cold liquid, such as liquid nitrogen, to freeze and destroy abnormal skin cells that need to be removed    What should I expect?    Tenderness and redness    A small blister that might grow and fill with dark purple blood. There may be crusting.    More than one treatment may be needed if the lesions do not go away.    How do I care for the treated area?    Gently wash the area with your hands when bathing.    Use a thin layer of Vaseline to help with healing. You may use a Band-Aid.     The area should heal within 7-10 days and may leave behind a pink or lighter color.     Do not use an antibiotic or Neosporin ointment.     You may take acetaminophen (Tylenol) for pain.     Call your Doctor if you have:    Severe pain    Signs of infection (warmth, redness, cloudy yellow drainage, and or a bad smell)    Questions or concerns    Who should I call with questions?       [facility]: [phone]       [facility]: [phone]       For urgent needs outside of business hours call the [facility] at [phone]        and ask for the dermatology resident on call

## 2021-06-01 NOTE — LETTER
6/1/2021       RE: Tony Mcnair  [address]  [place] [address]     Dear Colleague,    Thank you for referring your patient, Tony Mcnair, to the [facility] DERMATOLOGY CLINIC [place] at [facility]. Please see a copy of my visit note below.    [facility] Dermatology Note  Encounter Date: Jun 1, 2021  Office Visit     Dermatology Problem List:  1. Limited plaque type psoriasis  Current tx:   -NBUVB phototherapy in-office 1/week. Has home light box; also doing home light box 2 days per week  -Lidex 0.05% solution for scalp BID   -Protopic 0.1% around eyes BID PRN  -taclonex ointment to affected areas BID PRN, bedtime occlusion with Saran wrap for flares     ____________________________________________    Assessment & Plan:     # Psoriasis   - Patient doing well currently with topical pimecrolimus and tacrolimus as well as targeted phototherapy.  Feels like his symptoms are the best they have been for a long time.  No evidence of psoriatic arthritis for now and sees rheum.  -Continue current regimen, follow-up in 3 months.  -PCP for routine screening per age and PMH.  Has a stent, on Plavix.    # Seborrheic keratosis, irritated.   - Bothering him on his back and bleeding occasionally, seems to be growing.  - Appears to be a standard seborrheic keratosis.  - Cryotherapy completed.     Procedures Performed:   - Cryotherapy procedure note, location(s): Mid-left back. After verbal consent and discussion of risks and benefits including, but not limited to, dyspigmentation/scar, blister, and pain, 1 lesion(s) was(were) treated with 1-2 mm freeze border for 1-2 cycles with liquid nitrogen. Post cryotherapy instructions were provided.    Follow-up: 3 months.    Staff:     Christiano Sebastian MD  [facility]idency Program, PGY-2    Precepted patient with Dr. Petar Corbett.    I have seen and examined this patient and  agree with the assessment and plan as documented in the resident's note, and was present for all procedures.    Petar Corbett MD  Dermatology Attending    ____________________________________________    CC: Derm Problem (Here to follow up on psoriasis on legs, elbows and scalp.  Also here to address a mole that has been bleeding on he back left shoulder blade for the last couple of months that has grown.  )    HPI:    Tony Mcnair is a(n) 65 year old adult who presents today for follow-up  for psoriatic arthritis.  Feels like he is doing better than he has been.  Still has some lesions on his elbows and knees and mildly on his ankles but improving.  Doing topical tacrolimus, pimecrolimus and targeted phototherapy.  No new joint concerns.    Has a lesion on his back that may be growing and is irritating him and occasionally bleeding.    Patient is otherwise feeling well, without additional skin concerns.    Labs Reviewed:  N/A    Physical Exam:  Vitals: There were no vitals taken for this visit.  SKIN: Sun-exposed skin, which includes the head/face, neck, both arms, digits, and/or nails was examined.   - Seborrheic keratosis noted on L mid-back.  - There are erythematous well demarcated plaques with silvery micaceous scale on the bilateral knees and elbows and mild ones on bilateral ankles.   - No other lesions of concern on areas examined.     Medications:  Current Outpatient Medications   Medication     Artificial Tear Ointment (REFRESH P.M.) OINT     aspirin (ASA) 81 MG chewable tablet     BIOTIN PO     calcipotriene-betameth diprop (TACLONEX) 0.005-0.064 % external ointment     citalopram (CELEXA) 20 MG tablet     clopidogrel (PLAVIX) 75 MG tablet     EPINEPHrine (EPIPEN/ADRENACLICK/OR ANY BX GENERIC EQUIV) 0.3 MG/0.3ML injection 2-pack     fluocinonide (LIDEX) 0.05 % external solution     Glucosamine-Chondroit-Vit C-Mn (GLUCOSAMINE 1500 COMPLEX) CAPS     Multiple Vitamin (DAILY MULTIVITAMIN PO)     olopatadine  (PATADAY) 0.2 % ophthalmic solution     pimecrolimus (ELIDEL) 1 % external cream     rosuvastatin (CRESTOR) 20 MG tablet     tacrolimus (PROTOPIC) 0.1 % external ointment     Turmeric 500 MG TABS     VITAMIN D, CHOLECALCIFEROL, PO     BRILINTA 90 MG tablet     methylphenidate (CONCERTA) 27 MG CR tablet     No current facility-administered medications for this visit.       Past Medical History:   Patient Active Problem List   Diagnosis     Psoriasis     Pain in both feet     Iliotibial band syndrome, unspecified laterality     Adhesive capsulitis of left shoulder     Left shoulder pain     ADD (attention deficit disorder)     Past Medical History:   Diagnosis Date     Arthritis      Coronary artery disease involving native coronary artery of native heart without angina pectoris     s/p ptca tamica prox circ     Depressive disorder Junie     Z[place] 1988    LEFT EYE     CC Referred Self, MD  No address on file on close of this encounter.

## 2021-06-01 NOTE — PROGRESS NOTES
[facility] Dermatology Phototherapy Record  1. Tony Mcnair is a 65 year old adult is here today for phototherapy (UVB) treatment for Plaque psoriasis.        Changes or new medications since last treatment:   N/A    New medical conditions or problems since last treatment:   NO    Any problems with last phototherapy treatment?    NO    2. The patient tolerated phototherapy without complication    Patient will return on  for next UVB treatment, per protocol.     Patient to see provider every 4-12 weeks for follow-up during treatment.      All questions and concerns discussed with patient in clinic today.      Tony Mcnair comes into clinic today at the request of Dr. Corbett Ordering Provider for [facility]BU[facility].        This service provided today was under the supervising provider of the day Dr. Blanco, who was available if needed.    Cherie Zafar, [facility]

## 2021-06-01 NOTE — PROGRESS NOTES
[facility] Dermatology Note  Encounter Date: Jun 1, 2021  Office Visit     Dermatology Problem List:  1. Limited plaque type psoriasis  Current tx:   -NBUVB phototherapy in-office 1/week. Has home light box; also doing home light box 2 days per week  -Lidex 0.05% solution for scalp BID   -Protopic 0.1% around eyes BID PRN  -taclonex ointment to affected areas BID PRN, bedtime occlusion with Saran wrap for flares     ____________________________________________    Assessment & Plan:     # Psoriasis   - Patient doing well currently with topical pimecrolimus and tacrolimus as well as targeted phototherapy.  Feels like his symptoms are the best they have been for a long time.  No evidence of psoriatic arthritis for now and sees rheum.  -Continue current regimen, follow-up in 3 months.  -PCP for routine screening per age and PMH.  Has a stent, on Plavix.    # Seborrheic keratosis, irritated.   - Bothering him on his back and bleeding occasionally, seems to be growing.  - Appears to be a standard seborrheic keratosis.  - Cryotherapy completed.     Procedures Performed:   - Cryotherapy procedure note, location(s): Mid-left back. After verbal consent and discussion of risks and benefits including, but not limited to, dyspigmentation/scar, blister, and pain, 1 lesion(s) was(were) treated with 1-2 mm freeze border for 1-2 cycles with liquid nitrogen. Post cryotherapy instructions were provided.    Follow-up: 3 months.    Staff:     Christiano Sebastian MD  [facility], PGY-2    Precepted patient with Dr. Petar Corbett.    I have seen and examined this patient and agree with the assessment and plan as documented in the resident's note, and was present for all procedures.    Petar Corbett MD  Dermatology Attending    ____________________________________________    CC: Derm Problem (Here to follow up on psoriasis on legs, elbows and scalp.  Also here to address a mole that  has been bleeding on he back left shoulder blade for the last couple of months that has grown.  )    HPI:    Tony Mcnair is a(n) 65 year old adult who presents today for follow-up  for psoriatic arthritis.  Feels like he is doing better than he has been.  Still has some lesions on his elbows and knees and mildly on his ankles but improving.  Doing topical tacrolimus, pimecrolimus and targeted phototherapy.  No new joint concerns.    Has a lesion on his back that may be growing and is irritating him and occasionally bleeding.    Patient is otherwise feeling well, without additional skin concerns.    Labs Reviewed:  N/A    Physical Exam:  Vitals: There were no vitals taken for this visit.  SKIN: Sun-exposed skin, which includes the head/face, neck, both arms, digits, and/or nails was examined.   - Seborrheic keratosis noted on L mid-back.  - There are erythematous well demarcated plaques with silvery micaceous scale on the bilateral knees and elbows and mild ones on bilateral ankles.   - No other lesions of concern on areas examined.     Medications:  Current Outpatient Medications   Medication     Artificial Tear Ointment (REFRESH P.M.) OINT     aspirin (ASA) 81 MG chewable tablet     BIOTIN PO     calcipotriene-betameth diprop (TACLONEX) 0.005-0.064 % external ointment     citalopram (CELEXA) 20 MG tablet     clopidogrel (PLAVIX) 75 MG tablet     EPINEPHrine (EPIPEN/ADRENACLICK/OR ANY BX GENERIC EQUIV) 0.3 MG/0.3ML injection 2-pack     fluocinonide (LIDEX) 0.05 % external solution     Glucosamine-Chondroit-Vit C-Mn (GLUCOSAMINE 1500 COMPLEX) CAPS     Multiple Vitamin (DAILY MULTIVITAMIN PO)     olopatadine (PATADAY) 0.2 % ophthalmic solution     pimecrolimus (ELIDEL) 1 % external cream     rosuvastatin (CRESTOR) 20 MG tablet     tacrolimus (PROTOPIC) 0.1 % external ointment     Turmeric 500 MG TABS     VITAMIN D, CHOLECALCIFEROL, PO     BRILINTA 90 MG tablet     methylphenidate (CONCERTA) 27 MG CR tablet     No  current facility-administered medications for this visit.       Past Medical History:   Patient Active Problem List   Diagnosis     Psoriasis     Pain in both feet     Iliotibial band syndrome, unspecified laterality     Adhesive capsulitis of left shoulder     Left shoulder pain     ADD (attention deficit disorder)     Past Medical History:   Diagnosis Date     Arthritis      Coronary artery disease involving native coronary artery of native heart without angina pectoris     s/p ptca tamica prox circ     Depressive disorder Junie     [place] 1988    LEFT EYE     CC Referred Self, MD  No address on file on close of this encounter.

## 2021-06-17 PROBLEM — L82.1 SEBORRHEIC KERATOSIS: Status: ACTIVE | Noted: 2021-06-17

## 2021-07-07 ENCOUNTER — NURSE TRIAGE (OUTPATIENT)
Dept: FAMILY MEDICINE | Facility: CLINIC | Age: 66
End: 2021-07-07

## 2021-07-07 NOTE — TELEPHONE ENCOUNTER
Patient called stating he was a bite from 1 week ago.  He thinks it is a tick bite, but never saw what bit him.  Has a pea size lump on his upper left front thigh, with bruising around it.  Currently on Plavix.     Reason for Disposition    Tick bite with no complications    Additional Information    Negative: Fever or severe headache occurs, 2 to 14 days following the bite    Negative: Widespread rash occurs, 2 to 14 days following the bite    Negative: Can't remove live tick (after using Care Advice)    Negative: Can't remove tick's head that was broken off in the skin (after using Care Advice)    Negative: Fever and area is red    Negative: Not a tick bite    Negative: Patient sounds very sick or weak to the triager    Negative: Fever and area is very tender to touch    Negative: Red streak or red line and length > 2 inches (5 cm)    Negative: Red ring or bull's-eye rash occurs around a deer tick bite    Negative: Probable deer tick that was attached > 36 hours (or tick appears swollen, not flat)    Negative: Patient wants to be seen    Protocols used: TICK BITE-A-OH    Advised home care at this time.  If symptoms change or become worse can always make an appointment or call back to triage.    Tahira Fairbanks RN

## 2021-07-29 ENCOUNTER — OFFICE VISIT (OUTPATIENT)
Dept: OPHTHALMOLOGY | Facility: CLINIC | Age: 66
End: 2021-07-29
Attending: OPHTHALMOLOGY
Payer: COMMERCIAL

## 2021-07-29 DIAGNOSIS — D31.41 IRIS NEVUS, RIGHT: ICD-10-CM

## 2021-07-29 DIAGNOSIS — H04.123 DRY EYE SYNDROME OF BOTH LACRIMAL GLANDS: Primary | ICD-10-CM

## 2021-07-29 DIAGNOSIS — H01.02A SQUAMOUS BLEPHARITIS OF BOTH UPPER AND LOWER EYELID OF RIGHT EYE: ICD-10-CM

## 2021-07-29 PROCEDURE — G0463 HOSPITAL OUTPT CLINIC VISIT: HCPCS

## 2021-07-29 PROCEDURE — 99214 OFFICE O/P EST MOD 30 MIN: CPT | Mod: GC | Performed by: OPHTHALMOLOGY

## 2021-07-29 ASSESSMENT — CUP TO DISC RATIO
OS_RATIO: 0.25
OD_RATIO: 0.25

## 2021-07-29 ASSESSMENT — REFRACTION_WEARINGRX
SPECS_TYPE: PAL
OD_CYLINDER: +4.00
OS_SPHERE: -0.25
OS_AXIS: 007
OD_ADD: +2.25
OD_SPHERE: -1.00
OD_AXIS: 175
OS_ADD: +2.25
OS_CYLINDER: +4.00

## 2021-07-29 ASSESSMENT — EXTERNAL EXAM - RIGHT EYE: OD_EXAM: NORMAL

## 2021-07-29 ASSESSMENT — CONF VISUAL FIELD
OD_NORMAL: 1
OS_NORMAL: 1

## 2021-07-29 ASSESSMENT — TONOMETRY
OS_IOP_MMHG: 12
OD_IOP_MMHG: 13
IOP_METHOD: ICARE

## 2021-07-29 ASSESSMENT — VISUAL ACUITY
CORRECTION_TYPE: GLASSES
OS_CC: 20/20
OD_CC: 20/25
METHOD: SNELLEN - LINEAR

## 2021-07-29 ASSESSMENT — EXTERNAL EXAM - LEFT EYE: OS_EXAM: NORMAL

## 2021-07-29 NOTE — NURSING NOTE
Chief Complaints and History of Present Illnesses   Patient presents with     Dry Eye(s) Follow-Up     Chief Complaint(s) and History of Present Illness(es)     Dry Eye(s) Follow-Up     Laterality: both eyes    Duration: 1 year              Comments     Pt. States that he is doing well. Dry eye has been well controlled but LE has still been irritated most days. No change in VA BE. No pain BE. No flashes or floaters BE.  Vanessa Carrasco COT 8:06 AM July 29, 2021

## 2021-07-29 NOTE — PROGRESS NOTES
Mr. Mcnair is a 60 year old CM with h/o Dry eye syndrome.     Interval:  Patient last visit was 7/2020 w/ Dr. Schultz. Eyes are comfortable for the most part, no significant changes. Happy with vision in current glasses. No new floaters or flashes.     Hx of psoriasis.    Reports worsening of floaters, no flashes.    Ocular meds:  Preservative free artificial tears: 1-2x/day (Refresh Plus)   Pataday qAM  Fish oil  Warm compresses: not doing     A/P:  1. 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    Continue artificial tears. Increase to 4-6x/day.   Continue with eyelid scrubs   Restart warm compresses and lid hygiene daily.    Surface looks healthy. Can approach additional methods of treatment if symptoms/signs worsen - plugs, Restasis, BCL, Doxy, etc.     # Demodex noted - Worse left eye .  Rec Claridex foam. each eye each morning      2. Allergic conjunctivitis.   Continue pataday as needed.       3. Iris Nevus OD   stable in size compared to prior photos  (2011)   Monitor    4. Refractive Error each eye  - BCVA 20/25 and 20/20 today, patient deferring Mrx today    5. PVD each eye  - no flashes / curtain, stable.       RTC: 4 - 6 weeks.      Cecille Torres MD  Ophthalmology Resident, PGY-3  [facility]     Attending Physician Attestation:  Complete documentation of historical and exam elements from today's encounter can be found in the full encounter summary report (not reduplicated in this progress note).  I personally obtained the chief complaint(s) and history of present illness.  I confirmed and edited as necessary the review of systems, past medical/surgical history, family history, social history, and examination findings as documented by others; and I examined the patient myself.  I personally reviewed the relevant tests, images, and reports as documented above.  I formulated and edited as necessary the assessment and plan and discussed the findings and  management plan with the patient and family. - Sean Schultz MD

## 2021-08-23 ENCOUNTER — HOSPITAL ENCOUNTER (OUTPATIENT)
Facility: CLINIC | Age: 66
Setting detail: OBSERVATION
Discharge: HOME | End: 2021-08-24
Attending: NURSE PRACTITIONER | Admitting: STUDENT IN AN ORGANIZED HEALTH CARE EDUCATION/TRAINING PROGRAM
Payer: COMMERCIAL

## 2021-08-23 ENCOUNTER — NURSE TRIAGE (OUTPATIENT)
Dept: FAMILY MEDICINE | Facility: CLINIC | Age: 66
End: 2021-08-23

## 2021-08-23 ENCOUNTER — APPOINTMENT (OUTPATIENT)
Dept: CT IMAGING | Facility: CLINIC | Age: 66
End: 2021-08-23
Attending: NURSE PRACTITIONER
Payer: COMMERCIAL

## 2021-08-23 DIAGNOSIS — N20.1 URETERAL STONE: ICD-10-CM

## 2021-08-23 DIAGNOSIS — R10.9 LEFT FLANK PAIN: ICD-10-CM

## 2021-08-23 DIAGNOSIS — R11.2 NAUSEA WITH VOMITING: ICD-10-CM

## 2021-08-23 DIAGNOSIS — N20.1 URETEROLITHIASIS: Primary | ICD-10-CM

## 2021-08-23 PROBLEM — E78.5 DYSLIPIDEMIA: Status: ACTIVE | Noted: 2020-07-02

## 2021-08-23 PROBLEM — I47.29 NSVT (NONSUSTAINED VENTRICULAR TACHYCARDIA) (H): Status: ACTIVE | Noted: 2020-07-02

## 2021-08-23 PROBLEM — Q24.5 CORONARY ARTERY ANOMALY: Status: ACTIVE | Noted: 2020-07-02

## 2021-08-23 LAB
ALBUMIN SERPL-MCNC: 4 G/DL (ref 3.4–5)
ALBUMIN UR-MCNC: NEGATIVE MG/DL
ALP SERPL-CCNC: 52 U/L (ref 40–150)
ALT SERPL W P-5'-P-CCNC: 29 U/L (ref 0–70)
ANION GAP SERPL CALCULATED.3IONS-SCNC: 5 MMOL/L (ref 3–14)
APPEARANCE UR: CLEAR
AST SERPL W P-5'-P-CCNC: 22 U/L (ref 0–45)
BASOPHILS # BLD AUTO: 0.1 10E3/UL (ref 0–0.2)
BASOPHILS NFR BLD AUTO: 0 %
BILIRUB SERPL-MCNC: 1 MG/DL (ref 0.2–1.3)
BILIRUB UR QL STRIP: NEGATIVE
BUN SERPL-MCNC: 17 MG/DL (ref 7–30)
CALCIUM SERPL-MCNC: 9.3 MG/DL (ref 8.5–10.1)
CHLORIDE BLD-SCNC: 109 MMOL/L (ref 94–109)
CO2 SERPL-SCNC: 27 MMOL/L (ref 20–32)
COLOR UR AUTO: YELLOW
CREAT SERPL-MCNC: 1.21 MG/DL (ref 0.52–1.25)
EOSINOPHIL # BLD AUTO: 0 10E3/UL (ref 0–0.7)
EOSINOPHIL NFR BLD AUTO: 0 %
ERYTHROCYTE [DISTWIDTH] IN BLOOD BY AUTOMATED COUNT: 11.7 % (ref 10–15)
GFR SERPL CREATININE-BSD FRML MDRD: 63 ML/MIN/1.73M2
GLUCOSE BLD-MCNC: 119 MG/DL (ref 70–99)
GLUCOSE UR STRIP-MCNC: NEGATIVE MG/DL
HCT VFR BLD AUTO: 39.6 % (ref 35–53)
HGB BLD-MCNC: 13.6 G/DL (ref 11.7–17.7)
HGB UR QL STRIP: ABNORMAL
IMM GRANULOCYTES # BLD: 0.1 10E3/UL
IMM GRANULOCYTES NFR BLD: 0 %
KETONES UR STRIP-MCNC: 40 MG/DL
LEUKOCYTE ESTERASE UR QL STRIP: NEGATIVE
LYMPHOCYTES # BLD AUTO: 0.5 10E3/UL (ref 0.8–5.3)
LYMPHOCYTES NFR BLD AUTO: 4 %
MCH RBC QN AUTO: 32.1 PG (ref 26.5–33)
MCHC RBC AUTO-ENTMCNC: 34.3 G/DL (ref 31.5–36.5)
MCV RBC AUTO: 93 FL (ref 78–100)
MONOCYTES # BLD AUTO: 0.5 10E3/UL (ref 0–1.3)
MONOCYTES NFR BLD AUTO: 4 %
MUCOUS THREADS #/AREA URNS LPF: PRESENT /LPF
NEUTROPHILS # BLD AUTO: 10.8 10E3/UL (ref 1.6–8.3)
NEUTROPHILS NFR BLD AUTO: 92 %
NITRATE UR QL: NEGATIVE
NRBC # BLD AUTO: 0 10E3/UL
NRBC BLD AUTO-RTO: 0 /100
PH UR STRIP: 5.5 [PH] (ref 5–7)
PLATELET # BLD AUTO: 158 10E3/UL (ref 150–450)
POTASSIUM BLD-SCNC: 4.2 MMOL/L (ref 3.4–5.3)
PROT SERPL-MCNC: 7 G/DL (ref 6.8–8.8)
RBC # BLD AUTO: 4.24 10E6/UL (ref 3.8–5.9)
RBC URINE: 22 /HPF
SARS-COV-2 RNA RESP QL NAA+PROBE: NEGATIVE
SODIUM SERPL-SCNC: 141 MMOL/L (ref 133–144)
SP GR UR STRIP: 1.02 (ref 1–1.03)
SQUAMOUS EPITHELIAL: <1 /HPF
UROBILINOGEN UR STRIP-MCNC: NORMAL MG/DL
WBC # BLD AUTO: 11.9 10E3/UL (ref 4–11)
WBC URINE: 9 /HPF

## 2021-08-23 PROCEDURE — 99220 PR INITIAL OBSERVATION CARE,LEVEL III: CPT | Performed by: STUDENT IN AN ORGANIZED HEALTH CARE EDUCATION/TRAINING PROGRAM

## 2021-08-23 PROCEDURE — 99285 EMERGENCY DEPT VISIT HI MDM: CPT | Mod: 25

## 2021-08-23 PROCEDURE — 81001 URINALYSIS AUTO W/SCOPE: CPT | Performed by: NURSE PRACTITIONER

## 2021-08-23 PROCEDURE — 87635 SARS-COV-2 COVID-19 AMP PRB: CPT | Performed by: NURSE PRACTITIONER

## 2021-08-23 PROCEDURE — 250N000011 HC RX IP 250 OP 636: Performed by: NURSE PRACTITIONER

## 2021-08-23 PROCEDURE — 36415 COLL VENOUS BLD VENIPUNCTURE: CPT | Performed by: EMERGENCY MEDICINE

## 2021-08-23 PROCEDURE — 85025 COMPLETE CBC W/AUTO DIFF WBC: CPT | Performed by: NURSE PRACTITIONER

## 2021-08-23 PROCEDURE — 96376 TX/PRO/DX INJ SAME DRUG ADON: CPT

## 2021-08-23 PROCEDURE — 96374 THER/PROPH/DIAG INJ IV PUSH: CPT

## 2021-08-23 PROCEDURE — 85025 COMPLETE CBC W/AUTO DIFF WBC: CPT | Performed by: EMERGENCY MEDICINE

## 2021-08-23 PROCEDURE — 82040 ASSAY OF SERUM ALBUMIN: CPT | Performed by: EMERGENCY MEDICINE

## 2021-08-23 PROCEDURE — 96375 TX/PRO/DX INJ NEW DRUG ADDON: CPT

## 2021-08-23 PROCEDURE — 258N000003 HC RX IP 258 OP 636: Performed by: NURSE PRACTITIONER

## 2021-08-23 PROCEDURE — 74176 CT ABD & PELVIS W/O CONTRAST: CPT

## 2021-08-23 PROCEDURE — 250N000011 HC RX IP 250 OP 636: Performed by: EMERGENCY MEDICINE

## 2021-08-23 PROCEDURE — C9803 HOPD COVID-19 SPEC COLLECT: HCPCS

## 2021-08-23 PROCEDURE — 96361 HYDRATE IV INFUSION ADD-ON: CPT

## 2021-08-23 PROCEDURE — G0378 HOSPITAL OBSERVATION PER HR: HCPCS

## 2021-08-23 PROCEDURE — 80053 COMPREHEN METABOLIC PANEL: CPT | Performed by: NURSE PRACTITIONER

## 2021-08-23 RX ORDER — ACETAMINOPHEN 650 MG/1
650 SUPPOSITORY RECTAL EVERY 6 HOURS PRN
Status: DISCONTINUED | OUTPATIENT
Start: 2021-08-23 | End: 2021-08-24 | Stop reason: HOSPADM

## 2021-08-23 RX ORDER — ACETAMINOPHEN 325 MG/1
650 TABLET ORAL EVERY 6 HOURS PRN
Status: DISCONTINUED | OUTPATIENT
Start: 2021-08-23 | End: 2021-08-24 | Stop reason: HOSPADM

## 2021-08-23 RX ORDER — ONDANSETRON 2 MG/ML
4 INJECTION INTRAMUSCULAR; INTRAVENOUS EVERY 6 HOURS PRN
Status: DISCONTINUED | OUTPATIENT
Start: 2021-08-23 | End: 2021-08-24 | Stop reason: HOSPADM

## 2021-08-23 RX ORDER — HYDROMORPHONE HYDROCHLORIDE 1 MG/ML
0.5 INJECTION, SOLUTION INTRAMUSCULAR; INTRAVENOUS; SUBCUTANEOUS ONCE
Status: COMPLETED | OUTPATIENT
Start: 2021-08-23 | End: 2021-08-23

## 2021-08-23 RX ORDER — ONDANSETRON 4 MG/1
4 TABLET, ORALLY DISINTEGRATING ORAL EVERY 6 HOURS PRN
Status: DISCONTINUED | OUTPATIENT
Start: 2021-08-23 | End: 2021-08-24 | Stop reason: HOSPADM

## 2021-08-23 RX ORDER — CHLORAL HYDRATE 500 MG
1 CAPSULE ORAL DAILY
COMMUNITY

## 2021-08-23 RX ORDER — OXYCODONE HYDROCHLORIDE 5 MG/1
5-10 TABLET ORAL EVERY 4 HOURS PRN
Status: DISCONTINUED | OUTPATIENT
Start: 2021-08-23 | End: 2021-08-24 | Stop reason: HOSPADM

## 2021-08-23 RX ORDER — KETOROLAC TROMETHAMINE 15 MG/ML
15 INJECTION, SOLUTION INTRAMUSCULAR; INTRAVENOUS ONCE
Status: COMPLETED | OUTPATIENT
Start: 2021-08-23 | End: 2021-08-23

## 2021-08-23 RX ORDER — ONDANSETRON 2 MG/ML
4 INJECTION INTRAMUSCULAR; INTRAVENOUS ONCE
Status: COMPLETED | OUTPATIENT
Start: 2021-08-23 | End: 2021-08-23

## 2021-08-23 RX ORDER — SODIUM CHLORIDE, SODIUM LACTATE, POTASSIUM CHLORIDE, CALCIUM CHLORIDE 600; 310; 30; 20 MG/100ML; MG/100ML; MG/100ML; MG/100ML
INJECTION, SOLUTION INTRAVENOUS CONTINUOUS
Status: DISCONTINUED | OUTPATIENT
Start: 2021-08-23 | End: 2021-08-24 | Stop reason: HOSPADM

## 2021-08-23 RX ADMIN — ONDANSETRON 4 MG: 2 INJECTION INTRAMUSCULAR; INTRAVENOUS at 13:02

## 2021-08-23 RX ADMIN — SODIUM CHLORIDE 1000 ML: 9 INJECTION, SOLUTION INTRAVENOUS at 15:33

## 2021-08-23 RX ADMIN — SODIUM CHLORIDE 1000 ML: 9 INJECTION, SOLUTION INTRAVENOUS at 17:00

## 2021-08-23 RX ADMIN — HYDROMORPHONE HYDROCHLORIDE 1 MG: 1 INJECTION, SOLUTION INTRAMUSCULAR; INTRAVENOUS; SUBCUTANEOUS at 15:40

## 2021-08-23 RX ADMIN — HYDROMORPHONE HYDROCHLORIDE 0.5 MG: 1 INJECTION, SOLUTION INTRAMUSCULAR; INTRAVENOUS; SUBCUTANEOUS at 13:02

## 2021-08-23 RX ADMIN — KETOROLAC TROMETHAMINE 15 MG: 15 INJECTION, SOLUTION INTRAMUSCULAR; INTRAVENOUS at 16:20

## 2021-08-23 ASSESSMENT — ENCOUNTER SYMPTOMS
VOMITING: 1
DYSURIA: 0
NAUSEA: 1
FEVER: 0
ABDOMINAL PAIN: 1
FLANK PAIN: 1
BLOOD IN STOOL: 0
BACK PAIN: 1
DIFFICULTY URINATING: 0

## 2021-08-23 ASSESSMENT — MIFFLIN-ST. JEOR: SCORE: 1499.33

## 2021-08-23 NOTE — ED TRIAGE NOTES
Pt was constipated  - had a bm this am - sudden onset of left flank pain started this am - also he has been vomiting .

## 2021-08-23 NOTE — ED PROVIDER NOTES
History   Chief Complaint:  Flank Pain (left sided )       HPI   Tony Mcnair is a 66 year old adult anticoagulated on Plavix who presents with left-sided flank pain. Per the patient, they woke up this morning with mild low back pain. They then had a bowel movement, and developed mild lower abdominal pain described as cramping with associated nausea and vomiting. After one of their vomiting episodes, they developed severe left-sided slightly waxing and waning flank pain. While at the ER, they state they still have the mild abdominal pain and back pain, as well as the flank pain which is rated at a 7-8 on the pain scale. They have no other symptoms currently and otherwise deny blood in their stools, fever, dysuria, and difficulty urinating.    Review of Systems   Constitutional: Negative for fever.   Gastrointestinal: Positive for abdominal pain, nausea (resolved) and vomiting (resolved). Negative for blood in stool.   Genitourinary: Positive for flank pain. Negative for difficulty urinating and dysuria.   Musculoskeletal: Positive for back pain.   All other systems reviewed and are negative.        Allergies:  The patient has no known drug allergies.     Medications:  Aspirin 81 MG  Brilinta  Celexa  Plavix  Concerta  Rosuvastatin    Past Medical History:    CAD  Depressive disorder  Arthritis  ADD  Seborrheic keratosis     Past Surgical History:    Hernia repair, unspecified  Iris nevus  Excision right upper eyelid  Stent, unspecified    Family History:    Father: CAD, hyperlipidemia  Brother: CAD, hyperlipidemia, kidney stones  Mother: Hypertension, breast cancer, substance abuse, kidney stones    Social History:  The patient was accompanied to the ER by their spouse  Marital Status:     Physical Exam     Patient Vitals for the past 24 hrs:   BP Temp Temp src Pulse Resp SpO2 Height Weight   08/23/21 1729 -- -- -- 56 16 100 % -- --   08/23/21 1646 -- -- -- 59 12 99 % -- --   08/23/21 1620 -- -- -- -- --  "98 % -- --   08/23/21 1607 129/82 -- -- 53 -- -- -- --   08/23/21 1529 -- -- -- -- -- 99 % -- --   08/23/21 1527 (!) 140/86 -- -- 54 -- 100 % -- --   08/23/21 1251 (!) 141/60 96.9  F (36.1  C) Temporal 57 20 99 % 1.778 m (5' 10\") 70.3 kg (155 lb)       Physical Exam  Physical Exam   Constitutional: Pt appears well-developed and well-nourished. Non toxic appearing.   Head: Head moves freely with normal range of motion.   ENT: Oropharynx is clear and moist.   Eyes: Conjunctivae pink. EOMs intact. No scleral icterus.   Neck: Normal range of motion.    Cardiovascular: Regular rate and rhythm. Normal heart sounds. No concerning murmur.  Pulmonary/Chest: No respiratory distress. No decreased breath sounds. No wheezes. No rhonchi. No rales.   Abdominal: Soft. Non-tender. No rebound, no guarding. He notes pain in the lft flank area, although no CVA tenderness. No pain over McBurney's point. Negative Gimenez's sign.   Musculoskeletal: No peripheral edema. Distal capillary refill and sensation intact.  Neurological: Oriented to person, place, and time. No focal deficits.   Skin: Skin is warm and normal in color. No rash noted.        Emergency Department Course     Imaging:    Abd/pelvis CT no contrast - Stone Protocol  1.  Obstructing 0.8 cm stone in the mid/proximal left ureter causes  moderate left hydronephrosis.  2.  There are 2 small nonobstructing stones in the lower pole of the  right kidney measuring 0.2 cm each.  3.  Colonic diverticulosis, without convincing evidence for  diverticulitis.  Reading per radiology.    The above imaging workup was performed.     Laboratory:    CBC: WBC 11.9(H), HGB 13.6,   CMP: Glucose 119(H) o/w WNL (Creatinine 1.21)    UA with micro: Ordered    Asymptomatic COVID19 Virus PCR by nasopharyngeal swab pending     Emergency Department Course:    Reviewed:  I reviewed nursing notes, past medical history, and vitals    Assessments:  1530 I obtained history and examined the patient as " noted above.   1626 I rechecked the patient and explained findings.   1712 I rechecked the patient and explained findings.     Consults:   1710 I spoke with Dr. Virk of the urology service from [facility] regarding patient's presentation, findings, and plan of care.  1730 I spoke with Dr. Wakefield of the hospitalist service from [facility] regarding patient's presentation, findings, and plan of care.    Interventions:  1302: Dilaudid, 0.5 mg, IV  1302: Zofran, 4 mg, IV  1533: Normal Saline, 1 liter, IV bolus   1540: Dilaudid, 1 mg, IV  1620: Toradol, 15 mg, IV  1700: Normal Saline, 1 liter, IV bolus     Disposition:  The patient was admitted to the hospital under the care of Dr. Wakefield.       Impression & Plan       Medical Decision Making:  Tony Mcnair is a 66 year old male who presents with flank pain. Signs and symptoms consistent with ureterolithiasis. CT scan shows an obstructing 0.8 cm stone in the mid/proximal left ureter. No signs of infected stone.  Patient is hemodynamically stable in ED. I discussed the case with Dr. Virk of urology who recommended admission, NPO after midnight and they will see in the am. I also discussed the case with Dr. Wakefield, who will admit the patient to the hospitalist service. Patient and his wife are agreeable to this plan.      Diagnosis:    ICD-10-CM    1. Left flank pain  R10.9    2. Nausea with vomiting  R11.2    3. Ureteral stone  N20.1      Scribe Disclosure:  I, Kai Peter, am serving as a scribe at 3:26 PM on 8/23/2021 to document services personally performed by Jeannine De La Paz NP based on my observations and the provider's statements to me.        Jeannine De La Paz, CHAZ CNP  08/24/21 0057

## 2021-08-23 NOTE — ED NOTES
[facility]  ED Nurse Handoff Report    ED Chief complaint: Flank Pain (left sided )      ED Diagnosis:   Final diagnoses:   Left flank pain   Nausea with vomiting   Ureteral stone       Code Status: hospitalist to address    Allergies:   Allergies   Allergen Reactions     Lobster [Crustaceans] Angioedema     Shrimp Angioedema       Patient Story: sudden onset of left flank pain with n/v that began today. No hx of kidney stones until today. CT scan today showing:    IMPRESSION:   1.  Obstructing 0.8 cm stone in the mid/proximal left ureter causes  moderate left hydronephrosis.  2.  There are 2 small nonobstructing stones in the lower pole of the  right kidney measuring 0.2 cm each.  3.  Colonic diverticulosis, without convincing evidence for  Diverticulitis.    Pain managed with toradol and dilaudid  Focused Assessment:  See above    Treatments and/or interventions provided: see MAR  Patient's response to treatments and/or interventions: pain decreased from medications    To be done/followed up on inpatient unit:  none at this time    Does this patient have any cognitive concerns?: N/A    Activity level - Baseline/Home:  Independent  Activity Level - Current:   Independent    Patient's Preferred language: English   Needed?: No    Isolation: None  Infection: Not Applicable  Patient tested for COVID 19 prior to admission: YES  Bariatric?: No    Vital Signs:   Vitals:    08/23/21 1607 08/23/21 1620 08/23/21 1646 08/23/21 1729   BP: 129/82      Pulse: 53  59 56   Resp:   12 16   Temp:       TempSrc:       SpO2:  98% 99% 100%   Weight:       Height:           Cardiac Rhythm:     Was the PSS-3 completed:   Yes  What interventions are required if any?               Family Comments: wife at bedside, aware of plan of care  OBS brochure/video discussed/provided to patient/family: Yes              Name of person given brochure if not patient:               Relationship to patient:     For the  majority of the shift this patient's behavior was Green.   Behavioral interventions performed were .    ED NURSE PHONE NUMBER: [phone]

## 2021-08-23 NOTE — TELEPHONE ENCOUNTER
Patient came into clinic with abdominal pain. Patient seemed distressed. Patient pacing around waiting room, holding abdomen. As writer brought patient back to room to assess, patient paused in the middle of the hallway and squatted down, bracing abdomen. Writer advised ER immediately. Offered to call EMS , patient declined. Patients visitor states they will drive patient to [facility] ED.    Kathi Nielsen RN  [facility]

## 2021-08-23 NOTE — TELEPHONE ENCOUNTER
Reason for Disposition    SEVERE abdominal pain (e.g., excruciating)    Additional Information    Negative: Passed out (i.e., fainted, collapsed and was not responding)    Negative: Shock suspected (e.g., cold/pale/clammy skin, too weak to stand, low BP, rapid pulse)    Negative: Sounds like a life-threatening emergency to the triager    Protocols used: ABDOMINAL PAIN - MALE-A-OH

## 2021-08-24 VITALS
OXYGEN SATURATION: 97 % | DIASTOLIC BLOOD PRESSURE: 50 MMHG | SYSTOLIC BLOOD PRESSURE: 92 MMHG | TEMPERATURE: 98.4 F | HEIGHT: 70 IN | WEIGHT: 155 LBS | RESPIRATION RATE: 16 BRPM | HEART RATE: 61 BPM | BODY MASS INDEX: 22.19 KG/M2

## 2021-08-24 DIAGNOSIS — Z11.59 ENCOUNTER FOR SCREENING FOR OTHER VIRAL DISEASES: ICD-10-CM

## 2021-08-24 LAB
ANION GAP SERPL CALCULATED.3IONS-SCNC: 2 MMOL/L (ref 3–14)
BUN SERPL-MCNC: 19 MG/DL (ref 7–30)
CALCIUM SERPL-MCNC: 8.2 MG/DL (ref 8.5–10.1)
CHLORIDE BLD-SCNC: 111 MMOL/L (ref 94–109)
CO2 SERPL-SCNC: 27 MMOL/L (ref 20–32)
CREAT SERPL-MCNC: 1.08 MG/DL (ref 0.66–1.25)
ERYTHROCYTE [DISTWIDTH] IN BLOOD BY AUTOMATED COUNT: 11.9 % (ref 10–15)
GFR SERPL CREATININE-BSD FRML MDRD: 72 ML/MIN/1.73M2
GLUCOSE BLD-MCNC: 87 MG/DL (ref 70–99)
HCT VFR BLD AUTO: 33.5 % (ref 40–53)
HGB BLD-MCNC: 11.7 G/DL (ref 13.3–17.7)
MCH RBC QN AUTO: 32.5 PG (ref 26.5–33)
MCHC RBC AUTO-ENTMCNC: 34.9 G/DL (ref 31.5–36.5)
MCV RBC AUTO: 93 FL (ref 78–100)
PLATELET # BLD AUTO: 113 10E3/UL (ref 150–450)
POTASSIUM BLD-SCNC: 3.8 MMOL/L (ref 3.4–5.3)
RBC # BLD AUTO: 3.6 10E6/UL (ref 4.4–5.9)
SODIUM SERPL-SCNC: 140 MMOL/L (ref 133–144)
WBC # BLD AUTO: 7.8 10E3/UL (ref 4–11)

## 2021-08-24 PROCEDURE — 258N000003 HC RX IP 258 OP 636: Performed by: STUDENT IN AN ORGANIZED HEALTH CARE EDUCATION/TRAINING PROGRAM

## 2021-08-24 PROCEDURE — 36415 COLL VENOUS BLD VENIPUNCTURE: CPT | Performed by: STUDENT IN AN ORGANIZED HEALTH CARE EDUCATION/TRAINING PROGRAM

## 2021-08-24 PROCEDURE — 99217 PR OBSERVATION CARE DISCHARGE: CPT | Performed by: PHYSICIAN ASSISTANT

## 2021-08-24 PROCEDURE — G0378 HOSPITAL OBSERVATION PER HR: HCPCS

## 2021-08-24 PROCEDURE — 85027 COMPLETE CBC AUTOMATED: CPT | Performed by: STUDENT IN AN ORGANIZED HEALTH CARE EDUCATION/TRAINING PROGRAM

## 2021-08-24 PROCEDURE — 80048 BASIC METABOLIC PNL TOTAL CA: CPT | Performed by: STUDENT IN AN ORGANIZED HEALTH CARE EDUCATION/TRAINING PROGRAM

## 2021-08-24 PROCEDURE — 96361 HYDRATE IV INFUSION ADD-ON: CPT

## 2021-08-24 PROCEDURE — 250N000013 HC RX MED GY IP 250 OP 250 PS 637: Performed by: STUDENT IN AN ORGANIZED HEALTH CARE EDUCATION/TRAINING PROGRAM

## 2021-08-24 RX ORDER — TAMSULOSIN HYDROCHLORIDE 0.4 MG/1
0.4 CAPSULE ORAL DAILY
Qty: 30 CAPSULE | Refills: 0 | Status: SHIPPED | OUTPATIENT
Start: 2021-08-24 | End: 2022-06-28

## 2021-08-24 RX ORDER — OXYCODONE HYDROCHLORIDE 5 MG/1
5 TABLET ORAL EVERY 4 HOURS PRN
Qty: 10 TABLET | Refills: 0 | Status: SHIPPED | OUTPATIENT
Start: 2021-08-24 | End: 2022-06-28

## 2021-08-24 RX ORDER — ACETAMINOPHEN 500 MG
1000 TABLET ORAL EVERY 6 HOURS PRN
COMMUNITY
Start: 2021-08-24

## 2021-08-24 RX ORDER — LIDOCAINE 40 MG/G
CREAM TOPICAL
Status: DISCONTINUED | OUTPATIENT
Start: 2021-08-24 | End: 2021-08-24 | Stop reason: HOSPADM

## 2021-08-24 RX ADMIN — SODIUM CHLORIDE, POTASSIUM CHLORIDE, SODIUM LACTATE AND CALCIUM CHLORIDE: 600; 310; 30; 20 INJECTION, SOLUTION INTRAVENOUS at 10:47

## 2021-08-24 RX ADMIN — OXYCODONE HYDROCHLORIDE 5 MG: 5 TABLET ORAL at 00:02

## 2021-08-24 RX ADMIN — SODIUM CHLORIDE, POTASSIUM CHLORIDE, SODIUM LACTATE AND CALCIUM CHLORIDE: 600; 310; 30; 20 INJECTION, SOLUTION INTRAVENOUS at 00:02

## 2021-08-24 NOTE — PROGRESS NOTES
UROLOGY BRIEF NOTE    Pt with an 8mm left ureteral calculus. Pain minimal since last night. Creat stable. No concerning for infection. Fhx of kidney stones, no personal hx of stones.     Ok for discharge with oral pain meds prn, flomax, and strainer.   Our office will coordinate follow-up procedure for ureteroscopy sometime this week.     Formal consult note to follow    Isabel Sandoval PA-C  [facility]   [email or web address]  Text Page (7:30am to 4:30pm)

## 2021-08-24 NOTE — CONSULTS
[facility] Inpatient Consultation Note    Tony Mcnair MRN# [id number]   Age: 66 year old YOB: 1955     Date of Admission:  8/23/2021    Reason for consult: Left ureteral calculus        Requesting physician: Dr. Wakefield                History of Present Illness:   Pt is a 67yo male who presents with a 1 day hx of left renal colic. He is found on CT to have an 8mm left mid-proximal ureteral calculus. UA is benign, creat is WNL, he is afebrile and without any leukocytosis. He denies any personal hx of kidney stones or other urologic hx. Does note a family hx of kidney stones. No urinary changes.     From admission H&P: Tony Mcnair is a 66 year old male with past medical history of coronary disease who presents for evaluation of left flank pain.     Patient was in usual state of health until this morning when he woke up with mild low back pain.  As the day progressed, he developed mild lower abdominal pain as well as acute onset of nausea and vomiting.  He then developed severe left-sided waxing and waning flank pain.  He denied any fevers.  He denied any recent hematuria.  He has no prior history of kidney stones.  He has no chest pain or shortness of breath.  He denies any recent trauma to his low back or abdomen.  He denies any recent blood in stool, no weight loss or night sweats.  He had a normal bowel movement today.  He otherwise reports that he has had no recent cough, loss of taste or smell, no pulmonary symptoms.  He is currently on dual antiplatelet therapy for history of coronary artery disease status post stenting.  He otherwise has no other complaints this time.          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     Coronary artery disease involving native coronary artery of native heart without angina pectoris     s/p ptca tamica prox circ     Depressive disorder Junie     Zoster 1988    LEFT EYE              Past Surgical History:     Past Surgical History:   Procedure Laterality Date     COLONOSCOPY  11 years ago     COLONOSCOPY N/A 5/17/2019    Procedure: COLONOSCOPY;  Surgeon: Jaycob Mirza MD;  Location: [facility]     HERNIA REPAIR  10 years ago     IRIS NEVUS Right      LESION RIGHT UPPER LID Right 1999    EXCISION     STENT  2020             Social History:     Social History     Socioeconomic History     Marital status:      Spouse name: Not on file     Number of children: Not on file     Years of education: Not on file     Highest education level: Not on file   Occupational History     Not on file   Tobacco Use     Smoking status: Never Smoker     Smokeless tobacco: Never Used   Substance and Sexual Activity     Alcohol use: Yes     Comment: 4 beers a week     Drug use: No     Sexual activity: Not Currently     Partners: Female     Birth control/protection: None   Other Topics Concern     Parent/sibling w/ CABG, MI or angioplasty before 65F 55M? Yes     Comment: Father, 50   Social History Narrative     Not on file     Social Determinants of Health     Financial Resource Strain:      Difficulty of Paying Living Expenses:    Food Insecurity:      Worried About Running Out of Food in the Last Year:      Ran Out of Food in the Last Year:    Transportation Needs:      Lack of Transportation (Medical):      Lack of Transportation (Non-Medical):    Physical Activity:      Days of Exercise per Week:      Minutes of Exercise per Session:    Stress:      Feeling of Stress :    Social Connections:      Frequency of Communication with Friends and Family:      Frequency of Social Gatherings with Friends and Family:      Attends [employer] Services:      Active Member of Clubs or Organizations:      Attends Club or Organization Meetings:      Marital Status:    Intimate Partner Violence:      Fear of Current or Ex-Partner:      Emotionally Abused:      Physically Abused:      Sexually Abused:              Family  History:     Family History   Problem Relation Age of Onset     Coronary Artery Disease Father      Hyperlipidemia Father      Coronary Artery Disease Brother      Hyperlipidemia Brother      Hypertension Mother      Breast Cancer Mother      Substance Abuse Mother      Cancer No family hx of         skin cancer     Glaucoma No family hx of      Macular Degeneration No family hx of      Retinal detachment No family hx of      Skin Cancer No family hx of      Melanoma No family hx of              Immunizations:     Immunization History   Administered Date(s) Administered     COVID-19,PF,Moderna 02/15/2021, 03/15/2021     DT (PEDS <7y) 05/13/1988     DTaP, Unspecified 11/01/2012     HepB-Adult 09/15/2005, 11/10/2005     Influenza (IIV3) PF 11/16/2012, 11/06/2013, 10/01/2018     Influenza Quad, Recombinant, p-free (RIV4) 10/21/2020     Influenza Vaccine IM > 6 months Valent IIV4 10/11/2019     Influenza Vaccine IM Ages 6-35 Months 4 Valent (PF) 11/19/2013     Influenza Vaccine, 6+MO IM (QUADRIVALENT W/PRESERVATIVES) 10/11/2019     MMR 01/08/1991     Pneumococcal 23 valent 10/21/2020     TDAP Vaccine (Adacel) 11/16/2012     Td (Adult), Adsorbed 11/02/2006     Zoster vaccine recombinant adjuvanted (SHINGRIX) 10/11/2019, 01/13/2020             Allergies:     Allergies   Allergen Reactions     Lobster [Crustaceans] Angioedema     Shrimp Angioedema             Medications:     Current Facility-Administered Medications   Medication     acetaminophen (TYLENOL) tablet 650 mg    Or     acetaminophen (TYLENOL) Suppository 650 mg     lactated ringers infusion     lidocaine (LMX4) cream     lidocaine 1 % 0.1-1 mL     melatonin tablet 1 mg     ondansetron (ZOFRAN-ODT) ODT tab 4 mg    Or     ondansetron (ZOFRAN) injection 4 mg     oxyCODONE (ROXICODONE) tablet 5-10 mg     sodium chloride (PF) 0.9% PF flush 3 mL     sodium chloride (PF) 0.9% PF flush 3 mL             Review of Systems:   Comprehensive review of systems from the  "Admission note dated 8/23/21 at [facility] was reviewed with no changes except per HPI.     Examination:  /47   Pulse 52   Temp 98  F (36.7  C) (Oral)   Resp 16   Ht 1.778 m (5' 10\")   Wt 70.3 kg (155 lb)   SpO2 96%   BMI 22.24 kg/m    General: Alert and oriented, no distress  HEENT: Face symmetric, mucous membranes moist and pink  Eyes: No scleral icterus  Neck: Symmetric  Chest wall: Symmetric  Respiratory: Breathing unlabored, no audible wheezing  Cardiac: Extremities warm and well perfused  Abdomen: soft, non tender, non distended; no rebound, guarding or peritoneal signs  Back: No CVA or flank tenderness  : no suprapubic tenderness   Extremities: No evidence of deformities or trauma  Neuro:Grossly non focal  Pysch: Normal mood and affect  Skin: No evident rashes or lesions            Data:     Lab Results   Component Value Date    WBC 7.8 08/24/2021    WBC 6.2 10/21/2020     Lab Results   Component Value Date    RBC 3.60 08/24/2021    RBC 4.61 10/21/2020     Lab Results   Component Value Date    HGB 11.7 08/24/2021    HGB 15.3 10/21/2020     Lab Results   Component Value Date    HCT 33.5 08/24/2021    HCT 44.1 10/21/2020     Lab Results   Component Value Date     08/24/2021     10/21/2020     Creatinine   Date Value Ref Range Status   08/24/2021 1.08 0.66 - 1.25 mg/dL Final   10/21/2020 0.94 0.66 - 1.25 mg/dL Final   ]  Lab Results   Component Value Date    BUN 19 08/24/2021    BUN 12 10/21/2020       Imaging:  CT ABDOMEN PELVIS WITHOUT CONTRAST 8/23/2021 4:04 PM     CLINICAL HISTORY: Left flank pain.  TECHNIQUE: CT scan of the abdomen and pelvis was performed without IV  contrast. Multiplanar reformats were obtained. Dose reduction  techniques were used.  CONTRAST: None.  COMPARISON: None.     FINDINGS:   LOWER CHEST: The visualized lung bases are clear. Coronary artery  calcification. Small hiatal hernia.     HEPATOBILIARY: Unremarkable. No hepatic masses are " seen.     PANCREAS: Normal.     SPLEEN: Normal.     ADRENAL GLANDS: Normal.     KIDNEYS/BLADDER: An obstructing 0.8 cm stone in the mid/proximal left  ureter causes moderate left hydronephrosis and perinephric stranding.  There are 2 nonobstructing stones in the lower pole of the right  kidney, measuring 0.2 cm each. No ureteral calculi or hydronephrosis  on the right. Urinary bladder is unremarkable.     BOWEL: No bowel obstruction. Scattered colonic diverticulosis. No  convincing evidence for colitis or diverticulitis. Unremarkable  appendix.     PELVIC ORGANS: Unremarkable.     LYMPH NODES: No enlarged lymph nodes are identified in the abdomen or  pelvis.     VASCULATURE: Mild atherosclerotic aortoiliac calcification.     ADDITIONAL FINDINGS: None.     MUSCULOSKELETAL: Unremarkable.                                                                      IMPRESSION:   1.  Obstructing 0.8 cm stone in the mid/proximal left ureter causes  moderate left hydronephrosis.  2.  There are 2 small nonobstructing stones in the lower pole of the  right kidney measuring 0.2 cm each.  3.  Colonic diverticulosis, without convincing evidence for  diverticulitis.     PAKO IVEY MD     Impression:  67yo male with an 8mm left ureteral calculus (mid-proximal ureter)     Plan:  Given adequate pain control, he will discharge with oral narcotics prn, flomax, and urine strainer   Plan for surgery later this week, our office with coordinate and call patient with date/time/location       Isabel Sandoval PA-C  [facility]   [email or web address]  Text Page (7:30am to 4:30pm)

## 2021-08-24 NOTE — PROVIDER NOTIFICATION
MD Notification    Person notified:PA    Person Name:Joanna Barthel    Date/Time:8/24@2:08PM    Interaction:vocera text    Purpose of Notification: Urology consulted and advanced diet. Ok to discharge per urology standpoint. Denies pain, nausea, vomiting.     Orders Received:    Comments:

## 2021-08-24 NOTE — H&P
[facility]    History and Physical - Hospitalist Service       Date of Admission:  8/23/2021    Assessment & Plan      Tony Mcnair is a 66 year old male admitted on 8/23/2021. He presents with left sided flank pain.       Ureterolithiasis    Ureteral stone    Left flank pain    Nausea with vomiting    Assessment: Presents with 1 day history of acute onset of left-sided flank pain along with nausea and vomiting. CT abdomen shows Obstructing 0.8 cm stone in the mid/proximal left ureter causes moderate left hydronephrosis. There are 2 small nonobstructing stones in the lower pole of the right kidney measuring 0.2 cm each.    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 Currently on DAPT with ASA / Brillinta  Plan:  - Resume PTA DAPT post-op      ADD (attention deficit disorder)    Assessment/Plan: stable follow as outpatient      Dyslipidemia    Assessment/Plan: resume Crestor at discharge       Diet: Regular Diet Adult  NPO for Medical/Clinical Reasons Except for: Ice Chips, Meds    DVT Prophylaxis: Pneumatic Compression Devices  Parrish Catheter: Not present  Central Lines: None  Code Status: Full Code      Clinically Significant Risk Factors Present on Admission              # Platelet Defect: home medication list includes an antiplatelet medication      Disposition Plan   Expected discharge: Tomorrow recommended to prior living arrangement once adequate pain management/ tolerating PO medications and Urology work-up completed.     The patient's care was discussed with the Patient, Patient's Family and ED Provider.    Eduardo Wakefield MD  [facility]  Securely message with the Vocera Web Console (learn more here)  Text page via SoundFit  Paging/Directory      ______________________________________________________________________    Chief Complaint     Flank Pain    History is obtained from the patient    History of Present Illness      Tony Mcnair is a 66 year old male with past medical history of coronary disease who presents for evaluation of left flank pain.    Patient was in usual state of health until this morning when he woke up with mild low back pain.  As the day progressed, he developed mild lower abdominal pain as well as acute onset of nausea and vomiting.  He then developed severe left-sided waxing and waning flank pain.  He denied any fevers.  He denied any recent hematuria.  He has no prior history of kidney stones.  He has no chest pain or shortness of breath.  He denies any recent trauma to his low back or abdomen.  He denies any recent blood in stool, no weight loss or night sweats.  He had a normal bowel movement today.  He otherwise reports that he has had no recent cough, loss of taste or smell, no pulmonary symptoms.  He is currently on dual antiplatelet therapy for history of coronary artery disease status post stenting.  He otherwise has no other complaints this time.    Review of Systems      The 10 point Review of Systems is negative other than noted in the HPI or here.    Past Medical History    I have reviewed this patient's medical history and updated it with pertinent information if needed.   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  Past Surgical History:   Procedure Laterality Date     COLONOSCOPY  11 years ago     COLONOSCOPY N/A 5/17/2019    Procedure:  COLONOSCOPY;  Surgeon: Jaycob Mirza MD;  Location:  GI     HERNIA REPAIR  10 years ago     IRIS NEVUS Right      LESION RIGHT UPPER LID Right 1999    EXCISION     STENT  2020       Social History   I have reviewed this patient's social history and updated it with pertinent information if needed.  Social History     Tobacco Use     Smoking status: Never Smoker     Smokeless tobacco: Never Used   Substance Use Topics     Alcohol use: Yes     Comment: 4 beers a week     Drug use: No       Family History   I have reviewed this patient's family history and updated it with pertinent information if needed.  Family History   Problem Relation Age of Onset     Coronary Artery Disease Father      Hyperlipidemia Father      Coronary Artery Disease Brother      Hyperlipidemia Brother      Hypertension Mother      Breast Cancer Mother      Substance Abuse Mother      Cancer No family hx of         skin cancer     Glaucoma No family hx of      Macular Degeneration No family hx of      Retinal detachment No family hx of      Skin Cancer No family hx of      Melanoma No family hx of        Prior to Admission Medications   Prior to Admission Medications   Prescriptions Last Dose Informant Patient Reported? Taking?   Artificial Tear Ointment (REFRESH P.M.) OINT 8/21/2021 at PM Self No Yes   Sig: Apply 1 inch to eye nightly as needed   BIOTIN PO 8/22/2021 at AM Self Yes Yes   Sig: Take 1 tablet by mouth daily    EPINEPHrine (EPIPEN/ADRENACLICK/OR ANY BX GENERIC EQUIV) 0.3 MG/0.3ML injection 2-pack  at has home supply Self No No   Sig: Inject 0.3 mLs (0.3 mg) into the muscle as needed for anaphylaxis   Glucosamine-Chondroit-Vit C-Mn (GLUCOSAMINE 1500 COMPLEX) CAPS 8/22/2021 at AM Self Yes Yes   Sig: Take 1,500 mg by mouth 2 times daily   Multiple Vitamin (DAILY MULTIVITAMIN PO) 8/22/2021 at AM Self Yes Yes   Sig: Take 1 tablet by mouth daily    Turmeric 500 MG TABS 8/22/2021 at AM Self Yes Yes   Sig: Take 1,000 mg by mouth 2  times daily   VITAMIN D, CHOLECALCIFEROL, PO 8/22/2021 at AM Self Yes Yes   Sig: Take 1,000 Units by mouth daily    aspirin (ASA) 81 MG chewable tablet 8/22/2021 at AM Self Yes Yes   Sig: Take 81 mg by mouth daily    calcipotriene-betameth diprop (TACLONEX) 0.005-0.064 % external ointment Past Week at Unknown time Self No Yes   Sig: APPLY TO THE AFFECTED AREA 2 TIMES DAILY AS NEEDED   citalopram (CELEXA) 20 MG tablet 8/21/2021 at AM Self No Yes   Sig: Take 1 tablet (20 mg) by mouth daily   clopidogrel (PLAVIX) 75 MG tablet 8/22/2021 at PM Self Yes Yes   Sig: Take 75 mg by mouth daily   fish oil-omega-3 fatty acids 1000 MG capsule Past Week at Unknown time Self Yes Yes   Sig: Take 1 g by mouth daily   fluocinonide (LIDEX) 0.05 % external solution 8/22/2021 at Unknown time Self No Yes   Sig: Apply  topically 2 times daily for itchy scalp or scalp psoriasis   olopatadine (PATADAY) 0.2 % ophthalmic solution 8/22/2021 at AM Self No Yes   Sig: Place 1 drop into both eyes daily   pimecrolimus (ELIDEL) 1 % external cream Past Week at Unknown time Self No Yes   Sig: Apply topically 2 times daily as needed For psoriasis of face or genitals  Please keep appointment 12/1/20   rosuvastatin (CRESTOR) 20 MG tablet 8/22/2021 at PM Self No Yes   Sig: Take 1 tablet (20 mg) by mouth daily   tacrolimus (PROTOPIC) 0.1 % external ointment 8/21/2021 at Unknown time Self No Yes   Sig: Apply topically 2 times daily Apply to areas around eyes as needed      Facility-Administered Medications: None     Allergies   Allergies   Allergen Reactions     Lobster [Crustaceans] Angioedema     Shrimp Angioedema       Physical Exam   Vital Signs: Temp: 96.9  F (36.1  C) Temp src: Temporal BP: 105/59 Pulse: 55   Resp: 16 SpO2: 100 % O2 Device: None (Room air)    Weight: 155 lbs 0 oz    Constitutional: awake, alert, cooperative, no apparent distress.   Eyes: Lids and lashes normal, pupils equal, round and reactive to light   ENT: Normocephalic, without  obvious abnormality, atraumatic, sinuses nontender on palpation   Hematologic / Lymphatic: no cervical lymphadenopathy   Respiratory: CTABL   Cardiovascular: RRR with no m/r/g   GI: Normal bowel sounds, soft, non-distended, non-tender.   Skin: normal skin color, texture, turgor   Musculoskeletal: There is no redness, warmth, or swelling of the joints. Full range of motion noted.   Neurologic: Awake, alert, oriented to name, place and time. Cranial nerves II-XII are grossly intact. Motor is 5 out of 5 bilaterally. Sensory is intact.   Neuropsychiatric: normal mood and affect      Data   Data reviewed today: I reviewed all medications, new labs and imaging results over the last 24 hours. I personally reviewed the abdominal CT image(s) showing see below.    Abd/pelvis CT no contrast - Stone Protocol  1.  Obstructing 0.8 cm stone in the mid/proximal left ureter causes  moderate left hydronephrosis.  2.  There are 2 small nonobstructing stones in the lower pole of the  right kidney measuring 0.2 cm each.  3.  Colonic diverticulosis, without convincing evidence for  diverticulitis.    Most Recent 3 CBC's:Recent Labs   Lab Test 08/23/21  1306 10/21/20  0837 04/19/19  0813   WBC 11.9* 6.2 5.8   HGB 13.6 15.3 14.7   MCV 93 96 94    140* 135*     Most Recent 3 BMP's:Recent Labs   Lab Test 08/23/21  1306 10/21/20  0837 04/19/19  0813    138 142   POTASSIUM 4.2 4.2 4.4   CHLORIDE 109 104 107   CO2 27 25 29   BUN 17 12 15   CR 1.21 0.94 1.04   ANIONGAP 5 9 6   MILLY 9.3 9.4 9.2   * 91 95     Most Recent 2 LFT's:Recent Labs   Lab Test 08/23/21  1306 10/21/20  0837   AST 22 22   ALT 29 28   ALKPHOS 52 59   BILITOTAL 1.0 1.2     Most Recent 3 INR's:No lab results found.  Most Recent Urinalysis:Recent Labs   Lab Test 08/23/21  1817   COLOR Yellow   APPEARANCE Clear   URINEGLC Negative   URINEBILI Negative   URINEKETONE 40 *   SG 1.023   UBLD Small*   URINEPH 5.5   PROTEIN Negative   NITRITE Negative   LEUKEST  Negative   RBCU 22*   WBCU 9*     Recent Results (from the past 24 hour(s))   Abd/pelvis CT no contrast - Stone Protocol    Narrative    CT ABDOMEN PELVIS WITHOUT CONTRAST 8/23/2021 4:04 PM    CLINICAL HISTORY: Left flank pain.  TECHNIQUE: CT scan of the abdomen and pelvis was performed without IV  contrast. Multiplanar reformats were obtained. Dose reduction  techniques were used.  CONTRAST: None.  COMPARISON: None.    FINDINGS:   LOWER CHEST: The visualized lung bases are clear. Coronary artery  calcification. Small hiatal hernia.    HEPATOBILIARY: Unremarkable. No hepatic masses are seen.    PANCREAS: Normal.    SPLEEN: Normal.    ADRENAL GLANDS: Normal.    KIDNEYS/BLADDER: An obstructing 0.8 cm stone in the mid/proximal left  ureter causes moderate left hydronephrosis and perinephric stranding.  There are 2 nonobstructing stones in the lower pole of the right  kidney, measuring 0.2 cm each. No ureteral calculi or hydronephrosis  on the right. Urinary bladder is unremarkable.    BOWEL: No bowel obstruction. Scattered colonic diverticulosis. No  convincing evidence for colitis or diverticulitis. Unremarkable  appendix.    PELVIC ORGANS: Unremarkable.    LYMPH NODES: No enlarged lymph nodes are identified in the abdomen or  pelvis.    VASCULATURE: Mild atherosclerotic aortoiliac calcification.    ADDITIONAL FINDINGS: None.    MUSCULOSKELETAL: Unremarkable.      Impression    IMPRESSION:   1.  Obstructing 0.8 cm stone in the mid/proximal left ureter causes  moderate left hydronephrosis.  2.  There are 2 small nonobstructing stones in the lower pole of the  right kidney measuring 0.2 cm each.  3.  Colonic diverticulosis, without convincing evidence for  diverticulitis.    PAKO IVEY MD         SYSTEM ID:  [id number]

## 2021-08-24 NOTE — PROVIDER NOTIFICATION
MD Notification    Person notified:Urology    Person Name:Isabel Sandoval    Date/Time:8/24@11:50PM    Interaction:text page    Purpose of Notification:Is he on the schedule for today? Surgery just told me to keep him NPO and that he is on the schedule for 2:30PM.     Orders Received:    Comments:

## 2021-08-24 NOTE — PROGRESS NOTES
[facility] Progress Note  [facility]    Visit with pt, per request for information on health care directive.  Pt was pleased to receive a blank form and to learn more about how to complete it.  He will likely take it home with him and complete with his wife, who will be his health care agent.  (He has confirmed that his brother will be an alternative agent.)  Pt shared a bit more about the misery of his kidney stone, as well as his optimism about his recovery.  Provided conversation and support.   team available per additional need or request.                                                                                                                                                 Nayana Peacock M.A.  Staff   Phone [phone]

## 2021-08-24 NOTE — PHARMACY-ADMISSION MEDICATION HISTORY
Pharmacy Medication History  Admission medication history interview status for the 8/23/2021  admission is complete. See EPIC admission navigator for prior to admission medications     Location of Interview: Patient room  Medication history sources: Patient and Surescripts    Significant changes made to the medication list:  Added fish oil  Removed Concerta 27 mg CR tablet daily (stopped taking 1 year ago per pt), brilinta 90 mg BID (currently on Plavix)   Added dose/directions to aspirin, biotin, multivitamin, vitamin D    In the past week, patient estimated taking medication this percent of the time: greater than 90%    Additional medication history information:   Patient is a reliable historian. Fish oil has not been taken in a few days due to home supply running out.     Medication reconciliation completed by provider prior to medication history? Yes    Time spent in this activity: 20 minutes    Prior to Admission medications    Medication Sig Last Dose Taking? Auth Provider   Artificial Tear Ointment (REFRESH P.M.) OINT Apply 1 inch to eye nightly as needed 8/21/2021 at PM Yes Reji Allison MD   aspirin (ASA) 81 MG chewable tablet Take 81 mg by mouth daily  8/22/2021 at AM Yes Reported, Patient   BIOTIN PO Take 1 tablet by mouth daily  8/22/2021 at AM Yes Reported, Patient   calcipotriene-betameth diprop (TACLONEX) 0.005-0.064 % external ointment APPLY TO THE AFFECTED AREA 2 TIMES DAILY AS NEEDED Past Week at Unknown time Yes Lilia Aleman MD   citalopram (CELEXA) 20 MG tablet Take 1 tablet (20 mg) by mouth daily 8/21/2021 at AM Yes Tony Maldonado MD   clopidogrel (PLAVIX) 75 MG tablet Take 75 mg by mouth daily 8/22/2021 at PM Yes Reported, Patient   fish oil-omega-3 fatty acids 1000 MG capsule Take 1 g by mouth daily Past Week at Unknown time Yes Unknown, Entered By History   fluocinonide (LIDEX) 0.05 % external solution Apply  topically 2 times daily for itchy scalp or scalp psoriasis  8/22/2021 at Unknown time Yes Petar Corbett MD   Glucosamine-Chondroit-Vit C-Mn (GLUCOSAMINE 1500 COMPLEX) CAPS Take 1,500 mg by mouth 2 times daily 8/22/2021 at AM Yes Tony Maldonado MD   Multiple Vitamin (DAILY MULTIVITAMIN PO) Take 1 tablet by mouth daily  8/22/2021 at AM Yes Reported, Patient   olopatadine (PATADAY) 0.2 % ophthalmic solution Place 1 drop into both eyes daily 8/22/2021 at AM Yes Souleymane De Leon MD   pimecrolimus (ELIDEL) 1 % external cream Apply topically 2 times daily as needed For psoriasis of face or genitals  Please keep appointment 12/1/20 Past Week at Unknown time Yes Petar Corbett MD   rosuvastatin (CRESTOR) 20 MG tablet Take 1 tablet (20 mg) by mouth daily 8/22/2021 at PM Yes Tony Maldonado MD   tacrolimus (PROTOPIC) 0.1 % external ointment Apply topically 2 times daily Apply to areas around eyes as needed 8/21/2021 at Unknown time Yes Sandhya Mckinney MD   Turmeric 500 MG TABS Take 1,000 mg by mouth 2 times daily 8/22/2021 at AM Yes Tony Maldonado MD   VITAMIN D, CHOLECALCIFEROL, PO Take 1,000 Units by mouth daily  8/22/2021 at AM Yes Reported, Patient   EPINEPHrine (EPIPEN/ADRENACLICK/OR ANY BX GENERIC EQUIV) 0.3 MG/0.3ML injection 2-pack Inject 0.3 mLs (0.3 mg) into the muscle as needed for anaphylaxis  at [facility] home supply  Souleymane De Leon MD       The information provided in this note is only as accurate as the sources available at the time of update(s)   Eloisa Hernandez, Molly

## 2021-08-24 NOTE — DISCHARGE SUMMARY
"[facility]  Hospitalist Discharge Summary       Date of Admission:  8/23/2021  Date of Discharge:  8/24/2021  Discharging Provider: JoAnna K. Barthell, PA-C    Discharge Diagnoses   Left sided obstructive nephrolithiasis.  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  Follow-ups Needed After Discharge   Follow-up Appointments     Follow Up ([facility])      Our surgery schedulers will call you to coordinate surgery for your   kidney stone. If you do not hear from anyone within -the next 24hrs,   please call: [phone]    [address] [place] [address]. [phone]  You may call [phone] with any questions or concerns.   Central Appointment #: [phone]         Follow-up and recommended labs and tests      Please call to schedule follow up with Tony Lopez to occur   within 1 week as needed. Inform this is a \"hospital follow up visit\" to   help get in during recommended time. OK to see a colleague of primary if   needed.    Please call [phone] to schedule an appointment with [facility]).           Discharge Disposition   Discharged to home  Condition at discharge: Stable    Hospital Course   Tony Mcnair is a 66 year old male admitted on 8/23/2021. PMHx CAD who presented with nausea, vomiting, and left flank pain found to have obstructive nephrolithiasis. Admitted under observation status.     Left sided obstructive nephrolithiasis.  CT showing 8mm stone in mid-proximal left ureter with moderate hydronephrosis and 2 small (2mm) non-obstructing stone in lower pole of right kidney. UA does not suggest infection and no concerning symptoms. [facility] consulted; planning outpatient cysto as symptoms improved with supportive cares. Flomax, APAP, limited PRN oxycodone, DAPT held on discharge at request of Urology for procedure -- plan resumption post op when ok from urologic standpoint.     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  - " "Hold PTA ASA / Plavix as above -- has received 1 year DAPT and plan resume post op when OK with urology.  - Resume statin at obs discharge.    Consultations This Hospital Stay   UROLOGY IP CONSULT    Code Status   Full Code    Time Spent on this Encounter   I, JoAnna K. Barthell, PA-C, personally saw the patient today and spent less than or equal to 30 minutes discharging this patient.     This patient was discussed with Dr. Georges of the Hospitalist Service who agrees with current plans as outlined above.    JoAnna K. Barthell, PA-C  [facility]  ______________________________________________________________________  Physical Exam   Temp: 98.4  F (36.9  C) Temp src: Oral BP: 92/50 Pulse: 61   Resp: 16 SpO2: 97 % O2 Device: None (Room air)    Constitutional: Appears stated age, no acute distress. Appears comfortable upright in bed.  Respiratory: No increased work of breathing.  Skin: No rashes or lesions on exposed skin.       Primary Care Physician   Tony Maldonado    Discharge Orders      Follow Up ([facility])    Our surgery schedulers will call you to coordinate surgery for your kidney stone. If you do not hear from anyone within -the next 24hrs, please call: [phone]    [address] [place] [address]. [phone]  You may call [phone] with any questions or concerns.   Central Appointment #: [phone]     Reason for your hospital stay    Further evaluation and management of a left sided kidney stone.     Follow-up and recommended labs and tests    Please call to schedule follow up with Tony Lopez to occur within 1 week as needed. Inform this is a \"hospital follow up visit\" to help get in during recommended time. OK to see a colleague of primary if needed.    Please call [phone] to schedule an appointment with [facility] ([facility]).     Activity    Your activity upon discharge: activity as tolerated     Discharge Instructions    Hold " aspirin and Plavix until after urologic procedure.    - Tylenol 1000mg every 6 hours as needed for pain.  - Oxycodone is available for severe pain (8-10/10). May feel drowsy on this medication. Do not drive or make important decisions while taking. This is a constipating medication; if no bowel movement after 2 days while taking recommend over-the-counter Miralax until bowel movements regular.    - Gentle activity like leisurely walk and stretching.  - Heating pad/ice pack (thin towel between skin and compress to prevent burn) several times per day x 15-20 minutes.     Full Code     Diet    Follow this diet upon discharge: Orders Placed This Encounter      Regular Diet Adult         Significant Results and Procedures   Most Recent 3 CBC's:Recent Labs   Lab Test 08/24/21  0541 08/23/21  1306 10/21/20  0837   WBC 7.8 11.9* 6.2   HGB 11.7* 13.6 15.3   MCV 93 93 96   * 158 140*     Most Recent 3 BMP's:Recent Labs   Lab Test 08/24/21  0541 08/23/21  1306 10/21/20  0837    141 138   POTASSIUM 3.8 4.2 4.2   CHLORIDE 111* 109 104   CO2 27 27 25   BUN 19 17 12   CR 1.08 1.21 0.94   ANIONGAP 2* 5 9   MILLY 8.2* 9.3 9.4   GLC 87 119* 91     Most Recent 2 LFT's:Recent Labs   Lab Test 08/23/21  1306 10/21/20  0837   AST 22 22   ALT 29 28   ALKPHOS 52 59   BILITOTAL 1.0 1.2     Most Recent Urinalysis:Recent Labs   Lab Test 08/23/21  1817   COLOR Yellow   APPEARANCE Clear   URINEGLC Negative   URINEBILI Negative   URINEKETONE 40 *   SG 1.023   UBLD Small*   URINEPH 5.5   PROTEIN Negative   NITRITE Negative   LEUKEST Negative   RBCU 22*   WBCU 9*   ,   Results for orders placed or performed during the hospital encounter of 08/23/21   Abd/pelvis CT no contrast - Stone Protocol    Narrative    CT ABDOMEN PELVIS WITHOUT CONTRAST 8/23/2021 4:04 PM    CLINICAL HISTORY: Left flank pain.  TECHNIQUE: CT scan of the abdomen and pelvis was performed without IV  contrast. Multiplanar reformats were obtained. Dose  reduction  techniques were used.  CONTRAST: None.  COMPARISON: None.    FINDINGS:   LOWER CHEST: The visualized lung bases are clear. Coronary artery  calcification. Small hiatal hernia.    HEPATOBILIARY: Unremarkable. No hepatic masses are seen.    PANCREAS: Normal.    SPLEEN: Normal.    ADRENAL GLANDS: Normal.    KIDNEYS/BLADDER: An obstructing 0.8 cm stone in the mid/proximal left  ureter causes moderate left hydronephrosis and perinephric stranding.  There are 2 nonobstructing stones in the lower pole of the right  kidney, measuring 0.2 cm each. No ureteral calculi or hydronephrosis  on the right. Urinary bladder is unremarkable.    BOWEL: No bowel obstruction. Scattered colonic diverticulosis. No  convincing evidence for colitis or diverticulitis. Unremarkable  appendix.    PELVIC ORGANS: Unremarkable.    LYMPH NODES: No enlarged lymph nodes are identified in the abdomen or  pelvis.    VASCULATURE: Mild atherosclerotic aortoiliac calcification.    ADDITIONAL FINDINGS: None.    MUSCULOSKELETAL: Unremarkable.      Impression    IMPRESSION:   1.  Obstructing 0.8 cm stone in the mid/proximal left ureter causes  moderate left hydronephrosis.  2.  There are 2 small nonobstructing stones in the lower pole of the  right kidney measuring 0.2 cm each.  3.  Colonic diverticulosis, without convincing evidence for  diverticulitis.    PAKO IVEY MD         SYSTEM ID:  [id number]       Discharge Medications   Current Discharge Medication List      START taking these medications    Details   acetaminophen (TYLENOL) 500 MG tablet Take 2 tablets (1,000 mg) by mouth every 6 hours as needed for pain    Associated Diagnoses: Ureterolithiasis      oxyCODONE (ROXICODONE) 5 MG tablet Take 1 tablet (5 mg) by mouth every 4 hours as needed for moderate to severe pain  Qty: 10 tablet, Refills: 0    Associated Diagnoses: Ureterolithiasis      tamsulosin (FLOMAX) 0.4 MG capsule Take 1 capsule (0.4 mg) by mouth daily  Qty: 30 capsule,  Refills: 0    Associated Diagnoses: Ureterolithiasis         CONTINUE these medications which have NOT CHANGED    Details   Artificial Tear Ointment (REFRESH P.M.) OINT Apply 1 inch to eye nightly as needed  Qty: 1 Tube, Refills: 6    Associated Diagnoses: Tear film insufficiency, unspecified      BIOTIN PO Take 1 tablet by mouth daily       calcipotriene-betameth diprop (TACLONEX) 0.005-0.064 % external ointment APPLY TO THE AFFECTED AREA 2 TIMES DAILY AS NEEDED  Qty: 60 g, Refills: 0    Associated Diagnoses: Psoriasis      citalopram (CELEXA) 20 MG tablet Take 1 tablet (20 mg) by mouth daily  Qty: 90 tablet, Refills: 1    Associated Diagnoses: Moderate episode of recurrent major depressive disorder (H)      fish oil-omega-3 fatty acids 1000 MG capsule Take 1 g by mouth daily      fluocinonide (LIDEX) 0.05 % external solution Apply  topically 2 times daily for itchy scalp or scalp psoriasis  Qty: 60 mL, Refills: 2    Associated Diagnoses: Psoriasis      Glucosamine-Chondroit-Vit C-Mn (GLUCOSAMINE 1500 COMPLEX) CAPS Take 1,500 mg by mouth 2 times daily  Qty: 1 capsule, Refills: 0      Multiple Vitamin (DAILY MULTIVITAMIN PO) Take 1 tablet by mouth daily       olopatadine (PATADAY) 0.2 % ophthalmic solution Place 1 drop into both eyes daily  Qty: 2.5 mL, Refills: 11    Associated Diagnoses: Allergic conjunctivitis of both eyes      pimecrolimus (ELIDEL) 1 % external cream Apply topically 2 times daily as needed For psoriasis of face or genitals  Please keep appointment 12/1/20  Qty: 30 g, Refills: 0    Associated Diagnoses: Psoriasis      rosuvastatin (CRESTOR) 20 MG tablet Take 1 tablet (20 mg) by mouth daily  Qty: 90 tablet, Refills: 3    Associated Diagnoses: Hyperlipidemia, unspecified hyperlipidemia type      tacrolimus (PROTOPIC) 0.1 % external ointment Apply topically 2 times daily Apply to areas around eyes as needed  Qty: 60 g, Refills: 1    Associated Diagnoses: Psoriasis      Turmeric 500 MG TABS Take  1,000 mg by mouth 2 times daily  Qty: 1 tablet, Refills: 0      VITAMIN D, CHOLECALCIFEROL, PO Take 1,000 Units by mouth daily       EPINEPHrine (EPIPEN/ADRENACLICK/OR ANY BX GENERIC EQUIV) 0.3 MG/0.3ML injection 2-pack Inject 0.3 mLs (0.3 mg) into the muscle as needed for anaphylaxis  Qty: 2 each, Refills: 1    Associated Diagnoses: Food allergy         STOP taking these medications       aspirin (ASA) 81 MG chewable tablet Comments:   Reason for Stopping:         clopidogrel (PLAVIX) 75 MG tablet Comments:   Reason for Stopping:             Allergies   Allergies   Allergen Reactions     Lobster [Crustaceans] Angioedema     Shrimp Angioedema

## 2021-08-24 NOTE — PLAN OF CARE
A/Ox4. VSS ex bradycardic and soft BP. On RA. Denies pain. Up ind. NPO this morning and switched to regular diet this afternoon. Urology consulted, ok to discharge home with oral pain meds and strainer and urology will contact patient to have follow up procedure. Denies nausea and vomiting. Nursing to continue to monitor.

## 2021-08-25 ENCOUNTER — PATIENT OUTREACH (OUTPATIENT)
Dept: FAMILY MEDICINE | Facility: CLINIC | Age: 66
End: 2021-08-25

## 2021-08-25 NOTE — TELEPHONE ENCOUNTER
What type of discharge? Observation  Risk of Hospital admission or ED visit: 8%  Is a TCM episode required? Yes  When should the patient follow up with PCP? 14 days of discharge.    Kathi Nielsen RN  [facility]

## 2021-08-26 NOTE — TELEPHONE ENCOUNTER
"Hospital/TCU/ED for chronic condition Discharge Protocol    \"Hi, my name is Shira Huang RN, a registered nurse, and I am calling from [facility].  I am calling to follow up and see how things are going for you after your recent emergency visit/hospital/TCU stay.\"    Tell me how you are doing now that you are home?\" Good .      Discharge Instructions    \"Let's review your discharge instructions.  What is/are the follow-up recommendations?  Pt. Response: Yes    \"Has an appointment with your primary care provider been scheduled?\"   Yes. (confirm)    \"When you see the provider, I would recommend that you bring your medications with you.\"    Medications    \"Tell me what changed about your medicines when you discharged?\"    Changes to chronic meds?    2 or more - Epic MTM referral needed    \"What questions do you have about your medications?\"    None     New diagnoses of heart failure, COPD, diabetes, or MI?    No              Post Discharge Medication Reconciliation Status: discharge medications reconciled and changed, per note/orders.    Was MTM referral placed (*Make sure to put transitions as reason for referral)?   No    Call Summary    \"What questions or concerns do you have about your recent visit and your follow-up care?\"     none    \"If you have questions or things don't continue to improve, we encourage you contact us through the main clinic number (give number).  Even if the clinic is not open, triage nurses are available 24/7 to help you.     We would like you to know that our clinic has extended hours (provide information).  We also have urgent care (provide details on closest location and hours/contact info)\"      \"Thank you for your time and take care!\"    Waiting for call from urologist for kidney stone follow up.     Shira Huang RN  [facility]              "

## 2021-08-30 ENCOUNTER — MYC MEDICAL ADVICE (OUTPATIENT)
Dept: FAMILY MEDICINE | Facility: CLINIC | Age: 66
End: 2021-08-30

## 2021-08-31 NOTE — TELEPHONE ENCOUNTER
Called P[facility] office, went straight to . Per   is in the office M-F 8-430 and Friday - 4pm.     Left voicemail to call triage back to discuss issue with referral.    Kathi Nielsen RN  [facility]

## 2021-08-31 NOTE — TELEPHONE ENCOUNTER
PCP, please advise patient's mychart message.       Delmer Rachel, CHRISTOPHER on 8/31/2021 at 11:16 AM

## 2021-08-31 NOTE — TELEPHONE ENCOUNTER
Please see patient's mychart:    Patient reports hesitation with going to [facility]. Please advise if new referral to another surgeon is appropriate.    Please reply back to patient, route to triage follow up, or route to team coordinators to have patient schedule an appointment.     Kathi Nielsen RN  [facility]

## 2021-09-04 ENCOUNTER — HEALTH MAINTENANCE LETTER (OUTPATIENT)
Age: 66
End: 2021-09-04

## 2021-09-07 DIAGNOSIS — Z11.59 ENCOUNTER FOR SCREENING FOR OTHER VIRAL DISEASES: ICD-10-CM

## 2021-09-08 ENCOUNTER — LAB (OUTPATIENT)
Dept: URGENT CARE | Facility: URGENT CARE | Age: 66
End: 2021-09-08
Payer: COMMERCIAL

## 2021-09-08 ENCOUNTER — OFFICE VISIT (OUTPATIENT)
Dept: FAMILY MEDICINE | Facility: CLINIC | Age: 66
End: 2021-09-08
Payer: COMMERCIAL

## 2021-09-08 VITALS
HEART RATE: 69 BPM | SYSTOLIC BLOOD PRESSURE: 104 MMHG | TEMPERATURE: 98 F | RESPIRATION RATE: 12 BRPM | BODY MASS INDEX: 22.01 KG/M2 | DIASTOLIC BLOOD PRESSURE: 68 MMHG | WEIGHT: 153.4 LBS | OXYGEN SATURATION: 96 %

## 2021-09-08 DIAGNOSIS — Z01.818 PRE-OP EXAM: Primary | ICD-10-CM

## 2021-09-08 DIAGNOSIS — N20.1 LEFT URETERAL STONE: ICD-10-CM

## 2021-09-08 DIAGNOSIS — Z11.59 ENCOUNTER FOR SCREENING FOR OTHER VIRAL DISEASES: ICD-10-CM

## 2021-09-08 PROCEDURE — 93000 ELECTROCARDIOGRAM COMPLETE: CPT | Performed by: INTERNAL MEDICINE

## 2021-09-08 PROCEDURE — 99214 OFFICE O/P EST MOD 30 MIN: CPT | Performed by: INTERNAL MEDICINE

## 2021-09-08 PROCEDURE — U0003 INFECTIOUS AGENT DETECTION BY NUCLEIC ACID (DNA OR RNA); SEVERE ACUTE RESPIRATORY SYNDROME CORONAVIRUS 2 (SARS-COV-2) (CORONAVIRUS DISEASE [COVID-19]), AMPLIFIED PROBE TECHNIQUE, MAKING USE OF HIGH THROUGHPUT TECHNOLOGIES AS DESCRIBED BY CMS-2020-01-R: HCPCS

## 2021-09-08 PROCEDURE — U0005 INFEC AGEN DETEC AMPLI PROBE: HCPCS

## 2021-09-08 ASSESSMENT — PAIN SCALES - GENERAL: PAINLEVEL: NO PAIN (1)

## 2021-09-08 NOTE — PROGRESS NOTES
[address] [address]  Phone: [phone]  Primary Provider: Tony Maldonado  Pre-op Performing Provider: CASSIE URIBE      PREOPERATIVE EVALUATION:  Today's date: 9/8/2021    Tony Mcnair is a 66 year old male who presents for a preoperative evaluation.    Surgical Information:   Surgery/Procedure: CYSTOSCOPY, LEFT URETEROSCOPY, HOLMIUM LASER LITHOTRIPSY, POSSIBLE LEFT STENT PLACEMENT  Surgery Location:  OR  Surgeon: Sanjay Ackerman MD  Surgery Date: 9/10/2021  Time of Surgery: 1:15 PM  Where patient plans to recover: At home with family  Fax number for surgical facility: Note does not need to be faxed, will be available electronically in Epic.    Type of Anesthesia Anticipated: General    Assessment & Plan     The proposed surgical procedure is considered INTERMEDIATE risk.    Pre-op exam  He is in good physical health  He had some stents placed in July 2020  Currently on Plavix which has been on hold since last 1 week  Also told him to hold aspirin  His excess tolerance is very good  He does strenuous activity like gardening/landscaping without getting shortness of breath  He has at least 6 METS or more of excess tolerance  EKG showed sinus bradycardia but no acute changes  Recently had some blood work done which showed mild thrombocytopenia and nothing else   Cleared for surgery without any further testing  - EKG 12-lead complete w/read - Clinics    Left ureteral stone  He is going to have left ureteroscopy/laser/possible stent placement           Risks and Recommendations:  The patient has the following additional risks and recommendations for perioperative complications:  Cardiovascular:   - Stable CAD . Good Exercise Tolerance    Medication Instructions:  Patient is to take all scheduled medications on the day of surgery EXCEPT for modifications listed below:  To stop all supplements and also stop aspirin and  Plavix  RECOMMENDATION:  APPROVAL GIVEN to proceed with proposed procedure, without further diagnostic evaluation.              30 minutes spent on the date of the encounter doing chart review, history and exam, documentation and further activities per the note        Subjective     HPI related to upcoming procedure: Had left flank pain and vomiting and was found to have left ureteral stone . Now going for stent placement    Preop Questions 9/8/2021   1. Have you ever had a heart attack or stroke? UNKNOWN - NO   2. Have you ever had surgery on your heart or blood vessels, such as a stent placement, a coronary artery bypass, or surgery on an artery in your head, neck, heart, or legs? YES - In July 2020   3. Do you have chest pain with activity? No   4. Do you have a history of  heart failure? No   5. Do you currently have a cold, bronchitis or symptoms of other infection? No   6. Do you have a cough, shortness of breath, or wheezing? No   7. Do you or anyone in your family have previous history of blood clots? No   8. Do you or does anyone in your family have a serious bleeding problem such as prolonged bleeding following surgeries or cuts? No   9. Have you ever had problems with anemia or been told to take iron pills? No   10. Have you had any abnormal blood loss such as black, tarry or bloody stools? No   11. Have you ever had a blood transfusion? No   12. Are you willing to have a blood transfusion if it is medically needed before, during, or after your surgery? Yes   13. Have you or any of your relatives ever had problems with anesthesia? No   14. Do you have sleep apnea, excessive snoring or daytime drowsiness? UNKNOWN - NO   15. Do you have any artifical heart valves or other implanted medical devices like a pacemaker, defibrillator, or continuous glucose monitor? No   16. Do you have artificial joints? No   17. Are you allergic to latex? No       Health Care Directive:  Patient does not have a Health Care  Directive or Living Will: Discussed advance care planning with patient; information given to patient to review.        Review of Systems  CONSTITUTIONAL: NEGATIVE for fever, chills, change in weight  INTEGUMENTARY/SKIN: NEGATIVE for worrisome rashes, moles or lesions  EYES: NEGATIVE for vision changes or irritation  ENT/MOUTH: NEGATIVE for ear, mouth and throat problems  RESP: NEGATIVE for significant cough or SOB  CV: NEGATIVE for chest pain, palpitations or peripheral edema  GI: NEGATIVE for nausea, abdominal pain, heartburn, or change in bowel habits  : NEGATIVE for frequency, dysuria, or hematuria  MUSCULOSKELETAL: NEGATIVE for significant arthralgias or myalgia  NEURO: NEGATIVE for weakness, dizziness or paresthesias  ENDOCRINE: NEGATIVE for temperature intolerance, skin/hair changes  HEME: NEGATIVE for bleeding problems  PSYCHIATRIC: NEGATIVE for changes in mood or affect    Patient Active Problem List    Diagnosis Date Noted     Ureterolithiasis 08/23/2021     Priority: Medium     Nausea with vomiting 08/23/2021     Priority: Medium     Ureteral stone 08/23/2021     Priority: Medium     Left flank pain 08/23/2021     Priority: Medium     Seborrheic keratosis 06/17/2021     Priority: Medium     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  - Cardiac MRI 6/16/2020:Nonischemic fibrosis in the basal inferolateral wall of uncertain clinical significance.  - 6/16/2020 CCTA:  1. Very high CT calcium score - 902.8 (94th percentile for age/gender).   2. 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   3. Dominant RCA with diffuse proximal to mid mild-moderate stenoses.  Distal RPLS segment has mild-moderate stenosis.   4. LAD arises from the left coronary cusp.  Heavy calcification, especially at the origin of the large arborizing  first diagonal, which supplies large branches to the apex and lateral wall.  At least moderate diagonal stenoses.  *FFRct: + anomalous Cx; diagonal  - angiogram 7/2/2020: s/p TAMICA ostial CX    Formatting of this note might be different from the original.  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  - Zio Patch 2/2020: 3 shorts runs of NSVT       ADD (attention deficit disorder) 09/09/2019     Priority: Medium     Patient is followed by [facility] for ongoing prescription of stimulants.  All refills should be approved by this provider, or covering partner.    Medication(s): Concerta.   Maximum quantity per month: 30  Clinic visit frequency required: Q 3 months     Controlled substance agreement on file: No  Neuropsych evaluation for ADD completed:      Last [facility] website verification:  05/28/2020 [place]   [email or web address]           Left shoulder pain 05/25/2018     Priority: Medium     Adhesive capsulitis of left shoulder 05/22/2018     Priority: Medium     Pain in both feet 05/12/2017     Priority: Medium     Iliotibial band syndrome, unspecified laterality 05/12/2017     Priority: Medium     Plaque psoriasis 10/24/2012     Priority: Medium      Past Medical History:   Diagnosis Date     Arthritis      Coronary artery anomaly 7/2/2020     Coronary artery disease involving native coronary artery of native heart without angina pectoris     s/p ptca tamica prox circ     Depressive disorder Junie     Zoster 1988    LEFT EYE     Past Surgical History:   Procedure Laterality Date     COLONOSCOPY  11 years ago     COLONOSCOPY N/A 5/17/2019    Procedure: COLONOSCOPY;  Surgeon: Jaycob Mirza MD;  Location:  GI     HERNIA  REPAIR  10 years ago     IRIS NEVUS Right      LESION RIGHT UPPER LID Right 1999    EXCISION     STENT  2020     Current Outpatient Medications   Medication Sig Dispense Refill     acetaminophen (TYLENOL) 500 MG tablet Take 2 tablets (1,000 mg) by mouth every 6 hours as needed for pain       Artificial Tear Ointment (REFRESH P.M.) OINT Apply 1 inch to eye nightly as needed 1 Tube 6     BIOTIN PO Take 1 tablet by mouth daily        calcipotriene-betameth diprop (TACLONEX) 0.005-0.064 % external ointment APPLY TO THE AFFECTED AREA 2 TIMES DAILY AS NEEDED 60 g 0     citalopram (CELEXA) 20 MG tablet Take 1 tablet (20 mg) by mouth daily 90 tablet 1     EPINEPHrine (EPIPEN/ADRENACLICK/OR ANY BX GENERIC EQUIV) 0.3 MG/0.3ML injection 2-pack Inject 0.3 mLs (0.3 mg) into the muscle as needed for anaphylaxis 2 each 1     fish oil-omega-3 fatty acids 1000 MG capsule Take 1 g by mouth daily       fluocinonide (LIDEX) 0.05 % external solution Apply  topically 2 times daily for itchy scalp or scalp psoriasis 60 mL 2     Glucosamine-Chondroit-Vit C-Mn (GLUCOSAMINE 1500 COMPLEX) CAPS Take 1,500 mg by mouth 2 times daily 1 capsule 0     Multiple Vitamin (DAILY MULTIVITAMIN PO) Take 1 tablet by mouth daily        olopatadine (PATADAY) 0.2 % ophthalmic solution Place 1 drop into both eyes daily 2.5 mL 11     oxyCODONE (ROXICODONE) 5 MG tablet Take 1 tablet (5 mg) by mouth every 4 hours as needed for moderate to severe pain 10 tablet 0     pimecrolimus (ELIDEL) 1 % external cream Apply topically 2 times daily as needed For psoriasis of face or genitals  Please keep appointment 12/1/20 30 g 0     rosuvastatin (CRESTOR) 20 MG tablet Take 1 tablet (20 mg) by mouth daily 90 tablet 3     tacrolimus (PROTOPIC) 0.1 % external ointment Apply topically 2 times daily Apply to areas around eyes as needed 60 g 1     tamsulosin (FLOMAX) 0.4 MG capsule Take 1 capsule (0.4 mg) by mouth daily 30 capsule 0     Turmeric 500 MG TABS Take 1,000 mg by mouth  2 times daily 1 tablet 0     VITAMIN D, CHOLECALCIFEROL, PO Take 1,000 Units by mouth daily          Allergies   Allergen Reactions     Lobster [Crustaceans] Angioedema     Shrimp Angioedema        Social History     Tobacco Use     Smoking status: Never Smoker     Smokeless tobacco: Never Used   Substance Use Topics     Alcohol use: Yes     Comment: 4 beers a week     Family History   Problem Relation Age of Onset     Coronary Artery Disease Father      Hyperlipidemia Father      Coronary Artery Disease Brother      Hyperlipidemia Brother      Hypertension Mother      Breast Cancer Mother      Substance Abuse Mother      Cancer No family hx of         skin cancer     Glaucoma No family hx of      Macular Degeneration No family hx of      Retinal detachment No family hx of      Skin Cancer No family hx of      Melanoma No family hx of      History   Drug Use No         Objective     There were no vitals taken for this visit.    Physical Exam    GENERAL APPEARANCE: healthy, alert and no distress     EYES: EOMI,  PERRL     HENT: ear canals and TM's normal and nose and mouth without ulcers or lesions     NECK: no adenopathy, no asymmetry, masses, or scars and thyroid normal to palpation     RESP: lungs clear to auscultation - no rales, rhonchi or wheezes     CV: regular rates and rhythm, normal S1 S2, no S3 or S4 and no murmur, click or rub     ABDOMEN:  soft, nontender, no HSM or masses and bowel sounds normal     MS: extremities normal- no gross deformities noted, no evidence of inflammation in joints, FROM in all extremities.     SKIN: no suspicious lesions or rashes     NEURO: Normal strength and tone, sensory exam grossly normal, mentation intact and speech normal     PSYCH: mentation appears normal. and affect normal/bright     LYMPHATICS: No cervical adenopathy    Recent Labs   Lab Test 08/24/21  0541 08/23/21  1306   HGB 11.7* 13.6   * 158    141   POTASSIUM 3.8 4.2   CR 1.08 1.21         Diagnostics:  No labs were ordered during this visit.   EKG: sinus bradycardia, normal axis, normal intervals, no acute ST/T changes c/w ischemia, no LVH by voltage criteria    Revised Cardiac Risk Index (RCRI):  The patient has the following serious cardiovascular risks for perioperative complications:   - Coronary Artery Disease (MI, positive stress test, angina, Qs on EKG) = 1 point     RCRI Interpretation: 1 point: Class II (low risk - 0.9% complication rate)           Signed Electronically by: Johny Donaldson MD  Copy of this evaluation report is provided to requesting physician.

## 2021-09-09 ENCOUNTER — ANESTHESIA EVENT (OUTPATIENT)
Dept: SURGERY | Facility: CLINIC | Age: 66
End: 2021-09-09
Payer: COMMERCIAL

## 2021-09-09 ENCOUNTER — OFFICE VISIT (OUTPATIENT)
Dept: OPHTHALMOLOGY | Facility: CLINIC | Age: 66
End: 2021-09-09
Attending: OPHTHALMOLOGY
Payer: COMMERCIAL

## 2021-09-09 DIAGNOSIS — H04.123 DRY EYE SYNDROME OF BOTH LACRIMAL GLANDS: Primary | ICD-10-CM

## 2021-09-09 DIAGNOSIS — H10.32 ACUTE CONJUNCTIVITIS OF LEFT EYE, UNSPECIFIED ACUTE CONJUNCTIVITIS TYPE: ICD-10-CM

## 2021-09-09 LAB — SARS-COV-2 RNA RESP QL NAA+PROBE: NEGATIVE

## 2021-09-09 PROCEDURE — G0463 HOSPITAL OUTPT CLINIC VISIT: HCPCS

## 2021-09-09 PROCEDURE — 99214 OFFICE O/P EST MOD 30 MIN: CPT | Performed by: OPHTHALMOLOGY

## 2021-09-09 RX ORDER — FLUOROMETHOLONE 0.1 %
1 SUSPENSION, DROPS(FINAL DOSAGE FORM)(ML) OPHTHALMIC (EYE) 2 TIMES DAILY
Qty: 10 ML | Refills: 0 | Status: SHIPPED | OUTPATIENT
Start: 2021-09-09 | End: 2021-10-09

## 2021-09-09 ASSESSMENT — VISUAL ACUITY
OD_CC+: -2
OD_CC: 20/25
OS_CC: 20/20
OS_CC+: -2
METHOD: SNELLEN - LINEAR
CORRECTION_TYPE: GLASSES

## 2021-09-09 ASSESSMENT — REFRACTION_WEARINGRX
OS_AXIS: 007
OD_SPHERE: -1.00
OD_CYLINDER: +4.00
OS_SPHERE: -0.25
SPECS_TYPE: PAL
OD_ADD: +2.25
OS_ADD: +2.25
OS_CYLINDER: +4.00
OD_AXIS: 175

## 2021-09-09 ASSESSMENT — TONOMETRY
IOP_METHOD: ICARE
OD_IOP_MMHG: 10
OS_IOP_MMHG: 10

## 2021-09-09 ASSESSMENT — CONF VISUAL FIELD
OS_NORMAL: 1
OD_NORMAL: 1

## 2021-09-09 ASSESSMENT — CUP TO DISC RATIO
OS_RATIO: 0.25
OD_RATIO: 0.25

## 2021-09-09 ASSESSMENT — EXTERNAL EXAM - RIGHT EYE: OD_EXAM: NORMAL

## 2021-09-09 ASSESSMENT — ENCOUNTER SYMPTOMS: DYSRHYTHMIAS: 1

## 2021-09-09 ASSESSMENT — EXTERNAL EXAM - LEFT EYE: OS_EXAM: NORMAL

## 2021-09-09 NOTE — ANESTHESIA PREPROCEDURE EVALUATION
Anesthesia Pre-Procedure Evaluation    Patient: Tony Mcnair   MRN: [id number] : 1955        Preoperative Diagnosis: Left ureteral stone [N20.1]   Procedure : Procedure(s):  CYSTOSCOPY, LEFT URETEROSCOPY, HOLMIUM LASER LITHOTRIPSY, POSSIBLE LEFT STENT PLACEMENT     Past Medical History:   Diagnosis Date     Arthritis      Coronary artery anomaly 2020     Coronary artery disease involving native coronary artery of native heart without angina pectoris     s/p ptca tamica prox circ     Depressive disorder Junie     Zoster 1988    LEFT EYE      Past Surgical History:   Procedure Laterality Date     COLONOSCOPY  11 years ago     COLONOSCOPY N/A 2019    Procedure: COLONOSCOPY;  Surgeon: Jaycob Mirza MD;  Location:  GI     HERNIA REPAIR  10 years ago     IRIS NEVUS Right      LESION RIGHT UPPER LID Right     EXCISION     STENT        Allergies   Allergen Reactions     Lobster [Crustaceans] Angioedema     Shrimp Angioedema      Social History     Tobacco Use     Smoking status: Never Smoker     Smokeless tobacco: Never Used   Substance Use Topics     Alcohol use: Yes     Comment: 4 beers a week      Wt Readings from Last 1 Encounters:   21 69.6 kg (153 lb 6.4 oz)        Anesthesia Evaluation   Pt has had prior anesthetic.     No history of anesthetic complications       ROS/MED HX  ENT/Pulmonary:    (-) sleep apnea   Neurologic:       Cardiovascular:     (+) Dyslipidemia --CAD --stent-2020. dysrhythmias (zio patch 2020 showed 3 short runs of NSVT), Other,  (-) angina and angina   METS/Exercise Tolerance: >4 METS    Hematologic: Comments: Psoriasis  Thrombocytopenia    (+) anemia,     Musculoskeletal:       GI/Hepatic:    (-) GERD   Renal/Genitourinary:     (+) Nephrolithiasis ,     Endo:       Psychiatric/Substance Use:       Infectious Disease:       Malignancy:       Other:            Physical Exam    Airway        Mallampati: II   TM distance: > 3 FB   Neck ROM: full   Mouth  opening: > 3 cm    Respiratory Devices and Support         Dental       (+) missing      Cardiovascular   cardiovascular exam normal          Pulmonary   pulmonary exam normal                OUTSIDE LABS:  CBC:   Lab Results   Component Value Date    WBC 7.8 08/24/2021    WBC 11.9 (H) 08/23/2021    HGB 11.7 (L) 08/24/2021    HGB 13.6 08/23/2021    HCT 33.5 (L) 08/24/2021    HCT 39.6 08/23/2021     (L) 08/24/2021     08/23/2021     BMP:   Lab Results   Component Value Date     08/24/2021     08/23/2021    POTASSIUM 3.8 08/24/2021    POTASSIUM 4.2 08/23/2021    CHLORIDE 111 (H) 08/24/2021    CHLORIDE 109 08/23/2021    CO2 27 08/24/2021    CO2 27 08/23/2021    BUN 19 08/24/2021    BUN 17 08/23/2021    CR 1.08 08/24/2021    CR 1.21 08/23/2021    GLC 87 08/24/2021     (H) 08/23/2021     COAGS: No results found for: PTT, INR, FIBR  POC: No results found for: BGM, HCG, HCGS  HEPATIC:   Lab Results   Component Value Date    ALBUMIN 4.0 08/23/2021    PROTTOTAL 7.0 08/23/2021    ALT 29 08/23/2021    AST 22 08/23/2021    ALKPHOS 52 08/23/2021    BILITOTAL 1.0 08/23/2021     OTHER:   Lab Results   Component Value Date    MILLY 8.2 (L) 08/24/2021    TSH 1.75 10/21/2020    CRP <2.9 05/05/2017    SED 6 05/05/2017       Anesthesia Plan    ASA Status:  3   NPO Status:  NPO Appropriate    Anesthesia Type: General.     - Airway: LMA   Induction: Intravenous.   Maintenance: Balanced.        Consents    Anesthesia Plan(s) and associated risks, benefits, and realistic alternatives discussed. Questions answered and patient/representative(s) expressed understanding.     - Discussed with:  Patient         Postoperative Care    Pain management: IV analgesics, Multi-modal analgesia.   PONV prophylaxis: Ondansetron (or other 5HT-3), Dexamethasone or Solumedrol, Background Propofol Infusion     Comments:    H&P reviewed            Jack Cuellar MD

## 2021-09-09 NOTE — NURSING NOTE
Chief Complaints and History of Present Illnesses   Patient presents with     Dry Eye(s) Follow-Up     Chief Complaint(s) and History of Present Illness(es)     Dry Eye(s) Follow-Up     Laterality: both eyes              Comments     Pt. States that he is doing well. No change in VA BE. No pain BE. Still some dryness but not any worse. Has had some itching BE the last 2 days.  Vanessa Carrasco COT 9:22 AM September 9, 2021

## 2021-09-09 NOTE — PROGRESS NOTES
Mr. Mcnair is a 60 year old CM with h/o Dry eye syndrome.      Interval:  Patient last visit was 7/2020 w/ Dr. Schultz. Eyes are comfortable for the most part, no significant changes. Happy with vision in current glasses. No new floaters or flashes.      Hx of psoriasis.     Reports worsening of floaters, no flashes.     Ocular meds:  Preservative free artificial tears: 1-2x/day (Refresh Plus)   Pataday qAM  Fish oil  Warm compresses: not doing      A/P:  1. 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               Continue artificial tears. Increase to 4-6x/day.              Continue with eyelid scrubs              Restart warm compresses and lid hygiene daily.               Surface looks healthy. Can approach additional methods of treatment if symptoms/signs worsen - plugs, Restasis, BCL, Doxy, etc.      # Demodex noted - Worse left eye .  Using Claridex foam. each eye each morning but use day and night left eye.  Cont art tears each eye.    # Hx of left eye shingles: Will trial left eye FML left eye bid. Disc SE.                2. Allergic conjunctivitis.              Continue pataday as needed.                  3. Iris Nevus OD              stable in size compared to prior photos  (2011)              Monitor     4. Refractive Error each eye  - BCVA 20/25 and 20/20 today, patient deferring Mrx today     5. PVD each eye  - no flashes / curtain, stable.         RTC: 4 - 8 weeks. Call sooner with any problems    Sean Schultz MD    Attending Physician Attestation:  Complete documentation of historical and exam elements from today's encounter can be found in the full encounter summary report (not reduplicated in this progress note).  I personally obtained the chief complaint(s) and history of present illness.  I confirmed and edited as necessary the review of systems, past medical/surgical history, family history, social history, and examination findings as  documented by others; and I examined the patient myself.  I personally reviewed the relevant tests, images, and reports as documented above.  I formulated and edited as necessary the assessment and plan and discussed the findings and management plan with the patient and family. - Sean Schultz MD

## 2021-09-10 ENCOUNTER — HOSPITAL ENCOUNTER (OUTPATIENT)
Facility: CLINIC | Age: 66
Discharge: HOME | End: 2021-09-10
Attending: UROLOGY | Admitting: UROLOGY
Payer: COMMERCIAL

## 2021-09-10 ENCOUNTER — APPOINTMENT (OUTPATIENT)
Dept: GENERAL RADIOLOGY | Facility: CLINIC | Age: 66
End: 2021-09-10
Attending: UROLOGY
Payer: COMMERCIAL

## 2021-09-10 ENCOUNTER — ANESTHESIA (OUTPATIENT)
Dept: SURGERY | Facility: CLINIC | Age: 66
End: 2021-09-10
Payer: COMMERCIAL

## 2021-09-10 VITALS
RESPIRATION RATE: 16 BRPM | HEIGHT: 70 IN | DIASTOLIC BLOOD PRESSURE: 84 MMHG | HEART RATE: 56 BPM | TEMPERATURE: 97.8 F | WEIGHT: 152.9 LBS | BODY MASS INDEX: 21.89 KG/M2 | SYSTOLIC BLOOD PRESSURE: 124 MMHG | OXYGEN SATURATION: 99 %

## 2021-09-10 DIAGNOSIS — N20.1 URETERAL STONE: Primary | ICD-10-CM

## 2021-09-10 PROCEDURE — 250N000011 HC RX IP 250 OP 636: Performed by: NURSE ANESTHETIST, CERTIFIED REGISTERED

## 2021-09-10 PROCEDURE — 360N000076 HC SURGERY LEVEL 3, PER MIN: Performed by: UROLOGY

## 2021-09-10 PROCEDURE — 82365 CALCULUS SPECTROSCOPY: CPT | Performed by: UROLOGY

## 2021-09-10 PROCEDURE — C2617 STENT, NON-COR, TEM W/O DEL: HCPCS | Performed by: UROLOGY

## 2021-09-10 PROCEDURE — C1769 GUIDE WIRE: HCPCS | Performed by: UROLOGY

## 2021-09-10 PROCEDURE — 250N000011 HC RX IP 250 OP 636: Performed by: STUDENT IN AN ORGANIZED HEALTH CARE EDUCATION/TRAINING PROGRAM

## 2021-09-10 PROCEDURE — 255N000002 HC RX 255 OP 636: Performed by: UROLOGY

## 2021-09-10 PROCEDURE — 250N000025 HC SEVOFLURANE, PER MIN: Performed by: UROLOGY

## 2021-09-10 PROCEDURE — 999N000141 HC STATISTIC PRE-PROCEDURE NURSING ASSESSMENT: Performed by: UROLOGY

## 2021-09-10 PROCEDURE — 258N000003 HC RX IP 258 OP 636: Performed by: ANESTHESIOLOGY

## 2021-09-10 PROCEDURE — 250N000009 HC RX 250: Performed by: NURSE ANESTHETIST, CERTIFIED REGISTERED

## 2021-09-10 PROCEDURE — 999N000179 XR SURGERY CARM FLUORO LESS THAN 5 MIN W STILLS

## 2021-09-10 PROCEDURE — 258N000001 HC RX 258: Performed by: UROLOGY

## 2021-09-10 PROCEDURE — 272N000001 HC OR GENERAL SUPPLY STERILE: Performed by: UROLOGY

## 2021-09-10 PROCEDURE — 370N000017 HC ANESTHESIA TECHNICAL FEE, PER MIN: Performed by: UROLOGY

## 2021-09-10 PROCEDURE — C1758 CATHETER, URETERAL: HCPCS | Performed by: UROLOGY

## 2021-09-10 PROCEDURE — 710N000009 HC RECOVERY PHASE 1, LEVEL 1, PER MIN: Performed by: UROLOGY

## 2021-09-10 PROCEDURE — 710N000012 HC RECOVERY PHASE 2, PER MINUTE: Performed by: UROLOGY

## 2021-09-10 PROCEDURE — 250N000009 HC RX 250: Performed by: UROLOGY

## 2021-09-10 DEVICE — URETERAL STENT
Type: IMPLANTABLE DEVICE | Site: URETHRA | Status: FUNCTIONAL
Brand: POLARIS™ ULTRA

## 2021-09-10 RX ORDER — IOPAMIDOL 612 MG/ML
INJECTION, SOLUTION INTRAVASCULAR PRN
Status: DISCONTINUED | OUTPATIENT
Start: 2021-09-10 | End: 2021-09-10 | Stop reason: HOSPADM

## 2021-09-10 RX ORDER — OXYCODONE HYDROCHLORIDE 5 MG/1
5 TABLET ORAL EVERY 4 HOURS PRN
Status: DISCONTINUED | OUTPATIENT
Start: 2021-09-10 | End: 2021-09-10 | Stop reason: HOSPADM

## 2021-09-10 RX ORDER — HYDROMORPHONE HCL IN WATER/PF 6 MG/30 ML
0.4 PATIENT CONTROLLED ANALGESIA SYRINGE INTRAVENOUS EVERY 5 MIN PRN
Status: DISCONTINUED | OUTPATIENT
Start: 2021-09-10 | End: 2021-09-10 | Stop reason: HOSPADM

## 2021-09-10 RX ORDER — FENTANYL CITRATE 0.05 MG/ML
50 INJECTION, SOLUTION INTRAMUSCULAR; INTRAVENOUS
Status: DISCONTINUED | OUTPATIENT
Start: 2021-09-10 | End: 2021-09-10 | Stop reason: HOSPADM

## 2021-09-10 RX ORDER — AMOXICILLIN 250 MG
1-2 CAPSULE ORAL 2 TIMES DAILY
Qty: 30 TABLET | Refills: 0 | Status: SHIPPED | OUTPATIENT
Start: 2021-09-10 | End: 2022-06-28

## 2021-09-10 RX ORDER — DEXAMETHASONE SODIUM PHOSPHATE 4 MG/ML
INJECTION, SOLUTION INTRA-ARTICULAR; INTRALESIONAL; INTRAMUSCULAR; INTRAVENOUS; SOFT TISSUE PRN
Status: DISCONTINUED | OUTPATIENT
Start: 2021-09-10 | End: 2021-09-10

## 2021-09-10 RX ORDER — NAPROXEN 500 MG/1
500 TABLET ORAL EVERY 12 HOURS PRN
Qty: 10 TABLET | Refills: 0 | Status: SHIPPED | OUTPATIENT
Start: 2021-09-10 | End: 2023-11-15

## 2021-09-10 RX ORDER — FENTANYL CITRATE 50 UG/ML
INJECTION, SOLUTION INTRAMUSCULAR; INTRAVENOUS PRN
Status: DISCONTINUED | OUTPATIENT
Start: 2021-09-10 | End: 2021-09-10

## 2021-09-10 RX ORDER — ONDANSETRON 4 MG/1
4 TABLET, ORALLY DISINTEGRATING ORAL EVERY 30 MIN PRN
Status: DISCONTINUED | OUTPATIENT
Start: 2021-09-10 | End: 2021-09-10 | Stop reason: HOSPADM

## 2021-09-10 RX ORDER — ACETAMINOPHEN 325 MG/1
650 TABLET ORAL EVERY 4 HOURS PRN
Qty: 50 TABLET | Refills: 0 | Status: SHIPPED | OUTPATIENT
Start: 2021-09-10 | End: 2023-11-15

## 2021-09-10 RX ORDER — HYDRALAZINE HYDROCHLORIDE 20 MG/ML
2.5-5 INJECTION INTRAMUSCULAR; INTRAVENOUS EVERY 10 MIN PRN
Status: DISCONTINUED | OUTPATIENT
Start: 2021-09-10 | End: 2021-09-10 | Stop reason: HOSPADM

## 2021-09-10 RX ORDER — FENTANYL CITRATE 50 UG/ML
25 INJECTION, SOLUTION INTRAMUSCULAR; INTRAVENOUS EVERY 5 MIN PRN
Status: DISCONTINUED | OUTPATIENT
Start: 2021-09-10 | End: 2021-09-10 | Stop reason: HOSPADM

## 2021-09-10 RX ORDER — ACETAMINOPHEN 325 MG/1
650 TABLET ORAL ONCE
Status: DISCONTINUED | OUTPATIENT
Start: 2021-09-10 | End: 2021-09-10 | Stop reason: HOSPADM

## 2021-09-10 RX ORDER — PROPOFOL 10 MG/ML
INJECTION, EMULSION INTRAVENOUS CONTINUOUS PRN
Status: DISCONTINUED | OUTPATIENT
Start: 2021-09-10 | End: 2021-09-10

## 2021-09-10 RX ORDER — OXYCODONE HYDROCHLORIDE 5 MG/1
5-10 TABLET ORAL EVERY 4 HOURS PRN
Qty: 6 TABLET | Refills: 0 | Status: SHIPPED | OUTPATIENT
Start: 2021-09-10 | End: 2021-11-11

## 2021-09-10 RX ORDER — CEFAZOLIN SODIUM 2 G/100ML
2 INJECTION, SOLUTION INTRAVENOUS
Status: COMPLETED | OUTPATIENT
Start: 2021-09-10 | End: 2021-09-10

## 2021-09-10 RX ORDER — CEFAZOLIN SODIUM 2 G/100ML
2 INJECTION, SOLUTION INTRAVENOUS SEE ADMIN INSTRUCTIONS
Status: DISCONTINUED | OUTPATIENT
Start: 2021-09-10 | End: 2021-09-10 | Stop reason: HOSPADM

## 2021-09-10 RX ORDER — SODIUM CHLORIDE, SODIUM LACTATE, POTASSIUM CHLORIDE, CALCIUM CHLORIDE 600; 310; 30; 20 MG/100ML; MG/100ML; MG/100ML; MG/100ML
INJECTION, SOLUTION INTRAVENOUS CONTINUOUS
Status: DISCONTINUED | OUTPATIENT
Start: 2021-09-10 | End: 2021-09-10 | Stop reason: HOSPADM

## 2021-09-10 RX ORDER — PROPOFOL 10 MG/ML
INJECTION, EMULSION INTRAVENOUS PRN
Status: DISCONTINUED | OUTPATIENT
Start: 2021-09-10 | End: 2021-09-10

## 2021-09-10 RX ORDER — LABETALOL HYDROCHLORIDE 5 MG/ML
10 INJECTION, SOLUTION INTRAVENOUS
Status: DISCONTINUED | OUTPATIENT
Start: 2021-09-10 | End: 2021-09-10 | Stop reason: HOSPADM

## 2021-09-10 RX ORDER — ONDANSETRON 2 MG/ML
4 INJECTION INTRAMUSCULAR; INTRAVENOUS EVERY 30 MIN PRN
Status: DISCONTINUED | OUTPATIENT
Start: 2021-09-10 | End: 2021-09-10 | Stop reason: HOSPADM

## 2021-09-10 RX ORDER — LIDOCAINE HYDROCHLORIDE 20 MG/ML
INJECTION, SOLUTION INFILTRATION; PERINEURAL PRN
Status: DISCONTINUED | OUTPATIENT
Start: 2021-09-10 | End: 2021-09-10

## 2021-09-10 RX ORDER — ONDANSETRON 2 MG/ML
INJECTION INTRAMUSCULAR; INTRAVENOUS PRN
Status: DISCONTINUED | OUTPATIENT
Start: 2021-09-10 | End: 2021-09-10

## 2021-09-10 RX ORDER — KETOROLAC TROMETHAMINE 30 MG/ML
15-30 INJECTION, SOLUTION INTRAMUSCULAR; INTRAVENOUS ONCE
Status: DISCONTINUED | OUTPATIENT
Start: 2021-09-10 | End: 2021-09-10 | Stop reason: HOSPADM

## 2021-09-10 RX ORDER — HYDROCODONE BITARTRATE AND ACETAMINOPHEN 5; 325 MG/1; MG/1
1 TABLET ORAL ONCE
Status: DISCONTINUED | OUTPATIENT
Start: 2021-09-10 | End: 2021-09-10 | Stop reason: HOSPADM

## 2021-09-10 RX ORDER — IBUPROFEN 600 MG/1
600 TABLET, FILM COATED ORAL ONCE
Status: DISCONTINUED | OUTPATIENT
Start: 2021-09-10 | End: 2021-09-10 | Stop reason: HOSPADM

## 2021-09-10 RX ADMIN — SODIUM CHLORIDE, POTASSIUM CHLORIDE, SODIUM LACTATE AND CALCIUM CHLORIDE: 600; 310; 30; 20 INJECTION, SOLUTION INTRAVENOUS at 13:50

## 2021-09-10 RX ADMIN — PROPOFOL 200 MG: 10 INJECTION, EMULSION INTRAVENOUS at 13:52

## 2021-09-10 RX ADMIN — PROPOFOL 100 MCG/KG/MIN: 10 INJECTION, EMULSION INTRAVENOUS at 13:56

## 2021-09-10 RX ADMIN — FENTANYL CITRATE 50 MCG: 50 INJECTION, SOLUTION INTRAMUSCULAR; INTRAVENOUS at 13:52

## 2021-09-10 RX ADMIN — ONDANSETRON 4 MG: 2 INJECTION INTRAMUSCULAR; INTRAVENOUS at 13:59

## 2021-09-10 RX ADMIN — MIDAZOLAM 2 MG: 1 INJECTION INTRAMUSCULAR; INTRAVENOUS at 13:50

## 2021-09-10 RX ADMIN — DEXAMETHASONE SODIUM PHOSPHATE 4 MG: 4 INJECTION, SOLUTION INTRA-ARTICULAR; INTRALESIONAL; INTRAMUSCULAR; INTRAVENOUS; SOFT TISSUE at 13:59

## 2021-09-10 RX ADMIN — CEFAZOLIN SODIUM 2 G: 2 INJECTION, SOLUTION INTRAVENOUS at 13:52

## 2021-09-10 RX ADMIN — LIDOCAINE HYDROCHLORIDE 80 MG: 20 INJECTION, SOLUTION INFILTRATION; PERINEURAL at 13:52

## 2021-09-10 RX ADMIN — FENTANYL CITRATE 50 MCG: 50 INJECTION, SOLUTION INTRAMUSCULAR; INTRAVENOUS at 14:10

## 2021-09-10 ASSESSMENT — MIFFLIN-ST. JEOR: SCORE: 1489.8

## 2021-09-10 NOTE — PROCEDURES
UROLOGY OPERATIVE REPORT    Tony Mcnair  1955, 66 year old    SURGEON  Surgeon(s):  Sanjay Ackerman MD    PREOP DIAGNOSIS  Ureteral stone  (primary encounter diagnosis)    POSTOP DIAGNOSIS  Same    PROCEDURE  Procedure(s):  CYSTOSCOPY, LEFT URETEROSCOPY, HOLMIUM LASER LITHOTRIPSY, LEFT STENT PLACEMENT    FINDINGS  Large, dense proximal ureteral stone impacted in a narrow segment of ureter. It was broken into more than 8 pieces and the larges one removed.   Modifier 22    ANESTHESIA  General    STAFF  Circulator: Ashlee Arceo RN; Ashlee Robert RN  : Shira Lim RN  Relief Scrub: Jyoti Yee  Scrub Person: Beatriz Peterson    COMPLICATIONS  None    SPECIMEN  stone    PROSTHESIS/ GRAFTS/ DEVICES  None    EBL  0cc    TECHNIQUE  The patient was brought to the operating room,placed in the supine position and induced under general anesthesia. A time out was taken to verify the correct patient and procedure. The legs were changed to dorsal lithotomy and the perineum prepped and draped. The 22F cystoscope was used to pass through the urethra, which had no strictures. The bladder was free of tumors or stones. The dual lumen catheter was passed into the left ureteral orifice and a wire passed to the kidney. There was some resistance as the wire passed the stone. Two wires were passed to the kidney and the rigid ureteroscope passed over one of them. The stone was encountered and a 365 micron laser fiber was used to fragment the stone. Due to proximal hydroureter, the stone easily migrated towards the kidney. Therefore, a basket was used to hold the stone in place while the laser was used to break apart the stone. This step did cause the case to take longer than 150% of the normal time. Larger fragments were removed with a basket.     A 26cm, 5F JJ stent was then passed over the wire. The wire was removed and the bladder emptied. Fl uroscopy showed good stent position.      PLAN  Stent removal in the office within two weeks.         Sanjay Ackerman MD

## 2021-09-10 NOTE — ANESTHESIA CARE TRANSFER NOTE
Patient: Tony Mcnair    Procedure(s):  CYSTOSCOPY, LEFT URETEROSCOPY, HOLMIUM LASER LITHOTRIPSY, LEFT STENT PLACEMENT    Diagnosis: Left ureteral stone [N20.1]  Diagnosis Additional Information: No value filed.    Anesthesia Type:   General     Note:    Oropharynx: oropharynx clear of all foreign objects and spontaneously breathing  Level of Consciousness: drowsy  Oxygen Supplementation: nasal cannula  Level of Supplemental Oxygen (L/min / FiO2): 8  Independent Airway: airway patency satisfactory and stable  Dentition: dentition unchanged  Vital Signs Stable: post-procedure vital signs reviewed and stable  Report to RN Given: handoff report given  Patient transferred to: PACU  Comments: At end of procedure, spontaneous respirations, adequate tidal volumes, followed commands to voice, LMA removed atraumatically, oropharynx suctioned, airway patent after LMA removal. Oxygen via facemask at 8 liters per minute to PACU. Oxygen tubing connected to wall O2 in PACU, SpO2, NiBP, and EKG monitors and alarms on and functioning, report on patient's clinical status given to PACU RN, RN questions answered.  Handoff Report: Identifed the Patient, Identified the Reponsible Provider, Reviewed the pertinent medical history, Discussed the surgical course, Reviewed Intra-OP anesthesia mangement and issues during anesthesia, Set expectations for post-procedure period and Allowed opportunity for questions and acknowledgement of understanding      Vitals:  Vitals Value Taken Time   /83 09/10/21 1515   Temp     Pulse 52 09/10/21 1524   Resp 9 09/10/21 1524   SpO2 98 % 09/10/21 1524   Vitals shown include unvalidated device data.    Electronically Signed By: CHAZ Christopher CRNA  September 10, 2021  3:25 PM

## 2021-09-10 NOTE — ANESTHESIA POSTPROCEDURE EVALUATION
Patient: Tony Mcnair    Procedure(s):  CYSTOSCOPY, LEFT URETEROSCOPY, HOLMIUM LASER LITHOTRIPSY, LEFT STENT PLACEMENT    Diagnosis:Left ureteral stone [N20.1]  Diagnosis Additional Information: No value filed.    Anesthesia Type:  General    Note:  Disposition: Outpatient   Postop Pain Control: Uneventful            Sign Out: Well controlled pain   PONV: No   Neuro/Psych: Uneventful            Sign Out: Acceptable/Baseline neuro status   Airway/Respiratory: Uneventful            Sign Out: Acceptable/Baseline resp. status   CV/Hemodynamics: Uneventful            Sign Out: Acceptable CV status; No obvious hypovolemia; No obvious fluid overload   Other NRE: NONE   DID A NON-ROUTINE EVENT OCCUR? No           Last vitals:  Vitals Value Taken Time   /83 09/10/21 1515   Temp     Pulse 59 09/10/21 1521   Resp 11 09/10/21 1521   SpO2 99 % 09/10/21 1521   Vitals shown include unvalidated device data.    Electronically Signed By: Jack Cuellar MD  September 10, 2021  3:22 PM

## 2021-09-10 NOTE — DISCHARGE INSTRUCTIONS
Same Day Surgery Discharge Instructions for  Sedation and General Anesthesia       It's not unusual to feel dizzy, light-headed or faint for up to 24 hours after surgery or while taking pain medication.  If you have these symptoms: sit for a few minutes before standing and have someone assist you when you get up to walk or use the bathroom.      You should rest and relax for the next 24 hours. We recommend you make arrangements to have an adult stay with you for at least 24 hours after your discharge.  Avoid hazardous and strenuous activity.      DO NOT DRIVE any vehicle or operate mechanical equipment for 24 hours following the end of your surgery.  Even though you may feel normal, your reactions may be affected by the medication you have received.      Do not drink alcoholic beverages for 24 hours following surgery.       Slowly progress to your regular diet as you feel able. It's not unusual to feel nauseated and/or vomit after receiving anesthesia.  If you develop these symptoms, drink clear liquids (apple juice, ginger ale, broth, 7-up, etc. ) until you feel better.  If your nausea and vomiting persists for 24 hours, please notify your surgeon.        All narcotic pain medications, along with inactivity and anesthesia, can cause constipation. Drinking plenty of liquids and increasing fiber intake will help.      For any questions of a medical nature, call your surgeon.      Do not make important decisions for 24 hours.      If you had general anesthesia, you may have a sore throat for a couple of days related to the breathing tube used during surgery.  You may use Cepacol lozenges to help with this discomfort.  If it worsens or if you develop a fever, contact your surgeon.       If you feel your pain is not well managed with the pain medications prescribed by your surgeon, please contact your surgeon's office to let them know so they can address your concerns.       CoVid 19 Information    We want to give you  information regarding Covid. Please consult your primary care provider with any questions you might have.     Patient who have symptoms (cough, fever, or shortness of breath), need to isolate for 7 days from when symptoms started OR 72 hours after fever resolves (without fever reducing medications) AND improvement of respiratory symptoms (whichever is longer).      Isolate yourself at home (in own room/own bathroom if possible)    Do Not allow any visitors    Do Not go to work or school    Do Not go to Islam,  centers, shopping, or other public places.    Do Not shake hands.    Avoid close and intimate contact with others (hugging, kissing).    Follow CDC recommendations for household cleaning of frequently touched services.     After the initial 7 days, continue to isolate yourself from household members as much as possible. To continue decrease the risk of community spread and exposure, you and any members of your household should limit activities in public for 14 days after starting home isolation.     You can reference the following CDC link for helpful home isolation/care tips:  https://www.cdc.gov/coronavirus/2019-ncov/downloads/10Things.pdf    Protect Others:    Cover Your Mouth and Nose with a mask, disposable tissue or wash cloth to avoid spreading germs to others.    Wash your hands and face frequently with soap and water    Call Your Primary Doctor If: Breathing difficulty develops or you become worse.    For more information about COVID19 and options for caring for yourself at home, please visit the CDC website at https://www.cdc.gov/coronavirus/2019-ncov/about/steps-when-sick.html  For more options for care at [facility], please visit our website at [email or web address]          Cystoscopy and Stent Placement Discharge Instructions    During surgery, a stent was placed in the ureter.  The ureter is the tube that drains urine from the kidney to the bladder.   The stent is placed to dilate (open) the ureter so the stone fragments can pass easily through the ureter or to decrease ureteral swelling after surgery, or to relieve an obstruction. The stent is made of rubber. The upper end of the stent curls in the kidney while the lower end rests in the bladder      Diet:    Return to the diet that you were on before the procedure, unless you are given specific diet instructions.    It is important to drink 6-8 glasses of fluids per day at home - at least 3-4 glasses should be water.    Activity:    Walk short distances and increase as your strength allows.    You may climb stairs.    Do not do strenuous exercise or heavy lifting until approved by surgeon.    Do not drive while taking narcotic pain medications.    Bathing:    You may take a shower.    While the stent is in place you may experience the following symptoms:    Blood and/or small blood clots in urine.    Bladder spasm (frequency and urgency of urination).    Discomfort or aching in the back or side where the stent is.    Burning or discomfort at the end of urine stream.    To decrease these symptoms you should:    Take pain medication as prescribed.    Drink plenty of fluids.    If you experience pain at the end of urination try not emptying your bladder completely.    If having discomfort in back or side, decrease activity.    Call your physician if these signs/symptoms are present:    Pain that is not relieved by a short rest or ordered pain medications.    Temperature at or above 101.0 F or chills.    Inability or difficulty urinating.     Excessive blood in urine.    Any questions or concerns.          ** If you have questions or concerns about your procedure,   Call Dr. Ackerman at [phone] **

## 2021-09-14 ENCOUNTER — OFFICE VISIT (OUTPATIENT)
Dept: DERMATOLOGY | Facility: CLINIC | Age: 66
End: 2021-09-14
Payer: COMMERCIAL

## 2021-09-14 DIAGNOSIS — L40.9 PSORIASIS: ICD-10-CM

## 2021-09-14 DIAGNOSIS — L72.0 MILIA: Primary | ICD-10-CM

## 2021-09-14 PROCEDURE — 99214 OFFICE O/P EST MOD 30 MIN: CPT | Mod: GC | Performed by: DERMATOLOGY

## 2021-09-14 RX ORDER — TACROLIMUS 1 MG/G
OINTMENT TOPICAL 2 TIMES DAILY
Qty: 60 G | Refills: 1 | Status: SHIPPED | OUTPATIENT
Start: 2021-09-14 | End: 2021-11-11

## 2021-09-14 RX ORDER — BETAMETHASONE DIPROPIONATE 0.5 MG/G
OINTMENT, AUGMENTED TOPICAL 2 TIMES DAILY
Qty: 50 G | Refills: 3 | Status: SHIPPED | OUTPATIENT
Start: 2021-09-14

## 2021-09-14 RX ORDER — FLUOCINONIDE TOPICAL SOLUTION USP, 0.05% 0.5 MG/ML
SOLUTION TOPICAL
Qty: 60 ML | Refills: 2 | Status: SHIPPED | OUTPATIENT
Start: 2021-09-14

## 2021-09-14 RX ORDER — PIMECROLIMUS 10 MG/G
CREAM TOPICAL 2 TIMES DAILY PRN
Qty: 30 G | Refills: 0 | Status: SHIPPED | OUTPATIENT
Start: 2021-09-14 | End: 2022-04-21

## 2021-09-14 ASSESSMENT — PAIN SCALES - GENERAL: PAINLEVEL: NO PAIN (0)

## 2021-09-14 NOTE — LETTER
9/14/2021       RE: Tony CHRISTIE Walterakijodie  [address]  [place] [address]     Dear Colleague,    Thank you for referring your patient, Tony Mcnair, to the [facility] DERMATOLOG[facility] [place] at [facility]. Please see a copy of my visit note below.    [facility] Dermatology Note  Encounter Date: Sep 14, 2021  Office Visit     Dermatology Problem List:  # Limited plaque type psoriasis  Current tx:   -NBUVB phototherapy in-office 1/week. Has home light box; also doing home light box 2 days per week  -Lidex 0.05% solution for scalp BID   -Protopic 0.1% around eyes BID PRN          -taclonex ointment to affected areas BID PRN, bedtime occlusion with Saran wrap for flares    ____________________________________________    Assessment & Plan:     # Psoriasis, chronic stable. Well controlled with home nbUVB and topical pimecrolimus without symptoms of PsA   - Discussed with pt that psoriasis is a risk factor for cardiovascular disease and hypercholesterolemia  - Apply clobetasol 0.05% ointment BID two times per week and wrap with saran wrap PRN for thick areas of involvement  - Continue home nbUVB 3x per week. Discussed with pt that 5x per week isn't necessary  - Continue to apply pimecrolimus cream to affected areas  - Continue to apply lidex solution to the scalp daily PRN     # Milia, L lateral canthus  - Milia extraction performed in clinic, see procedure note below    Procedures Performed:   Milia extraction on L lateral canthus (*test spot* - no charge). After discussion of the risks/benefits including recurrence, scarring and infection, the milia was gently nicked with 30 gauge needle tip and contents expressed.    Follow-up: 6 month(s) in-person, or earlier for new or changing lesions    Staff and Resident:     Nguyen Hernandez MD  PGY-4 Dermatology Resident     I have seen and examined this patient and agree with the assessment and plan  "as documented in the resident's note, and was present for all procedures.    Petar Corbett MD  Dermatology Attending    ____________________________________________    CC: Derm Problem (Tony is here today for psoriasis. He states \" my skin is doing good\")    HPI:  Mr. Tony Mcnair is a(n) 66 year old male who presents today as a return patient for psoriasis. He was last seen in clinic on 6/1/21 at which time he was recommended to treat his psoriasis with nbUVB, protopic and lidex solution to the scalp.      Since his last visit, he states that his psoriasis has been well controlled.  He has a few active areas of involvement on his elbows, knees, and lower legs that are not painful or bothersome for him. He is currently using his home nbUVB device 5x per week. He denies any join symptoms such as pain or stiffness. He is not interested in starting a systemic medication at this time for his psoriasis given his COVID-19 pandemic.    Patient is otherwise feeling well, without additional skin concerns.    Labs Reviewed:  N/A    Physical Exam:  Vitals: There were no vitals taken for this visit.  SKIN: Focused examination of face, nails, b/l upper and lower extremities was performed  - There are erythematous well demarcated plaques with silvery micaceous scale on the b/l elbows, knees and lateral aspects of the lower legs.   - No nail pitting present on examination  - No other lesions of concern on areas examined.     Medications:  Current Outpatient Medications   Medication     acetaminophen (TYLENOL) 325 MG tablet     Artificial Tear Ointment (REFRESH P.M.) OINT     BIOTIN PO     calcipotriene-betameth diprop (TACLONEX) 0.005-0.064 % external ointment     citalopram (CELEXA) 20 MG tablet     Clopidogrel Bisulfate (PLAVIX PO)     EPINEPHrine (EPIPEN/ADRENACLICK/OR ANY BX GENERIC EQUIV) 0.3 MG/0.3ML injection 2-pack     fish oil-omega-3 fatty acids 1000 MG capsule     fluocinonide (LIDEX) 0.05 % external solution     " fluorometholone (FML LIQUIFILM) 0.1 % ophthalmic suspension     Glucosamine-Chondroit-Vit C-Mn (GLUCOSAMINE 1500 COMPLEX) CAPS     Multiple Vitamin (DAILY MULTIVITAMIN PO)     naproxen (NAPROSYN) 500 MG tablet     olopatadine (PATADAY) 0.2 % ophthalmic solution     oxyCODONE (ROXICODONE) 5 MG tablet     rosuvastatin (CRESTOR) 20 MG tablet     senna-docusate (SENOKOT-S/PERICOLACE) 8.6-50 MG tablet     tacrolimus (PROTOPIC) 0.1 % external ointment     Turmeric 500 MG TABS     VITAMIN D, CHOLECALCIFEROL, PO     acetaminophen (TYLENOL) 500 MG tablet     oxyCODONE (ROXICODONE) 5 MG tablet     pimecrolimus (ELIDEL) 1 % external cream     tamsulosin (FLOMAX) 0.4 MG capsule     No current facility-administered medications for this visit.      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

## 2021-09-14 NOTE — PROGRESS NOTES
"[facility] Dermatology Note  Encounter Date: Sep 14, 2021  Office Visit     Dermatology Problem List:  # Limited plaque type psoriasis  Current tx:   -NBUVB phototherapy in-office 1/week. Has home light box; also doing home light box 2 days per week  -Lidex 0.05% solution for scalp BID   -Protopic 0.1% around eyes BID PRN          -taclonex ointment to affected areas BID PRN, bedtime occlusion with Saran wrap for flares    ____________________________________________    Assessment & Plan:     # ***.  {kkplans:942124}   - ***     # ***.   {kkplans:910042}   - ***     Procedures Performed:   {kkprocedurenotes:754218}  {kkprocedurenotes:410397}    Follow-up: {kkfollowup:486096}    Staff and Resident:     Nguyen Hernandez MD  PGY-4 Dermatology Resident   ____________________________________________    CC: Derm Problem (Tony is here today for psoriasis. He states \" my skin is doing good\")    HPI:  Mr. Tony Mcnair is a(n) 66 year old male who presents today as a return patient for psoriasis. He was last seen in clinic on 6/1/21 at which time he was recommended to treat his psoriasis with nbUVB, protopic and lidex solution to the scalp.      Since his last visit, he states     Patient is otherwise feeling well, without additional skin concerns.    Labs Reviewed:  N/A    Physical Exam:  Vitals: There were no vitals taken for this visit.  SKIN: {kkSkinExam:140952}  - ***  - {Skin Exam Derm:405005}.   - {Skin Exam Derm:777955}.   - {Skin Exam Derm:510401}.   - No other lesions of concern on areas examined.     Medications:  Current Outpatient Medications   Medication     acetaminophen (TYLENOL) 325 MG tablet     Artificial Tear Ointment (REFRESH P.M.) OINT     BIOTIN PO     calcipotriene-betameth diprop (TACLONEX) 0.005-0.064 % external ointment     citalopram (CELEXA) 20 MG tablet     Clopidogrel Bisulfate (PLAVIX PO)     EPINEPHrine (EPIPEN/ADRENACLICK/OR ANY BX GENERIC EQUIV) 0.3 MG/0.3ML injection " 2-pack     fish oil-omega-3 fatty acids 1000 MG capsule     fluocinonide (LIDEX) 0.05 % external solution     fluorometholone (FML LIQUIFILM) 0.1 % ophthalmic suspension     Glucosamine-Chondroit-Vit C-Mn (GLUCOSAMINE 1500 COMPLEX) CAPS     Multiple Vitamin (DAILY MULTIVITAMIN PO)     naproxen (NAPROSYN) 500 MG tablet     olopatadine (PATADAY) 0.2 % ophthalmic solution     oxyCODONE (ROXICODONE) 5 MG tablet     rosuvastatin (CRESTOR) 20 MG tablet     senna-docusate (SENOKOT-S/PERICOLACE) 8.6-50 MG tablet     tacrolimus (PROTOPIC) 0.1 % external ointment     Turmeric 500 MG TABS     VITAMIN D, CHOLECALCIFEROL, PO     acetaminophen (TYLENOL) 500 MG tablet     oxyCODONE (ROXICODONE) 5 MG tablet     pimecrolimus (ELIDEL) 1 % external cream     tamsulosin (FLOMAX) 0.4 MG capsule     No current facility-administered medications for this visit.      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

## 2021-09-14 NOTE — NURSING NOTE
"Dermatology Rooming Note    Tony Mcnair's goals for this visit include:   Chief Complaint   Patient presents with     Derm Problem     Tony is here today for psoriasis. He states \" my skin is doing good\"     Evy Crowder, MAYRA  "

## 2021-09-14 NOTE — PROGRESS NOTES
"[facility] Dermatology Note  Encounter Date: Sep 14, 2021  Office Visit     Dermatology Problem List:  # Limited plaque type psoriasis  Current tx:   -NBUVB phototherapy in-office 1/week. Has home light box; also doing home light box 2 days per week  -Lidex 0.05% solution for scalp BID   -Protopic 0.1% around eyes BID PRN          -taclonex ointment to affected areas BID PRN, bedtime occlusion with Saran wrap for flares    ____________________________________________    Assessment & Plan:     # Psoriasis, chronic stable. Well controlled with home nbUVB and topical pimecrolimus without symptoms of PsA   - Discussed with pt that psoriasis is a risk factor for cardiovascular disease and hypercholesterolemia  - Apply clobetasol 0.05% ointment BID two times per week and wrap with saran wrap PRN for thick areas of involvement  - Continue home nbUVB 3x per week. Discussed with pt that 5x per week isn't necessary  - Continue to apply pimecrolimus cream to affected areas  - Continue to apply lidex solution to the scalp daily PRN     # Milia, L lateral canthus  - Milia extraction performed in clinic, see procedure note below    Procedures Performed:   Milia extraction on L lateral canthus (*test spot* - no charge). After discussion of the risks/benefits including recurrence, scarring and infection, the milia was gently nicked with 30 gauge needle tip and contents expressed.    Follow-up: 6 month(s) in-person, or earlier for new or changing lesions    Staff and Resident:     Nguyen Hernandez MD  PGY-4 Dermatology Resident     I have seen and examined this patient and agree with the assessment and plan as documented in the resident's note, and was present for all procedures.    Petar Corbett MD  Dermatology Attending    ____________________________________________    CC: Derm Problem (Tony is here today for psoriasis. He states \" my skin is doing good\")    HPI:  Mr. Tony Mcnair is a(n) 66 year old male who " presents today as a return patient for psoriasis. He was last seen in clinic on 6/1/21 at which time he was recommended to treat his psoriasis with nbUVB, protopic and lidex solution to the scalp.      Since his last visit, he states that his psoriasis has been well controlled.  He has a few active areas of involvement on his elbows, knees, and lower legs that are not painful or bothersome for him. He is currently using his home nbUVB device 5x per week. He denies any join symptoms such as pain or stiffness. He is not interested in starting a systemic medication at this time for his psoriasis given his COVID-19 pandemic.    Patient is otherwise feeling well, without additional skin concerns.    Labs Reviewed:  N/A    Physical Exam:  Vitals: There were no vitals taken for this visit.  SKIN: Focused examination of face, nails, b/l upper and lower extremities was performed  - There are erythematous well demarcated plaques with silvery micaceous scale on the b/l elbows, knees and lateral aspects of the lower legs.   - No nail pitting present on examination  - No other lesions of concern on areas examined.     Medications:  Current Outpatient Medications   Medication     acetaminophen (TYLENOL) 325 MG tablet     Artificial Tear Ointment (REFRESH P.M.) OINT     BIOTIN PO     calcipotriene-betameth diprop (TACLONEX) 0.005-0.064 % external ointment     citalopram (CELEXA) 20 MG tablet     Clopidogrel Bisulfate (PLAVIX PO)     EPINEPHrine (EPIPEN/ADRENACLICK/OR ANY BX GENERIC EQUIV) 0.3 MG/0.3ML injection 2-pack     fish oil-omega-3 fatty acids 1000 MG capsule     fluocinonide (LIDEX) 0.05 % external solution     fluorometholone (FML LIQUIFILM) 0.1 % ophthalmic suspension     Glucosamine-Chondroit-Vit C-Mn (GLUCOSAMINE 1500 COMPLEX) CAPS     Multiple Vitamin (DAILY MULTIVITAMIN PO)     naproxen (NAPROSYN) 500 MG tablet     olopatadine (PATADAY) 0.2 % ophthalmic solution     oxyCODONE (ROXICODONE) 5 MG tablet     rosuvastatin  (CRESTOR) 20 MG tablet     senna-docusate (SENOKOT-S/PERICOLACE) 8.6-50 MG tablet     tacrolimus (PROTOPIC) 0.1 % external ointment     Turmeric 500 MG TABS     VITAMIN D, CHOLECALCIFEROL, PO     acetaminophen (TYLENOL) 500 MG tablet     oxyCODONE (ROXICODONE) 5 MG tablet     pimecrolimus (ELIDEL) 1 % external cream     tamsulosin (FLOMAX) 0.4 MG capsule     No current facility-administered medications for this visit.      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

## 2021-09-15 LAB
APPEARANCE STONE: NORMAL
COMPN STONE: NORMAL
NUMBER STONE: 2
SIZE STONE: NORMAL MM
SPECIMEN WT: 43 MG

## 2021-09-23 ENCOUNTER — TRANSFERRED RECORDS (OUTPATIENT)
Dept: HEALTH INFORMATION MANAGEMENT | Facility: CLINIC | Age: 66
End: 2021-09-23

## 2021-10-09 PROBLEM — L72.0 MILIA: Status: ACTIVE | Noted: 2021-10-09

## 2021-10-13 ENCOUNTER — NURSE TRIAGE (OUTPATIENT)
Dept: FAMILY MEDICINE | Facility: CLINIC | Age: 66
End: 2021-10-13

## 2021-10-13 NOTE — TELEPHONE ENCOUNTER
Patient called reporting burning with urination, cloudy urine and irritation on his penis. Notes he recently had a kidney stone removed. Pt was advised to follow up with urology. See UC if unable to be seen with urology today. Pt verbalized agreement with plan.     Reason for Disposition    Bad or foul-smelling urine    Additional Information    Negative: Shock suspected (e.g., cold/pale/clammy skin, too weak to stand, low BP, rapid pulse)    Negative: Sounds like a life-threatening emergency to the triager    Negative: Followed a genital area injury    Negative: Followed a genital area injury (penis, scrotum)    Negative: Vaginal discharge    Negative: Pus (white, yellow) or bloody discharge from end of penis    Negative: Discomfort (pain, burning or stinging) when passing urine and pregnant    Negative: Discomfort (pain, burning or stinging) when passing urine and female    Negative: Discomfort (pain, burning or stinging) when passing urine and male    Negative: Pain or itching in the vulvar area    Negative: Pain in scrotum is main symptom    Negative: Blood in the urine is main symptom    Negative: Symptoms arising from use of a urinary catheter ([place] or [place])    Negative: Unable to urinate (or only a few drops) > 4 hours and bladder feels very full (e.g., palpable bladder or strong urge to urinate)    Negative: Decreased urination and drinking very little and dehydration suspected (e.g., dark urine, no urine > 12 hours, very dry mouth, very lightheaded)    Negative: Patient sounds very sick or weak to the triager    Negative: Fever > 100.4 F (38.0 C)    Negative: Side (flank) or lower back pain present    Negative: Can't control passage of urine (i.e., urinary incontinence) and new onset (< 2 weeks) or worsening    Negative: Urinating more frequently than usual (i.e., frequency)    Protocols used: URINARY SYMPTOMS-A-OH

## 2021-10-25 DIAGNOSIS — F33.1 MODERATE EPISODE OF RECURRENT MAJOR DEPRESSIVE DISORDER (H): ICD-10-CM

## 2021-10-26 RX ORDER — CITALOPRAM HYDROBROMIDE 20 MG/1
TABLET ORAL
Qty: 90 TABLET | Refills: 1 | Status: SHIPPED | OUTPATIENT
Start: 2021-10-26 | End: 2022-05-02

## 2021-11-11 ENCOUNTER — OFFICE VISIT (OUTPATIENT)
Dept: FAMILY MEDICINE | Facility: CLINIC | Age: 66
End: 2021-11-11
Payer: COMMERCIAL

## 2021-11-11 VITALS
HEIGHT: 70 IN | HEART RATE: 66 BPM | BODY MASS INDEX: 22.48 KG/M2 | TEMPERATURE: 97.5 F | WEIGHT: 157 LBS | SYSTOLIC BLOOD PRESSURE: 120 MMHG | RESPIRATION RATE: 16 BRPM | OXYGEN SATURATION: 98 % | DIASTOLIC BLOOD PRESSURE: 74 MMHG

## 2021-11-11 DIAGNOSIS — Z00.00 ENCOUNTER FOR PREVENTIVE CARE: Primary | ICD-10-CM

## 2021-11-11 PROCEDURE — 99396 PREV VISIT EST AGE 40-64: CPT | Performed by: INTERNAL MEDICINE

## 2021-11-11 ASSESSMENT — ENCOUNTER SYMPTOMS
PARESTHESIAS: 0
DYSURIA: 0
PALPITATIONS: 0
WEAKNESS: 0
FEVER: 0
JOINT SWELLING: 0
SORE THROAT: 0
DIZZINESS: 0
DIARRHEA: 0
HEMATURIA: 0
CHILLS: 0
CONSTIPATION: 0
HEARTBURN: 0
FREQUENCY: 0
HEADACHES: 1
EYE PAIN: 0
MYALGIAS: 0
NAUSEA: 0
COUGH: 0
ABDOMINAL PAIN: 0
NERVOUS/ANXIOUS: 0
SHORTNESS OF BREATH: 0
HEMATOCHEZIA: 0
ARTHRALGIAS: 1

## 2021-11-11 ASSESSMENT — MIFFLIN-ST. JEOR: SCORE: 1508.4

## 2021-11-11 NOTE — PATIENT INSTRUCTIONS
Tony;  I think you are doing well.    We will obtain some fasting tests at your convenience in the next few days.  These will include cholesterol.  I will also evaluate a urinalysis to make certain that there has been resolution of your nephrolithiasis    I have included a list of high oxalate and low oxalate foods.  Make certain to drink plenty of water.    If you start to have symptoms consistent with nephrolithiasis remember our conversation, there are medications that can help with passage of stones such as Flomax or tamsulosin.    Best regards  Tony

## 2021-11-11 NOTE — PROGRESS NOTES
SUBJECTIVE:   CC: Tony Mcnair is an 66 year old male who presents for preventative health visit.  Patient overall is doing quite well.    No complaints of shortness of breath endocrine hematologic or allergic symptoms.      Patient has been advised of split billing requirements and indicates understanding: Yes  Healthy Habits:     Getting at least 3 servings of Calcium per day:  Yes    Bi-annual eye exam:  Yes    Dental care twice a year:  Yes    Sleep apnea or symptoms of sleep apnea:  Daytime drowsiness    Diet:  Gluten-free/reduced    Frequency of exercise:  6-7 days/week    Duration of exercise:  30-45 minutes    Taking medications regularly:  Yes    Medication side effects:  None    PHQ-2 Total Score: 1    Additional concerns today:  No            Today's PHQ-2 Score:   PHQ-2 ( 1999 Pfizer) 11/11/2021   Q1: Little interest or pleasure in doing things -   Q2: Feeling down, depressed or hopeless -   PHQ-2 Score -   Q1: Little interest or pleasure in doing things -   Q2: Feeling down, depressed or hopeless -   PHQ-2 Score Incomplete       Abuse: Current or Past(Physical, Sexual or Emotional)- No  Do you feel safe in your environment? Yes        Social History     Tobacco Use     Smoking status: Never Smoker     Smokeless tobacco: Never Used   Substance Use Topics     Alcohol use: Yes     Comment: 4 beers a week     If you drink alcohol do you typically have >3 drinks per day or >7 drinks per week? No    Alcohol Use 10/21/2020   Prescreen: >3 drinks/day or >7 drinks/week? -   Prescreen: >3 drinks/day or >7 drinks/week? No       Last PSA:   PSA   Date Value Ref Range Status   10/21/2020 0.61 0 - 4 ug/L Final     Comment:     Assay Method:  Chemiluminescence using Siemens Vista analyzer       Reviewed orders with patient. 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     Coronary artery disease involving native coronary artery of native heart without angina pectoris     s/p ptca tamica prox circ     Depressive disorder Junie     Motion sickness      Zoster 1988    LEFT EYE        Review of Systems  CONSTITUTIONAL: NEGATIVE for fever, chills, change in weight  INTEGUMENTARY/SKIN: NEGATIVE for worrisome rashes, moles or lesions  EYES: NEGATIVE for vision changes or irritation  ENT: NEGATIVE for ear, mouth and throat problems  RESP: NEGATIVE for significant cough or SOB  CV: NEGATIVE for chest pain, palpitations or peripheral edema  GI: NEGATIVE for nausea, abdominal pain, heartburn, or change in bowel habits   male: negative for dysuria, hematuria, decreased urinary stream, erectile dysfunction, urethral discharge  MUSCULOSKELETAL: NEGATIVE for significant arthralgias or myalgia  NEURO: NEGATIVE for weakness, dizziness or paresthesias  PSYCHIATRIC: NEGATIVE for changes in mood or affect    OBJECTIVE:   There were no vitals taken for this visit.    Physical Exam  GENERAL: healthy, alert and no distress  EYES: Eyes grossly normal to inspection, PERRL and conjunctivae and sclerae normal  HENT: ear canals and TM's normal, nose and mouth without ulcers or lesions  NECK: no adenopathy, no asymmetry, masses, or scars and thyroid normal to palpation  RESP: lungs clear to auscultation - no rales, rhonchi or wheezes  CV: regular rate and rhythm, normal S1 S2, no S3 or S4, no murmur, click or rub, no peripheral edema and peripheral pulses strong  ABDOMEN: soft, nontender, no hepatosplenomegaly, no masses and bowel sounds normal  MS: no gross musculoskeletal defects noted, no edema  SKIN: no suspicious lesions or rashes  NEURO: Normal strength and tone, mentation intact and speech normal  PSYCH: mentation appears normal, affect normal/bright    Diagnostic Test  "Results:  Labs reviewed in Epic    ASSESSMENT/PLAN:       ICD-10-CM    1. Encounter for preventive care  Z00.00 Comprehensive metabolic panel (BMP + Alb, Alk Phos, ALT, AST, Total. Bili, TP)     CBC with platelets and differential     TSH with free T4 reflex     Lipid panel reflex to direct LDL Fasting     PSA, screen     UA with Microscopic reflex to Culture - lab collect       Patient has been advised of split billing requirements and indicates understanding: Yes  COUNSELING:   Reviewed preventive health counseling, as reflected in patient instructions    Estimated body mass index is 21.94 kg/m  as calculated from the following:    Height as of 9/10/21: 1.778 m (5' 10\").    Weight as of 9/10/21: 69.4 kg (152 lb 14.4 oz).         He reports that he has never smoked. He has never used smokeless tobacco.      Counseling Resources:  ATP IV Guidelines  Pooled Cohorts Equation Calculator  FRAX Risk Assessment  ICSI Preventive Guidelines  Dietary Guidelines for Americans, 2010  USDA's MyPlate  ASA Prophylaxis  Lung CA Screening    Tony Maldonado MD  [facility]  "

## 2021-11-12 ASSESSMENT — PATIENT HEALTH QUESTIONNAIRE - PHQ9: SUM OF ALL RESPONSES TO PHQ QUESTIONS 1-9: 4

## 2021-11-18 ENCOUNTER — LAB (OUTPATIENT)
Dept: LAB | Facility: CLINIC | Age: 66
End: 2021-11-18
Payer: COMMERCIAL

## 2021-11-18 ENCOUNTER — MYC MEDICAL ADVICE (OUTPATIENT)
Dept: FAMILY MEDICINE | Facility: CLINIC | Age: 66
End: 2021-11-18
Payer: COMMERCIAL

## 2021-11-18 ENCOUNTER — NURSE TRIAGE (OUTPATIENT)
Dept: FAMILY MEDICINE | Facility: CLINIC | Age: 66
End: 2021-11-18
Payer: COMMERCIAL

## 2021-11-18 DIAGNOSIS — Z00.00 ENCOUNTER FOR PREVENTIVE CARE: ICD-10-CM

## 2021-11-18 LAB
ALBUMIN UR-MCNC: NEGATIVE MG/DL
APPEARANCE UR: CLEAR
BACTERIA #/AREA URNS HPF: NORMAL /HPF
BASOPHILS # BLD AUTO: 0 10E3/UL (ref 0–0.2)
BASOPHILS NFR BLD AUTO: 1 %
BILIRUB UR QL STRIP: NEGATIVE
COLOR UR AUTO: YELLOW
EOSINOPHIL # BLD AUTO: 0.4 10E3/UL (ref 0–0.7)
EOSINOPHIL NFR BLD AUTO: 6 %
ERYTHROCYTE [DISTWIDTH] IN BLOOD BY AUTOMATED COUNT: 12.1 % (ref 10–15)
GLUCOSE UR STRIP-MCNC: NEGATIVE MG/DL
HCT VFR BLD AUTO: 44.9 % (ref 40–53)
HGB BLD-MCNC: 15.7 G/DL (ref 13.3–17.7)
HGB UR QL STRIP: ABNORMAL
KETONES UR STRIP-MCNC: 15 MG/DL
LEUKOCYTE ESTERASE UR QL STRIP: NEGATIVE
LYMPHOCYTES # BLD AUTO: 1.9 10E3/UL (ref 0.8–5.3)
LYMPHOCYTES NFR BLD AUTO: 30 %
MCH RBC QN AUTO: 33.9 PG (ref 26.5–33)
MCHC RBC AUTO-ENTMCNC: 35 G/DL (ref 31.5–36.5)
MCV RBC AUTO: 97 FL (ref 78–100)
MONOCYTES # BLD AUTO: 0.6 10E3/UL (ref 0–1.3)
MONOCYTES NFR BLD AUTO: 10 %
NEUTROPHILS # BLD AUTO: 3.5 10E3/UL (ref 1.6–8.3)
NEUTROPHILS NFR BLD AUTO: 54 %
NITRATE UR QL: NEGATIVE
PH UR STRIP: 7 [PH] (ref 5–7)
PLATELET # BLD AUTO: 124 10E3/UL (ref 150–450)
RBC # BLD AUTO: 4.63 10E6/UL (ref 4.4–5.9)
RBC #/AREA URNS AUTO: NORMAL /HPF
SP GR UR STRIP: 1.02 (ref 1–1.03)
UROBILINOGEN UR STRIP-ACNC: 0.2 E.U./DL
WBC # BLD AUTO: 6.4 10E3/UL (ref 4–11)
WBC #/AREA URNS AUTO: NORMAL /HPF

## 2021-11-18 PROCEDURE — 81001 URINALYSIS AUTO W/SCOPE: CPT

## 2021-11-18 PROCEDURE — 80061 LIPID PANEL: CPT

## 2021-11-18 PROCEDURE — G0103 PSA SCREENING: HCPCS

## 2021-11-18 PROCEDURE — 36415 COLL VENOUS BLD VENIPUNCTURE: CPT

## 2021-11-18 PROCEDURE — 80050 GENERAL HEALTH PANEL: CPT

## 2021-11-18 NOTE — TELEPHONE ENCOUNTER
Pt question if he should keep lab appointments today since his wife was informed by CDC by possible Exposure to someone with COVID. Both pt and wife are fully vaccinated and have no symptoms. Advised he keep appt at clinic. Pt verbalized agreement with plan.      Reason for Disposition    [1] No COVID-19 EXPOSURE BUT [2] living with someone who was exposed and who has no symptoms of COVID-19    Additional Information    Negative: COVID-19 lab test positive    Negative: [1] Lives with someone known to have influenza (flu test positive) AND [2] flu-like symptoms (e.g., cough, runny nose, sore throat, SOB; with or without fever)    Negative: [1] Symptoms of COVID-19 (e.g., cough, fever, SOB, or others) AND [2] HCP diagnosed COVID-19 based on symptoms    Negative: [1] Symptoms of COVID-19 (e.g., cough, fever, SOB, or others) AND [2] lives in an area with community spread    Negative: [1] Symptoms of COVID-19 (e.g., cough, fever, SOB, or others) AND [2] within 14 days of EXPOSURE (close contact) with diagnosed or suspected COVID-19 patient    Negative: [1] Symptoms of COVID-19 (e.g., cough, fever, SOB, or others) AND [2] within 14 days of travel from high-risk area for COVID-19 community spread (identified by CDC)    Negative: [1] Difficulty breathing (shortness of breath) occurs AND [2] onset > 14 days after COVID-19 EXPOSURE (Close Contact)    Negative: [1] Dry cough occurs AND [2] onset > 14 days after COVID-19 EXPOSURE    Negative: [1] Wet cough (i.e., white-yellow, yellow, green, or tomy colored sputum) AND [2] onset > 14 days after COVID-19 EXPOSURE    Negative: [1] Common cold symptoms AND [2] onset > 14 days after COVID-19 EXPOSURE    Negative: COVID-19 vaccine reaction suspected (e.g., fever, headache, muscle aches) occurring during days 1-3 after getting vaccine    Negative: COVID-19 vaccine, questions about    Negative: [1] CLOSE CONTACT COVID-19 EXPOSURE within last 14 days AND [2] needs COVID-19 lab test to  return to work AND [3] NO symptoms    Negative: [1] CLOSE CONTACT COVID-19 EXPOSURE within last 14 days AND [2] exposed person is a  (e.g., police or paramedic) AND [3] NO symptoms    Negative: [1] CLOSE CONTACT COVID-19 EXPOSURE within last 14 days AND [2] exposed person is a healthcare worker who was NOT using all recommended personal protective equipment (e.g., a respirator-N95 mask, eye protection, gloves, and gown) AND [3] NO symptoms    Negative: [1] Living or working in a correctional facility, long-term care facility, or shelter (i.e., congregate setting; densely populated) AND [2] where an outbreak has occurred AND [3] NO symptoms    Negative: [1] CLOSE CONTACT COVID-19 EXPOSURE within last 14 days AND [2] NO symptoms    Negative: [1] Has NOT completed COVID-19 vaccine series AND [2] was at a large indoor or outdoor event (e.g., concert, festival, rally, wedding) or been in crowded indoor setting AND [3] within last 14 days    Negative: [1] COVID-19 EXPOSURE AND [2] 15 or more days ago AND [3] NO symptoms    Negative: [1] Living in area with community spread (identified by local PHD) BUT [2] NO symptoms    Negative: [1] Travel from area with community spread (identified by CDC) AND [2] within last 14 days BUT [3] NO symptoms    Protocols used: CORONAVIRUS (COVID-19) EXPOSURE-A- 8.25.2021

## 2021-11-19 LAB
ALBUMIN SERPL-MCNC: 3.7 G/DL (ref 3.4–5)
ALP SERPL-CCNC: 53 U/L (ref 40–150)
ALT SERPL W P-5'-P-CCNC: 20 U/L (ref 0–70)
ANION GAP SERPL CALCULATED.3IONS-SCNC: 8 MMOL/L (ref 3–14)
AST SERPL W P-5'-P-CCNC: 15 U/L (ref 0–45)
BILIRUB SERPL-MCNC: 1.2 MG/DL (ref 0.2–1.3)
BUN SERPL-MCNC: 13 MG/DL (ref 7–30)
CALCIUM SERPL-MCNC: 9.3 MG/DL (ref 8.5–10.1)
CHLORIDE BLD-SCNC: 102 MMOL/L (ref 94–109)
CHOLEST SERPL-MCNC: 183 MG/DL
CO2 SERPL-SCNC: 28 MMOL/L (ref 20–32)
CREAT SERPL-MCNC: 0.93 MG/DL (ref 0.66–1.25)
FASTING STATUS PATIENT QL REPORTED: NORMAL
GFR SERPL CREATININE-BSD FRML MDRD: 86 ML/MIN/1.73M2
GLUCOSE BLD-MCNC: 82 MG/DL (ref 70–99)
HDLC SERPL-MCNC: 97 MG/DL
LDLC SERPL CALC-MCNC: 71 MG/DL
NONHDLC SERPL-MCNC: 86 MG/DL
POTASSIUM BLD-SCNC: 4.3 MMOL/L (ref 3.4–5.3)
PROT SERPL-MCNC: 7.1 G/DL (ref 6.8–8.8)
PSA SERPL-MCNC: 1.81 UG/L (ref 0–4)
SODIUM SERPL-SCNC: 138 MMOL/L (ref 133–144)
TRIGL SERPL-MCNC: 77 MG/DL
TSH SERPL DL<=0.005 MIU/L-ACNC: 2.18 MU/L (ref 0.4–4)

## 2021-11-23 ENCOUNTER — OFFICE VISIT (OUTPATIENT)
Dept: OPHTHALMOLOGY | Facility: CLINIC | Age: 66
End: 2021-11-23
Attending: OPHTHALMOLOGY
Payer: COMMERCIAL

## 2021-11-23 DIAGNOSIS — D31.41 IRIS NEVUS, RIGHT: ICD-10-CM

## 2021-11-23 DIAGNOSIS — H01.02A SQUAMOUS BLEPHARITIS OF BOTH UPPER AND LOWER EYELID OF RIGHT EYE: ICD-10-CM

## 2021-11-23 DIAGNOSIS — H04.123 DRY EYE SYNDROME OF BOTH LACRIMAL GLANDS: Primary | ICD-10-CM

## 2021-11-23 PROCEDURE — 99214 OFFICE O/P EST MOD 30 MIN: CPT | Mod: GC | Performed by: OPHTHALMOLOGY

## 2021-11-23 ASSESSMENT — REFRACTION_WEARINGRX
SPECS_TYPE: PAL
OD_ADD: +2.25
OS_ADD: +2.25
OS_CYLINDER: +4.00
OD_SPHERE: -1.00
OD_AXIS: 175
OD_CYLINDER: +4.00
OS_AXIS: 007
OS_SPHERE: -0.25

## 2021-11-23 ASSESSMENT — VISUAL ACUITY
METHOD: SNELLEN - LINEAR
OS_CC: 20/25
CORRECTION_TYPE: GLASSES
OS_CC+: -2
OD_CC: 20/25
OD_CC+: -2

## 2021-11-23 ASSESSMENT — EXTERNAL EXAM - LEFT EYE: OS_EXAM: NORMAL

## 2021-11-23 ASSESSMENT — EXTERNAL EXAM - RIGHT EYE: OD_EXAM: NORMAL

## 2021-11-23 ASSESSMENT — TONOMETRY
IOP_METHOD: ICARE
OD_IOP_MMHG: 11
OS_IOP_MMHG: 13

## 2021-11-23 ASSESSMENT — CONF VISUAL FIELD
OS_NORMAL: 1
OD_NORMAL: 1
METHOD: COUNTING FINGERS

## 2021-11-23 NOTE — PROGRESS NOTES
Mr. Mcnair is a 60 year old CM with h/o Dry eye syndrome.      Interval:  Patient here for 2 month follow up on MGD/demodex/OS zoster.  Eyes are comfortable more so than they have been.  or the most part, no significant changes. Happy with vision in current glasses. No new floaters or flashes.      Hx of psoriasis.     Reports worsening of floaters, no flashes.     Ocular meds:  FML BID left eye     Preservative free artificial tears: 1-2x/day (Refresh Plus)   Pataday qAM  Fish oil  Claridex foam    Warm compresses: not doing     A/P:  1. 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               Continue artificial tears. Increase to 4-6x/day.              Continue with eyelid scrubs              Advised to Restart warm compresses and lid hygiene daily.               Surface looks healthy. Can approach additional methods of treatment if symptoms/signs worsen - plugs, Restasis, BCL, Doxy, etc.      # Demodex noted - Worse left eye .  Using Claridex foam. each eye each morning but use day and night left eye. Told to use 1 pe day.  Cont art tears each eye.    # Hx of left eye shingles: OK to reduce to once daily FML left eye for 1 month, then every other day for 1 month, then stop. Disc SE.                2. Allergic conjunctivitis.              Continue pataday as needed.                  3. Iris Nevus OD              stable in size compared to prior photos  (2011)              Monitor     4. Refractive Error each eye  - BCVA 20/25 and 20/20 today, patient deferring Mrx today     5. PVD each eye  - no flashes / curtain, stable.      RTC: 3 months . Call sooner with any problems    Alirio Erwin DO  Fellow, Cornea & External Disease  Department of Ophthalmology  [facility]    Sean Schultz MD    Attending Physician Attestation:  Complete documentation of historical and exam elements from today's encounter can be found in the full encounter summary  report (not reduplicated in this progress note).  I personally obtained the chief complaint(s) and history of present illness.  I confirmed and edited as necessary the review of systems, past medical/surgical history, family history, social history, and examination findings as documented by others; and I examined the patient myself.  I personally reviewed the relevant tests, images, and reports as documented above.  I formulated and edited as necessary the assessment and plan and discussed the findings and management plan with the patient and family. - Sean Schultz MD

## 2021-12-08 ENCOUNTER — MYC MEDICAL ADVICE (OUTPATIENT)
Dept: FAMILY MEDICINE | Facility: CLINIC | Age: 66
End: 2021-12-08
Payer: COMMERCIAL

## 2021-12-08 DIAGNOSIS — Z91.018 FOOD ALLERGY: ICD-10-CM

## 2021-12-10 RX ORDER — EPINEPHRINE 0.3 MG/.3ML
0.3 INJECTION SUBCUTANEOUS ONCE
Qty: 2 EACH | Refills: 1 | Status: SHIPPED | OUTPATIENT
Start: 2021-12-10 | End: 2021-12-10

## 2021-12-10 NOTE — TELEPHONE ENCOUNTER
Routing refill request to provider for review/approval because:  Prescribed by previous PCP for food allergy    Last office vist: 11/11/221    Pended 2 injection  supply & 1 refills. Please Review.    Next office visit: none      Kathi Nielsen RN  [facility]

## 2022-01-01 NOTE — NURSING NOTE
Tony Mcnair comes into clinic today at the request of Dr Mckinney Ordering Provider for NBUVB.    This service provided today was under the supervising provider of the day Dr Waters, who was available if needed.    Georgina Wills RN     Attending Only

## 2022-02-09 ENCOUNTER — TELEPHONE (OUTPATIENT)
Dept: OPHTHALMOLOGY | Facility: CLINIC | Age: 67
End: 2022-02-09
Payer: COMMERCIAL

## 2022-02-09 NOTE — TELEPHONE ENCOUNTER
LVM for patient to call back and reschedule appointment with  for 2/22. Call back phone number was provided.      Keyla guzman

## 2022-02-11 ENCOUNTER — TELEPHONE (OUTPATIENT)
Dept: OPHTHALMOLOGY | Facility: CLINIC | Age: 67
End: 2022-02-11
Payer: COMMERCIAL

## 2022-02-11 DIAGNOSIS — E78.5 HYPERLIPIDEMIA, UNSPECIFIED HYPERLIPIDEMIA TYPE: ICD-10-CM

## 2022-02-14 ENCOUNTER — TELEPHONE (OUTPATIENT)
Dept: OPHTHALMOLOGY | Facility: CLINIC | Age: 67
End: 2022-02-14
Payer: COMMERCIAL

## 2022-02-15 RX ORDER — ROSUVASTATIN CALCIUM 20 MG/1
20 TABLET, COATED ORAL DAILY
Qty: 90 TABLET | Refills: 1 | Status: SHIPPED | OUTPATIENT
Start: 2022-02-15 | End: 2022-06-28

## 2022-02-27 ENCOUNTER — MYC REFILL (OUTPATIENT)
Dept: FAMILY MEDICINE | Facility: CLINIC | Age: 67
End: 2022-02-27
Payer: COMMERCIAL

## 2022-02-27 DIAGNOSIS — L40.9 PSORIASIS: ICD-10-CM

## 2022-03-01 NOTE — TELEPHONE ENCOUNTER
calcipotriene-betameth diprop (TACLONEX) 0.005-0.064 % external ointment 60 g 0 7/20/2020  No   Sig: APPLY TO THE AFFECTED AREA 2 TIMES DAILY AS NEEDED   Sent to pharmacy as: Calcipotriene-Betameth Diprop 0.005-0.064 % External Ointment (TACLONEX)   Class: E-Prescribe   Order: [id number]   E-Prescribing Status: Receipt confirmed by pharmacy (7/20/2020  3:15 PM CDT)      last Rx was from dermatologist     Routing refill request to provider for review/approval because:  Drug not on the [facility] refill protocol     Miguel Oliver RN  [facility]

## 2022-03-02 RX ORDER — CALCIPOTRIENE, BETAMETHASONE DIPROPIONATE 50; .643 UG/G; MG/G
OINTMENT TOPICAL
Qty: 60 G | Refills: 0 | Status: SHIPPED | OUTPATIENT
Start: 2022-03-02 | End: 2022-04-21

## 2022-04-20 ENCOUNTER — MYC MEDICAL ADVICE (OUTPATIENT)
Dept: DERMATOLOGY | Facility: CLINIC | Age: 67
End: 2022-04-20
Payer: COMMERCIAL

## 2022-04-21 ENCOUNTER — OFFICE VISIT (OUTPATIENT)
Dept: DERMATOLOGY | Facility: CLINIC | Age: 67
End: 2022-04-21
Payer: COMMERCIAL

## 2022-04-21 DIAGNOSIS — L40.9 PSORIASIS: ICD-10-CM

## 2022-04-21 DIAGNOSIS — D18.01 CHERRY ANGIOMA: ICD-10-CM

## 2022-04-21 DIAGNOSIS — L57.0 ACTINIC KERATOSIS: Primary | ICD-10-CM

## 2022-04-21 DIAGNOSIS — L81.4 SOLAR LENTIGO: ICD-10-CM

## 2022-04-21 DIAGNOSIS — L82.0 SEBORRHEIC KERATOSIS, INFLAMED: ICD-10-CM

## 2022-04-21 DIAGNOSIS — D22.9 MULTIPLE BENIGN NEVI: ICD-10-CM

## 2022-04-21 DIAGNOSIS — L82.1 SEBORRHEIC KERATOSIS: ICD-10-CM

## 2022-04-21 PROCEDURE — 17000 DESTRUCT PREMALG LESION: CPT | Performed by: PHYSICIAN ASSISTANT

## 2022-04-21 PROCEDURE — 99213 OFFICE O/P EST LOW 20 MIN: CPT | Mod: 25 | Performed by: PHYSICIAN ASSISTANT

## 2022-04-21 RX ORDER — CALCIPOTRIENE, BETAMETHASONE DIPROPIONATE 50; .643 UG/G; MG/G
OINTMENT TOPICAL
Qty: 60 G | Refills: 0 | Status: SHIPPED | OUTPATIENT
Start: 2022-04-21 | End: 2023-03-10

## 2022-04-21 RX ORDER — PIMECROLIMUS 10 MG/G
CREAM TOPICAL 2 TIMES DAILY PRN
Qty: 30 G | Refills: 0 | Status: SHIPPED | OUTPATIENT
Start: 2022-04-21

## 2022-04-21 ASSESSMENT — PAIN SCALES - GENERAL: PAINLEVEL: NO PAIN (0)

## 2022-04-21 NOTE — PROGRESS NOTES
[facility] Dermatology Note  Encounter Date: Apr 21, 2022  Office Visit     Dermatology Problem List:  1. Limited plaque type psoriasis  Current tx:   - NBUVB phototherapy in-office 1/week. Has home light box; also doing home light box 2 days per week  - Lidex 0.05% solution for scalp BID   - Protopic 0.1% around eyes BID PRN  - taclonex ointment to affected areas BID PRN, bedtime  occlusion with Saran wrap for flares  2. AK, s/p cryo  ____________________________________________    Assessment & Plan:    # Actinic keratosis, vertex scalp x1  - Cryo today, see procedure below    # Irritated seborrheic keratosis, mid back  - Offered treatment with cryo today, but patient deferred.    # Psoriasis, chronic stable. Well controlled with home nbUVB and topical pimecrolimus without symptoms of PsA  - Continue home nbUVB 3x per week. Discussed with pt that 5x per week isn't necessary  - Continue to apply pimecrolimus cream to affected areas, refill provided  - Continue to apply lidex solution to the scalp daily PRN   - Refilled Taclonex ointment BID prn, bedtime  occlusion with Saran wrap for flares    # Benign lesions: Multiple benign nevi, solar lentigos, seborrheic keratoses, cherry angiomas. Explained to patient benign nature of lesion. No treatment is necessary at this time unless the lesion changes or becomes symptomatic.   - ABCDs of melanoma were discussed and self skin checks were advised.  - Sun precaution was advised including the use of sun screens of SPF 30 or higher, sun protective clothing, and avoidance of tanning beds.     Procedures Performed:   - Cryotherapy procedure note, location(s): see above. After verbal consent and discussion of risks and benefits including, but not limited to, dyspigmentation/scar, blister, and pain, 1 lesion(s) was(were) treated with 1-2 mm freeze border for 1-2 cycles with liquid nitrogen. Post cryotherapy instructions were provided.    Follow-up: 6 months for  Dr. Corbett for psoriasis and 1 year for FBSE    Staff and Scribe:     Scribe Disclosure:  I, Minh Melara, am serving as a scribe to document services personally performed by Nikkie Way PA-C based on data collection and the provider's statements to me.     Provider Disclosure:   The documentation recorded by the scribe accurately reflects the services I personally performed and the decisions made by me.    All risks, benefits and alternatives were discussed with patient.  Patient is in agreement and understands the assessment and plan.  All questions were answered.    Nikkie Way PA-C, [facility]  [facility]: Phone: [phone], Fax: [phone]  [facility]: Phone: [phone],  Fax: [phone]  [facility]: Phone: [phone], Fax: [phone]  _____________________________________    CC: Derm Problem (Psoriasis - doing well. ) and Skin Check (No specific spots of concern. )    HPI:  Mr. Tony Mcnair is a(n) 66 year old male who presents today as a return patient for FBSE. Last seen in dermatology by Dr. Corbett on 9/14/2021, at which time patient was started on clobetasol 0.05% ointment BID 2x weekly prn for treatment of psoriasis.    Today, patient reports that his psoriasis has been manageable with nbUVB and topicals. Denies personal history of skin cancer or family history of melanoma.    Patient is otherwise feeling well, without additional skin concerns.    Labs Reviewed:  N/A    Physical Exam:  Vitals: There were no vitals taken for this visit.  SKIN: Total skin excluding the undergarment areas was performed. The exam included the head/face, neck, both arms, chest, back, abdomen, both legs, digits and/or nails.   - Psoriasiform plaques on lateral aspects of calves, knees, elbows, upper buttocks.  - There is an erythematous macule with overyling adherent scale on the vertex scalp.   -  Xerosis bilaterally on the medial canthi.  - There are dome shaped bright red papules on the trunk and extremities.   - Multiple regular brown pigmented macules and papules are identified on the trunk and extremities.   - Scattered brown macules on sun exposed areas.  - There are waxy stuck on tan to brown papules on the trunk and extremities.   - No pitting noted to nails.  - There is a tan to brown waxy stuck on papule with surrounding erythema on the mid back.  - No other lesions of concern on areas examined.     Medications:  Current Outpatient Medications   Medication     acetaminophen (TYLENOL) 325 MG tablet     acetaminophen (TYLENOL) 500 MG tablet     Artificial Tear Ointment (REFRESH P.M.) OINT     augmented betamethasone dipropionate (DIPROLENE-AF) 0.05 % external ointment     BIOTIN PO     calcipotriene-betameth diprop (TACLONEX) 0.005-0.064 % external ointment     citalopram (CELEXA) 20 MG tablet     Clopidogrel Bisulfate (PLAVIX PO)     fish oil-omega-3 fatty acids 1000 MG capsule     fluocinonide (LIDEX) 0.05 % external solution     Glucosamine-Chondroit-Vit C-Mn (GLUCOSAMINE 1500 COMPLEX) CAPS     Multiple Vitamin (DAILY MULTIVITAMIN PO)     naproxen (NAPROSYN) 500 MG tablet     olopatadine (PATADAY) 0.2 % ophthalmic solution     pimecrolimus (ELIDEL) 1 % external cream     rosuvastatin (CRESTOR) 20 MG tablet     tamsulosin (FLOMAX) 0.4 MG capsule     Turmeric 500 MG TABS     VITAMIN D, CHOLECALCIFEROL, PO     oxyCODONE (ROXICODONE) 5 MG tablet     senna-docusate (SENOKOT-S/PERICOLACE) 8.6-50 MG tablet     No current facility-administered medications for this visit.      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

## 2022-04-21 NOTE — PATIENT INSTRUCTIONS
Cryotherapy    What is it?  Use of a very cold liquid, such as liquid nitrogen, to freeze and destroy abnormal skin cells that need to be removed    What should I expect?  Tenderness and redness  A small blister that might grow and fill with dark purple blood. There may be crusting.  More than one treatment may be needed if the lesions do not go away.    How do I care for the treated area?  Gently wash the area with your hands when bathing.  Use a thin layer of Vaseline to help with healing. You may use a Band-Aid.   The area should heal within 7-10 days and may leave behind a pink or lighter color.   Do not use an antibiotic or Neosporin ointment.   You may take acetaminophen (Tylenol) for pain.     Call your doctor if you have:  Severe pain  Signs of infection (warmth, redness, cloudy yellow drainage, and or a bad smell)  Questions or concerns    Who should I call with questions?      [facility]: [phone]      [facility]: [phone]      For urgent needs outside of business hours call the [facility] at [phone] and ask for the dermatology resident on call       Patient Education     Checking for Skin Cancer  You can find cancer early by checking your skin each month. There are 3 kinds of skin cancer. They are melanoma, basal cell carcinoma, and squamous cell carcinoma. Doing monthly skin checks is the best way to find new marks or skin changes. Follow the instructions below for checking your skin.   The ABCDEs of checking moles for melanoma   Check your moles or growths for signs of melanoma using ABCDE:   Asymmetry: the sides of the mole or growth don t match  Border: the edges are ragged, notched, or blurred  Color: the color within the mole or growth varies  Diameter: the mole or growth is larger than 6 mm (size of a pencil eraser)  Evolving: the size, shape, or color of the mole or growth is changing (evolving is not shown in the  images below)    Checking for other types of skin cancer  Basal cell carcinoma or squamous cell carcinoma have symptoms such as:     A spot or mole that looks different from all other marks on your skin  Changes in how an area feels, such as itching, tenderness, or pain  Changes in the skin's surface, such as oozing, bleeding, or scaliness  A sore that does not heal  New swelling or redness beyond the border of a mole    Who s at risk?  Anyone can get skin cancer. But you are at greater risk if you have:   Fair skin, light-colored hair, or light-colored eyes  Many moles or abnormal moles on your skin  A history of sunburns from sunlight or tanning beds  A family history of skin cancer  A history of exposure to radiation or chemicals  A weakened immune system  If you have had skin cancer in the past, you are at risk for recurring skin cancer.   How to check your skin  Do your monthly skin checkups in front of a full-length mirror. Check all parts of your body, including your:   Head (ears, face, neck, and scalp)  Torso (front, back, and sides)  Arms (tops, undersides, upper, and lower armpits)  Hands (palms, backs, and fingers, including under the nails)  Buttocks and genitals  Legs (front, back, and sides)  Feet (tops, soles, toes, including under the nails, and between toes)  If you have a lot of moles, take digital photos of them each month. Make sure to take photos both up close and from a distance. These can help you see if any moles change over time.   Most skin changes are not cancer. But if you see any changes in your skin, call your doctor right away. Only he or she can diagnose a problem. If you have skin cancer, seeing your doctor can be the first step toward getting the treatment that could save your life.   [employer] last reviewed this educational content on 4/1/2019 2000-2020 [employer]. [address] [place] [address], [place] [address]. All rights reserved. This information is not intended as  a substitute for professional medical care. Always follow your healthcare professional's instructions.       When should I call my doctor?  If you are worsening or not improving, please, contact us or seek urgent care as noted below.     Who should I call with questions (adults)?  [facility] (adult and pediatric): [phone]  [facility] (adult): [phone]  For urgent needs outside of business hours call the [facility] at [phone] and ask for the dermatology resident on call to be paged  If this is a medical emergency and you are unable to reach an ER, Call 1    Who should I call with questions (pediatric)?  [facility]- Pediatric Dermatology  Dr. Linda Hayden, Dr. Juan A Benavidez, Dr. Roseanne Sharp, HARLEY Lopez, Dr. Cassie Puente, Dr. Evy Manrique & Dr. Michael Castaneda  Non-urgent nurse triage line; [phone]- Yin and Marisa [facility] Care Coordinators   Fani (/Complex ) [phone]    If you need a prescription refill, please contact your pharmacy. Refills are approved or denied by our Physicians during normal business hours, Monday through Fridays  Per office policy, refills will not be granted if you have not been seen within the past year (or sooner depending on your child's condition)    Scheduling Information:  Pediatric Appointment Scheduling and Call Center [phone]  Radiology Scheduling- [phone]  Sedation Unit Scheduling- [phone]  [facility] Scheduling- General [phone]; Pediatric Dermatology [phone]  Main  Services: [phone]  Arabic: [phone]  Samoan: [phone]  Hmong/Ukrainian/Vietnamese: [phone]  Preadmission Nursing Department Fax Number: [phone] (Fax all pre-operative paperwork to this number)    For urgent matters arising during evenings, weekends, or holidays that cannot wait for normal business hours please  call [phone] and ask for the dermatology resident on call to be paged.

## 2022-04-21 NOTE — LETTER
4/21/2022       RE: Tony Mcnair  [address]  [place] [address]     Dear Colleague,    Thank you for referring your patient, Tony Mcnair, to the [facility] D[facility]OLOG[facility] [place] [facility]. Please see a copy of my visit note below.    [facility] Dermatology Note  Encounter Date: Apr 21, 2022  Office Visit     Dermatology Problem List:  1. Limited plaque type psoriasis  Current tx:   - NBUVB phototherapy in-office 1/week. Has home light box; also doing home light box 2 days per week  - Lidex 0.05% solution for scalp BID   - Protopic 0.1% around eyes BID PRN  - taclonex ointment to affected areas BID PRN, bedtime  occlusion with Saran wrap for flares  2. AK, s/p cryo  ____________________________________________    Assessment & Plan:    # Actinic keratosis, vertex scalp x1  - Cryo today, see procedure below    # Irritated seborrheic keratosis, mid back  - Offered treatment with cryo today, but patient deferred.    # Psoriasis, chronic stable. Well controlled with home nbUVB and topical pimecrolimus without symptoms of PsA  - Continue home nbUVB 3x per week. Discussed with pt that 5x per week isn't necessary  - Continue to apply pimecrolimus cream to affected areas, refill provided  - Continue to apply lidex solution to the scalp daily PRN   - Refilled Taclonex ointment BID prn, bedtime  occlusion with Saran wrap for flares    # Benign lesions: Multiple benign nevi, solar lentigos, seborrheic keratoses, cherry angiomas. Explained to patient benign nature of lesion. No treatment is necessary at this time unless the lesion changes or becomes symptomatic.   - ABCDs of melanoma were discussed and self skin checks were advised.  - Sun precaution was advised including the use of sun screens of SPF 30 or higher, sun protective clothing, and avoidance of tanning beds.     Procedures Performed:   - Cryotherapy procedure note,  location(s): see above. After verbal consent and discussion of risks and benefits including, but not limited to, dyspigmentation/scar, blister, and pain, 1 lesion(s) was(were) treated with 1-2 mm freeze border for 1-2 cycles with liquid nitrogen. Post cryotherapy instructions were provided.    Follow-up: 6 months for Dr. Corbett for psoriasis and 1 year for FBSE    Staff and Scribe:     Scribe Disclosure:  I, Minh Melara, am serving as a scribe to document services personally performed by Nikkie Way PA-C based on data collection and the provider's statements to me.     Provider Disclosure:   The documentation recorded by the scribe accurately reflects the services I personally performed and the decisions made by me.    All risks, benefits and alternatives were discussed with patient.  Patient is in agreement and understands the assessment and plan.  All questions were answered.    Nikkie Way PA-C, [facility]  [facility]: Phone: [phone], Fax: [phone]  [facility]: Phone: [phone],  Fax: [phone]  [facility]: Phone: [phone], Fax: [phone]  _____________________________________    CC: Derm Problem (Psoriasis - doing well. ) and Skin Check (No specific spots of concern. )    HPI:  Mr. Tony Mcnair is a(n) 66 year old male who presents today as a return patient for FBSE. Last seen in dermatology by Dr. Corbett on 9/14/2021, at which time patient was started on clobetasol 0.05% ointment BID 2x weekly prn for treatment of psoriasis.    Today, patient reports that his psoriasis has been manageable with nbUVB and topicals. Denies personal history of skin cancer or family history of melanoma.    Patient is otherwise feeling well, without additional skin concerns.    Labs Reviewed:  N/A    Physical Exam:  Vitals: There were no vitals taken for this visit.  SKIN: Total skin  excluding the undergarment areas was performed. The exam included the head/face, neck, both arms, chest, back, abdomen, both legs, digits and/or nails.   - Psoriasiform plaques on lateral aspects of calves, knees, elbows, upper buttocks.  - There is an erythematous macule with overyling adherent scale on the vertex scalp.   - Xerosis bilaterally on the medial canthi.  - There are dome shaped bright red papules on the trunk and extremities.   - Multiple regular brown pigmented macules and papules are identified on the trunk and extremities.   - Scattered brown macules on sun exposed areas.  - There are waxy stuck on tan to brown papules on the trunk and extremities.   - No pitting noted to nails.  - There is a tan to brown waxy stuck on papule with surrounding erythema on the mid back.  - No other lesions of concern on areas examined.     Medications:  Current Outpatient Medications   Medication     acetaminophen (TYLENOL) 325 MG tablet     acetaminophen (TYLENOL) 500 MG tablet     Artificial Tear Ointment (REFRESH P.M.) OINT     augmented betamethasone dipropionate (DIPROLENE-AF) 0.05 % external ointment     BIOTIN PO     calcipotriene-betameth diprop (TACLONEX) 0.005-0.064 % external ointment     citalopram (CELEXA) 20 MG tablet     Clopidogrel Bisulfate (PLAVIX PO)     fish oil-omega-3 fatty acids 1000 MG capsule     fluocinonide (LIDEX) 0.05 % external solution     Glucosamine-Chondroit-Vit C-Mn (GLUCOSAMINE 1500 COMPLEX) CAPS     Multiple Vitamin (DAILY MULTIVITAMIN PO)     naproxen (NAPROSYN) 500 MG tablet     olopatadine (PATADAY) 0.2 % ophthalmic solution     pimecrolimus (ELIDEL) 1 % external cream     rosuvastatin (CRESTOR) 20 MG tablet     tamsulosin (FLOMAX) 0.4 MG capsule     Turmeric 500 MG TABS     VITAMIN D, CHOLECALCIFEROL, PO     oxyCODONE (ROXICODONE) 5 MG tablet     senna-docusate (SENOKOT-S/PERICOLACE) 8.6-50 MG tablet     No current facility-administered medications for this visit.      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

## 2022-04-30 DIAGNOSIS — F33.1 MODERATE EPISODE OF RECURRENT MAJOR DEPRESSIVE DISORDER (H): ICD-10-CM

## 2022-05-02 RX ORDER — CITALOPRAM HYDROBROMIDE 20 MG/1
TABLET ORAL
Qty: 90 TABLET | Refills: 1 | Status: SHIPPED | OUTPATIENT
Start: 2022-05-02 | End: 2022-11-28

## 2022-05-02 NOTE — TELEPHONE ENCOUNTER
Routing refill request to provider for review/approval because:  HIGH interaction warning     Drug-Drug: citalopram and naproxen  Toxic effects may be increased with concurrent administration of NSAIDs and Selective Serotonin Reuptake Inhibitors. The risk of upper gastrointestinal bleeding may be increased. Patients taking both drugs concurrently should be educated about the signs and symptoms of GI bleeding.    Jeannine BISHOP, Triage RN  [facility]

## 2022-05-17 ENCOUNTER — OFFICE VISIT (OUTPATIENT)
Dept: OPHTHALMOLOGY | Facility: CLINIC | Age: 67
End: 2022-05-17
Attending: OPHTHALMOLOGY
Payer: COMMERCIAL

## 2022-05-17 DIAGNOSIS — H04.123 DRY EYE SYNDROME OF BOTH LACRIMAL GLANDS: Primary | ICD-10-CM

## 2022-05-17 DIAGNOSIS — D31.41 IRIS NEVUS, RIGHT: ICD-10-CM

## 2022-05-17 DIAGNOSIS — H01.02A SQUAMOUS BLEPHARITIS OF BOTH UPPER AND LOWER EYELID OF RIGHT EYE: ICD-10-CM

## 2022-05-17 PROCEDURE — G0463 HOSPITAL OUTPT CLINIC VISIT: HCPCS

## 2022-05-17 PROCEDURE — 99214 OFFICE O/P EST MOD 30 MIN: CPT | Mod: GC | Performed by: OPHTHALMOLOGY

## 2022-05-17 ASSESSMENT — VISUAL ACUITY
OD_PH_CC+: -2
OD_CC+: +2
OD_PH_CC: 20/30
OS_CC+: -2
METHOD: SNELLEN - LINEAR
CORRECTION_TYPE: GLASSES
OS_CC: 20/25
OD_CC: 20/50

## 2022-05-17 ASSESSMENT — REFRACTION_WEARINGRX
SPECS_TYPE: PAL
OS_CYLINDER: +4.00
OS_ADD: +2.25
OD_ADD: +2.25
OD_AXIS: 175
OD_CYLINDER: +4.00
OS_AXIS: 007
OS_SPHERE: -0.25
OD_SPHERE: -1.00

## 2022-05-17 ASSESSMENT — REFRACTION_MANIFEST
OD_ADD: +2.50
OS_ADD: +2.50
OD_CYLINDER: +3.75
OS_CYLINDER: +3.75
OS_SPHERE: PLANO
OD_SPHERE: -0.25
OD_AXIS: 170
OS_AXIS: 005

## 2022-05-17 ASSESSMENT — CUP TO DISC RATIO
OS_RATIO: .3
OD_RATIO: .3

## 2022-05-17 ASSESSMENT — TONOMETRY
OD_IOP_MMHG: 14
OS_IOP_MMHG: 13
IOP_METHOD: ICARE

## 2022-05-17 ASSESSMENT — EXTERNAL EXAM - LEFT EYE: OS_EXAM: NORMAL

## 2022-05-17 ASSESSMENT — CONF VISUAL FIELD
OD_NORMAL: 1
OS_NORMAL: 1
METHOD: COUNTING FINGERS

## 2022-05-17 ASSESSMENT — EXTERNAL EXAM - RIGHT EYE: OD_EXAM: NORMAL

## 2022-05-17 NOTE — PROGRESS NOTES
Mr. Mcnair is a 60 year old CM with h/o Dry eye syndrome.      Interval:  Eyes are still irritated. The left eye he has been using Claridex with inconsistent use. Feels that his vision has become somewhat worse overall during the interval. The left eye is still particularly irritated when focusing on activities like when he is driving.     Patient here for 2 month follow up on MGD/demodex/OS zoster.      Hx of psoriasis.     Reports worsening of floaters, no flashes.     Ocular meds:  Preservative free artificial tears: 3-4x/day (Refresh Plus)   Pataday qAM  Fish oil  Claridex foam    Warm compresses: not doing     A/P:  1. 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               Continue artificial tears. Increase to 4-6x/day.              Continue with eyelid scrubs              Advised to Restart warm compresses and lid hygiene daily.               Surface looks healthy. Can approach additional methods of treatment if symptoms/signs worsen - plugs, Restasis, BCL, Doxy, etc.      # Demodex noted - Worse left eye .  Using Claridex foam. each eye each morning but use day and night left eye. Told to use 1 pe day.  Cont art tears each eye.    # Hx of left eye shingles:   - now off FML gtt for about 1 month                 2. Allergic conjunctivitis.              Continue pataday as needed.                  3. Iris Nevus OD              stable in size compared to prior photos  (2011)              Monitor - No change today     4. Refractive Error each eye  - BCVA 20/25 and 20/20 today, patient deferring Mrx today  -Disc refractive change - No abn on dialated eye exam. Able to correct to baseline  With blink of lids and RX refraction).     5. PVD each eye  - no flashes / curtain, stable.      RTC: 6-8 months    Juli Eid MD  Ophthalmology Resident PGY3  [facility]     Attending Physician Attestation:  Complete documentation of historical and exam  elements from today's encounter can be found in the full encounter summary report (not reduplicated in this progress note).  I personally obtained the chief complaint(s) and history of present illness.  I confirmed and edited as necessary the review of systems, past medical/surgical history, family history, social history, and examination findings as documented by others; and I examined the patient myself.  I personally reviewed the relevant tests, images, and reports as documented above.  I formulated and edited as necessary the assessment and plan and discussed the findings and management plan with the patient and family. - Sean Schultz MD

## 2022-05-17 NOTE — NURSING NOTE
Chief Complaints and History of Present Illnesses   Patient presents with     Dry Eye Syndrome Follow Up     Chief Complaint(s) and History of Present Illness(es)     Dry Eye Syndrome Follow Up     Laterality: both eyes    Associated symptoms: irritation, itching and photophobia (mild).  Negative for eye pain and burning    Treatments tried: artificial tears and allergy drops              Comments     Here for dry eyes in both eyes. Since last visit, his vision in both eyes are gradually worsening. Uses lubricating drops, warm compresses, and lid scrubs.     Eduardo MIDDLETON 12:37 PM May 17, 2022

## 2022-06-15 ENCOUNTER — DOCUMENTATION ONLY (OUTPATIENT)
Dept: OTHER | Facility: CLINIC | Age: 67
End: 2022-06-15
Payer: COMMERCIAL

## 2022-06-28 ENCOUNTER — VIRTUAL VISIT (OUTPATIENT)
Dept: URGENT CARE | Facility: CLINIC | Age: 67
End: 2022-06-28
Payer: COMMERCIAL

## 2022-06-28 DIAGNOSIS — U07.1 COVID: Primary | ICD-10-CM

## 2022-06-28 PROCEDURE — 99213 OFFICE O/P EST LOW 20 MIN: CPT | Mod: CS | Performed by: EMERGENCY MEDICINE

## 2022-06-28 NOTE — PROGRESS NOTES
"Video visit:   Start time: 8:29 AM   Start time: 8:44 AM   Duration: 15 minutes   Patient location: Home   Provider location: [place] virtual appointment (remote).    Platform used for video visit: [employer]       the patient has been notified of following:     \"This telephone visit will be conducted via a call between you and your physician/provider. We have found that certain health care needs can be provided without the need for a physical exam.  This service lets us provide the care you need with a short phone conversation.  If a prescription is necessary we can send it directly to your pharmacy.  If lab work is needed we can place an order for that and you can then stop by our lab to have the test done at a later time.    Telephone visits are billed at different rates depending on your insurance coverage. During this emergency period, for some insurers they may be billed the same as an in-person visit.  Please reach out to your insurance provider with any questions.    If during the course of the call the physician/provider feels a telephone visit is not appropriate, you will not be charged for this service.\"    Patient has given verbal consent for Telephone visit?  Yes    What phone number would you like to be contacted at?  [phone]    How would you like to obtain your AVS? MyChart    Subjective   CC: Tony Mcnair  is a 66 year old male who presents via phone visit today for the following health issues:   Chief Complaint   Patient presents with     Infection        COVID-19 Symptom Review  How many days ago did these symptoms start? 4    Are any of the following symptoms significant for you?    New or worsening difficulty breathing? No    Worsening cough? Yes, I am coughing up mucus.    Fever or chills? Yes, the highest temperature was 101.8    Headache: no    Sore throat: no    Chest pain: YES- with coughing    Diarrhea: no    Body aches? no    What treatments has patient tried? Acetaminophen "   Does patient live in a nursing home, group home, or shelter? no  Does patient have a way to get food/medications during quarantined? Yes, I have a friend or family member who can help me. and Yes                       Reviewed and updated as needed this visit by Provider                    Review of Systems         Objective    Gen: Patient is alert, oriented  Gen: No acute distress on video visit.  Alert.  Nondyspneic appearing speaking in full sentences.              Assessment/Plan:  Patient is a 66-year-old male who is had COVID for 4 days.  T-max of 101.8.  Fever is now defervesced.  Symptoms are mild with no shortness of breath.     Risk: History of coronary artery disease.  GFR: 86  Patient does request to take paxlovid.  He is instructed to hold his Crestor for 5 days.        Leroy Kuhn MD

## 2022-08-07 NOTE — NURSING NOTE
[facility] Dermatology Phototherapy Record  1. Tony Mcnair is a 61 year old male is here today for phototherapy (UVB) treatment for Psoriasis.        Changes or new medications since last treatment:   NO    New medical conditions or problems since last treatment:   NO    Any problems with last phototherapy treatment?    NO    2. The patient tolerated phototherapy without complication    Patient will return for next UVB treatment, per protocol.     Patient to see provider every 4-12 weeks for follow-up during treatment.      All questions and concerns discussed with patient in clinic today.      Mary Aponte        
0

## 2022-10-16 ENCOUNTER — HEALTH MAINTENANCE LETTER (OUTPATIENT)
Age: 67
End: 2022-10-16

## 2022-10-17 DIAGNOSIS — E78.5 DYSLIPIDEMIA: Primary | ICD-10-CM

## 2022-10-20 RX ORDER — ROSUVASTATIN CALCIUM 20 MG/1
TABLET, COATED ORAL
Qty: 90 TABLET | Refills: 1 | Status: SHIPPED | OUTPATIENT
Start: 2022-10-20 | End: 2023-03-15

## 2022-10-20 NOTE — TELEPHONE ENCOUNTER
Rosuvastatin was discontinued from med list when pt was told to hold for 5 days for paxlovid. Pended for PCP order.  Yojana Drummond, RN  [facility] RN Triage Team

## 2022-10-25 ENCOUNTER — OFFICE VISIT (OUTPATIENT)
Dept: DERMATOLOGY | Facility: CLINIC | Age: 67
End: 2022-10-25
Payer: COMMERCIAL

## 2022-10-25 DIAGNOSIS — D22.9 MULTIPLE BENIGN NEVI: ICD-10-CM

## 2022-10-25 DIAGNOSIS — L40.0 PLAQUE PSORIASIS: Primary | ICD-10-CM

## 2022-10-25 PROCEDURE — 99213 OFFICE O/P EST LOW 20 MIN: CPT | Mod: GC | Performed by: DERMATOLOGY

## 2022-10-25 ASSESSMENT — PAIN SCALES - GENERAL: PAINLEVEL: NO PAIN (0)

## 2022-10-25 ASSESSMENT — PATIENT HEALTH QUESTIONNAIRE - PHQ9: SUM OF ALL RESPONSES TO PHQ QUESTIONS 1-9: 0

## 2022-10-25 NOTE — PROGRESS NOTES
"[facility] Dermatology Note  Encounter Date: 10/25/2022  Office Visit     Dermatology Problem List:  # Limited plaque type psoriasis  Current tx:   -NBUVB phototherapy in-office 1/week. Has home light box; also doing home light box 2 days per week  -Lidex 0.05% solution for scalp BID   -Protopic 0.1% around eyes BID PRN          -taclonex ointment to affected areas BID PRN, bedtime occlusion with Saran wrap for flares  #AKs  -LN2 4/21/2022 - on posterior superior scalp     ____________________________________________    Assessment & Plan:     # Psoriasis, chronic stable. Well controlled with home nbUVB and topical pimecrolimus without symptoms of PsA  - Continue using augmented betamethasone BID a few times a week for flares   - Continue home nbUVB 3x per week. Discussed with pt that 5x per week isn't necessary  - Continue to apply pimecrolimus cream to affected areas    # Benign lesions (cherry angiomas, seborrheic keratoses, lentigines, clinically banal appearing melanocytic nevi)  - Reassurance provided and benign nature of condition discussed  - ABCDs of melanoma were discussed and self skin checks were advised  - Signs and symptoms of NMSC discussed      Follow up: 1 year     Procedures Performed:   None    Staff and Resident:     Yvette Clay MD  Dermatology Resident, PGY-2    I have seen and examined this patient and agree with the assessment and plan as documented in the resident's note.    Petar Corbett MD  Dermatology Attending      ____________________________________________    CC: Derm Problem (Tony is here today for psoriasis. He states \" my skin is doing good\")    HPI:  Mr. Tony cMnair is a(n) 66 year old male who presents today as a return patient for psoriasis and skin check. Last visit patient 4/21/2022 had an AK frozen from his scalp. Otherwise says psoriasis is \"status quo.\" States that overall he feels good about his psoriasis and is controlled with the home light " therapy, and the augmented betamethasone that he uses two times a day for flares and elidel for maintenance.     Patient is otherwise feeling well, without additional skin concerns.    Labs Reviewed:  N/A    Physical Exam:  Vitals: There were no vitals taken for this visit.  SKIN: Focused examination of face, nails, b/l upper and lower extremities was performed  -Absence of Aks on the superior scalp   - There are erythematous well demarcated plaques with minimal scale on the b/l elbows, knees and lateral aspects of the lower legs.   - No nail pitting present on examination  - No other lesions of concern on areas examined.     Medications:  Current Outpatient Medications   Medication     acetaminophen (TYLENOL) 325 MG tablet     Artificial Tear Ointment (REFRESH P.M.) OINT     BIOTIN PO     calcipotriene-betameth diprop (TACLONEX) 0.005-0.064 % external ointment     citalopram (CELEXA) 20 MG tablet     Clopidogrel Bisulfate (PLAVIX PO)     EPINEPHrine (EPIPEN/ADRENACLICK/OR ANY BX GENERIC EQUIV) 0.3 MG/0.3ML injection 2-pack     fish oil-omega-3 fatty acids 1000 MG capsule     fluocinonide (LIDEX) 0.05 % external solution     fluorometholone (FML LIQUIFILM) 0.1 % ophthalmic suspension     Glucosamine-Chondroit-Vit C-Mn (GLUCOSAMINE 1500 COMPLEX) CAPS     Multiple Vitamin (DAILY MULTIVITAMIN PO)     naproxen (NAPROSYN) 500 MG tablet     olopatadine (PATADAY) 0.2 % ophthalmic solution     oxyCODONE (ROXICODONE) 5 MG tablet     rosuvastatin (CRESTOR) 20 MG tablet     senna-docusate (SENOKOT-S/PERICOLACE) 8.6-50 MG tablet     tacrolimus (PROTOPIC) 0.1 % external ointment     Turmeric 500 MG TABS     VITAMIN D, CHOLECALCIFEROL, PO     acetaminophen (TYLENOL) 500 MG tablet     oxyCODONE (ROXICODONE) 5 MG tablet     pimecrolimus (ELIDEL) 1 % external cream     tamsulosin (FLOMAX) 0.4 MG capsule     No current facility-administered medications for this visit.      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

## 2022-10-25 NOTE — NURSING NOTE
Chief Complaint   Patient presents with     Derm Problem     Here for a 6 month follow up psoriasis, no concerns     Di POTTER, EMT  Dermatology/Dermatology Surgery  [phone]

## 2022-10-25 NOTE — LETTER
"10/25/2022       RE: Tony CHRISTIE Walterxavier  [address]  [place] [address]     Dear Colleague,    Thank you for referring your patient, Tony Mcnair, to the [facility] DERMATOLOGY [facility]IC [place] [facility]. Please see a copy of my visit note below.    [facility] Dermatology Note  Encounter Date: 10/25/2022  Office Visit     Dermatology Problem List:  # Limited plaque type psoriasis  Current tx:   -NBUVB phototherapy in-office 1/week. Has home light box; also doing home light box 2 days per week  -Lidex 0.05% solution for scalp BID   -Protopic 0.1% around eyes BID PRN          -taclonex ointment to affected areas BID PRN, bedtime occlusion with Saran wrap for flares  #AKs  -LN2 4/21/2022 - on posterior superior scalp     ____________________________________________    Assessment & Plan:     # Psoriasis, chronic stable. Well controlled with home nbUVB and topical pimecrolimus without symptoms of PsA  - Continue using augmented betamethasone BID a few times a week for flares   - Continue home nbUVB 3x per week. Discussed with pt that 5x per week isn't necessary  - Continue to apply pimecrolimus cream to affected areas    # Benign lesions (cherry angiomas, seborrheic keratoses, lentigines, clinically banal appearing melanocytic nevi)  - Reassurance provided and benign nature of condition discussed  - ABCDs of melanoma were discussed and self skin checks were advised  - Signs and symptoms of NMSC discussed      Follow up: 1 year     Procedures Performed:   None    Staff and Resident:     Yvette Clay MD  Dermatology Resident, PGY-2    I have seen and examined this patient and agree with the assessment and plan as documented in the resident's note.    Petar Corbett MD  Dermatology Attending      ____________________________________________    CC: Derm Problem (Tony is here today for psoriasis. He states \" my skin is doing " "good\")    HPI:  Mr. Tony Mcnair is a(n) 66 year old male who presents today as a return patient for psoriasis and skin check. Last visit patient 4/21/2022 had an AK frozen from his scalp. Otherwise says psoriasis is \"status quo.\" States that overall he feels good about his psoriasis and is controlled with the home light therapy, and the augmented betamethasone that he uses two times a day for flares and elidel for maintenance.     Patient is otherwise feeling well, without additional skin concerns.    Labs Reviewed:  N/A    Physical Exam:  Vitals: There were no vitals taken for this visit.  SKIN: Focused examination of face, nails, b/l upper and lower extremities was performed  -Absence of Aks on the superior scalp   - There are erythematous well demarcated plaques with minimal scale on the b/l elbows, knees and lateral aspects of the lower legs.   - No nail pitting present on examination  - No other lesions of concern on areas examined.     Medications:  Current Outpatient Medications   Medication     acetaminophen (TYLENOL) 325 MG tablet     Artificial Tear Ointment (REFRESH P.M.) OINT     BIOTIN PO     calcipotriene-betameth diprop (TACLONEX) 0.005-0.064 % external ointment     citalopram (CELEXA) 20 MG tablet     Clopidogrel Bisulfate (PLAVIX PO)     EPINEPHrine (EPIPEN/ADRENACLICK/OR ANY BX GENERIC EQUIV) 0.3 MG/0.3ML injection 2-pack     fish oil-omega-3 fatty acids 1000 MG capsule     fluocinonide (LIDEX) 0.05 % external solution     fluorometholone (FML LIQUIFILM) 0.1 % ophthalmic suspension     Glucosamine-Chondroit-Vit C-Mn (GLUCOSAMINE 1500 COMPLEX) CAPS     Multiple Vitamin (DAILY MULTIVITAMIN PO)     naproxen (NAPROSYN) 500 MG tablet     olopatadine (PATADAY) 0.2 % ophthalmic solution     oxyCODONE (ROXICODONE) 5 MG tablet     rosuvastatin (CRESTOR) 20 MG tablet     senna-docusate (SENOKOT-S/PERICOLACE) 8.6-50 MG tablet     tacrolimus (PROTOPIC) 0.1 % external ointment     Turmeric 500 MG TABS     " VITAMIN D, CHOLECALCIFEROL, PO     acetaminophen (TYLENOL) 500 MG tablet     oxyCODONE (ROXICODONE) 5 MG tablet     pimecrolimus (ELIDEL) 1 % external cream     tamsulosin (FLOMAX) 0.4 MG capsule     No current facility-administered medications for this visit.      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

## 2022-11-17 ENCOUNTER — OFFICE VISIT (OUTPATIENT)
Dept: OPHTHALMOLOGY | Facility: CLINIC | Age: 67
End: 2022-11-17
Attending: OPHTHALMOLOGY
Payer: COMMERCIAL

## 2022-11-17 DIAGNOSIS — H01.02A SQUAMOUS BLEPHARITIS OF BOTH UPPER AND LOWER EYELID OF RIGHT EYE: ICD-10-CM

## 2022-11-17 DIAGNOSIS — H04.123 DRY EYE SYNDROME OF BOTH LACRIMAL GLANDS: Primary | ICD-10-CM

## 2022-11-17 DIAGNOSIS — D31.41 IRIS NEVUS, RIGHT: ICD-10-CM

## 2022-11-17 DIAGNOSIS — B88.0 INFESTATION BY DEMODEX FOLLICULORUM: ICD-10-CM

## 2022-11-17 PROCEDURE — G0463 HOSPITAL OUTPT CLINIC VISIT: HCPCS

## 2022-11-17 PROCEDURE — 99214 OFFICE O/P EST MOD 30 MIN: CPT | Mod: GC | Performed by: OPHTHALMOLOGY

## 2022-11-17 ASSESSMENT — CONF VISUAL FIELD
OS_INFERIOR_TEMPORAL_RESTRICTION: 0
METHOD: COUNTING FINGERS
OD_INFERIOR_NASAL_RESTRICTION: 0
OD_SUPERIOR_TEMPORAL_RESTRICTION: 0
OS_SUPERIOR_NASAL_RESTRICTION: 0
OD_INFERIOR_TEMPORAL_RESTRICTION: 0
OD_SUPERIOR_NASAL_RESTRICTION: 0
OS_NORMAL: 1
OS_SUPERIOR_TEMPORAL_RESTRICTION: 0
OD_NORMAL: 1
OS_INFERIOR_NASAL_RESTRICTION: 0

## 2022-11-17 ASSESSMENT — EXTERNAL EXAM - RIGHT EYE: OD_EXAM: NORMAL

## 2022-11-17 ASSESSMENT — TONOMETRY
OS_IOP_MMHG: 10
OD_IOP_MMHG: 12
IOP_METHOD: ICARE

## 2022-11-17 ASSESSMENT — VISUAL ACUITY
OS_CC+: -2
OD_CC: 20/40
OS_CC: 20/25
METHOD: SNELLEN - LINEAR

## 2022-11-17 ASSESSMENT — REFRACTION_WEARINGRX
OS_ADD: +2.25
OD_ADD: +2.25
OS_CYLINDER: +4.00
SPECS_TYPE: PAL
OS_AXIS: 007
OD_AXIS: 175
OS_SPHERE: -0.25
OD_SPHERE: -1.00
OD_CYLINDER: +4.00

## 2022-11-17 ASSESSMENT — EXTERNAL EXAM - LEFT EYE: OS_EXAM: NORMAL

## 2022-11-17 NOTE — PROGRESS NOTES
Mr. Mcnair is a 67 year old CM with h/o Dry eye syndrome.      Interval History: Still feels occasional irritation, specifically in the upper portion of the left eye. He feels this irritation especially after prolonged focusing, like after driving for a long time. No vision changes. No flashes, floaters, curtains.      Hx of psoriasis.     Ocular meds:  Preservative free artificial tears: (using 1-3 times per day)  Pataday qAM (intermittently)  Fish oil   Claridex foam (not using)    Warm compresses: 1-2x/week     A/P:  1. 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               Continue artificial tears. Increase to 4-6x/day.              Advised to increase warm compresses and lid hygiene to twice daily.               Surface looks healthy. Can approach additional methods of treatment if symptoms/signs worsen - plugs, Restasis, BCL, Doxy, etc.      # Demodex noted - Worse left eye .  Not currently using Claridex foam, no demodex sleeves noted today  Cont art tears each eye.  Pt will restart claridex daily    # Hx of left eye shingles:   - monitoring off FML                2. Allergic conjunctivitis.              Continue pataday as needed.                  3. Iris Nevus OD              stable in size compared to prior photos  (2011)              Monitor - No change today     4. Refractive Error each eye  - Happy with current glasses, stable VA    5. PVD each eye  - no flashes / curtain, stable.      RTC: 6 - 9  months    Christian Lunsford MD  Fellow, Cornea, External Disease and Refractive Surgery  Department of Ophthalmology  [facility]      Attending Physician Attestation:  Complete documentation of historical and exam elements from today's encounter can be found in the full encounter summary report (not reduplicated in this progress note).  I personally obtained the chief complaint(s) and history of present illness.  I confirmed and edited as  necessary the review of systems, past medical/surgical history, family history, social history, and examination findings as documented by others; and I examined the patient myself.  I personally reviewed the relevant tests, images, and reports as documented above.  I formulated and edited as necessary the assessment and plan and discussed the findings and management plan with the patient and family. - Sean Schultz MD

## 2022-11-17 NOTE — NURSING NOTE
Chief Complaints and History of Present Illnesses   Patient presents with     Dry Eye Syndrome Follow Up     Chief Complaint(s) and History of Present Illness(es)     Dry Eye Syndrome Follow Up            Laterality: both eyes    Characteristics: chronic    Associated symptoms: mattering (in the corners in the morning and throughout the day), eye pain, dryness, irritation and itching.  Negative for burning and photophobia    Frequency: infrequently    Context: allergies and dry eyes    Treatments tried: artificial tears and allergy drops          Comments    Pt here today for 6 mos dry eyes syndrome follow up.  States eyes and vision have been stable. LE still irritated.  Pt not currently experiencing mild pain today.    Ocular meds = artificial tears; warm compresses; lid scrubs  Pt also using Pataday allergy q juanitan    Komal ROBBINS, AUGUST 8:38 AM 11/17/2022

## 2022-11-19 PROBLEM — D22.9 MULTIPLE BENIGN NEVI: Status: ACTIVE | Noted: 2022-11-19

## 2022-11-19 PROBLEM — L40.0 PLAQUE PSORIASIS: Status: ACTIVE | Noted: 2022-11-19

## 2022-11-23 DIAGNOSIS — F33.1 MODERATE EPISODE OF RECURRENT MAJOR DEPRESSIVE DISORDER (H): ICD-10-CM

## 2022-11-25 NOTE — TELEPHONE ENCOUNTER
Routing refill request to provider for review/approval because:  Patient needs to be seen because it has been more than six months since last office visit. LOV: 11/11/22. No upcoming OV scheduled. [employer] message sent to patient requesting appointment be made, as well as establish with new provider.  Rosa Carvajal RN

## 2022-11-28 RX ORDER — CITALOPRAM HYDROBROMIDE 20 MG/1
TABLET ORAL
Qty: 90 TABLET | Refills: 1 | Status: SHIPPED | OUTPATIENT
Start: 2022-11-28 | End: 2023-04-26

## 2023-02-09 ENCOUNTER — OFFICE VISIT (OUTPATIENT)
Dept: FAMILY MEDICINE | Facility: CLINIC | Age: 68
End: 2023-02-09
Payer: COMMERCIAL

## 2023-02-09 VITALS
DIASTOLIC BLOOD PRESSURE: 64 MMHG | TEMPERATURE: 97.6 F | SYSTOLIC BLOOD PRESSURE: 102 MMHG | HEART RATE: 57 BPM | BODY MASS INDEX: 22.68 KG/M2 | HEIGHT: 71 IN | WEIGHT: 162 LBS | RESPIRATION RATE: 16 BRPM | OXYGEN SATURATION: 97 %

## 2023-02-09 DIAGNOSIS — D69.6 THROMBOCYTOPENIA (H): ICD-10-CM

## 2023-02-09 DIAGNOSIS — Z12.5 SCREENING FOR PROSTATE CANCER: ICD-10-CM

## 2023-02-09 DIAGNOSIS — E78.5 DYSLIPIDEMIA: ICD-10-CM

## 2023-02-09 DIAGNOSIS — Z00.00 MEDICARE ANNUAL WELLNESS VISIT, INITIAL: Primary | ICD-10-CM

## 2023-02-09 DIAGNOSIS — Z13.220 LIPID SCREENING: ICD-10-CM

## 2023-02-09 DIAGNOSIS — Q24.5 CORONARY ARTERY ANOMALY: ICD-10-CM

## 2023-02-09 DIAGNOSIS — I47.29 NSVT (NONSUSTAINED VENTRICULAR TACHYCARDIA) (H): ICD-10-CM

## 2023-02-09 LAB
ALBUMIN SERPL BCG-MCNC: 4.3 G/DL (ref 3.5–5.2)
ALP SERPL-CCNC: 47 U/L (ref 40–129)
ALT SERPL W P-5'-P-CCNC: 14 U/L (ref 10–50)
ANION GAP SERPL CALCULATED.3IONS-SCNC: 10 MMOL/L (ref 7–15)
AST SERPL W P-5'-P-CCNC: 24 U/L (ref 10–50)
BASOPHILS # BLD AUTO: 0 10E3/UL (ref 0–0.2)
BASOPHILS NFR BLD AUTO: 1 %
BILIRUB SERPL-MCNC: 0.6 MG/DL
BUN SERPL-MCNC: 13.8 MG/DL (ref 8–23)
CALCIUM SERPL-MCNC: 9.5 MG/DL (ref 8.8–10.2)
CHLORIDE SERPL-SCNC: 105 MMOL/L (ref 98–107)
CHOLEST SERPL-MCNC: 183 MG/DL
CREAT SERPL-MCNC: 0.96 MG/DL (ref 0.67–1.17)
DEPRECATED HCO3 PLAS-SCNC: 27 MMOL/L (ref 22–29)
EOSINOPHIL # BLD AUTO: 0.4 10E3/UL (ref 0–0.7)
EOSINOPHIL NFR BLD AUTO: 7 %
ERYTHROCYTE [DISTWIDTH] IN BLOOD BY AUTOMATED COUNT: 12 % (ref 10–15)
GFR SERPL CREATININE-BSD FRML MDRD: 88 ML/MIN/1.73M2
GLUCOSE SERPL-MCNC: 94 MG/DL (ref 70–99)
HCT VFR BLD AUTO: 42.4 % (ref 40–53)
HDLC SERPL-MCNC: 91 MG/DL
HGB BLD-MCNC: 14.8 G/DL (ref 13.3–17.7)
LDLC SERPL CALC-MCNC: 81 MG/DL
LYMPHOCYTES # BLD AUTO: 1.4 10E3/UL (ref 0.8–5.3)
LYMPHOCYTES NFR BLD AUTO: 26 %
MCH RBC QN AUTO: 33.3 PG (ref 26.5–33)
MCHC RBC AUTO-ENTMCNC: 34.9 G/DL (ref 31.5–36.5)
MCV RBC AUTO: 96 FL (ref 78–100)
MONOCYTES # BLD AUTO: 0.8 10E3/UL (ref 0–1.3)
MONOCYTES NFR BLD AUTO: 15 %
NEUTROPHILS # BLD AUTO: 2.6 10E3/UL (ref 1.6–8.3)
NEUTROPHILS NFR BLD AUTO: 51 %
NONHDLC SERPL-MCNC: 92 MG/DL
PLATELET # BLD AUTO: 138 10E3/UL (ref 150–450)
POTASSIUM SERPL-SCNC: 4.4 MMOL/L (ref 3.4–5.3)
PROT SERPL-MCNC: 6.8 G/DL (ref 6.4–8.3)
PSA SERPL-MCNC: 0.6 NG/ML (ref 0–4.5)
RBC # BLD AUTO: 4.44 10E6/UL (ref 4.4–5.9)
SODIUM SERPL-SCNC: 142 MMOL/L (ref 136–145)
TRIGL SERPL-MCNC: 53 MG/DL
WBC # BLD AUTO: 5.1 10E3/UL (ref 4–11)

## 2023-02-09 PROCEDURE — 90677 PCV20 VACCINE IM: CPT | Performed by: PHYSICIAN ASSISTANT

## 2023-02-09 PROCEDURE — 36415 COLL VENOUS BLD VENIPUNCTURE: CPT | Performed by: PHYSICIAN ASSISTANT

## 2023-02-09 PROCEDURE — G0402 INITIAL PREVENTIVE EXAM: HCPCS | Performed by: PHYSICIAN ASSISTANT

## 2023-02-09 PROCEDURE — 85025 COMPLETE CBC W/AUTO DIFF WBC: CPT | Performed by: PHYSICIAN ASSISTANT

## 2023-02-09 PROCEDURE — G0009 ADMIN PNEUMOCOCCAL VACCINE: HCPCS | Performed by: PHYSICIAN ASSISTANT

## 2023-02-09 PROCEDURE — 80053 COMPREHEN METABOLIC PANEL: CPT | Performed by: PHYSICIAN ASSISTANT

## 2023-02-09 PROCEDURE — G0103 PSA SCREENING: HCPCS | Performed by: PHYSICIAN ASSISTANT

## 2023-02-09 PROCEDURE — 80061 LIPID PANEL: CPT | Performed by: PHYSICIAN ASSISTANT

## 2023-02-09 ASSESSMENT — ENCOUNTER SYMPTOMS
CHILLS: 0
MYALGIAS: 1
SORE THROAT: 0
HEARTBURN: 1
HEMATOCHEZIA: 0
NERVOUS/ANXIOUS: 0
HEADACHES: 0
EYE PAIN: 1
FREQUENCY: 0
WEAKNESS: 0
DYSURIA: 0
FEVER: 0
CONSTIPATION: 0
SHORTNESS OF BREATH: 0
HEMATURIA: 0
NAUSEA: 0
DIZZINESS: 0
PARESTHESIAS: 0
ABDOMINAL PAIN: 0
DIARRHEA: 0
PALPITATIONS: 0
JOINT SWELLING: 0
COUGH: 0
ARTHRALGIAS: 1

## 2023-02-09 ASSESSMENT — PAIN SCALES - GENERAL: PAINLEVEL: NO PAIN (0)

## 2023-02-09 ASSESSMENT — ACTIVITIES OF DAILY LIVING (ADL): CURRENT_FUNCTION: NO ASSISTANCE NEEDED

## 2023-02-09 NOTE — PROGRESS NOTES
"SUBJECTIVE:   Tony is a 67 year old who presents for Preventive Visit.  Patient has been advised of split billing requirements and indicates understanding: Yes  Are you in the first 12 months of your Medicare coverage?  Yes,  Visual Acuity:  Right Eye: 20/50   Left Eye: 20/50  Both Eyes: 20/50    Healthy Habits:     In general, how would you rate your overall health?  Good    Frequency of exercise:  4-5 days/week    Duration of exercise:  Greater than 60 minutes    Do you usually eat at least 4 servings of fruit and vegetables a day, include whole grains    & fiber and avoid regularly eating high fat or \"junk\" foods?  No    Taking medications regularly:  Yes    Medication side effects:  None    Ability to successfully perform activities of daily living:  No assistance needed    Home Safety:  No safety concerns identified    Hearing Impairment:  Difficulty following a conversation in a noisy restaurant or crowded room, difficulty following dialogue in the theater, difficult to understand a speaker at a public meeting or Latter-day service and difficulty understanding soft or whispered speech    In the past 6 months, have you been bothered by leaking of urine?  No    In general, how would you rate your overall mental or emotional health?  Good      PHQ-2 Total Score: 1    Additional concerns today:  No      Tony is a new patient to me today. He has hx oo psoriasis and CAD, s/p PCI in 2020, following with cardiology. Feeling great!  Retired now, planning to go to [place] and [place] next week.   Exercising 6 days per week    Have you ever done Advance Care Planning? (For example, a Health Directive, POLST, or a discussion with a medical provider or your loved ones about your wishes):    Fall risk  Fallen 2 or more times in the past year?: No  Any fall with injury in the past year?: No    Cognitive Screening   1) Repeat 3 items (Leader, Season, Table)    2) Clock draw: NORMAL  3) 3 item recall: Recalls 2 objects   Results: " NORMAL clock, 1-2 items recalled: COGNITIVE IMPAIRMENT LESS LIKELY    Mini-CogTM Copyright GIANA Messer. Licensed by the author for use in [facility]; reprinted with permission (becky@.[facility]). All rights reserved.      Do you have sleep apnea, excessive snoring or daytime drowsiness?: no    Reviewed and updated as needed this visit by clinical staff   Tobacco  Allergies  Meds              Reviewed and updated as needed this visit by Provider                 Social History     Tobacco Use     Smoking status: Never     Smokeless tobacco: Never   Substance Use Topics     Alcohol use: Yes     Comment: 4 beers a week         Alcohol Use 2/9/2023   Prescreen: >3 drinks/day or >7 drinks/week? No   Prescreen: >3 drinks/day or >7 drinks/week? -       Current providers sharing in care for this patient include  Patient Care Team:  Clinic, [facility] [place] as PCP - Sandhya Escudero MD as MD (Dermatology)  Petar Corbett MD as MD (Dermapathology)  Tony Maldonado MD as Assigned PCP  Sean Schultz MD as Assigned Surgical Provider    The following health maintenance items are reviewed in Epic and correct as of today:  Health Maintenance   Topic Date Due     ANNUAL REVIEW OF HM ORDERS  Never done     DEPRESSION ACTION PLAN  Never done     Pneumococcal Vaccine: 65+ Years (2 - PCV) 10/21/2021     MEDICARE ANNUAL WELLNESS VISIT  11/11/2022     PHQ-9  04/25/2023     FALL RISK ASSESSMENT  02/09/2024     LIPID  11/18/2026     ADVANCE CARE PLANNING  06/15/2027     COLORECTAL CANCER SCREENING  05/17/2029     DTAP/TDAP/TD IMMUNIZATION (4 - Td or Tdap) 09/26/2031     HEPATITIS C SCREENING  Completed     HIV SCREENING  Completed     INFLUENZA VACCINE  Completed     ZOSTER IMMUNIZATION  Completed     AORTIC ANEURYSM SCREENING (SYSTEM ASSIGNED)  Completed     COVID-19 Vaccine  Completed     IPV IMMUNIZATION  Aged Out     MENINGITIS IMMUNIZATION  Aged Out     Patient Active Problem List    Diagnosis     Psoriasis     Pain in both feet     Iliotibial band syndrome, unspecified laterality     Adhesive capsulitis of left shoulder     Left shoulder pain     ADD (attention deficit disorder)     Seborrheic keratosis     Coronary artery anomaly     Dyslipidemia     NSVT (nonsustained ventricular tachycardia)     Ureterolithiasis     Nausea with vomiting     Ureteral stone     Left flank pain     Milia     Plaque psoriasis     Multiple benign nevi     Past Surgical History:   Procedure Laterality Date     COLONOSCOPY  11 years ago     COLONOSCOPY N/A 5/17/2019    Procedure: COLONOSCOPY;  Surgeon: Jaycob Mirza MD;  Location: [facility]     HERNIA REPAIR  10 years ago     IRIS NEVUS Right      LASER HOLMIUM LITHOTRIPSY URETER(S), INSERT STENT, COMBINED Left 9/10/2021    Procedure: CYSTOSCOPY, LEFT URETEROSCOPY, HOLMIUM LASER LITHOTRIPSY, LEFT STENT PLACEMENT;  Surgeon: Sanjay Ackerman MD;  Location: [facility]     LESION RIGHT UPPER LID Right 1999    EXCISION     STENT  2020       Social History     Tobacco Use     Smoking status: Never     Smokeless tobacco: Never   Substance Use Topics     Alcohol use: Yes     Comment: 4 beers a week     Family History   Problem Relation Age of Onset     Coronary Artery Disease Father      Hyperlipidemia Father      Cerebrovascular Disease Father      Coronary Artery Disease Brother      Hyperlipidemia Brother      Hypertension Mother      Breast Cancer Mother      Substance Abuse Mother      Hyperlipidemia Brother      Cancer No family hx of         skin cancer     Glaucoma No family hx of      Macular Degeneration No family hx of      Retinal detachment No family hx of      Skin Cancer No family hx of      Melanoma No family hx of          Current Outpatient Medications   Medication Sig Dispense Refill     acetaminophen (TYLENOL) 325 MG tablet Take 2 tablets (650 mg) by mouth every 4 hours as needed for mild pain 50 tablet 0     acetaminophen (TYLENOL) 500 MG tablet  Take 2 tablets (1,000 mg) by mouth every 6 hours as needed for pain       Artificial Tear Ointment (REFRESH P.M.) OINT Apply 1 inch to eye nightly as needed 1 Tube 6     augmented betamethasone dipropionate (DIPROLENE-AF) 0.05 % external ointment Apply topically 2 times daily Apply a thin layer to thick areas of psoriasis 1-2x per day twice per week and wrap with saran wrap 50 g 3     BIOTIN PO Take 1 tablet by mouth daily        calcipotriene-betameth diprop (TACLONEX) 0.005-0.064 % external ointment APPLY TO THE AFFECTED AREA 2 TIMES DAILY AS NEEDED 60 g 0     citalopram (CELEXA) 20 MG tablet TAKE 1 TABLET BY MOUTH EVERY DAY 90 tablet 1     fish oil-omega-3 fatty acids 1000 MG capsule Take 1 g by mouth daily       fluocinonide (LIDEX) 0.05 % external solution Apply  topically 2 times daily for itchy scalp or scalp psoriasis 60 mL 2     Glucosamine-Chondroit-Vit C-Mn (GLUCOSAMINE 1500 COMPLEX) CAPS Take 1,500 mg by mouth 2 times daily 1 capsule 0     Multiple Vitamin (DAILY MULTIVITAMIN PO) Take 1 tablet by mouth daily        naproxen (NAPROSYN) 500 MG tablet Take 1 tablet (500 mg) by mouth every 12 hours as needed for other (Inflammatory pain) 10 tablet 0     olopatadine (PATADAY) 0.2 % ophthalmic solution Place 1 drop into both eyes daily 2.5 mL 11     pimecrolimus (ELIDEL) 1 % external cream Apply topically 2 times daily as needed For psoriasis of face or genitals  Please keep appointment 12/1/20 30 g 0     rosuvastatin (CRESTOR) 20 MG tablet TAKE 1 TABLET BY MOUTH EVERY DAY 90 tablet 1     Turmeric 500 MG TABS Take 1,000 mg by mouth 2 times daily 1 tablet 0     VITAMIN D, CHOLECALCIFEROL, PO Take 1,000 Units by mouth daily        Allergies   Allergen Reactions     Crab Extract Allergy Skin Test      Other reaction(s): Angioedema  Crustaceans     Lobster [Crustaceans] Angioedema     Shrimp Angioedema     Other reaction(s): Angioedema             Review of Systems   Constitutional: Negative for chills and fever.  "  HENT: Positive for hearing loss. Negative for congestion, ear pain and sore throat.    Eyes: Positive for pain and visual disturbance.   Respiratory: Negative for cough and shortness of breath.    Cardiovascular: Negative for chest pain, palpitations and peripheral edema.   Gastrointestinal: Positive for heartburn. Negative for abdominal pain, constipation, diarrhea, hematochezia and nausea.   Genitourinary: Negative for dysuria, frequency, genital sores, hematuria, impotence, penile discharge and urgency.   Musculoskeletal: Positive for arthralgias and myalgias. Negative for joint swelling.   Skin: Negative for rash.   Neurological: Negative for dizziness, weakness, headaches and paresthesias.   Psychiatric/Behavioral: Negative for mood changes. The patient is not nervous/anxious.          OBJECTIVE:   /64   Pulse 57   Temp 97.6  F (36.4  C) (Tympanic)   Resp 16   Ht 1.795 m (5' 10.67\")   Wt 73.5 kg (162 lb)   SpO2 97%   BMI 22.81 kg/m   Estimated body mass index is 22.81 kg/m  as calculated from the following:    Height as of this encounter: 1.795 m (5' 10.67\").    Weight as of this encounter: 73.5 kg (162 lb).  Physical Exam  GENERAL: healthy, alert and no distress  EYES: Eyes grossly normal to inspection, PERRL and conjunctivae and sclerae normal  HENT: ear canals and TM's normal, nose and mouth without ulcers or lesions  NECK: no adenopathy, no asymmetry, masses, or scars and thyroid normal to palpation  RESP: lungs clear to auscultation - no rales, rhonchi or wheezes  CV: regular rate and rhythm, normal S1 S2, no S3 or S4, no murmur, click or rub, no peripheral edema and peripheral pulses strong  ABDOMEN: soft, nontender, no hepatosplenomegaly, no masses and bowel sounds normal  MS: no gross musculoskeletal defects noted, no edema  SKIN: no suspicious lesions or rashes  NEURO: Normal strength and tone, mentation intact and speech normal  PSYCH: mentation appears normal, affect " normal/bright      ASSESSMENT / PLAN:     1. Medicare annual wellness visit, initial  - Comprehensive metabolic panel (BMP + Alb, Alk Phos, ALT, AST, Total. Bili, TP); Future  - Comprehensive metabolic panel (BMP + Alb, Alk Phos, ALT, AST, Total. Bili, TP)    2. NSVT (nonsustained ventricular tachycardia)  Stable, following with cardiology    3. Coronary artery anomaly  Stable, following with cardiology    4. Dyslipidemia  Monitoring, taking Crestor/ASA for secondary prevention    5. Lipid screening  - Lipid Profile (Chol, Trig, HDL, LDL calc); Future  - Lipid Profile (Chol, Trig, HDL, LDL calc)    6. Screening for prostate cancer  - PSA, screen; Future  - PSA, screen    7. Thrombocytopenia (H)  Noted on previous CBC, will monitor  - CBC with platelets and differential; Future  - CBC with platelets and differential        COUNSELING:  Reviewed preventive health counseling, as reflected in patient instructions       Regular exercise       Healthy diet/nutrition       Immunizations    Vaccinated for: Pneumococcal             Prostate cancer screening        He reports that he has never smoked. He has never used smokeless tobacco.      Appropriate preventive services were discussed with this patient, including applicable screening as appropriate for cardiovascular disease, diabetes, osteopenia/osteoporosis, and glaucoma.  As appropriate for age/gender, discussed screening for colorectal cancer, prostate cancer, breast cancer, and cervical cancer. Checklist reviewing preventive services available has been given to the patient.    Reviewed patients plan of care and provided an AVS. The Basic Care Plan (routine screening as documented in Health Maintenance) for Tony meets the Care Plan requirement. This Care Plan has been established and reviewed with the Patient.    Armin Alvarez PA-C  [facility]    Identified Health Risks:

## 2023-02-09 NOTE — PROGRESS NOTES
"SUBJECTIVE:   Tony is a 67 year old who presents for Preventive Visit.  {Split Bill scripting  The purpose of this visit is to discuss your medical history and prevent health problems before you are sick. You may be responsible for a co-pay, coinsurance, or deductible if your visit today includes services such as checking on a sore throat, having an x-ray or lab test, or treating and evaluating a new or existing condition :457773}  Patient has been advised of split billing requirements and indicates understanding: Yes  Are you in the first 12 months of your Medicare coverage?  Yes,  Visual Acuity:  Right Eye: 20/50   Left Eye: 20/50  Both Eyes: 20/50    Healthy Habits:     In general, how would you rate your overall health?  Good    Frequency of exercise:  4-5 days/week    Duration of exercise:  Greater than 60 minutes    Do you usually eat at least 4 servings of fruit and vegetables a day, include whole grains    & fiber and avoid regularly eating high fat or \"junk\" foods?  No    Taking medications regularly:  Yes    Medication side effects:  None    Ability to successfully perform activities of daily living:  No assistance needed    Home Safety:  No safety concerns identified    Hearing Impairment:  Difficulty following a conversation in a noisy restaurant or crowded room, difficulty following dialogue in the theater, difficult to understand a speaker at a public meeting or Hindu service and difficulty understanding soft or whispered speech    In the past 6 months, have you been bothered by leaking of urine?  No    In general, how would you rate your overall mental or emotional health?  Good      PHQ-2 Total Score: 1    Additional concerns today:  No      Have you ever done Advance Care Planning? (For example, a Health Directive, POLST, or a discussion with a medical provider or your loved ones about your wishes): { :734427}    {Hearing Test Done (Optional):520511}   Fall risk     {If any of the above " "assessments are answered yes, consider ordering appropriate referrals (Optional):359467::\"click delete button to remove this line now\"}  Cognitive Screening   1) Repeat 3 items (Leader, Season, Table)    2) Clock draw: NORMAL  3) 3 item recall: {Say: \"What were the three words I asked you to remember?\"}Recalls 2 objects   Results: NORMAL clock, 1-2 items recalled: COGNITIVE IMPAIRMENT LESS LIKELY    Mini-CogTM Copyright GIANA Messer. Licensed by the author for use in [facility]; reprinted with permission (soob@[facility]). All rights reserved.      {Do you have sleep apnea, excessive snoring or daytime drowsiness? (Optional):166795}    Reviewed and updated as needed this visit by clinical staff   Tobacco  Allergies  Meds              Reviewed and updated as needed this visit by Provider                 Social History     Tobacco Use     Smoking status: Never     Smokeless tobacco: Never   Substance Use Topics     Alcohol use: Yes     Comment: 4 beers a week     {Rooming Staff- Complete this question if Prescreen response is not shown below for today's visit. If you drink alcohol do you typically have >3 drinks per day or >7 drinks per week? (Optional):570799}    Alcohol Use 2/9/2023   Prescreen: >3 drinks/day or >7 drinks/week? No   Prescreen: >3 drinks/day or >7 drinks/week? -   {add AUDIT responses (Optional) (A score of 7 for adult men is an indication of hazardous drinking; a score of 8 or more is an indication of an alcohol use disorder.  A score of 7 or more for adult women is an indication of hazardous drinking or an alchohol use disorder):307986}    {Outside tests to abstract? :406361}    {additional problems to add (Optional):911888}    Current providers sharing in care for this patient include: {Rooming staff:  Please update Care Team from storyboard, refresh this note and then delete this statement}  Patient Care Team:  [facility] T[facility] [place] as PCP - Sandhya Escudero MD as " MD (Dermatology)  Petar Corbett MD as MD (Dermapathology)  Tony Maldonado MD as Assigned PCP  Sean Schultz MD as Assigned Surgical Provider    The following health maintenance items are reviewed in Epic and correct as of today:  Health Maintenance   Topic Date Due     ANNUAL REVIEW OF HM ORDERS  Never done     DEPRESSION ACTION PLAN  Never done     Pneumococcal Vaccine: 65+ Years (2 - PCV) 10/21/2021     MEDICARE ANNUAL WELLNESS VISIT  11/11/2022     PHQ-9  04/25/2023     FALL RISK ASSESSMENT  02/09/2024     LIPID  11/18/2026     ADVANCE CARE PLANNING  06/15/2027     COLORECTAL CANCER SCREENING  05/17/2029     DTAP/TDAP/TD IMMUNIZATION (4 - Td or Tdap) 09/26/2031     HEPATITIS C SCREENING  Completed     HIV SCREENING  Completed     INFLUENZA VACCINE  Completed     ZOSTER IMMUNIZATION  Completed     AORTIC ANEURYSM SCREENING (SYSTEM ASSIGNED)  Completed     COVID-19 Vaccine  Completed     IPV IMMUNIZATION  Aged Out     MENINGITIS IMMUNIZATION  Aged Out     {Chronicprobdata (optional):450119}  {Decision Support (Optional):560773}        Review of Systems   Constitutional: Negative for chills and fever.   HENT: Positive for hearing loss. Negative for congestion, ear pain and sore throat.    Eyes: Positive for pain and visual disturbance.   Respiratory: Negative for cough and shortness of breath.    Cardiovascular: Negative for chest pain, palpitations and peripheral edema.   Gastrointestinal: Positive for heartburn. Negative for abdominal pain, constipation, diarrhea, hematochezia and nausea.   Genitourinary: Negative for dysuria, frequency, genital sores, hematuria, impotence, penile discharge and urgency.   Musculoskeletal: Positive for arthralgias and myalgias. Negative for joint swelling.   Skin: Negative for rash.   Neurological: Negative for dizziness, weakness, headaches and paresthesias.   Psychiatric/Behavioral: Negative for mood changes. The patient is not nervous/anxious.      {ROS COMP  "(Optional):888794}    OBJECTIVE:   /64   Pulse 57   Temp 97.6  F (36.4  C) (Tympanic)   Resp 16   Ht 1.795 m (5' 10.67\")   Wt 73.5 kg (162 lb)   SpO2 97%   BMI 22.81 kg/m   Estimated body mass index is 22.81 kg/m  as calculated from the following:    Height as of this encounter: 1.795 m (5' 10.\").    Weight as of this encounter: 73.5 kg (162 lb).  Physical Exam  {Exam (Optional) :963758}    {Diagnostic Test Results (Optional):067497::\"Diagnostic Test Results:\",\"Labs reviewed in Epic\"}    ASSESSMENT / PLAN:   {Diag Picklist:527074}    {Patient advised of split billing (Optional):970277}      COUNSELING:  {Medicare Counselin}        He reports that he has never smoked. He has never used smokeless tobacco.      Appropriate preventive services were discussed with this patient, including applicable screening as appropriate for cardiovascular disease, diabetes, osteopenia/osteoporosis, and glaucoma.  As appropriate for age/gender, discussed screening for colorectal cancer, prostate cancer, breast cancer, and cervical cancer. Checklist reviewing preventive services available has been given to the patient.    Reviewed patients plan of care and provided an AVS. The {CarePlan:721321} for Tony meets the Care Plan requirement. This Care Plan has been established and reviewed with the {PATIENT, FAMILY MEMBER, CAREGIVER:904947}.    {Counseling Resources  [place] Preventive Services Task Force  Cholesterol Screening  Health diet/nutrition  Pooled Cohorts Equation Calculator  USDA's MyPlate  ASA Prophylaxis  Lung CA Screening  Osteoporosis prevention/bone health :443170}  {Prostate Cancer Screening  Consider for men 55-69 per guidance from USPSTF :846147}    DOUGIE Stevenson [facility]    Identified Health Risks:  "

## 2023-02-14 NOTE — RESULT ENCOUNTER NOTE
Tony-  Here are your recent results.     Your labs including your cholesterol, electrolytes, kidney function and prostate screening are normal at this time.     Your complete blood count continues to show low platelets though these have improved somewhat from previous.   We should recheck these labs again in 1 year.     Armin Alvarez PA-C

## 2023-03-10 ENCOUNTER — MYC REFILL (OUTPATIENT)
Dept: DERMATOLOGY | Facility: CLINIC | Age: 68
End: 2023-03-10
Payer: COMMERCIAL

## 2023-03-10 DIAGNOSIS — L40.9 PSORIASIS: ICD-10-CM

## 2023-03-12 RX ORDER — CALCIPOTRIENE, BETAMETHASONE DIPROPIONATE 50; .643 UG/G; MG/G
OINTMENT TOPICAL
Qty: 60 G | Refills: 0 | Status: SHIPPED | OUTPATIENT
Start: 2023-03-12

## 2023-03-12 NOTE — TELEPHONE ENCOUNTER
calcipotriene-betameth diprop (TACLONEX) 0.005-0.064 % external ointment      Last Office Visit : 10-25-22  Derm process 2

## 2023-03-15 DIAGNOSIS — E78.5 DYSLIPIDEMIA: ICD-10-CM

## 2023-03-15 RX ORDER — ROSUVASTATIN CALCIUM 20 MG/1
TABLET, COATED ORAL
Qty: 90 TABLET | Refills: 1 | Status: SHIPPED | OUTPATIENT
Start: 2023-03-15 | End: 2023-05-03

## 2023-03-16 ENCOUNTER — TELEPHONE (OUTPATIENT)
Dept: DERMATOLOGY | Facility: CLINIC | Age: 68
End: 2023-03-16

## 2023-03-16 NOTE — TELEPHONE ENCOUNTER
Prior Authorization Retail Medication Request    Medication/Dose: calcipotriene-betameth diprop (TACLONEX) 0.005-0.064 % external ointment  ICD code (if different than what is on RX):    Previously Tried and Failed:    Rationale:      Insurance Name:  [facility]  Insurance ID:  [id number]

## 2023-03-17 DIAGNOSIS — L40.9 PSORIASIS: ICD-10-CM

## 2023-03-18 NOTE — TELEPHONE ENCOUNTER
CALCIPOTRIENE-BETAMETH DP OINT  This medication needs a Prior Authorization to be covered.  Or send an alternative med to be covered under Pt's insurance for Pt care.    Dorothy Ritter RN  Central Triage Red Flags/Med Refills

## 2023-03-20 NOTE — TELEPHONE ENCOUNTER
Central Prior Authorization Team   Phone: [phone]      PA Initiation    Medication: calcipotriene-betameth diprop (TACLONEX) 0.005-0.064 % external ointment  Insurance Company: [employer] - Phone [phone] Fax [phone]  Pharmacy Filling the Rx: [id number] - [place], [place] - [address] AT  [address]  Filling Pharmacy Phone: [phone]  Filling Pharmacy Fax:    Start Date: 3/20/2023

## 2023-03-21 NOTE — TELEPHONE ENCOUNTER
PRIOR AUTHORIZATION DENIED    Medication: calcipotriene-betameth diprop (TACLONEX) 0.005-0.064 % external oint-PA Denied    Denial Date: 3/21/2023    Denial Rational: must use medications separately, not combination product        Appeal Information:

## 2023-03-24 ENCOUNTER — MYC REFILL (OUTPATIENT)
Dept: DERMATOLOGY | Facility: CLINIC | Age: 68
End: 2023-03-24
Payer: COMMERCIAL

## 2023-03-24 DIAGNOSIS — L40.9 PSORIASIS: ICD-10-CM

## 2023-03-24 RX ORDER — CALCIPOTRIENE, BETAMETHASONE DIPROPIONATE 50; .643 UG/G; MG/G
OINTMENT TOPICAL
Qty: 60 G | Refills: 0 | Status: CANCELLED | OUTPATIENT
Start: 2023-03-24

## 2023-03-27 ENCOUNTER — TELEPHONE (OUTPATIENT)
Dept: DERMATOLOGY | Facility: CLINIC | Age: 68
End: 2023-03-27
Payer: COMMERCIAL

## 2023-03-27 DIAGNOSIS — L40.9 PSORIASIS: Primary | ICD-10-CM

## 2023-03-27 RX ORDER — BETAMETHASONE DIPROPIONATE 0.05 %
OINTMENT (GRAM) TOPICAL
Qty: 50 G | Refills: 3 | Status: SHIPPED | OUTPATIENT
Start: 2023-03-27 | End: 2024-07-10

## 2023-03-27 RX ORDER — CALCIPOTRIENE 50 UG/G
OINTMENT TOPICAL
Qty: 60 G | Refills: 3 | Status: SHIPPED | OUTPATIENT
Start: 2023-03-27 | End: 2024-07-18

## 2023-03-27 NOTE — TELEPHONE ENCOUNTER
[facility] Call Center    Phone Message    May a detailed message be left on voicemail: yes     Reason for Call: Medication Question or concern regarding medication   Prescription Clarification  Name of Medication: calcipotriene-betameth diprop (TACLONEX) 0.005-0.064 % external ointment  Prescribing Provider: Aric   Pharmacy: [id number] - [place], [place] - [address]  Ph: [phone]   What on the order needs clarification?Dr Corbett's team to confirm with [employer] that this formulation is necessary for my health which would reduce my out of pocket.           Action Taken: Message routed to:  [facility] DERM    Travel Screening: Not Applicable

## 2023-03-28 ENCOUNTER — TELEPHONE (OUTPATIENT)
Dept: DERMATOLOGY | Facility: CLINIC | Age: 68
End: 2023-03-28

## 2023-03-28 NOTE — TELEPHONE ENCOUNTER
Prior Authorization Retail Medication Request    Medication/Dose:   ICD code (if different than what is on RX):  Psoriasis [L40.9]  Previously Tried and Failed:  See chart  Rationale:      Insurance Name:  [facility]  Insurance ID:  [id number]

## 2023-03-29 RX ORDER — CALCIPOTRIENE, BETAMETHASONE DIPROPIONATE 50; .643 UG/G; MG/G
OINTMENT TOPICAL
Qty: 60 G | OUTPATIENT
Start: 2023-03-29

## 2023-03-29 NOTE — TELEPHONE ENCOUNTER
calcipotriene-betameth diprop (TACLONEX) 0.005-0.064 % external ointment: RF available- addressed in 3-24-23 my chart

## 2023-03-30 NOTE — TELEPHONE ENCOUNTER
Prior Authorization Not Needed per Insurance        Medication: betamethasone dipropionate (DIPROSONE) 0.05 % external ointment-PA NOT NEEDED   Insurance Company: [employer] [place] - Phone [phone] Fax [phone]  Expected CoPay:      Pharmacy Filling the Rx: [id number] - [place], [place] - [address]  Pharmacy Notified: Yes  Patient Notified: No    Called pharmacy and pharmacy stated that PA is Not Needed and medication is covered. **Instructed pharmacy to notify patient when script is ready to /ship.** Pharmacy stated that they have a paid claim on medication on 3/28/2023 through patients insurance and patient has picked up medication. Insurance also stated that PA is Not Needed and medication is covered

## 2023-04-20 NOTE — PROGRESS NOTES
[facility] Phototherapy Record  1. Tony Mcnair is a 62 year old male is here today for phototherapy (UVB) treatment for Psoriasis.        Changes or new medications since last treatment:   NO    New medical conditions or problems since last treatment:   NO    Any problems with last phototherapy treatment?    NO    2. The patient tolerated phototherapy without complication    Patient will return for next UVB treatment, per protocol.     Patient to see provider every 4-12 weeks for follow-up during treatment.      All questions and concerns discussed with patient in clinic today.      Lorrie Graham  
[facility]

## 2023-04-25 DIAGNOSIS — F33.1 MODERATE EPISODE OF RECURRENT MAJOR DEPRESSIVE DISORDER (H): ICD-10-CM

## 2023-04-26 RX ORDER — CITALOPRAM HYDROBROMIDE 20 MG/1
TABLET ORAL
Qty: 90 TABLET | Refills: 1 | Status: SHIPPED | OUTPATIENT
Start: 2023-04-26 | End: 2023-06-26

## 2023-05-03 ENCOUNTER — MYC MEDICAL ADVICE (OUTPATIENT)
Dept: FAMILY MEDICINE | Facility: CLINIC | Age: 68
End: 2023-05-03
Payer: COMMERCIAL

## 2023-05-03 DIAGNOSIS — E78.5 DYSLIPIDEMIA: ICD-10-CM

## 2023-05-03 RX ORDER — ROSUVASTATIN CALCIUM 20 MG/1
20 TABLET, COATED ORAL DAILY
Qty: 90 TABLET | Refills: 3 | Status: SHIPPED | OUTPATIENT
Start: 2023-05-03 | End: 2023-11-03

## 2023-05-03 NOTE — TELEPHONE ENCOUNTER
Prescription approved per [facility] Refill Protocol.    Stefany TALLEY RN  [facility] Triage Team

## 2023-05-16 ENCOUNTER — OFFICE VISIT (OUTPATIENT)
Dept: OPHTHALMOLOGY | Facility: CLINIC | Age: 68
End: 2023-05-16
Attending: OPHTHALMOLOGY
Payer: COMMERCIAL

## 2023-05-16 DIAGNOSIS — B88.0 INFESTATION BY DEMODEX FOLLICULORUM: ICD-10-CM

## 2023-05-16 DIAGNOSIS — H01.02A SQUAMOUS BLEPHARITIS OF BOTH UPPER AND LOWER EYELID OF RIGHT EYE: ICD-10-CM

## 2023-05-16 DIAGNOSIS — H04.123 DRY EYE SYNDROME OF BOTH LACRIMAL GLANDS: Primary | ICD-10-CM

## 2023-05-16 PROCEDURE — 92015 DETERMINE REFRACTIVE STATE: CPT

## 2023-05-16 PROCEDURE — G0463 HOSPITAL OUTPT CLINIC VISIT: HCPCS | Performed by: OPHTHALMOLOGY

## 2023-05-16 PROCEDURE — 99214 OFFICE O/P EST MOD 30 MIN: CPT | Mod: GC | Performed by: OPHTHALMOLOGY

## 2023-05-16 ASSESSMENT — REFRACTION_WEARINGRX
OS_AXIS: 007
OD_CYLINDER: +4.00
OD_SPHERE: -1.00
OS_ADD: +2.25
OD_ADD: +2.25
OS_CYLINDER: +4.00
OD_AXIS: 175
SPECS_TYPE: PAL
OS_SPHERE: -0.25

## 2023-05-16 ASSESSMENT — CONF VISUAL FIELD
OS_SUPERIOR_TEMPORAL_RESTRICTION: 0
OS_INFERIOR_NASAL_RESTRICTION: 0
OD_SUPERIOR_TEMPORAL_RESTRICTION: 0
OS_INFERIOR_TEMPORAL_RESTRICTION: 0
OD_SUPERIOR_NASAL_RESTRICTION: 0
OS_SUPERIOR_NASAL_RESTRICTION: 0
OS_NORMAL: 1
OD_INFERIOR_NASAL_RESTRICTION: 0
OD_INFERIOR_TEMPORAL_RESTRICTION: 0
METHOD: COUNTING FINGERS
OD_NORMAL: 1

## 2023-05-16 ASSESSMENT — TONOMETRY
IOP_METHOD: ICARE
OS_IOP_MMHG: 13
OD_IOP_MMHG: 12

## 2023-05-16 ASSESSMENT — VISUAL ACUITY
OD_PH_CC: 20/30
CORRECTION_TYPE: GLASSES
OS_PH_CC: 20/25
OD_CC: 20/50
OS_PH_CC+: -2
METHOD: SNELLEN - LINEAR
OS_CC: 20/30

## 2023-05-16 ASSESSMENT — EXTERNAL EXAM - LEFT EYE: OS_EXAM: NORMAL

## 2023-05-16 ASSESSMENT — REFRACTION_MANIFEST
OD_SPHERE: -0.50
OS_ADD: +2.25
OD_CYLINDER: +4.75
OS_SPHERE: -0.50
OD_AXIS: 180
OS_AXIS: 012
OD_ADD: +2.25
OS_CYLINDER: +3.75

## 2023-05-16 ASSESSMENT — EXTERNAL EXAM - RIGHT EYE: OD_EXAM: NORMAL

## 2023-05-16 NOTE — PROGRESS NOTES
[facility]    Mr. Mcnair is a 67 year old CM with h/o Dry eye syndrome.      Interval History 05/16/23: Last seen 11/2022. Doing well overall but noticing more blurry vision recently. No glare, minimal haloes around lights.     Hx of psoriasis.     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   Continue artificial tears. Increase to 4-6x/day.  Advised to increase warm compresses and lid hygiene to twice daily.   Recommend warm, microbead eye mask instead  Surface looks healthy. Can approach additional methods of treatment if symptoms/signs worsen - plugs, Restasis, BCL, Doxy, etc.   - Patient would like to defer PO medication at this point     # Demodex noted - Worse left eye .  Restart using Claridex foam, no demodex sleeves noted today  Continue claridex daily  PFAT QID     # Hx of left eye shingles:   monitoring off FML                # Allergic conjunctivitis.  Continue pataday as needed.                  # Iris Nevus OD  stable in size compared to prior photos (2011)   Monitor     # Refractive Error each eye  - Happy with current glasses, stable VA  - Has significant against the rule astigmatism each eye     # PVD each eye  - no flashes / curtain, stable.     # each eye cataracts (cortical).  Pt without compaints at present. Disc Cat Ext.     RTC: 12 months  [place]    Humaira Lambert MD  Resident Physician, PGY-3  Department of Ophthalmology      Attending Physician Attestation:  Complete documentation of historical and exam elements from today's encounter can be found in the full encounter summary report (not reduplicated in this progress note).  I personally obtained the chief complaint(s) and history of present illness.  I confirmed and edited as necessary the review of systems,  past medical/surgical history, family history, social history, and examination findings as documented by others; and I examined the patient myself.  I personally reviewed the relevant tests, images, and reports as documented above.  I formulated and edited as necessary the assessment and plan and discussed the findings and management plan with the patient and family. - Sean Schultz MD

## 2023-05-16 NOTE — NURSING NOTE
Chief Complaints and History of Present Illnesses   Patient presents with     Follow Up     Chief Complaint(s) and History of Present Illness(es)     Follow Up            Laterality: both eyes    Onset: gradual    Quality: blurred vision    Context: distance vision and near vision    Associated symptoms: floaters and itching.  Negative for flashes    Treatments tried: artificial tears and warm compresses    Pain scale: 0/10          Comments    Follow up for dry eyes.   The patient notes ongoing floaters and a perfect [place] floaters.  The patient uses Pataday and artificial tears.  The patent is using Tea Tree oil and warm compresses.  The patient notes increasingly blurred vision for the last two to three months.  Nena Kellogg, COA, COA 8:51 AM 05/16/2023

## 2023-06-26 DIAGNOSIS — F33.1 MODERATE EPISODE OF RECURRENT MAJOR DEPRESSIVE DISORDER (H): ICD-10-CM

## 2023-06-26 RX ORDER — CITALOPRAM HYDROBROMIDE 20 MG/1
TABLET ORAL
Qty: 90 TABLET | Refills: 0 | Status: SHIPPED | OUTPATIENT
Start: 2023-06-26 | End: 2023-11-15

## 2023-07-28 ENCOUNTER — MYC MEDICAL ADVICE (OUTPATIENT)
Dept: OPHTHALMOLOGY | Facility: CLINIC | Age: 68
End: 2023-07-28
Payer: COMMERCIAL

## 2023-07-28 NOTE — TELEPHONE ENCOUNTER
Spoke to pt at 1240    Pt interested in cataract consult    Scheduled with Dr. Yee August 8th at 1245 with arrival at 1230    Pt aware of date/time/location at [facility] and seemed comfortable with the scheduling    Tony Napier RN 12:46 PM 07/28/23

## 2023-08-08 ENCOUNTER — OFFICE VISIT (OUTPATIENT)
Dept: OPHTHALMOLOGY | Facility: CLINIC | Age: 68
End: 2023-08-08
Attending: OPHTHALMOLOGY
Payer: COMMERCIAL

## 2023-08-08 DIAGNOSIS — H25.813 COMBINED FORM OF AGE-RELATED CATARACT, BOTH EYES: ICD-10-CM

## 2023-08-08 DIAGNOSIS — H18.603 KERATOCONUS OF BOTH EYES: Primary | ICD-10-CM

## 2023-08-08 DIAGNOSIS — H04.123 DRY EYE SYNDROME OF BOTH LACRIMAL GLANDS: ICD-10-CM

## 2023-08-08 PROCEDURE — 92025 CPTRIZED CORNEAL TOPOGRAPHY: CPT | Performed by: OPHTHALMOLOGY

## 2023-08-08 PROCEDURE — 99214 OFFICE O/P EST MOD 30 MIN: CPT | Performed by: OPHTHALMOLOGY

## 2023-08-08 PROCEDURE — 76519 ECHO EXAM OF EYE: CPT | Performed by: OPHTHALMOLOGY

## 2023-08-08 PROCEDURE — G0463 HOSPITAL OUTPT CLINIC VISIT: HCPCS | Performed by: OPHTHALMOLOGY

## 2023-08-08 ASSESSMENT — CONF VISUAL FIELD
OS_SUPERIOR_TEMPORAL_RESTRICTION: 0
OS_INFERIOR_TEMPORAL_RESTRICTION: 0
OD_NORMAL: 1
OS_SUPERIOR_NASAL_RESTRICTION: 0
OD_INFERIOR_NASAL_RESTRICTION: 0
OD_INFERIOR_TEMPORAL_RESTRICTION: 0
OD_SUPERIOR_TEMPORAL_RESTRICTION: 0
OD_SUPERIOR_NASAL_RESTRICTION: 0
OS_INFERIOR_NASAL_RESTRICTION: 0
METHOD: COUNTING FINGERS
OS_NORMAL: 1

## 2023-08-08 ASSESSMENT — VISUAL ACUITY
OD_PH_CC: 20/30
OS_BAT_HIGH: 20/80
OD_PH_CC+: -1
METHOD: SNELLEN - LINEAR
OD_SC: -3
OS_BAT_LOW: 20/40
OS_PH_CC+: 25
OD_CC: 20/60
OD_BAT_HIGH: 20/100
OD_BAT_LOW: 20/60
OS_CC: 20/40
OS_BAT_MED: 20/60
OD_BAT_MED: 20/70

## 2023-08-08 ASSESSMENT — REFRACTION_WEARINGRX
OD_SPHERE: -1.00
OD_CYLINDER: +4.00
OS_CYLINDER: +4.00
OS_AXIS: 007
OD_ADD: +2.25
OD_AXIS: 175
OS_ADD: +2.25
SPECS_TYPE: PAL
OS_SPHERE: -0.25

## 2023-08-08 ASSESSMENT — EXTERNAL EXAM - RIGHT EYE: OD_EXAM: NORMAL

## 2023-08-08 ASSESSMENT — TONOMETRY
OS_IOP_MMHG: 13
OD_IOP_MMHG: 10
IOP_METHOD: TONOPEN

## 2023-08-08 ASSESSMENT — EXTERNAL EXAM - LEFT EYE: OS_EXAM: NORMAL

## 2023-08-08 ASSESSMENT — CUP TO DISC RATIO
OD_RATIO: 0.2
OS_RATIO: 0.15

## 2023-08-08 NOTE — LETTER
8/8/2023       RE: Tony Mcnair  [address]  [place] [address]     Dear Dr. Schultz,    Thank you for referring your patient, Tony Mcnair, to the [facility] at [facility]. Please see a copy of my visit note below.    Chief Complaint(s) and History of Present Illness(es)     Cataract            Comments: cataracts (cortical) both eyes          Comments    Ref by Dr Schultz  Pt states decreased vision both eye   C/o glare, halos, trouble with night driving     Sayda Yee COT 12:53 PM August 8, 2023         Review of systems for the eyes was negative other than the pertinent positives/negatives listed in the HPI.      Assessment & Plan      Tony Mcnair is a 68 year old male with the following diagnoses:   1. Keratoconus of both eyes    2. Dry eye syndrome of both lacrimal glands    3. Combined form of age-related cataract, both eyes       New patient referred for cataract eval from Dr. Schultz.  Noting worsening vision left eye > right eye and recent inability to correct vision in glasses as before  Doing well with ocular comfort    Topography today revealing of forme fruste Keratoconus left eye > right eye   Discussed with patient, he was told of this by his optometrist in the distant past.  No previous contact lens use.  No previous topography for comparison available today  Possible early visually significant cataract left eye, but I suspect the irregular astigmatism is the bigger culprit for decreased vision at this time  Recommend eval with scleral contact lens before considering cataract extraction   Patient understanding of the plan and hoping to avoid cataract extraction       Patient disposition:   Return for Dr. Sargent for scleral lens eval.  Back to [place] as needed          Attending Physician Attestation:  Complete documentation of historical and exam elements from today's encounter can be found in the full encounter summary report (not reduplicated in  this progress note).  I personally obtained the chief complaint(s) and history of present illness.  I confirmed and edited as necessary the review of systems, past medical/surgical history, family history, social history, and examination findings as documented by others; and I examined the patient myself.  I personally reviewed the relevant tests, images, and reports as documented above.  I formulated and edited as necessary the assessment and plan and discussed the findings and management plan with the patient and family. I spent a total of 45 minutes reviewing chart, interpreting testing, documenting and face to face with the patient.  Over 50% of this time was spent counseling and coordinating care regarding their diagnosis and management.  . - Reji Yee MD         Main Ophthalmology Exam       External Exam         Right Left    External Normal Normal              Slit Lamp Exam         Right Left    Lids/Lashes MGD, telangiectasia lid margin. No Demodex sleeves on lid margin, +saponified oils,  MGD, telangiectasia lid margin. No Demodex sleeves on lid margin, + saponified oils    Conjunctiva/Sclera Pinguecula nasal, temporal, Concretions Pinguecula nasal, temporal, Concretions    Cornea reduced tbut, mild pee reduced tbut, mild pee    Anterior Chamber Deep and quiet Deep and quiet    Iris Mod dilation, nasal iris nevus ~1mm x 1mm stable Mod dilation, nasal iris nevus ~1mm x 1mm stable    Lens Tr NS, Tr sutural cataract Tr NS, Tr sutural cataract, 1+ cortical with spoke near axis              Fundus Exam         Right Left    Disc PPA PPA    C/D Ratio 0.2 0.15    Macula Normal Normal    Vessels Normal Normal    Periphery Normal Normal                  Base Eye Exam       Visual Acuity (Snellen - Linear)         Right Left    Dist cc 20/60 20/40    Dist ph cc 20/30 -1  25    Near sc -3               Tonometry (Tonopen, 1:03 PM)         Right Left    Pressure 10 13              Pupils         Dark Light  Shape React APD    Right 3 2 Round Brisk None    Left 3 2 Round Brisk None              Visual Fields (Counting fingers)         Left Right     Full Full              Extraocular Movement         Right Left     Full Full              Neuro/Psych       Oriented x3: Yes    Mood/Affect: Normal              Dilation       Both eyes: 1.0% Mydriacyl, 2.5% Simon Synephrine @ 1:04 PM                  Additional Tests       Glare Testing         Off Low Medium High    Right 20/60 20/60 20/70 20/100    Left 20/40 20/40 20/60 20/80                  Slit Lamp and Fundus Exam       External Exam         Right Left    External Normal Normal              Slit Lamp Exam         Right Left    Lids/Lashes MGD, telangiectasia lid margin. No Demodex sleeves on lid margin, +saponified oils,  MGD, telangiectasia lid margin. No Demodex sleeves on lid margin, + saponified oils    Conjunctiva/Sclera Pinguecula nasal, temporal, Concretions Pinguecula nasal, temporal, Concretions    Cornea reduced tbut, mild pee reduced tbut, mild pee    Anterior Chamber Deep and quiet Deep and quiet    Iris Mod dilation, nasal iris nevus ~1mm x 1mm stable Mod dilation, nasal iris nevus ~1mm x 1mm stable    Lens Tr NS, Tr sutural cataract Tr NS, Tr sutural cataract, 1+ cortical with spoke near axis              Fundus Exam         Right Left    Disc PPA PPA    C/D Ratio 0.2 0.15    Macula Normal Normal    Vessels Normal Normal    Periphery Normal Normal                  Refraction       Wearing Rx         Sphere Cylinder Axis Add    Right -1.00 +4.00 175 +2.25    Left -0.25 +4.00 007 +2.25      Type: PAL              Manifest Refraction    MR done 5/16/23                   Again, thank you for allowing me to participate in the care of your patient.      Sincerely,    Reji eYe MD

## 2023-08-08 NOTE — NURSING NOTE
Chief Complaints and History of Present Illnesses   Patient presents with    Cataract     cataracts (cortical) both eyes     Chief Complaint(s) and History of Present Illness(es)       Cataract              Comments: cataracts (cortical) both eyes              Comments    Ref by Dr Schultz  Pt states decreased vision both eye   C/o glare, halos, trouble with night driving     Sayda Yee COT 12:53 PM August 8, 2023

## 2023-08-09 NOTE — PROGRESS NOTES
Chief Complaint(s) and History of Present Illness(es)     Cataract            Comments: cataracts (cortical) both eyes          Comments    Ref by Dr Schultz  Pt states decreased vision both eye   C/o glare, halos, trouble with night driving     Sayda Yee COT 12:53 PM August 8, 2023         Review of systems for the eyes was negative other than the pertinent positives/negatives listed in the HPI.      Assessment & Plan      Tony Mcnair is a 68 year old male with the following diagnoses:   1. Keratoconus of both eyes    2. Dry eye syndrome of both lacrimal glands    3. Combined form of age-related cataract, both eyes       New patient referred for cataract eval from Dr. Schultz.  Noting worsening vision left eye > right eye and recent inability to correct vision in glasses as before  Doing well with ocular comfort    Topography today revealing of forme fruste Keratoconus left eye > right eye   Discussed with patient, he was told of this by his optometrist in the distant past.  No previous contact lens use.  No previous topography for comparison available today  Possible early visually significant cataract left eye, but I suspect the irregular astigmatism is the bigger culprit for decreased vision at this time  Recommend eval with scleral contact lens before considering cataract extraction   Patient understanding of the plan and hoping to avoid cataract extraction       Patient disposition:   Return for Dr. Sargent for scleral lens eval.  Back to [place] as needed          Attending Physician Attestation:  Complete documentation of historical and exam elements from today's encounter can be found in the full encounter summary report (not reduplicated in this progress note).  I personally obtained the chief complaint(s) and history of present illness.  I confirmed and edited as necessary the review of systems, past medical/surgical history, family history, social history, and examination findings as documented by  others; and I examined the patient myself.  I personally reviewed the relevant tests, images, and reports as documented above.  I formulated and edited as necessary the assessment and plan and discussed the findings and management plan with the patient and family. I spent a total of 45 minutes reviewing chart, interpreting testing, documenting and face to face with the patient.  Over 50% of this time was spent counseling and coordinating care regarding their diagnosis and management.  . - Reji Yee MD

## 2023-08-16 NOTE — TELEPHONE ENCOUNTER
calcipotriene-betameth diprop (TACLONEX) 0.005-0.064 % external ointment  Last Written Prescription Date:  4/7/20  Last Fill Quantity: 60g,   # refills: 0  Last visit:  6/2/20  NV: 12/1/20       Routing refill request to provider for review/approval because:  Not on protocol           Clindamycin Pregnancy And Lactation Text: This medication can be used in pregnancy if certain situations. Clindamycin is also present in breast milk. Arava Counseling:  Patient counseled regarding adverse effects of Arava including but not limited to nausea, vomiting, abnormalities in liver function tests. Patients may develop mouth sores, rash, diarrhea, and abnormalities in blood counts. The patient understands that monitoring is required including LFTs and blood counts. There is a rare possibility of scarring of the liver and lung problems that can occur when taking methotrexate. Persistent nausea, loss of appetite, pale stools, dark urine, cough, and shortness of breath should be reported immediately. Patient advised to discontinue Arava treatment and consult with a physician prior to attempting conception. The patient will have to undergo a treatment to eliminate Arava from the body prior to conception. Topical Sulfur Applications Counseling: Topical Sulfur Counseling: Patient counseled that this medication may cause skin irritation or allergic reactions. In the event of skin irritation, the patient was advised to reduce the amount of the drug applied or use it less frequently. The patient verbalized understanding of the proper use and possible adverse effects of topical sulfur application. All of the patient's questions and concerns were addressed. Imiquimod Pregnancy And Lactation Text: This medication is Pregnancy Category C. It is unknown if this medication is excreted in breast milk. Sski Pregnancy And Lactation Text: This medication is Pregnancy Category D and isn't considered safe during pregnancy. It is excreted in breast milk. Spironolactone Counseling: Patient advised regarding risks of diarrhea, abdominal pain, hyperkalemia, birth defects (for female patients), liver toxicity and renal toxicity. The patient may need blood work to monitor liver and kidney function and potassium levels while on therapy. The patient verbalized understanding of the proper use and possible adverse effects of spironolactone. All of the patient's questions and concerns were addressed. Simponi Counseling:  I discussed with the patient the risks of golimumab including but not limited to myelosuppression, immunosuppression, autoimmune hepatitis, demyelinating diseases, lymphoma, and serious infections. The patient understands that monitoring is required including a PPD at baseline and must alert us or the primary physician if symptoms of infection or other concerning signs are noted. Griseofulvin Counseling:  I discussed with the patient the risks of griseofulvin including but not limited to photosensitivity, cytopenia, liver damage, nausea/vomiting and severe allergy. The patient understands that this medication is best absorbed when taken with a fatty meal (e.g., ice cream or french fries). Azathioprine Counseling:  I discussed with the patient the risks of azathioprine including but not limited to myelosuppression, immunosuppression, hepatotoxicity, lymphoma, and infections. The patient understands that monitoring is required including baseline LFTs, Creatinine, possible TPMP genotyping and weekly CBCs for the first month and then every 2 weeks thereafter. The patient verbalized understanding of the proper use and possible adverse effects of azathioprine. All of the patient's questions and concerns were addressed. Zyclara Counseling:  I discussed with the patient the risks of imiquimod including but not limited to erythema, scaling, itching, weeping, crusting, and pain. Patient understands that the inflammatory response to imiquimod is variable from person to person and was educated regarded proper titration schedule. If flu-like symptoms develop, patient knows to discontinue the medication and contact us. Quinolones Counseling:  I discussed with the patient the risks of fluoroquinolones including but not limited to GI upset, allergic reaction, drug rash, diarrhea, dizziness, photosensitivity, yeast infections, liver function test abnormalities, tendonitis/tendon rupture. Bexarotene Pregnancy And Lactation Text: This medication is Pregnancy Category X and should not be given to women who are pregnant or may become pregnant. This medication should not be used if you are breast feeding. Topical Clindamycin Pregnancy And Lactation Text: This medication is Pregnancy Category B and is considered safe during pregnancy. It is unknown if it is excreted in breast milk. Humira Pregnancy And Lactation Text: This medication is Pregnancy Category B and is considered safe during pregnancy. It is unknown if this medication is excreted in breast milk. Erivedge Pregnancy And Lactation Text: This medication is Pregnancy Category X and is absolutely contraindicated during pregnancy. It is unknown if it is excreted in breast milk. Doxepin Counseling:  Patient advised that the medication is sedating and not to drive a car after taking this medication. Patient informed of potential adverse effects including but not limited to dry mouth, urinary retention, and blurry vision. The patient verbalized understanding of the proper use and possible adverse effects of doxepin. All of the patient's questions and concerns were addressed. 5-Fu Pregnancy And Lactation Text: This medication is Pregnancy Category X and contraindicated in pregnancy and in women who may become pregnant. It is unknown if this medication is excreted in breast milk. Gabapentin Pregnancy And Lactation Text: This medication is Pregnancy Category C and isn't considered safe during pregnancy. It is excreted in breast milk. Eucrisa Pregnancy And Lactation Text: This medication has not been assigned a Pregnancy Risk Category but animal studies failed to show danger with the topical medication. It is unknown if the medication is excreted in breast milk. Benzoyl Peroxide Counseling: Patient counseled that medicine may cause skin irritation and bleach clothing. In the event of skin irritation, the patient was advised to reduce the amount of the drug applied or use it less frequently. The patient verbalized understanding of the proper use and possible adverse effects of benzoyl peroxide. All of the patient's questions and concerns were addressed. Azithromycin Pregnancy And Lactation Text: This medication is considered safe during pregnancy and is also secreted in breast milk. Terbinafine Counseling: Patient counseling regarding adverse effects of terbinafine including but not limited to headache, diarrhea, rash, upset stomach, liver function test abnormalities, itching, taste/smell disturbance, nausea, abdominal pain, and flatulence. There is a rare possibility of liver failure that can occur when taking terbinafine. The patient understands that a baseline LFT and kidney function test may be required. The patient verbalized understanding of the proper use and possible adverse effects of terbinafine. All of the patient's questions and concerns were addressed. Eucrisa Counseling: Patient may experience a mild burning sensation during topical application. Debbrah Bangl[place] is not approved in children less than 3years of age. Terbinafine Pregnancy And Lactation Text: This medication is Pregnancy Category B and is considered safe during pregnancy. It is also excreted in breast milk and breast feeding isn't recommended. Cosentyx Counseling:  I discussed with the patient the risks of Cosentyx including but not limited to worsening of Crohn's disease, immunosuppression, allergic reactions and infections. The patient understands that monitoring is required including a PPD at baseline and must alert us or the primary physician if symptoms of infection or other concerning signs are noted. Cephalexin Counseling: I counseled the patient regarding use of cephalexin as an antibiotic for prophylactic and/or therapeutic purposes. Cephalexin (commonly prescribed under brand name Keflex) is a cephalosporin antibiotic which is active against numerous classes of bacteria, including most skin bacteria. Side effects may include nausea, diarrhea, gastrointestinal upset, rash, hives, yeast infections, and in rare cases, hepatitis, kidney disease, seizures, fever, confusion, neurologic symptoms, and others. Patients with severe allergies to penicillin medications are cautioned that there is about a 10% incidence of cross-reactivity with cephalosporins. When possible, patients with penicillin allergies should use alternatives to cephalosporins for antibiotic therapy. Siliq Pregnancy And Lactation Text: The risk during pregnancy and breastfeeding is uncertain with this medication. Carac Counseling:  I discussed with the patient the risks of Carac including but not limited to erythema, scaling, itching, weeping, crusting, and pain. Methotrexate Pregnancy And Lactation Text: This medication is Pregnancy Category X and is known to cause fetal harm. This medication is excreted in breast milk. Oxybutynin Counseling:  I discussed with the patient the risks of oxybutynin including but not limited to skin rash, drowsiness, dry mouth, difficulty urinating, and blurred vision. Hydroquinone Counseling:  Patient advised that medication may result in skin irritation, lightening (hypopigmentation), dryness, and burning. In the event of skin irritation, the patient was advised to reduce the amount of the drug applied or use it less frequently. Rarely, spots that are treated with hydroquinone can become darker (pseudoochronosis). Should this occur, patient instructed to stop medication and call the office. The patient verbalized understanding of the proper use and possible adverse effects of hydroquinone. All of the patient's questions and concerns were addressed. Fluconazole Pregnancy And Lactation Text: This medication is Pregnancy Category C and it isn't know if it is safe during pregnancy. It is also excreted in breast milk. Rifampin Counseling: I discussed with the patient the risks of rifampin including but not limited to liver damage, kidney damage, red-orange body fluids, nausea/vomiting and severe allergy. Fluconazole Counseling:  Patient counseled regarding adverse effects of fluconazole including but not limited to headache, diarrhea, nausea, upset stomach, liver function test abnormalities, taste disturbance, and stomach pain. There is a rare possibility of liver failure that can occur when taking fluconazole. The patient understands that monitoring of LFTs and kidney function test may be required, especially at baseline. The patient verbalized understanding of the proper use and possible adverse effects of fluconazole. All of the patient's questions and concerns were addressed. Cyclosporine Pregnancy And Lactation Text: This medication is Pregnancy Category C and it isn't know if it is safe during pregnancy. This medication is excreted in breast milk. Xolair Pregnancy And Lactation Text: This medication is Pregnancy Category B and is considered safe during pregnancy. This medication is excreted in breast milk. Griseofulvin Pregnancy And Lactation Text: This medication is Pregnancy Category X and is known to cause serious birth defects. It is unknown if this medication is excreted in breast milk but breast feeding should be avoided. Nsaids Pregnancy And Lactation Text: These medications are considered safe up to 30 weeks gestation. It is excreted in breast milk. Xellisaz Pregnancy And Lactation Text: This medication is Pregnancy Category D and is not considered safe during pregnancy. The risk during breast feeding is also uncertain. Cyclophosphamide Counseling:  I discussed with the patient the risks of cyclophosphamide including but not limited to hair loss, hormonal abnormalities, decreased fertility, abdominal pain, diarrhea, nausea and vomiting, bone marrow suppression and infection. The patient understands that monitoring is required while taking this medication. Protopic Counseling: Patient may experience a mild burning sensation during topical application. Protopic is not approved in children less than 3years of age. There have been case reports of hematologic and skin malignancies in patients using topical calcineurin inhibitors although causality is questionable. Prednisone Counseling:  I discussed with the patient the risks of prolonged use of prednisone including but not limited to weight gain, insomnia, osteoporosis, mood changes, diabetes, susceptibility to infection, glaucoma and high blood pressure. In cases where prednisone use is prolonged, patients should be monitored with blood pressure checks, serum glucose levels and an eye exam.  Additionally, the patient may need to be placed on GI prophylaxis, PCP prophylaxis, and calcium and vitamin D supplementation and/or a bisphosphonate. The patient verbalized understanding of the proper use and the possible adverse effects of prednisone. All of the patient's questions and concerns were addressed. Cephalexin Pregnancy And Lactation Text: This medication is Pregnancy Category B and considered safe during pregnancy. It is also excreted in breast milk but can be used safely for shorter doses. Sarecycline Counseling: Patient advised regarding possible photosensitivity and discoloration of the teeth, skin, lips, tongue and gums. Patient instructed to avoid sunlight, if possible. When exposed to sunlight, patients should wear protective clothing, sunglasses, and sunscreen. The patient was instructed to call the office immediately if the following severe adverse effects occur:  hearing changes, easy bruising/bleeding, severe headache, or vision changes. The patient verbalized understanding of the proper use and possible adverse effects of sarecycline. All of the patient's questions and concerns were addressed. Cyclophosphamide Pregnancy And Lactation Text: This medication is Pregnancy Category D and it isn't considered safe during pregnancy. This medication is excreted in breast milk. Itraconazole Counseling:  I discussed with the patient the risks of itraconazole including but not limited to liver damage, nausea/vomiting, neuropathy, and severe allergy. The patient understands that this medication is best absorbed when taken with acidic beverages such as non-diet cola or ginger ale. The patient understands that monitoring is required including baseline LFTs and repeat LFTs at intervals. The patient understands that they are to contact us or the primary physician if concerning signs are noted. Birth Control Pills Pregnancy And Lactation Text: This medication should be avoided if pregnant and for the first 30 days post-partum. Ketoconazole Counseling:   Patient counseled regarding improving absorption with orange juice. Adverse effects include but are not limited to breast enlargement, headache, diarrhea, nausea, upset stomach, liver function test abnormalities, taste disturbance, and stomach pain. There is a rare possibility of liver failure that can occur when taking ketoconazole. The patient understands that monitoring of LFTs may be required, especially at baseline. The patient verbalized understanding of the proper use and possible adverse effects of ketoconazole. All of the patient's questions and concerns were addressed. Siliq Counseling:  I discussed with the patient the risks of Siliq including but not limited to new or worsening depression, suicidal thoughts and behavior, immunosuppression, malignancy, posterior leukoencephalopathy syndrome, and serious infections. The patient understands that monitoring is required including a PPD at baseline and must alert us or the primary physician if symptoms of infection or other concerning signs are noted. There is also a special program designed to monitor depression which is required with Siliq. Stelara Counseling:  I discussed with the patient the risks of ustekinumab including but not limited to immunosuppression, malignancy, posterior leukoencephalopathy syndrome, and serious infections. The patient understands that monitoring is required including a PPD at baseline and must alert us or the primary physician if symptoms of infection or other concerning signs are noted. Include Pregnancy/Lactation Warning?: No Minocycline Counseling: Patient advised regarding possible photosensitivity and discoloration of the teeth, skin, lips, tongue and gums. Patient instructed to avoid sunlight, if possible. When exposed to sunlight, patients should wear protective clothing, sunglasses, and sunscreen. The patient was instructed to call the office immediately if the following severe adverse effects occur:  hearing changes, easy bruising/bleeding, severe headache, or vision changes. The patient verbalized understanding of the proper use and possible adverse effects of minocycline. All of the patient's questions and concerns were addressed. Minocycline Pregnancy And Lactation Text: This medication is Pregnancy Category D and not consider safe during pregnancy. It is also excreted in breast milk. Dupixent Counseling: I discussed with the patient the risks of dupilumab including but not limited to eye infection and irritation, cold sores, injection site reactions, worsening of asthma, allergic reactions and increased risk of parasitic infection. Live vaccines should be avoided while taking dupilumab. Dupilumab will also interact with certain medications such as warfarin and cyclosporine. The patient understands that monitoring is required and they must alert us or the primary physician if symptoms of infection or other concerning signs are noted. Rituxan Counseling:  I discussed with the patient the risks of Rituxan infusions. Side effects can include infusion reactions, severe drug rashes including mucocutaneous reactions, reactivation of latent hepatitis and other infections and rarely progressive multifocal leukoencephalopathy. All of the patient's questions and concerns were addressed. Bexarotene Counseling:  I discussed with the patient the risks of bexarotene including but not limited to hair loss, dry lips/skin/eyes, liver abnormalities, hyperlipidemia, pancreatitis, depression/suicidal ideation, photosensitivity, drug rash/allergic reactions, hypothyroidism, anemia, leukopenia, infection, cataracts, and teratogenicity. Patient understands that they will need regular blood tests to check lipid profile, liver function tests, white blood cell count, thyroid function tests and pregnancy test if applicable. Cellcept Pregnancy And Lactation Text: This medication is Pregnancy Category D and isn't considered safe during pregnancy. It is unknown if this medication is excreted in breast milk. High Dose Vitamin A Counseling: Side effects reviewed, pt to contact office should one occur. Picato Counseling:  I discussed with the patient the risks of Picato including but not limited to erythema, scaling, itching, weeping, crusting, and pain. Ilumya Counseling: I discussed with the patient the risks of tildrakizumab including but not limited to immunosuppression, malignancy, posterior leukoencephalopathy syndrome, and serious infections. The patient understands that monitoring is required including a PPD at baseline and must alert us or the primary physician if symptoms of infection or other concerning signs are noted. Tetracycline Counseling: Patient counseled regarding possible photosensitivity and increased risk for sunburn. Patient instructed to avoid sunlight, if possible. When exposed to sunlight, patients should wear protective clothing, sunglasses, and sunscreen. The patient was instructed to call the office immediately if the following severe adverse effects occur:  hearing changes, easy bruising/bleeding, severe headache, or vision changes. The patient verbalized understanding of the proper use and possible adverse effects of tetracycline. All of the patient's questions and concerns were addressed. Patient understands to avoid pregnancy while on therapy due to potential birth defects. Valtrex Pregnancy And Lactation Text: this medication is Pregnancy Category B and is considered safe during pregnancy. This medication is not directly found in breast milk but it's metabolite acyclovir is present. Solaraze Counseling:  I discussed with the patient the risks of Solaraze including but not limited to erythema, scaling, itching, weeping, crusting, and pain. Ivermectin Pregnancy And Lactation Text: This medication is Pregnancy Category C and it isn't known if it is safe during pregnancy. It is also excreted in breast milk. SSKI Counseling:  I discussed with the patient the risks of SSKI including but not limited to thyroid abnormalities, metallic taste, GI upset, fever, headache, acne, arthralgias, paraesthesias, lymphadenopathy, easy bleeding, arrhythmias, and allergic reaction. Solaraze Pregnancy And Lactation Text: This medication is Pregnancy Category B and is considered safe. There is some data to suggest avoiding during the third trimester. It is unknown if this medication is excreted in breast milk. Albendazole Counseling:  I discussed with the patient the risks of albendazole including but not limited to cytopenia, kidney damage, nausea/vomiting and severe allergy. The patient understands that this medication is being used in an off-label manner. Skyrizi Counseling: I discussed with the patient the risks of risankizumab-rzaa including but not limited to immunosuppression, and serious infections. The patient understands that monitoring is required including a PPD at baseline and must alert us or the primary physician if symptoms of infection or other concerning signs are noted. Xeljanz Counseling: Jain Jose Counseling: I discussed with the patient the risks of Jain Jose therapy including increased risk of infection, liver issues, headache, diarrhea, or cold symptoms. Live vaccines should be avoided. They were instructed to call if they have any problems. Ketoconazole Pregnancy And Lactation Text: This medication is Pregnancy Category C and it isn't know if it is safe during pregnancy. It is also excreted in breast milk and breast feeding isn't recommended. Elidel Counseling: Patient may experience a mild burning sensation during topical application. Elidel is not approved in children less than 3years of age. There have been case reports of hematologic and skin malignancies in patients using topical calcineurin inhibitors although causality is questionable. 5-Fu Counseling: 5-Fluorouracil Counseling:  I discussed with the patient the risks of 5-fluorouracil including but not limited to erythema, scaling, itching, weeping, crusting, and pain. Protopic Pregnancy And Lactation Text: This medication is Pregnancy Category C. It is unknown if this medication is excreted in breast milk when applied topically. Cimzia Counseling:  I discussed with the patient the risks of Cimzia including but not limited to immunosuppression, allergic reactions and infections. The patient understands that monitoring is required including a PPD at baseline and must alert us or the primary physician if symptoms of infection or other concerning signs are noted. Thalidomide Counseling: I discussed with the patient the risks of thalidomide including but not limited to birth defects, anxiety, weakness, chest pain, dizziness, cough and severe allergy. Tremfya Counseling: I discussed with the patient the risks of guselkumab including but not limited to immunosuppression, serious infections, and drug reactions. The patient understands that monitoring is required including a PPD at baseline and must alert us or the primary physician if symptoms of infection or other concerning signs are noted. Spironolactone Pregnancy And Lactation Text: This medication can cause feminization of the male fetus and should be avoided during pregnancy. The active metabolite is also found in breast milk. Benzoyl Peroxide Pregnancy And Lactation Text: This medication is Pregnancy Category C. It is unknown if benzoyl peroxide is excreted in breast milk. Ivermectin Counseling:  Patient instructed to take medication on an empty stomach with a full glass of water. Patient informed of potential adverse effects including but not limited to nausea, diarrhea, dizziness, itching, and swelling of the extremities or lymph nodes. The patient verbalized understanding of the proper use and possible adverse effects of ivermectin. All of the patient's questions and concerns were addressed. Drysol Pregnancy And Lactation Text: This medication is considered safe during pregnancy and breast feeding. Clofazimine Counseling:  I discussed with the patient the risks of clofazimine including but not limited to skin and eye pigmentation, liver damage, nausea/vomiting, gastrointestinal bleeding and allergy. Acitretin Counseling:  I discussed with the patient the risks of acitretin including but not limited to hair loss, dry lips/skin/eyes, liver damage, hyperlipidemia, depression/suicidal ideation, photosensitivity. Serious rare side effects can include but are not limited to pancreatitis, pseudotumor cerebri, bony changes, clot formation/stroke/heart attack. Patient understands that alcohol is contraindicated since it can result in liver toxicity and significantly prolong the elimination of the drug by many years. Hydroxychloroquine Counseling:  I discussed with the patient that a baseline ophthalmologic exam is needed at the start of therapy and every year thereafter while on therapy. A CBC may also be warranted for monitoring. The side effects of this medication were discussed with the patient, including but not limited to agranulocytosis, aplastic anemia, seizures, rashes, retinopathy, and liver toxicity. Patient instructed to call the office should any adverse effect occur. The patient verbalized understanding of the proper use and possible adverse effects of Plaquenil. All the patient's questions and concerns were addressed. Otezla Counseling: Arloa Elvis Counseling: The side effects of Arloa Elvis were discussed with the patient, including but not limited to worsening or new depression, weight loss, diarrhea, nausea, upper respiratory tract infection, and headache. Patient instructed to call the office should any adverse effect occur. The patient verbalized understanding of the proper use and possible adverse effects of Otezla. All the patient's questions and concerns were addressed. Taltz Counseling: I discussed with the patient the risks of ixekizumab including but not limited to immunosuppression, serious infections, worsening of inflammatory bowel disease and drug reactions. The patient understands that monitoring is required including a PPD at baseline and must alert us or the primary physician if symptoms of infection or other concerning signs are noted. Dupixent Pregnancy And Lactation Text: This medication likely crosses the placenta but the risk for the fetus is uncertain. This medication is excreted in breast milk. Rifampin Pregnancy And Lactation Text: This medication is Pregnancy Category C and it isn't know if it is safe during pregnancy. It is also excreted in breast milk and should not be used if you are breast feeding. Azithromycin Counseling:  I discussed with the patient the risks of azithromycin including but not limited to GI upset, allergic reaction, drug rash, diarrhea, and yeast infections. Cimzia Pregnancy And Lactation Text: This medication crosses the placenta but can be considered safe in certain situations. Cimzia may be excreted in breast milk. Drysol Counseling:  I discussed with the patient the risks of drysol/aluminum chloride including but not limited to skin rash, itching, irritation, burning. Enbrel Counseling:  I discussed with the patient the risks of etanercept including but not limited to myelosuppression, immunosuppression, autoimmune hepatitis, demyelinating diseases, lymphoma, and infections. The patient understands that monitoring is required including a PPD at baseline and must alert us or the primary physician if symptoms of infection or other concerning signs are noted. Bactrim Counseling:  I discussed with the patient the risks of sulfa antibiotics including but not limited to GI upset, allergic reaction, drug rash, diarrhea, dizziness, photosensitivity, and yeast infections. Rarely, more serious reactions can occur including but not limited to aplastic anemia, agranulocytosis, methemoglobinemia, blood dyscrasias, liver or kidney failure, lung infiltrates or desquamative/blistering drug rashes. Dapsone Pregnancy And Lactation Text: This medication is Pregnancy Category C and is not considered safe during pregnancy or breast feeding. Cellcept Counseling:  I discussed with the patient the risks of mycophenolate mofetil including but not limited to infection/immunosuppression, GI upset, hypokalemia, hypercholesterolemia, bone marrow suppression, lymphoproliferative disorders, malignancy, GI ulceration/bleed/perforation, colitis, interstitial lung disease, kidney failure, progressive multifocal leukoencephalopathy, and birth defects. The patient understands that monitoring is required including a baseline creatinine and regular CBC testing. In addition, patient must alert us immediately if symptoms of infection or other concerning signs are noted. Erythromycin Pregnancy And Lactation Text: This medication is Pregnancy Category B and is considered safe during pregnancy. It is also excreted in breast milk. Dapsone Counseling: I discussed with the patient the risks of dapsone including but not limited to hemolytic anemia, agranulocytosis, rashes, methemoglobinemia, kidney failure, peripheral neuropathy, headaches, GI upset, and liver toxicity. Patients who start dapsone require monitoring including baseline LFTs and weekly CBCs for the first month, then every month thereafter. The patient verbalized understanding of the proper use and possible adverse effects of dapsone. All of the patient's questions and concerns were addressed. Doxycycline Counseling:  Patient counseled regarding possible photosensitivity and increased risk for sunburn. Patient instructed to avoid sunlight, if possible. When exposed to sunlight, patients should wear protective clothing, sunglasses, and sunscreen. The patient was instructed to call the office immediately if the following severe adverse effects occur:  hearing changes, easy bruising/bleeding, severe headache, or vision changes. The patient verbalized understanding of the proper use and possible adverse effects of doxycycline. All of the patient's questions and concerns were addressed. Clindamycin Counseling: I counseled the patient regarding use of clindamycin as an antibiotic for prophylactic and/or therapeutic purposes. Clindamycin is active against numerous classes of bacteria, including skin bacteria. Side effects may include nausea, diarrhea, gastrointestinal upset, rash, hives, yeast infections, and in rare cases, colitis. Detail Level: Detailed High Dose Vitamin A Pregnancy And Lactation Text: High dose vitamin A therapy is contraindicated during pregnancy and breast feeding. Hydroxyzine Counseling: Patient advised that the medication is sedating and not to drive a car after taking this medication. Patient informed of potential adverse effects including but not limited to dry mouth, urinary retention, and blurry vision. The patient verbalized understanding of the proper use and possible adverse effects of hydroxyzine. All of the patient's questions and concerns were addressed. Acitretin Pregnancy And Lactation Text: This medication is Pregnancy Category X and should not be given to women who are pregnant or may become pregnant in the future. This medication is excreted in breast milk. Cimetidine Counseling:  I discussed with the patient the risks of Cimetidine including but not limited to gynecomastia, headache, diarrhea, nausea, drowsiness, arrhythmias, pancreatitis, skin rashes, psychosis, bone marrow suppression and kidney toxicity. Minoxidil Counseling: Minoxidil is a topical medication which can increase blood flow where it is applied. It is uncertain how this medication increases hair growth. Side effects are uncommon and include stinging and allergic reactions. Tazorac Pregnancy And Lactation Text: This medication is not safe during pregnancy. It is unknown if this medication is excreted in breast milk. Nsaids Counseling: NSAID Counseling: I discussed with the patient that NSAIDs should be taken with food. Prolonged use of NSAIDs can result in the development of stomach ulcers. Patient advised to stop taking NSAIDs if abdominal pain occurs. The patient verbalized understanding of the proper use and possible adverse effects of NSAIDs. All of the patient's questions and concerns were addressed. Colchicine Counseling:  Patient counseled regarding adverse effects including but not limited to stomach upset (nausea, vomiting, stomach pain, or diarrhea). Patient instructed to limit alcohol consumption while taking this medication. Colchicine may reduce blood counts especially with prolonged use. The patient understands that monitoring of kidney function and blood counts may be required, especially at baseline. The patient verbalized understanding of the proper use and possible adverse effects of colchicine. All of the patient's questions and concerns were addressed. Odomzo Counseling- I discussed with the patient the risks of Odomzo including but not limited to nausea, vomiting, diarrhea, constipation, weight loss, changes in the sense of taste, decreased appetite, muscle spasms, and hair loss. The patient verbalized understanding of the proper use and possible adverse effects of Odomzo. All of the patient's questions and concerns were addressed. Otezla Pregnancy And Lactation Text: This medication is Pregnancy Category C and it isn't known if it is safe during pregnancy. It is unknown if it is excreted in breast milk. Glycopyrrolate Pregnancy And Lactation Text: This medication is Pregnancy Category B and is considered safe during pregnancy. It is unknown if it is excreted breast milk. Hydroxyzine Pregnancy And Lactation Text: This medication is not safe during pregnancy and should not be taken. It is also excreted in breast milk and breast feeding isn't recommended. Xolair Counseling:  Patient informed of potential adverse effects including but not limited to fever, muscle aches, rash and allergic reactions. The patient verbalized understanding of the proper use and possible adverse effects of Xolair. All of the patient's questions and concerns were addressed. Birth Control Pills Counseling: Birth Control Pill Counseling: I discussed with the patient the potential side effects of OCPs including but not limited to increased risk of stroke, heart attack, thrombophlebitis, deep venous thrombosis, hepatic adenomas, breast changes, GI upset, headaches, and depression. The patient verbalized understanding of the proper use and possible adverse effects of OCPs. All of the patient's questions and concerns were addressed. Imiquimod Counseling:  I discussed with the patient the risks of imiquimod including but not limited to erythema, scaling, itching, weeping, crusting, and pain. Patient understands that the inflammatory response to imiquimod is variable from person to person and was educated regarded proper titration schedule. If flu-like symptoms develop, patient knows to discontinue the medication and contact us. Isotretinoin Pregnancy And Lactation Text: This medication is Pregnancy Category X and is considered extremely dangerous during pregnancy. It is unknown if it is excreted in breast milk. Humira Counseling:  I discussed with the patient the risks of adalimumab including but not limited to myelosuppression, immunosuppression, autoimmune hepatitis, demyelinating diseases, lymphoma, and serious infections. The patient understands that monitoring is required including a PPD at baseline and must alert us or the primary physician if symptoms of infection or other concerning signs are noted. Topical Retinoid counseling:  Patient advised to apply a pea-sized amount only at bedtime and wait 30 minutes after washing their face before applying. If too drying, patient may add a non-comedogenic moisturizer. The patient verbalized understanding of the proper use and possible adverse effects of retinoids. All of the patient's questions and concerns were addressed. Topical Sulfur Applications Pregnancy And Lactation Text: This medication is Pregnancy Category C and has an unknown safety profile during pregnancy. It is unknown if this topical medication is excreted in breast milk. Metronidazole Pregnancy And Lactation Text: This medication is Pregnancy Category B and considered safe during pregnancy. It is also excreted in breast milk. Erivedge Counseling- I discussed with the patient the risks of Erivedge including but not limited to nausea, vomiting, diarrhea, constipation, weight loss, changes in the sense of taste, decreased appetite, muscle spasms, and hair loss. The patient verbalized understanding of the proper use and possible adverse effects of Erivedge. All of the patient's questions and concerns were addressed. Tazorac Counseling:  Patient advised that medication is irritating and drying. Patient may need to apply sparingly and wash off after an hour before eventually leaving it on overnight. The patient verbalized understanding of the proper use and possible adverse effects of tazorac. All of the patient's questions and concerns were addressed. Doxepin Pregnancy And Lactation Text: This medication is Pregnancy Category C and it isn't known if it is safe during pregnancy. It is also excreted in breast milk and breast feeding isn't recommended. Cyclosporine Counseling:  I discussed with the patient the risks of cyclosporine including but not limited to hypertension, gingival hyperplasia,myelosuppression, immunosuppression, liver damage, kidney damage, neurotoxicity, lymphoma, and serious infections. The patient understands that monitoring is required including baseline blood pressure, CBC, CMP, lipid panel and uric acid, and then 1-2 times monthly CMP and blood pressure. Doxycycline Pregnancy And Lactation Text: This medication is Pregnancy Category D and not consider safe during pregnancy. It is also excreted in breast milk but is considered safe for shorter treatment courses. Hydroxychloroquine Pregnancy And Lactation Text: This medication has been shown to cause fetal harm but it isn't assigned a Pregnancy Risk Category. There are small amounts excreted in breast milk. Isotretinoin Counseling: Patient should get monthly blood tests, not donate blood, not drive at night if vision affected, not share medication, and not undergo elective surgery for 6 months after tx completed. Side effects reviewed, pt to contact office should one occur. Erythromycin Counseling:  I discussed with the patient the risks of erythromycin including but not limited to GI upset, allergic reaction, drug rash, diarrhea, increase in liver enzymes, and yeast infections. Valtrex Counseling: I discussed with the patient the risks of valacyclovir including but not limited to kidney damage, nausea, vomiting and severe allergy. The patient understands that if the infection seems to be worsening or is not improving, they are to call. Bactrim Pregnancy And Lactation Text: This medication is Pregnancy Category D and is known to cause fetal risk. It is also excreted in breast milk. Gabapentin Counseling: I discussed with the patient the risks of gabapentin including but not limited to dizziness, somnolence, fatigue and ataxia. Topical Clindamycin Counseling: Patient counseled that this medication may cause skin irritation or allergic reactions. In the event of skin irritation, the patient was advised to reduce the amount of the drug applied or use it less frequently. The patient verbalized understanding of the proper use and possible adverse effects of clindamycin. All of the patient's questions and concerns were addressed. Metronidazole Counseling:  I discussed with the patient the risks of metronidazole including but not limited to seizures, nausea/vomiting, a metallic taste in the mouth, nausea/vomiting and severe allergy. Infliximab Counseling:  I discussed with the patient the risks of infliximab including but not limited to myelosuppression, immunosuppression, autoimmune hepatitis, demyelinating diseases, lymphoma, and serious infections. The patient understands that monitoring is required including a PPD at baseline and must alert us or the primary physician if symptoms of infection or other concerning signs are noted. Methotrexate Counseling:  Patient counseled regarding adverse effects of methotrexate including but not limited to nausea, vomiting, abnormalities in liver function tests. Patients may develop mouth sores, rash, diarrhea, and abnormalities in blood counts. The patient understands that monitoring is required including LFT's and blood counts. There is a rare possibility of scarring of the liver and lung problems that can occur when taking methotrexate. Persistent nausea, loss of appetite, pale stools, dark urine, cough, and shortness of breath should be reported immediately. Patient advised to discontinue methotrexate treatment at least three months before attempting to become pregnant. I discussed the need for folate supplements while taking methotrexate. These supplements can decrease side effects during methotrexate treatment. The patient verbalized understanding of the proper use and possible adverse effects of methotrexate. All of the patient's questions and concerns were addressed. Rituxan Pregnancy And Lactation Text: This medication is Pregnancy Category C and it isn't know if it is safe during pregnancy. It is unknown if this medication is excreted in breast milk but similar antibodies are known to be excreted. Glycopyrrolate Counseling:  I discussed with the patient the risks of glycopyrrolate including but not limited to skin rash, drowsiness, dry mouth, difficulty urinating, and blurred vision.

## 2023-08-18 ENCOUNTER — LAB (OUTPATIENT)
Dept: LAB | Facility: CLINIC | Age: 68
End: 2023-08-18
Payer: COMMERCIAL

## 2023-08-18 ENCOUNTER — NURSE TRIAGE (OUTPATIENT)
Dept: NURSING | Facility: CLINIC | Age: 68
End: 2023-08-18

## 2023-08-18 ENCOUNTER — E-VISIT (OUTPATIENT)
Dept: URGENT CARE | Facility: CLINIC | Age: 68
End: 2023-08-18
Payer: COMMERCIAL

## 2023-08-18 DIAGNOSIS — R19.7 DIARRHEA, UNSPECIFIED TYPE: ICD-10-CM

## 2023-08-18 DIAGNOSIS — R19.7 DIARRHEA, UNSPECIFIED TYPE: Primary | ICD-10-CM

## 2023-08-18 PROCEDURE — 99207 PR NON-BILLABLE SERV PER CHARTING: CPT | Performed by: PREVENTIVE MEDICINE

## 2023-08-18 NOTE — TELEPHONE ENCOUNTER
Has lab appt today at 3:15 pm.  Order placed today by [facility] provider related to diarrhea.    Tony called to ask if there is any prep needed before he goes in.  I explained that this appt. is to  the containers needed for the stool samples and directions on how to collect and store the stool prior to delivering filled containers back to the lab.  He stated understanding of this info.    Yady CASTANEDA RN [facility]

## 2023-08-18 NOTE — PATIENT INSTRUCTIONS
Thank you for choosing us for your care. Given your symptoms, I would like you to do a lab-only visit to determine what is causing them.  I have placed the orders.  Please schedule an appointment with the lab right here in [place], or call [phone].  I will let you know when the results are back and next steps to take.

## 2023-08-19 ENCOUNTER — MYC MEDICAL ADVICE (OUTPATIENT)
Dept: FAMILY MEDICINE | Facility: CLINIC | Age: 68
End: 2023-08-19

## 2023-08-19 ENCOUNTER — APPOINTMENT (OUTPATIENT)
Dept: LAB | Facility: CLINIC | Age: 68
End: 2023-08-19
Payer: COMMERCIAL

## 2023-08-19 LAB — C DIFF TOX B STL QL: NEGATIVE

## 2023-08-19 PROCEDURE — 87493 C DIFF AMPLIFIED PROBE: CPT

## 2023-08-19 PROCEDURE — 87046 STOOL CULTR AEROBIC BACT EA: CPT

## 2023-08-21 LAB — BACTERIA STL CULT: NORMAL

## 2023-08-24 NOTE — TELEPHONE ENCOUNTER
It looks like he was evaluated by a provider for an evisit.  I did not see him for this.  The provider who ordered the test is in charge of reaching out to him regarding these results.  Regarding his symptoms, If they are persisting, I would recommend an in person appointment this week with available provider.  Please help him schedule in same day spot with available provider

## 2023-08-24 NOTE — TELEPHONE ENCOUNTER
I am sorry he had this experience.  As I said I did not evaluate him for this and I do not know how the UC completes their follow up.  I will route this to the provider who evaluated the labs for further follow up.

## 2023-08-24 NOTE — CONFIDENTIAL NOTE
Armin lechuga saw you in Feb, he was then seen in urgent care on 8/19/23 for diarrhea symptoms. Labs were conducted and he was negative for c diff.  Please advise the type of appt that is need, virtual or in person?     Evelin Doyle RN

## 2023-08-24 NOTE — TELEPHONE ENCOUNTER
Patient given message from Armin Alvarez PA-C.  Patient states that the diarrhea has resolved. States that his other GI symptoms are tapering off.   Patient states that for a few days every time he ate he had gas and felt rumbling in his abdomen. States that these symptoms are resolving.    Patient denies abdominal pain, black or bloody stools, nausea and vomiting.    Offered the patient an appointment, but since symptoms are improving he would like to hold off scheduling at this time.    Patient agrees to call back to speak with a triage nurse for any new or worsening symptoms.    Patient was frustrated that he did not receive a call with the lab results asking him how he was doing.   Jaky Castañeda RN

## 2023-09-15 ENCOUNTER — OFFICE VISIT (OUTPATIENT)
Dept: OPTOMETRY | Facility: CLINIC | Age: 68
End: 2023-09-15
Payer: COMMERCIAL

## 2023-09-15 DIAGNOSIS — H18.463 PELLUCID MARGINAL DEGENERATION OF BOTH CORNEAS: ICD-10-CM

## 2023-09-15 DIAGNOSIS — H04.123 DRY EYES: Primary | ICD-10-CM

## 2023-09-15 ASSESSMENT — CONF VISUAL FIELD
OS_SUPERIOR_NASAL_RESTRICTION: 0
OS_SUPERIOR_TEMPORAL_RESTRICTION: 0
OD_INFERIOR_TEMPORAL_RESTRICTION: 0
OD_INFERIOR_NASAL_RESTRICTION: 0
OS_NORMAL: 1
OS_INFERIOR_TEMPORAL_RESTRICTION: 0
OD_SUPERIOR_TEMPORAL_RESTRICTION: 0
OS_INFERIOR_NASAL_RESTRICTION: 0
OD_SUPERIOR_NASAL_RESTRICTION: 0
OD_NORMAL: 1

## 2023-09-15 ASSESSMENT — REFRACTION_MANIFEST
OD_CYLINDER: +4.75
OS_SPHERE: -0.75
OD_SPHERE: -0.25
OD_AXIS: 001
OS_CYLINDER: +4.00
OS_AXIS: 177

## 2023-09-15 ASSESSMENT — REFRACTION_CURRENTRX
OD_BRAND: ZENLENS RC
OD_BASECURVE: 4.2/7.67
OS_SPHERE: -2.00
OD_SPHERE: -2.00
OS_DIAMETER: 15.4
OS_BRAND: ZENLENS RC
OD_DIAMETER: 15.4
OS_BASECURVE: 4.5/7.42

## 2023-09-15 ASSESSMENT — VISUAL ACUITY
METHOD: SNELLEN - LINEAR
OD_CC: 20/30
CORRECTION_TYPE: GLASSES
OD_CC+: -2
OS_CC: 20/25 ECC
VA_OR_OD_CURRENT_RX: 20/20
VA_OR_OS_CURRENT_RX: 20/25+1
OS_CC: J1+
OD_CC: J1+
OS_CC+: -2

## 2023-09-15 ASSESSMENT — REFRACTION_WEARINGRX
OS_ADD: +2.25
OS_SPHERE: -0.50
OD_AXIS: 180
OD_CYLINDER: +4.75
OD_ADD: +2.25
OS_AXIS: 012
OD_SPHERE: -0.50
OS_CYLINDER: +3.75

## 2023-09-15 ASSESSMENT — EXTERNAL EXAM - LEFT EYE: OS_EXAM: NORMAL

## 2023-09-15 ASSESSMENT — EXTERNAL EXAM - RIGHT EYE: OD_EXAM: NORMAL

## 2023-09-15 NOTE — PATIENT INSTRUCTIONS
What is a scleral lens?  A scleral lens is a large diameter rigid gas permeable contact lens that vaults completely over the cornea (clear tissue on the front of the eye) and touches only on the white part of the eye (sclera). A liquid layer between the lens and the cornea lubricates the ocular surface and protects from physical rubbing/irritation on the sensitive cornea. These lenses are helpful for a variety of conditions including dry eyes and irregularly shaped corneas.    How are they different than small RGP lenses?  Scleral lenses do not move much in the eye and do not touch the sensitive cornea, so they tend to be much easier to get used to. Insertion is quite different, but removal is similar. To insert them properly they need to be filled with preservative free saline    How do you insert and remove them?  See this website for video demonstration: [email or web address]   We will start you out with all the solutions you need at first, but you will need to buy more if you continue to wear lenses.    How long do they last?  Once the final fit is obtained (the best lens shape for your eye may require several revisions), lenses can last from 1-2 years depending on your eyes.    Does insurance cover them?  Sometimes - this is very dependent on your insurance and which diagnoses they are for. It is usually best if you check directly with your insurance, however we are able to help you with this: call [phone]. We always submit medically necessary lenses to insurance but if your insurance does not cover them you may be responsible for them.     What happens if I lose or break a lens?  In the first 90 days after initial order you are covered under the manufacturing lab's warranty. Please just call the clinic to let us know. After 90 days we typically need to order a new lens, which will carry a new charge. Custom lenses take 1-2 weeks to arrive, so if you cannot function well  without a lens it may be worth getting a backup.

## 2023-09-15 NOTE — PROGRESS NOTES
A/P  1.) Pellucid Marginal Degeneration OU  -Largely doing well in glasses but with high cyl OU  -BCVA in hard lenses 20/20 both eyes  -Previously failed soft lenses 2' to dryness  -Decent start with scleral lens trials - reviewed findings with pt and he would like to proceed  -Aim for DVO and readers to start, may consider monovision trial in the future    2.) Dry eye OU  -Symptomatic despite topical tx. Tears often  -Excellent response to scleral lens today    Order CL's and HOLD for appt. Follow-up 2-3 weeks for lens dispense, I&R    I have confirmed the patient's CC, HPI and reviewed Past Medical History, Past Surgical History, Social History, Family History, Problem List, Medication List and agree with Tech note.     Renetta Sargent, RUSSELL FAAO FSLS

## 2023-10-06 ENCOUNTER — OFFICE VISIT (OUTPATIENT)
Dept: OPHTHALMOLOGY | Facility: CLINIC | Age: 68
End: 2023-10-06
Payer: COMMERCIAL

## 2023-10-06 DIAGNOSIS — H04.123 DRY EYE SYNDROME OF BOTH LACRIMAL GLANDS: Primary | ICD-10-CM

## 2023-10-06 DIAGNOSIS — H18.463 PELLUCID MARGINAL DEGENERATION OF BOTH CORNEAS: ICD-10-CM

## 2023-10-06 PROCEDURE — 99213 OFFICE O/P EST LOW 20 MIN: CPT | Performed by: OPTOMETRIST

## 2023-10-06 PROCEDURE — V2627 SCLERAL COVER SHELL: HCPCS | Mod: LT | Performed by: OPTOMETRIST

## 2023-10-06 ASSESSMENT — REFRACTION_CURRENTRX
OS_DIAMETER: 15.4
OS_BASECURVE: 4.3/7.67
OS_ADDL_SPECS: BOSTON XO CLEAR HYDRAPEG
OS_SPHERE: -1.25
OS_BRAND: ZENLENS RC
OD_SPHERE: -1.50
OD_BRAND: ZENLENS RC
OD_ADDL_SPECS: BOSTON XO CLEAR HYDRAPEG
OD_BASECURVE: 4.3/7.67
OD_DIAMETER: 15.4

## 2023-10-06 ASSESSMENT — VISUAL ACUITY
OD_CC: 20/20-1
CORRECTION_TYPE: CONTACTS
METHOD: SNELLEN - LINEAR
OS_CC: 20/25

## 2023-10-06 ASSESSMENT — EXTERNAL EXAM - RIGHT EYE: OD_EXAM: NORMAL

## 2023-10-06 ASSESSMENT — EXTERNAL EXAM - LEFT EYE: OS_EXAM: NORMAL

## 2023-10-06 NOTE — PROGRESS NOTES
A/P  1.) Pellucid Marginal Degeneration OU  -Largely doing well in glasses but with high cyl OU  -BCVA in hard lenses 20/20 both eyes  -Previously failed soft lenses 2' to dryness  -Aim for DVO and readers to start, may consider monovision trial in the future    2.) Dry eye OU  -Symptomatic despite topical tx. Tears often  -Excellent response to scleral lens today  -Successful I&R, reviewed CL care and hygiene ([place] Simplus, Addipak)    Dispensed current lenses. RTC 1-2 months for follow-up wearing lenses for recheck. Check for FST    I have confirmed the patient's CC, HPI and reviewed Past Medical History, Past Surgical History, Social History, Family History, Problem List, Medication List and agree with Tech note.     Renetta Sargent, OD FAAO FSLS    Contact Lens Billing  V-Code:  - Scleral Cover Shell  Final Contact Lens Rx         Brand Base Curve Diameter Sphere Lens Addl. Specs    Right Zenlens RC 4.3/7.67 15.4 -1.50 2 flat H x 2 steep V [place] XO clear HydraPEG    Left Zenlens RC 4.3/7.67 15.4 -1.25 2 flat H x 2 steep V [place] XO clear HydraPEG           # of units: 2  Price per Unit: $225    This patient requires contact lenses that are medically necessary for either improvement in vision over spectacles, support of the ocular surface, or other therapeutic benefit. These are not cosmetic contact lenses.     Encounter Diagnoses   Name Primary?    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

## 2023-10-11 ENCOUNTER — TELEPHONE (OUTPATIENT)
Dept: FAMILY MEDICINE | Facility: CLINIC | Age: 68
End: 2023-10-11
Payer: COMMERCIAL

## 2023-10-11 NOTE — TELEPHONE ENCOUNTER
OTC Prilosec can work , so he can just talk to his pharmacy and he can try that ifno problem taking it . Reflux precautions also help. Should be scheduled to establish and follow up next week but sooner if concerns

## 2023-10-11 NOTE — TELEPHONE ENCOUNTER
Spoke to patient and he will try the Prilosec OTC.     Appointment scheduled.     Shira Huang RN  [facility]

## 2023-10-11 NOTE — TELEPHONE ENCOUNTER
Pt has chronic GERD and trouble swallowing. Symptoms have worsened in the last 2-3 years. He wants advice on what to do. Pt had and EGD on the past, but doesn't have a GI provider currently. He also wants to establish care at [place]. Future appts require approval. Pt would like to be seen tomorrow or next week. Pt is available tomorrow before noon or after 12:00 Pt since he has a dentist appt. Can any Ep provider approve use of same day appt tomorrow 10/12?     Pt needs a call back with advice on appt.

## 2023-11-06 ENCOUNTER — MYC REFILL (OUTPATIENT)
Dept: FAMILY MEDICINE | Facility: CLINIC | Age: 68
End: 2023-11-06
Payer: COMMERCIAL

## 2023-11-06 DIAGNOSIS — E78.5 DYSLIPIDEMIA: ICD-10-CM

## 2023-11-06 RX ORDER — ROSUVASTATIN CALCIUM 20 MG/1
20 TABLET, COATED ORAL DAILY
Qty: 90 TABLET | Refills: 0 | OUTPATIENT
Start: 2023-11-06

## 2023-11-14 ENCOUNTER — MYC MEDICAL ADVICE (OUTPATIENT)
Dept: FAMILY MEDICINE | Facility: CLINIC | Age: 68
End: 2023-11-14
Payer: COMMERCIAL

## 2023-11-14 DIAGNOSIS — F33.1 MODERATE EPISODE OF RECURRENT MAJOR DEPRESSIVE DISORDER (H): ICD-10-CM

## 2023-11-15 RX ORDER — CITALOPRAM HYDROBROMIDE 20 MG/1
20 TABLET ORAL DAILY
Qty: 90 TABLET | Refills: 1 | Status: SHIPPED | OUTPATIENT
Start: 2023-11-15 | End: 2024-09-17

## 2023-12-18 ENCOUNTER — OFFICE VISIT (OUTPATIENT)
Dept: FAMILY MEDICINE | Facility: CLINIC | Age: 68
End: 2023-12-18
Payer: COMMERCIAL

## 2023-12-18 VITALS
BODY MASS INDEX: 22.94 KG/M2 | HEART RATE: 61 BPM | DIASTOLIC BLOOD PRESSURE: 60 MMHG | SYSTOLIC BLOOD PRESSURE: 104 MMHG | HEIGHT: 70 IN | TEMPERATURE: 96.5 F | RESPIRATION RATE: 16 BRPM | WEIGHT: 160.2 LBS | OXYGEN SATURATION: 99 %

## 2023-12-18 DIAGNOSIS — K21.9 GASTROESOPHAGEAL REFLUX DISEASE WITHOUT ESOPHAGITIS: Primary | ICD-10-CM

## 2023-12-18 DIAGNOSIS — R13.19 OTHER DYSPHAGIA: ICD-10-CM

## 2023-12-18 PROCEDURE — 99213 OFFICE O/P EST LOW 20 MIN: CPT | Performed by: PHYSICIAN ASSISTANT

## 2023-12-18 ASSESSMENT — PATIENT HEALTH QUESTIONNAIRE - PHQ9
SUM OF ALL RESPONSES TO PHQ QUESTIONS 1-9: 4
SUM OF ALL RESPONSES TO PHQ QUESTIONS 1-9: 4
10. IF YOU CHECKED OFF ANY PROBLEMS, HOW DIFFICULT HAVE THESE PROBLEMS MADE IT FOR YOU TO DO YOUR WORK, TAKE CARE OF THINGS AT HOME, OR GET ALONG WITH OTHER PEOPLE: NOT DIFFICULT AT ALL

## 2023-12-18 ASSESSMENT — PAIN SCALES - GENERAL: PAINLEVEL: NO PAIN (0)

## 2023-12-18 NOTE — PROGRESS NOTES
Assessment & Plan       ICD-10-CM    1. Gastroesophageal reflux disease without esophagitis  K21.9 Adult GI  Referral - Procedure Only      2. Other dysphagia  R13.19 Adult GI  Referral - Procedure Only        Given dysphagia and severity of symptoms, recommend endoscopy for further evaluation. Continue daily PPI and lifestyle recommendations for now.    DOUGIE [facility] LUIS Jimenez is a 68 year old, presenting for the following health issues:  Gastrophageal Reflux and Follow Up        12/18/2023    12:53 PM   Additional Questions   Roomed by Laina ROBERT       History of Present Illness       Reason for visit:  Acid reflux  Symptom onset:  More than a month  Symptom intensity:  Moderate  Symptom progression:  Staying the same  Had these symptoms before:  Yes  Has tried/received treatment for these symptoms:  No  What makes it worse:  Excess acid  What makes it better:  Less acid and omeprazol    He eats 2-3 servings of fruits and vegetables daily.He consumes 0 sweetened beverage(s) daily.He exercises with enough effort to increase his heart rate 30 to 60 minutes per day.  He exercises with enough effort to increase his heart rate 5 days per week.   He is taking medications regularly.     Patient reports onset of food getting stuck when swallowing and substernal pain. He contacted triage for these issues in Oct 2023, and OTC Prilosec was recommended. Patient took this for 2-4 weeks, which resolved symptoms completely. He also minimized acidic foods, snacking, and not eating prior to bedtime. Within a few days of stopping PPI, he developed issues with food feeling stuck to the point he had to self-induce vomiting to make symptoms resolve. He also developed substernal burning again. He resumed Prilosec and symptoms resolved within 1 day. He reports endoscopy about 10 years ago, no interventions needed. No hx H pylori, PUD, or esophageal  "stricture.      Review of Systems   Constitutional, HEENT, cardiovascular, pulmonary, GI, , musculoskeletal, neuro, skin, endocrine and psych systems are negative, except as otherwise noted.      Objective    /60 (BP Location: Left arm, Patient Position: Sitting, Cuff Size: Adult Regular)   Pulse 61   Temp (!) 96.5  F (35.8  C) (Tympanic)   Resp 16   Ht 1.778 m (5' 10\")   Wt 72.7 kg (160 lb 3.2 oz)   SpO2 99%   BMI 22.99 kg/m    Body mass index is 22.99 kg/m .  Physical Exam   GENERAL:  WDWN, no acute distress  PSYCH: pleasant, cooperative  EYES: no discharge, no injection  HENT:  Normocephalic. Moist mucus membranes.  NECK:  Supple, symmetric  LUNGS:  Clear to auscultation bilaterally without rhonchi, rales, or wheeze. Chest rise symmetric and no tenderness to palpation.  HEART:  Regular rate & rhythm. No murmur, gallop, or rub.  ABDOMEN:  Soft, non-tender, non-distended. Bowel sounds are present. No palpable masses  EXTREMITIES:  No gross deformities, moves all 4 limbs spontaneously, no peripheral edema  SKIN:  Warm and dry, no rash or suspicious lesions    NEUROLOGIC:  alert, sensation grossly intact.                        "

## 2023-12-26 ENCOUNTER — TELEPHONE (OUTPATIENT)
Dept: GASTROENTEROLOGY | Facility: CLINIC | Age: 68
End: 2023-12-26
Payer: COMMERCIAL

## 2023-12-26 NOTE — TELEPHONE ENCOUNTER
"Endoscopy Scheduling Screen    Have you had a positive Covid test in the last 14 days?  No    Are you active on MyChart?   Yes    What insurance is in the chart?  Other:  BCBS/MEDICARE    Ordering/Referring Provider:     KAY REYES      (If ordering provider performs procedure, schedule with ordering provider unless otherwise instructed. )    BMI: Estimated body mass index is 22.99 kg/m  as calculated from the following:    Height as of 12/18/23: 1.778 m (5' 10\").    Weight as of 12/18/23: 72.7 kg (160 lb 3.2 oz).     Sedation Ordered  moderate sedation.   If patient BMI > 50 do not schedule in [facility].    If patient BMI > 45 do not schedule at [facility].    Are you taking methadone or Suboxone?  No    Are you taking any prescription medications for pain 3 or more times per week?   NO - No RN review required.    Do you have a history of malignant hyperthermia or adverse reaction to anesthesia?  No    (Females) Are you currently pregnant?   No     Have you been diagnosed or told you have pulmonary hypertension?   No    Do you have an LVAD?  No    Have you been told you have moderate to severe sleep apnea?  No    Have you been told you have COPD, asthma, or any other lung disease?  No    Do you have any heart conditions?  Yes     In the past 6 months, have you had any hospitalizations for heart related issues including cardiomyopathy, heart attack, or stent placement?  No    Do you have any implantable devices in your body (pacemaker, ICD)?  Other STENT    Do you take nitroglycerine?  No    Have you ever had an organ transplant?   No    Have you ever had or are you awaiting a heart or lung transplant?   No    Have you had a stroke or transient ischemic attack (TIA aka \"mini stroke\" in the last 6 months?   No    Have you been diagnosed with or been told you have cirrhosis of the liver?   No    Are you currently on dialysis?   No    Do you need assistance transferring?   No    BMI: Estimated body mass index is 22.99 kg/m  " "as calculated from the following:    Height as of 12/18/23: 1.778 m (5' 10\").    Weight as of 12/18/23: 72.7 kg (160 lb 3.2 oz).     Is patients BMI > 40 and scheduling location [facility]?  No    Do you take an injectable medication for weight loss or diabetes (excluding insulin)?  No    Do you take the medication Naltrexone?  No    Do you take blood thinners?  No       Prep   Are you currently on dialysis or do you have chronic kidney disease?  No    Do you have a diagnosis of diabetes?  No    Do you have a diagnosis of cystic fibrosis (CF)?  No    On a regular basis do you go 3 -5 days between bowel movements?  No    BMI > 40?  No    Preferred Pharmacy:    [facility] [id number] [place] [address] [address]  Phone: [phone] Fax: [phone]    [id number] - [place], [place] - [address] [place] [address] [address]  [facility] [address]  Phone: [phone] Fax: [phone]    [id number] - [place], [place] - [address]  [address]  [facility] [address]  Phone: [phone] Fax: [phone]      Final Scheduling Details   Colonoscopy prep sent?  N/A    Procedure scheduled  Upper endoscopy (EGD)    Surgeon:  BLAIR     Date of procedure:  12/28/2024     Pre-OP / PAC:   No - Not required for this site.    Location  [facility] - Per order.    Sedation   Moderate Sedation - Per order.      Patient Reminders:   You will receive a call from a Nurse to review instructions and health history.  This assessment must be completed prior to your procedure.  Failure to complete the Nurse assessment may result in the procedure being cancelled.      On the day of your procedure, please designate an adult(s) who can drive you home stay with you for the next 24 hours. The medicines used in the exam will make you sleepy. You will not be able to drive.      You cannot take public transportation, ride share services, " or non-medical taxi service without a responsible caregiver.  Medical transport services are allowed with the requirement that a responsible caregiver will receive you at your destination.  We require that drivers and caregivers are confirmed prior to your procedure.

## 2023-12-26 NOTE — TELEPHONE ENCOUNTER
Pre assessment completed for upcoming procedure.      Procedure details:    Patient scheduled for Upper endoscopy (EGD) on 12/28/23.     Arrival time: 1015. Procedure time 1100    Pre op exam needed? N/A    Facility location: [facility]; [address] [place] [address] [place] [address]    Sedation type: Conscious sedation     Indication for procedure: Gastroesophageal reflux disease without esophagitis, Other dysphagia    COVID policy reviewed.    Designated  policy reviewed. Instructed to have someone stay 6 hours post procedure.       Chart review:     Electronic implanted devices? No    Recent diagnosis of diverticulitis within the last 6 weeks?  N/A    Diabetic? No    Diabetic medication HOLDING recommendations: (if applicable)  Oral diabetic medications: N/A  Diabetic injectables: N/A  Insulin: N/A    Medication review:    Anticoagulants? No    NSAIDS? No    Other medication HOLDING recommendations:  N/A      Prep for procedure:     Bowel prep recommendation: N/A    Prep instructions sent via [facility] by     Reviewed procedure prep instructions.     Patient verbalized understanding and had no questions or concerns at this time.        Alanna Chew RN  Endoscopy Procedure Pre Assessment RN  [phone] option 4

## 2023-12-27 RX ORDER — ONDANSETRON 2 MG/ML
4 INJECTION INTRAMUSCULAR; INTRAVENOUS
Status: CANCELLED | OUTPATIENT
Start: 2023-12-27

## 2023-12-27 RX ORDER — LIDOCAINE 40 MG/G
CREAM TOPICAL
Status: CANCELLED | OUTPATIENT
Start: 2023-12-27

## 2023-12-28 ENCOUNTER — HOSPITAL ENCOUNTER (OUTPATIENT)
Facility: CLINIC | Age: 68
Discharge: HOME | End: 2023-12-28
Attending: SPECIALIST | Admitting: SPECIALIST
Payer: COMMERCIAL

## 2023-12-28 VITALS
RESPIRATION RATE: 10 BRPM | BODY MASS INDEX: 22.9 KG/M2 | OXYGEN SATURATION: 96 % | SYSTOLIC BLOOD PRESSURE: 95 MMHG | WEIGHT: 160 LBS | HEART RATE: 62 BPM | HEIGHT: 70 IN | DIASTOLIC BLOOD PRESSURE: 58 MMHG

## 2023-12-28 DIAGNOSIS — K22.2 SCHATZKI'S RING OF DISTAL ESOPHAGUS: Primary | ICD-10-CM

## 2023-12-28 LAB — UPPER GI ENDOSCOPY: NORMAL

## 2023-12-28 PROCEDURE — 88305 TISSUE EXAM BY PATHOLOGIST: CPT | Mod: TC | Performed by: SPECIALIST

## 2023-12-28 PROCEDURE — G0500 MOD SEDAT ENDO SERVICE >5YRS: HCPCS | Performed by: SPECIALIST

## 2023-12-28 PROCEDURE — 250N000011 HC RX IP 250 OP 636: Performed by: SPECIALIST

## 2023-12-28 PROCEDURE — 43239 EGD BIOPSY SINGLE/MULTIPLE: CPT | Performed by: SPECIALIST

## 2023-12-28 PROCEDURE — 250N000009 HC RX 250: Performed by: SPECIALIST

## 2023-12-28 PROCEDURE — 43249 ESOPH EGD DILATION <30 MM: CPT | Performed by: SPECIALIST

## 2023-12-28 RX ORDER — NITROGLYCERIN 0.4 MG/1
0.4 TABLET SUBLINGUAL EVERY 5 MIN PRN
COMMUNITY

## 2023-12-28 RX ORDER — FENTANYL CITRATE 50 UG/ML
INJECTION, SOLUTION INTRAMUSCULAR; INTRAVENOUS PRN
Status: DISCONTINUED | OUTPATIENT
Start: 2023-12-28 | End: 2023-12-28 | Stop reason: HOSPADM

## 2023-12-28 ASSESSMENT — ACTIVITIES OF DAILY LIVING (ADL): ADLS_ACUITY_SCORE: 35

## 2023-12-29 PROBLEM — K22.2 SCHATZKI'S RING: Status: ACTIVE | Noted: 2023-12-29

## 2023-12-29 LAB
PATH REPORT.COMMENTS IMP SPEC: NORMAL
PATH REPORT.COMMENTS IMP SPEC: NORMAL
PATH REPORT.FINAL DX SPEC: NORMAL
PATH REPORT.GROSS SPEC: NORMAL
PATH REPORT.MICROSCOPIC SPEC OTHER STN: NORMAL
PATH REPORT.RELEVANT HX SPEC: NORMAL
PHOTO IMAGE: NORMAL

## 2023-12-29 PROCEDURE — 88305 TISSUE EXAM BY PATHOLOGIST: CPT | Mod: 26 | Performed by: PATHOLOGY

## 2024-01-04 ENCOUNTER — OFFICE VISIT (OUTPATIENT)
Dept: OPHTHALMOLOGY | Facility: CLINIC | Age: 69
End: 2024-01-04
Payer: COMMERCIAL

## 2024-01-04 DIAGNOSIS — H18.603 KERATOCONUS OF BOTH EYES: ICD-10-CM

## 2024-01-04 DIAGNOSIS — H04.123 DRY EYE SYNDROME OF BOTH LACRIMAL GLANDS: Primary | ICD-10-CM

## 2024-01-04 DIAGNOSIS — H18.463 PELLUCID MARGINAL DEGENERATION OF BOTH CORNEAS: ICD-10-CM

## 2024-01-04 PROCEDURE — 99213 OFFICE O/P EST LOW 20 MIN: CPT | Performed by: OPTOMETRIST

## 2024-01-04 RX ORDER — OMEPRAZOLE 10 MG/1
20 CAPSULE, DELAYED RELEASE ORAL DAILY
COMMUNITY

## 2024-01-04 ASSESSMENT — REFRACTION_CURRENTRX
OS_SPHERE: -1.25
OD_BRAND: ZENLENS RC
OS_ADDL_SPECS: BOSTON XO CLEAR HYDRAPEG
OD_DIAMETER: 15.4
OD_BASECURVE: 4.3/7.67
OD_ADDL_SPECS: BOSTON XO CLEAR HYDRAPEG
OD_SPHERE: -1.50
OS_DIAMETER: 15.4
OS_BASECURVE: 4.3/7.67
OS_BRAND: ZENLENS RC

## 2024-01-04 ASSESSMENT — CONF VISUAL FIELD
OS_SUPERIOR_TEMPORAL_RESTRICTION: 0
METHOD: COUNTING FINGERS
OD_NORMAL: 1
OS_INFERIOR_TEMPORAL_RESTRICTION: 0
OS_INFERIOR_NASAL_RESTRICTION: 0
OD_INFERIOR_NASAL_RESTRICTION: 0
OS_NORMAL: 1
OD_SUPERIOR_TEMPORAL_RESTRICTION: 0
OD_INFERIOR_TEMPORAL_RESTRICTION: 0
OD_SUPERIOR_NASAL_RESTRICTION: 0
OS_SUPERIOR_NASAL_RESTRICTION: 0

## 2024-01-04 ASSESSMENT — REFRACTION_MANIFEST
OS_CYLINDER: +1.00
OD_CYLINDER: +1.00
OD_SPHERE: PLANO
OS_SPHERE: PLANO
OS_AXIS: 110
OD_AXIS: 035

## 2024-01-04 ASSESSMENT — VISUAL ACUITY
OS_CC: 20/30
OD_CC: 20/20
METHOD: SNELLEN - LINEAR
OS_CC: J2-2
OS_PH_CC: 20/20
METHOD_MR: OVER CL'S
OS_PH_CC+: -2
OD_CC: J1+/-1
CORRECTION_TYPE: CONTACTS

## 2024-01-04 ASSESSMENT — REFRACTION_WEARINGRX
OD_CYLINDER: SPHERE
OS_SPHERE: +2.50
SPECS_TYPE: SVL
OD_SPHERE: +2.50
OS_CYLINDER: SPHERE

## 2024-01-04 ASSESSMENT — EXTERNAL EXAM - RIGHT EYE: OD_EXAM: NORMAL

## 2024-01-04 ASSESSMENT — EXTERNAL EXAM - LEFT EYE: OS_EXAM: NORMAL

## 2024-01-04 NOTE — PROGRESS NOTES
A/P  1.) Pellucid Marginal Degeneration OU  -Largely doing well in glasses but with high cyl OU  -BCVA in hard lenses 20/20- both eyes  -Previously failed soft lenses 2' to dryness  -Great start in scleral lens, much improved vision. Good fit - no adjustments necessary at this time  -He would like Rx PAL for use over to correct residual cyl    2.) Dry eye OU  -Symptoms improved with CL use    Continue with current lenses. Follow-up 1 year CL check (and possibly comprehensive exam)    I have confirmed the patient's CC, HPI and reviewed Past Medical History, Past Surgical History, Social History, Family History, Problem List, Medication List and agree with Tech note.     Renetta Sargent, OD EMILYO MIRNAS

## 2024-01-04 NOTE — NURSING NOTE
Chief Complaints and History of Present Illnesses   Patient presents with    Contact Lens Follow Up     3 month follow up Scleral lens follow up each eye.      Chief Complaint(s) and History of Present Illness(es)       Contact Lens Follow Up              Laterality: both eyes    Associated symptoms: dryness.  Negative for eye pain    Pain scale: 0/10    Comments: 3 month follow up Scleral lens follow up each eye.               Comments    Some intermittent issues with each eye. Will wear them and each eye feel irritated and sore so takes breaks from them for a few days and then starts using them again. Some days get them right in but other days if it takes longer to get them in eye can get irritated. Each eye feel great when they are in though. Does have some double vision or ghosting vision but has dismissed with the plan of getting glasses to go over them to correct this. Able to read at a distance with contacts despite ghosting. Really likes contacts and very motivated to use contacts. Head lights are better with contacts. Dryness / Comfort each eye improved with using them.     Skye Jimenez, COT COT 8:41 AM January 4, 2024

## 2024-02-06 DIAGNOSIS — E78.5 DYSLIPIDEMIA: ICD-10-CM

## 2024-02-06 RX ORDER — ROSUVASTATIN CALCIUM 20 MG/1
20 TABLET, COATED ORAL DAILY
Qty: 90 TABLET | Refills: 0 | Status: SHIPPED | OUTPATIENT
Start: 2024-02-06 | End: 2024-05-06

## 2024-03-23 ENCOUNTER — HEALTH MAINTENANCE LETTER (OUTPATIENT)
Age: 69
End: 2024-03-23

## 2024-05-05 DIAGNOSIS — E78.5 DYSLIPIDEMIA: ICD-10-CM

## 2024-05-06 RX ORDER — ROSUVASTATIN CALCIUM 20 MG/1
20 TABLET, COATED ORAL DAILY
Qty: 90 TABLET | Refills: 1 | Status: SHIPPED | OUTPATIENT
Start: 2024-05-06

## 2024-06-26 DIAGNOSIS — L40.9 PSORIASIS: ICD-10-CM

## 2024-07-08 NOTE — TELEPHONE ENCOUNTER
calcipotriene (DOVONOX) 0.005 % external ointment 60 g 3 3/27/2023     betamethasone dipropionate (DIPROSONE) 0.05 % external ointment 50 g 3 3/27/2023     Last Office Visit : 10/25/22  Future Office visit:  1/14/25    Routing refill request to provider for review/approval because:  Not on protocol   last seen 2022

## 2024-07-10 ENCOUNTER — MYC REFILL (OUTPATIENT)
Dept: DERMATOLOGY | Facility: CLINIC | Age: 69
End: 2024-07-10
Payer: COMMERCIAL

## 2024-07-10 DIAGNOSIS — L40.9 PSORIASIS: ICD-10-CM

## 2024-07-11 RX ORDER — CALCIPOTRIENE 50 UG/G
OINTMENT TOPICAL
Qty: 60 G | OUTPATIENT
Start: 2024-07-11

## 2024-07-11 NOTE — TELEPHONE ENCOUNTER
calcipotriene (DOVONOX) 0.005 % external ointment   60 g 3 3/27/2023     Process 2    Last Office Visit : 10- ----Follow up: 1 year   Future Office visit:  1-          betamethasone dipropionate (DIPROSONE) 0.05 % external ointment   50 g 3 3/27/2023     Last Office Visit : 10- ----Follow up: 1 year   Future Office visit:  1-   2

## 2024-07-15 ENCOUNTER — TELEPHONE (OUTPATIENT)
Dept: FAMILY MEDICINE | Facility: CLINIC | Age: 69
End: 2024-07-15
Payer: COMMERCIAL

## 2024-07-15 NOTE — LETTER
August 5, 2024      Tony Mcnair  [address]  [place] [address]        Dear Tony,    I care about your health and have reviewed your health plan. I have reviewed your medical conditions, medication list, and lab results and am making recommendations based on this review, to better manage your health.    You are in particular need of attention regarding:  -Depression  -Wellness (Physical) Visit     I am recommending that you:  -schedule a WELLNESS (Physical) APPOINTMENT with me.   I will check fasting labs the same day - nothing to eat except water and meds for 8-10 hours prior. (If you go elsewhere for Wellness visits then please disregard this reminder.)         Here is a list of Health Maintenance topics that are due now or due soon:  Health Maintenance Due   Topic Date Due    COVID-19 Vaccine (6 - 2023-24 season) 09/01/2023    MEDICARE ANNUAL WELLNESS VISIT  02/09/2024    LIPID  02/09/2024    ANNUAL REVIEW OF HM ORDERS  02/09/2024    PHQ-9  06/18/2024        Please call us (or use [facility]) to address the above recommendations.     Thank you for trusting [facility] and we appreciate the opportunity to serve you.  We look forward to supporting your healthcare needs in the future.    Healthy Regards,    Armin Alvarez, MPH, PA-C

## 2024-07-15 NOTE — TELEPHONE ENCOUNTER
Patient Quality Outreach    Patient is due for the following:   Depression  -  PHQ-9 needed  Physical Annual Wellness Visit    Next Steps:   No follow up needed at this time.  Patient was assigned appropriate questionnaire to complete    Type of outreach:    Sent [employer] message.      Questions for provider review:    None           Laina Marquez RN

## 2024-07-17 RX ORDER — BETAMETHASONE DIPROPIONATE 0.05 %
OINTMENT (GRAM) TOPICAL
Qty: 50 G | Refills: 0 | Status: SHIPPED | OUTPATIENT
Start: 2024-07-17

## 2024-07-18 ENCOUNTER — MYC REFILL (OUTPATIENT)
Dept: DERMATOLOGY | Facility: CLINIC | Age: 69
End: 2024-07-18
Payer: COMMERCIAL

## 2024-07-18 DIAGNOSIS — L40.9 PSORIASIS: ICD-10-CM

## 2024-07-18 RX ORDER — CALCIPOTRIENE 50 UG/G
OINTMENT TOPICAL
Qty: 60 G | Refills: 2 | Status: SHIPPED | OUTPATIENT
Start: 2024-07-18

## 2024-07-18 RX ORDER — CALCIPOTRIENE 50 UG/G
OINTMENT TOPICAL
Qty: 60 G | Refills: 3 | Status: SHIPPED | OUTPATIENT
Start: 2024-07-18

## 2024-07-18 RX ORDER — BETAMETHASONE DIPROPIONATE 0.05 %
OINTMENT (GRAM) TOPICAL
Qty: 45 G | Refills: 2 | Status: SHIPPED | OUTPATIENT
Start: 2024-07-18

## 2024-08-05 NOTE — TELEPHONE ENCOUNTER
Patient Quality Outreach    Patient is due for the following:   Depression  -  PHQ-9 needed  Physical Annual Wellness Visit    Next Steps:   No follow up needed at this time.  Patient was assigned appropriate questionnaire to complete    Type of outreach:    Sent letter. and Phq-9 sent with return envelope    Next Steps:  Reach out within 90 days via Phone.    Max number of attempts reached: Yes. Will try again in 90 days if patient still on fail list.    Questions for provider review:    None           Laina Marquez RN

## 2024-09-16 DIAGNOSIS — F33.1 MODERATE EPISODE OF RECURRENT MAJOR DEPRESSIVE DISORDER (H): ICD-10-CM

## 2024-09-17 RX ORDER — CITALOPRAM HYDROBROMIDE 20 MG/1
20 TABLET ORAL DAILY
Qty: 90 TABLET | Refills: 1 | Status: SHIPPED | OUTPATIENT
Start: 2024-09-17

## 2024-10-25 ENCOUNTER — TELEPHONE (OUTPATIENT)
Dept: DERMATOLOGY | Facility: CLINIC | Age: 69
End: 2024-10-25
Payer: COMMERCIAL

## 2024-10-25 NOTE — TELEPHONE ENCOUNTER
10/25 Patient confirmed scheduled appointment:  Date: 1/14/2025  Time: 2:45 pm  Visit type: Return Dermatology  Provider: Aric  Location: [facility]  Testing/imaging: n/a  Additional notes: n/a

## 2024-11-04 ENCOUNTER — OFFICE VISIT (OUTPATIENT)
Dept: OPTOMETRY | Facility: CLINIC | Age: 69
End: 2024-11-04
Payer: COMMERCIAL

## 2024-11-04 DIAGNOSIS — H04.123 DRY EYES: ICD-10-CM

## 2024-11-04 DIAGNOSIS — H52.213 IRREGULAR ASTIGMATISM OF BOTH EYES: ICD-10-CM

## 2024-11-04 DIAGNOSIS — H18.463 PELLUCID MARGINAL DEGENERATION OF BOTH CORNEAS: Primary | ICD-10-CM

## 2024-11-04 DIAGNOSIS — H25.13 NUCLEAR AGE-RELATED CATARACT, BOTH EYES: ICD-10-CM

## 2024-11-04 ASSESSMENT — REFRACTION_MANIFEST
OS_AXIS: 110
OD_SPHERE: -0.50
OS_AXIS: 008
OD_CYLINDER: +1.00
OD_AXIS: 035
OS_CYLINDER: +1.00
OD_AXIS: 005
OD_CYLINDER: +4.50
OD_SPHERE: PLANO
OS_SPHERE: -0.50
OS_SPHERE: PLANO
OS_CYLINDER: +4.00

## 2024-11-04 ASSESSMENT — REFRACTION_WEARINGRX
OD_ADD: +2.50
OS_CYLINDER: +1.00
OS_SPHERE: PLANO
OD_AXIS: 035
OD_CYLINDER: +1.00
OS_ADD: +2.50
OS_AXIS: 110
OD_SPHERE: PLANO

## 2024-11-04 ASSESSMENT — CONF VISUAL FIELD
OS_SUPERIOR_NASAL_RESTRICTION: 0
OD_INFERIOR_NASAL_RESTRICTION: 0
OD_SUPERIOR_NASAL_RESTRICTION: 0
OD_NORMAL: 1
OD_SUPERIOR_TEMPORAL_RESTRICTION: 0
OS_NORMAL: 1
OS_INFERIOR_NASAL_RESTRICTION: 0
OD_INFERIOR_TEMPORAL_RESTRICTION: 0
OS_SUPERIOR_TEMPORAL_RESTRICTION: 0
OS_INFERIOR_TEMPORAL_RESTRICTION: 0

## 2024-11-04 ASSESSMENT — REFRACTION_CURRENTRX
OS_BRAND: ZENLENS RC
OS_ADDL_SPECS: BOSTON XO CLEAR HYDRAPEG
OD_BASECURVE: 4.3/7.67
OS_BASECURVE: 4.3/7.67
OD_ADDL_SPECS: BOSTON XO CLEAR HYDRAPEG
OD_BRAND: ZENLENS RC
OD_DIAMETER: 15.4
OS_SPHERE: -1.25
OS_DIAMETER: 15.4
OD_SPHERE: -1.50

## 2024-11-04 ASSESSMENT — VISUAL ACUITY
OS_PH_CC: 20/25
OD_CC+: -1
OS_CC+: -2
METHOD: SNELLEN - LINEAR
OS_CC: 20/30
OD_CC: 20/20
CORRECTION_TYPE: GLASSES, CONTACTS
OS_PH_CC+: -3

## 2024-11-04 ASSESSMENT — TONOMETRY
IOP_METHOD: ICARE
OD_IOP_MMHG: 14
OS_IOP_MMHG: 15

## 2024-11-04 ASSESSMENT — EXTERNAL EXAM - RIGHT EYE: OD_EXAM: NORMAL

## 2024-11-04 ASSESSMENT — CUP TO DISC RATIO
OD_RATIO: 0.2
OS_RATIO: 0.15

## 2024-11-04 ASSESSMENT — EXTERNAL EXAM - LEFT EYE: OS_EXAM: NORMAL

## 2024-11-04 NOTE — NURSING NOTE
"Chief Complaints and History of Present Illnesses   Patient presents with    Contact Lens Follow Up     Pt here for contact lens follow up.      Chief Complaint(s) and History of Present Illness(es)       Contact Lens Follow Up              Laterality: both eyes    Comments: Pt here for contact lens follow up.               Comments    He notes left eye feels sore with lens in and out. Eye feels \"bruised\". Vision is largely changed since last exam.     EMI Chand on 11/4/2024 at 2:23 PM                     "

## 2024-11-04 NOTE — PROGRESS NOTES
A/P  1.) Pellucid Marginal Degeneration OU  -Largely doing well in glasses but with high cyl OU. Is having more glare lately while driving  -BCVA in hard lenses 20/20- both eyes. Alternating with glasses.   -Previously failed soft lenses 2' to dryness  -Right eye doing well - left eye with suction on removal causing soreness/aching. 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 Loosen fit on limbus/conj to help with issues. Wearing PAL over for residual cyl    2.) Dry eye OU  -Symptoms improved with CL use    3.) Cataracts OU  -Minimal, not visually significant  -Some glare, combined with irregular corneas but manageable for now  -Dilated health otherwise unremarkable OU    Order left lens and mail direct. Follow-up annually, sooner prn    I have confirmed the patient's CC, HPI and reviewed Past Medical History, Past Surgical History, Social History, Family History, Problem List, Medication List and agree with Tech note.     Renetta Sargent, RUSSELL DIAZO MIRNAS

## 2024-11-08 DIAGNOSIS — E78.5 DYSLIPIDEMIA: ICD-10-CM

## 2024-11-08 RX ORDER — ROSUVASTATIN CALCIUM 20 MG/1
20 TABLET, COATED ORAL DAILY
Qty: 90 TABLET | Refills: 1 | OUTPATIENT
Start: 2024-11-08

## 2024-11-08 NOTE — TELEPHONE ENCOUNTER
Patient has transferred care to [facility], refills should be sent to new PCP

## 2024-11-08 NOTE — TELEPHONE ENCOUNTER
Spoke to patient and he will contact [facility].       Shira Huang RN  [facility]      Leukocytosis

## 2025-01-14 ENCOUNTER — OFFICE VISIT (OUTPATIENT)
Dept: DERMATOLOGY | Facility: CLINIC | Age: 70
End: 2025-01-14
Payer: COMMERCIAL

## 2025-01-14 DIAGNOSIS — L21.9 DERMATITIS, SEBORRHEIC: Primary | ICD-10-CM

## 2025-01-14 DIAGNOSIS — D49.2 NEOPLASM OF UNSPECIFIED BEHAVIOR OF BONE, SOFT TISSUE, AND SKIN: ICD-10-CM

## 2025-01-14 DIAGNOSIS — L40.9 PSORIASIS: ICD-10-CM

## 2025-01-14 PROCEDURE — 11102 TANGNTL BX SKIN SINGLE LES: CPT | Performed by: DERMATOLOGY

## 2025-01-14 PROCEDURE — 88305 TISSUE EXAM BY PATHOLOGIST: CPT | Mod: TC | Performed by: DERMATOLOGY

## 2025-01-14 PROCEDURE — 99214 OFFICE O/P EST MOD 30 MIN: CPT | Mod: 25 | Performed by: DERMATOLOGY

## 2025-01-14 PROCEDURE — 88305 TISSUE EXAM BY PATHOLOGIST: CPT | Mod: 26 | Performed by: DERMATOLOGY

## 2025-01-14 RX ORDER — CALCIPOTRIENE, BETAMETHASONE DIPROPIONATE 50; .643 UG/G; MG/G
OINTMENT TOPICAL
Qty: 60 G | Refills: 3 | Status: SHIPPED | OUTPATIENT
Start: 2025-01-14

## 2025-01-14 RX ORDER — KETOCONAZOLE 20 MG/ML
SHAMPOO, SUSPENSION TOPICAL
Qty: 120 ML | Refills: 3 | Status: SHIPPED | OUTPATIENT
Start: 2025-01-14

## 2025-01-14 RX ORDER — TACROLIMUS 1 MG/G
OINTMENT TOPICAL
COMMUNITY

## 2025-01-14 RX ORDER — BETAMETHASONE DIPROPIONATE 0.5 MG/G
OINTMENT, AUGMENTED TOPICAL 2 TIMES DAILY
Qty: 50 G | Refills: 3 | Status: SHIPPED | OUTPATIENT
Start: 2025-01-14

## 2025-01-14 RX ORDER — FLUOCINONIDE TOPICAL SOLUTION USP, 0.05% 0.5 MG/ML
SOLUTION TOPICAL
Qty: 60 ML | Refills: 2 | Status: SHIPPED | OUTPATIENT
Start: 2025-01-14

## 2025-01-14 RX ORDER — CALCIPOTRIENE 50 UG/G
OINTMENT TOPICAL
Qty: 60 G | Refills: 2 | Status: SHIPPED | OUTPATIENT
Start: 2025-01-14

## 2025-01-14 ASSESSMENT — PAIN SCALES - GENERAL: PAINLEVEL_OUTOF10: NO PAIN (0)

## 2025-01-14 NOTE — NURSING NOTE
"Dermatology Rooming Note    Tony Mcnair's goals for this visit include:   Chief Complaint   Patient presents with    Psoriasis     Flaring on legs currently: \"splotches on my thighs\"    Derm Problem     Mole near left temple: grew in size over the last year     Cherri Tello LPN    "

## 2025-01-14 NOTE — PROGRESS NOTES
"[facility] Dermatology Note  Encounter Date: Jan 14, 2025  Office Visit     Dermatology Problem List:  1. Psoriasis   2. Atypical nevus, left lateral face    ____________________________________________    Assessment & Plan:    # Psoriasis  - Refill topical medications and continue as previously applied  - Apply Ketoconazole shampoo to face for mild irritation    # Evaluation of abnormal nevi   - Shave biopsy on right lateral neck (see procedure note below).   - Send for pathology.       Procedures Performed:   - Shave biopsy procedure note, location: right lateral neck. After discussion of benefits and risks including but not limited to bleeding, infection, scar, incomplete removal, recurrence, and non-diagnostic biopsy, verbal consent and photographs were obtained. The area was cleaned with isopropyl alcohol. 0.5mL of 1% lidocaine with epinephrine was injected to obtain adequate anesthesia of lesion. Shave biopsy at site performed. Hemostasis was achieved with aluminium chloride. Petrolatum ointment and a sterile dressing were applied. The patient tolerated the procedure and no complications were noted. The patient was provided with verbal and written post care instructions.   - Procedure(s) performed by medical student with close faculty supervision.     Follow-up: 6 months in-person, or earlier for new or changing lesions. Pending biopsy results.     Staff and Medical Student:     Yeimi Peacock, MS4    Dr. Petar Corbett  ____________________________________________    CC: Psoriasis (Flaring on legs currently: \"splotches on my thighs\")    HPI:  Mr. Tony Mcnair is a(n) 69 year old male who presents today as a new patient for psoriasis and evaluation of a nevus. He feels his condition is well-controlled on current topical regimen. He is not interested in oral mediations. He is primarily affected on the bilateral lower legs, knees, and elbows. Minimal scaling in his scalp and back. He needs refills " of his topicals and assistance with insurance coverage for Talconex.     He also noticed a change in a nevus on his left temple. He thinks it has grown in size and is flaky. His brother recently had a non-melanoma skin cancer removed. No other family history of skin cancer.     Patient is otherwise feeling well, without additional skin concerns.    Labs:  None reviewed.    Physical Exam:  Vitals: There were no vitals taken for this visit.  SKIN: Total skin excluding the undergarment areas was performed. The exam included the head/face, neck, both arms, chest, back, abdomen, both legs, digits and/or nails.   - Scaling plaques to bilateral lateral lower legs, knees, elbows. Minimal scale on scalp and back.   - Scabbed lesion on right lateral neck  - Seborrheic keratosis on left lateral face.   - No other lesions of concern on areas examined.     Medications:  Current Outpatient Medications   Medication Sig Dispense Refill    acetaminophen (TYLENOL) 500 MG tablet Take 2 tablets (1,000 mg) by mouth every 6 hours as needed for pain      Artificial Tear Ointment (REFRESH P.M.) OINT Apply 1 inch to eye nightly as needed 1 Tube 6    augmented betamethasone dipropionate (DIPROLENE-AF) 0.05 % external ointment Apply topically 2 times daily Apply a thin layer to thick areas of psoriasis 1-2x per day twice per week and wrap with saran wrap 50 g 3    betamethasone dipropionate (DIPROSONE) 0.05 % external ointment MIX 1:1 WITH CALCIPOTRIENE. APPLY TOPICALLY TO THE AFFECTED AREA TWICE A DAY 45 g 2    betamethasone dipropionate (DIPROSONE) 0.05 % external ointment Mix 1:1 with calcipotriene. Apply topically to the AA BID. 50 g 0    BIOTIN PO Take 1 tablet by mouth daily       calcipotriene (DOVONOX) 0.005 % external ointment MIX 1:1 WITH BETAMETHASONE. APPLY TOPICALLY TO THE AFFFECTED AREA TWICE A DAY . 60 g 2    calcipotriene (DOVONOX) 0.005 % external ointment Mix 1:1 wirh betamethasone. Apply topically to the AA BID. 60 g 3     calcipotriene-betameth diprop (TACLONEX) 0.005-0.064 % external ointment APPLY TO THE AFFECTED AREA 2 TIMES DAILY AS NEEDED.  For additional refills, please schedule a follow-up appointment. 60 g 0    citalopram (CELEXA) 20 MG tablet TAKE 1 TABLET BY MOUTH EVERY DAY 90 tablet 1    fish oil-omega-3 fatty acids 1000 MG capsule Take 1 g by mouth daily      fluocinonide (LIDEX) 0.05 % external solution Apply  topically 2 times daily for itchy scalp or scalp psoriasis 60 mL 2    Glucosamine-Chondroit-Vit C-Mn (GLUCOSAMINE 1500 COMPLEX) CAPS Take 1,500 mg by mouth 2 times daily 1 capsule 0    Multiple Vitamin (DAILY MULTIVITAMIN PO) Take 1 tablet by mouth daily       nitroGLYcerin (NITROSTAT) 0.4 MG sublingual tablet Place 0.4 mg under the tongue every 5 minutes as needed for chest pain For chest pain place 1 tablet under the tongue every 5 minutes for 3 doses. If symptoms persist 5 minutes after 1st dose call 911.      olopatadine (PATADAY) 0.2 % ophthalmic solution Place 1 drop into both eyes daily 2.5 mL 11    omeprazole (PRILOSEC) 10 MG DR capsule Take 20 mg by mouth daily      pimecrolimus (ELIDEL) 1 % external cream Apply topically 2 times daily as needed For psoriasis of face or genitals  Please keep appointment 12/1/20 30 g 0    rosuvastatin (CRESTOR) 20 MG tablet TAKE 1 TABLET BY MOUTH EVERY DAY 90 tablet 1    Turmeric 500 MG TABS Take 1,000 mg by mouth 2 times daily 1 tablet 0    VITAMIN D, CHOLECALCIFEROL, PO Take 1,000 Units by mouth daily        No current facility-administered medications for this visit.      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   "

## 2025-01-14 NOTE — NURSING NOTE
Lidocaine-epinephrine 1-1:496501 % injection   1 mL once for one use, starting 1/14/2025 ending 1/14/2025,  2mL disp, R-0, injection  Injected by Steve De La Vega EMT

## 2025-01-14 NOTE — Clinical Note
"1/14/2025       RE: Tony Mcnair  [address] [facility] [address]     Dear Colleague,    Thank you for referring your patient, Tony Mcnair, to the [facility] DERMATOLOGY [facility] [place] at [facility]. Please see a copy of my visit note below.    [facility] Dermatology Note  Encounter Date: Jan 14, 2025  Office Visit     Dermatology Problem List:  1. Psoriasis   2. Atypical nevus, left lateral face    ____________________________________________    Assessment & Plan:    # Psoriasis  {kkplans:893439}  - Talconex refill and prior authorization  -     # Evaluation of nevus.   {kkplans:981699}   - ***     Procedures Performed:   {bsproceduresnotes:214429}  {kkproceduremedstudent:079841}    Follow-up: {kkfollowup:784901}    Staff and Medical Student:     Yeimi Peacock, MS4    Dr. Petar Corbett  ____________________________________________    CC: Psoriasis (Flaring on legs currently: \"splotches on my thighs\")    HPI:  Mr. Tony Mcnair is a(n) 69 year old male who presents today as a new patient for psoriasis and evaluation of a nevus. He feels his condition is well-controlled on current topical regimen. He is not interested in oral mediations. He is primarily affected on the bilateral lower legs, knees, and elbows. Minimal scaling in his scalp and back. He needs refills of his topicals and assistance with insurance coverage for Talconex.     He also noticed a change in a nevus on his left temple. He thinks it has grown in size and is flaky. His brother recently had a non-melanoma skin cancer removed. No other family history of skin cancer.     Patient is otherwise feeling well, without additional skin concerns.    Labs:  None reviewed.    Physical Exam:  Vitals: There were no vitals taken for this visit.  SKIN: Total skin excluding the undergarment areas was performed. The exam included the head/face, neck, both arms, chest, back, abdomen, both " legs, digits and/or nails.   - ***  - ***  - ***  - No other lesions of concern on areas examined.     Medications:  Current Outpatient Medications   Medication Sig Dispense Refill    acetaminophen (TYLENOL) 500 MG tablet Take 2 tablets (1,000 mg) by mouth every 6 hours as needed for pain      Artificial Tear Ointment (REFRESH P.M.) OINT Apply 1 inch to eye nightly as needed 1 Tube 6    augmented betamethasone dipropionate (DIPROLENE-AF) 0.05 % external ointment Apply topically 2 times daily Apply a thin layer to thick areas of psoriasis 1-2x per day twice per week and wrap with saran wrap 50 g 3    betamethasone dipropionate (DIPROSONE) 0.05 % external ointment MIX 1:1 WITH CALCIPOTRIENE. APPLY TOPICALLY TO THE AFFECTED AREA TWICE A DAY 45 g 2    betamethasone dipropionate (DIPROSONE) 0.05 % external ointment Mix 1:1 with calcipotriene. Apply topically to the AA BID. 50 g 0    BIOTIN PO Take 1 tablet by mouth daily       calcipotriene (DOVONOX) 0.005 % external ointment MIX 1:1 WITH BETAMETHASONE. APPLY TOPICALLY TO THE AFFFECTED AREA TWICE A DAY . 60 g 2    calcipotriene (DOVONOX) 0.005 % external ointment Mix 1:1 wirh betamethasone. Apply topically to the AA BID. 60 g 3    calcipotriene-betameth diprop (TACLONEX) 0.005-0.064 % external ointment APPLY TO THE AFFECTED AREA 2 TIMES DAILY AS NEEDED.  For additional refills, please schedule a follow-up appointment. 60 g 0    citalopram (CELEXA) 20 MG tablet TAKE 1 TABLET BY MOUTH EVERY DAY 90 tablet 1    fish oil-omega-3 fatty acids 1000 MG capsule Take 1 g by mouth daily      fluocinonide (LIDEX) 0.05 % external solution Apply  topically 2 times daily for itchy scalp or scalp psoriasis 60 mL 2    Glucosamine-Chondroit-Vit C-Mn (GLUCOSAMINE 1500 COMPLEX) CAPS Take 1,500 mg by mouth 2 times daily 1 capsule 0    Multiple Vitamin (DAILY MULTIVITAMIN PO) Take 1 tablet by mouth daily       nitroGLYcerin (NITROSTAT) 0.4 MG sublingual tablet Place 0.4 mg under the tongue  "every 5 minutes as needed for chest pain For chest pain place 1 tablet under the tongue every 5 minutes for 3 doses. If symptoms persist 5 minutes after 1st dose call 911.      olopatadine (PATADAY) 0.2 % ophthalmic solution Place 1 drop into both eyes daily 2.5 mL 11    omeprazole (PRILOSEC) 10 MG DR capsule Take 20 mg by mouth daily      pimecrolimus (ELIDEL) 1 % external cream Apply topically 2 times daily as needed For psoriasis of face or genitals  Please keep appointment 12/1/20 30 g 0    rosuvastatin (CRESTOR) 20 MG tablet TAKE 1 TABLET BY MOUTH EVERY DAY 90 tablet 1    Turmeric 500 MG TABS Take 1,000 mg by mouth 2 times daily 1 tablet 0    VITAMIN D, CHOLECALCIFEROL, PO Take 1,000 Units by mouth daily        No current facility-administered medications for this visit.      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     [facility] Dermatology Note  Encounter Date: Jan 14, 2025  Office Visit " "    Dermatology Problem List:  1. Psoriasis   2. Atypical nevus, left lateral face    ____________________________________________    Assessment & Plan:    # Psoriasis  - Refill topical medications and continue as previously applied  - Apply Ketoconazole shampoo to face for mild irritation    # Evaluation of abnormal nevi  {kkplans:[id number]}   - Shave biopsy on right lateral neck for BCC vs          Procedures Performed:   {bsproceduresnotes:788749}  {kkproceduremedstudent:987027}    Follow-up: 1 year(s) in-person, or earlier for new or changing lesions    Staff and Medical Student:     Yeimi Peacock, MS4    Dr. Petar Corbett  ____________________________________________    CC: Psoriasis (Flaring on legs currently: \"splotches on my thighs\")    HPI:  Mr. Tony Mcnair is a(n) 69 year old male who presents today as a new patient for psoriasis and evaluation of a nevus. He feels his condition is well-controlled on current topical regimen. He is not interested in oral mediations. He is primarily affected on the bilateral lower legs, knees, and elbows. Minimal scaling in his scalp and back. He needs refills of his topicals and assistance with insurance coverage for Talconex.     He also noticed a change in a nevus on his left temple. He thinks it has grown in size and is flaky. His brother recently had a non-melanoma skin cancer removed. No other family history of skin cancer.     Patient is otherwise feeling well, without additional skin concerns.    Labs:  None reviewed.    Physical Exam:  Vitals: There were no vitals taken for this visit.  SKIN: Total skin excluding the undergarment areas was performed. The exam included the head/face, neck, both arms, chest, back, abdomen, both legs, digits and/or nails.   - Scaling plaques to bilateral lateral lower legs, knees, elbows. Minimal scale on scalp and back.   - Scabbed lesion on right lateral neck  - ***  - No other lesions of concern on areas examined. "     Medications:  Current Outpatient Medications   Medication Sig Dispense Refill     acetaminophen (TYLENOL) 500 MG tablet Take 2 tablets (1,000 mg) by mouth every 6 hours as needed for pain       Artificial Tear Ointment (REFRESH P.M.) OINT Apply 1 inch to eye nightly as needed 1 Tube 6     augmented betamethasone dipropionate (DIPROLENE-AF) 0.05 % external ointment Apply topically 2 times daily Apply a thin layer to thick areas of psoriasis 1-2x per day twice per week and wrap with saran wrap 50 g 3     betamethasone dipropionate (DIPROSONE) 0.05 % external ointment MIX 1:1 WITH CALCIPOTRIENE. APPLY TOPICALLY TO THE AFFECTED AREA TWICE A DAY 45 g 2     betamethasone dipropionate (DIPROSONE) 0.05 % external ointment Mix 1:1 with calcipotriene. Apply topically to the AA BID. 50 g 0     BIOTIN PO Take 1 tablet by mouth daily        calcipotriene (DOVONOX) 0.005 % external ointment MIX 1:1 WITH BETAMETHASONE. APPLY TOPICALLY TO THE AFFFECTED AREA TWICE A DAY . 60 g 2     calcipotriene (DOVONOX) 0.005 % external ointment Mix 1:1 wirh betamethasone. Apply topically to the AA BID. 60 g 3     calcipotriene-betameth diprop (TACLONEX) 0.005-0.064 % external ointment APPLY TO THE AFFECTED AREA 2 TIMES DAILY AS NEEDED.  For additional refills, please schedule a follow-up appointment. 60 g 0     citalopram (CELEXA) 20 MG tablet TAKE 1 TABLET BY MOUTH EVERY DAY 90 tablet 1     fish oil-omega-3 fatty acids 1000 MG capsule Take 1 g by mouth daily       fluocinonide (LIDEX) 0.05 % external solution Apply  topically 2 times daily for itchy scalp or scalp psoriasis 60 mL 2     Glucosamine-Chondroit-Vit C-Mn (GLUCOSAMINE 1500 COMPLEX) CAPS Take 1,500 mg by mouth 2 times daily 1 capsule 0     Multiple Vitamin (DAILY MULTIVITAMIN PO) Take 1 tablet by mouth daily        nitroGLYcerin (NITROSTAT) 0.4 MG sublingual tablet Place 0.4 mg under the tongue every 5 minutes as needed for chest pain For chest pain place 1 tablet under the tongue  "every 5 minutes for 3 doses. If symptoms persist 5 minutes after 1st dose call 911.       olopatadine (PATADAY) 0.2 % ophthalmic solution Place 1 drop into both eyes daily 2.5 mL 11     omeprazole (PRILOSEC) 10 MG DR capsule Take 20 mg by mouth daily       pimecrolimus (ELIDEL) 1 % external cream Apply topically 2 times daily as needed For psoriasis of face or genitals  Please keep appointment 12/1/20 30 g 0     rosuvastatin (CRESTOR) 20 MG tablet TAKE 1 TABLET BY MOUTH EVERY DAY 90 tablet 1     Turmeric 500 MG TABS Take 1,000 mg by mouth 2 times daily 1 tablet 0     VITAMIN D, CHOLECALCIFEROL, PO Take 1,000 Units by mouth daily        No current facility-administered medications for this visit.      Past Medical History:   Patient Active Problem List   Diagnosis     Psoriasis     Pain in both feet     Iliotibial band syndrome, unspecified laterality     Adhesive capsulitis of left shoulder     Left shoulder pain     ADD (attention deficit disorder)     Seborrheic keratosis     Coronary artery anomaly     Dyslipidemia     NSVT (nonsustained ventricular tachycardia) (H)     Ureterolithiasis     Nausea with vomiting     Ureteral stone     Left flank pain     Milia     Plaque psoriasis     Multiple benign nevi     Schatzki's ring     Past Medical History:   Diagnosis Date     Arthritis      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       Again, thank you for allowing me to participate in the care of your patient.      Sincerely,    Petar Corbett MD    "

## 2025-01-16 LAB
PATH REPORT.COMMENTS IMP SPEC: NORMAL
PATH REPORT.COMMENTS IMP SPEC: NORMAL
PATH REPORT.FINAL DX SPEC: NORMAL
PATH REPORT.GROSS SPEC: NORMAL
PATH REPORT.MICROSCOPIC SPEC OTHER STN: NORMAL
PATH REPORT.RELEVANT HX SPEC: NORMAL

## 2025-02-16 PROBLEM — D49.2 NEOPLASM OF UNSPECIFIED BEHAVIOR OF BONE, SOFT TISSUE, AND SKIN: Status: ACTIVE | Noted: 2025-02-16

## 2025-02-17 ENCOUNTER — TELEPHONE (OUTPATIENT)
Dept: DERMATOLOGY | Facility: CLINIC | Age: 70
End: 2025-02-17
Payer: COMMERCIAL

## 2025-02-17 NOTE — TELEPHONE ENCOUNTER
2/17 Left Voicemail (1st Attempt) and Sent Mychart (1st Attempt) for the patient to call back and schedule the following:    Appointment type: Return Dermatology  Provider: Aric  Return date: September 2025  Specialty phone number: [phone]  Additional appointment(s) needed: na  Additonal Notes: na

## 2025-03-12 DIAGNOSIS — F33.1 MODERATE EPISODE OF RECURRENT MAJOR DEPRESSIVE DISORDER (H): ICD-10-CM

## 2025-03-12 RX ORDER — CITALOPRAM HYDROBROMIDE 20 MG/1
20 TABLET ORAL DAILY
Qty: 90 TABLET | Refills: 1 | OUTPATIENT
Start: 2025-03-12

## 2025-03-12 NOTE — TELEPHONE ENCOUNTER
Have not seen this patient since 2023 and he has transferred care to [facility]. Rx should be sent to new PCP.

## 2025-03-12 NOTE — TELEPHONE ENCOUNTER
Attempted to call pharmacy and waited on hold for 15 minutes. Will attempt to call pharmacy later.     Kathryn BEYER RN  [facility] Triage Team

## 2025-03-13 NOTE — TELEPHONE ENCOUNTER
Sent MC message to pt to request them to contact their PCP or pharmacy to get a new prescription from their new PCP.    Jennifer Neely RN

## 2025-04-12 ENCOUNTER — HEALTH MAINTENANCE LETTER (OUTPATIENT)
Age: 70
End: 2025-04-12

## (undated) DEVICE — CATH URETERAL OPEN END 6FR AXXCESS

## (undated) DEVICE — GUIDEWIRE SENSOR DUAL FLEX STR 0.035"X150CM M0066703080

## (undated) DEVICE — SOL WATER IRRIG 1000ML BOTTLE 2F7114

## (undated) DEVICE — LINEN TOWEL PACK X5 5464

## (undated) DEVICE — SOL WATER IRRIG 3000ML BAG 2B7117

## (undated) DEVICE — CATH URETERAL DUAL LUMEN 10FRX54CM M0064051000

## (undated) DEVICE — BASKET STONE RETRIEVAL SEGURA 2.4FRX120CM M0063801070

## (undated) DEVICE — BAG CYSTO TABLE DRAIN

## (undated) DEVICE — PAD CHUX UNDERPAD 23X24" 7136

## (undated) DEVICE — RAD RX ISOVUE 300 (50ML) 61% IOPAMIDOL CHARGE PER ML

## (undated) DEVICE — GLOVE PROTEXIS W/NEU-THERA 7.5  2D73TE75

## (undated) DEVICE — PACK CYSTOSCOPY SBA15CYFSI

## (undated) DEVICE — LASER FIBER HOLMIUM FLEXIVA 365UM M0068403920

## (undated) RX ORDER — FENTANYL CITRATE 50 UG/ML
INJECTION, SOLUTION INTRAMUSCULAR; INTRAVENOUS
Status: DISPENSED
Start: 2023-12-28

## (undated) RX ORDER — FENTANYL CITRATE 50 UG/ML
INJECTION, SOLUTION INTRAMUSCULAR; INTRAVENOUS
Status: DISPENSED
Start: 2021-09-10

## (undated) RX ORDER — PROPOFOL 10 MG/ML
INJECTION, EMULSION INTRAVENOUS
Status: DISPENSED
Start: 2021-09-10

## (undated) RX ORDER — FENTANYL CITRATE 50 UG/ML
INJECTION, SOLUTION INTRAMUSCULAR; INTRAVENOUS
Status: DISPENSED
Start: 2019-05-17

## (undated) RX ORDER — CEFAZOLIN SODIUM 2 G/100ML
INJECTION, SOLUTION INTRAVENOUS
Status: DISPENSED
Start: 2021-09-10